# Patient Record
Sex: MALE | Race: WHITE | NOT HISPANIC OR LATINO | Employment: OTHER | URBAN - NONMETROPOLITAN AREA
[De-identification: names, ages, dates, MRNs, and addresses within clinical notes are randomized per-mention and may not be internally consistent; named-entity substitution may affect disease eponyms.]

---

## 2018-01-18 ENCOUNTER — APPOINTMENT (OUTPATIENT)
Dept: GENERAL RADIOLOGY | Facility: HOSPITAL | Age: 48
End: 2018-01-18

## 2018-01-18 ENCOUNTER — HOSPITAL ENCOUNTER (EMERGENCY)
Facility: HOSPITAL | Age: 48
Discharge: HOME | End: 2018-01-18
Attending: EMERGENCY MEDICINE | Admitting: EMERGENCY MEDICINE

## 2018-01-18 ENCOUNTER — APPOINTMENT (OUTPATIENT)
Dept: CT IMAGING | Facility: HOSPITAL | Age: 48
End: 2018-01-18

## 2018-01-18 VITALS
DIASTOLIC BLOOD PRESSURE: 106 MMHG | HEIGHT: 71 IN | BODY MASS INDEX: 26.6 KG/M2 | WEIGHT: 190 LBS | OXYGEN SATURATION: 96 % | RESPIRATION RATE: 16 BRPM | HEART RATE: 93 BPM | SYSTOLIC BLOOD PRESSURE: 140 MMHG | TEMPERATURE: 98.2 F

## 2018-01-18 DIAGNOSIS — S16.1XXA CERVICAL STRAIN, ACUTE, INITIAL ENCOUNTER: ICD-10-CM

## 2018-01-18 DIAGNOSIS — S39.012A LUMBAR STRAIN, INITIAL ENCOUNTER: ICD-10-CM
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
## 2018-01-18 DIAGNOSIS — S29.019A STRAIN OF THORACIC SPINE, INITIAL ENCOUNTER: ICD-10-CM

## 2018-01-18 PROCEDURE — 72128 CT CHEST SPINE W/O DYE: CPT

## 2018-01-18 PROCEDURE — 73030 X-RAY EXAM OF SHOULDER: CPT

## 2018-01-18 PROCEDURE — 72125 CT NECK SPINE W/O DYE: CPT

## 2018-01-18 PROCEDURE — 72131 CT LUMBAR SPINE W/O DYE: CPT

## 2018-01-18 PROCEDURE — 99283 EMERGENCY DEPT VISIT LOW MDM: CPT

## 2018-01-18 PROCEDURE — 70450 CT HEAD/BRAIN W/O DYE: CPT

## 2018-01-18 RX ORDER — HYDROCODONE BITARTRATE AND ACETAMINOPHEN 5; 325 MG/1; MG/1
1 TABLET ORAL 4 TIMES DAILY PRN
Qty: 5 TABLET | Refills: 0 | Status: SHIPPED | OUTPATIENT
Start: 2018-01-18

## 2018-01-18 RX ORDER — MORPHINE SULFATE 4 MG/ML
4 INJECTION, SOLUTION INTRAMUSCULAR; INTRAVENOUS ONCE
Status: DISCONTINUED | OUTPATIENT
Start: 2018-01-18 | End: 2018-01-18 | Stop reason: HOSPADM

## 2018-01-18 RX ORDER — LORAZEPAM 0.5 MG/1
1 TABLET ORAL ONCE
Status: COMPLETED | OUTPATIENT
Start: 2018-01-18 | End: 2018-01-18

## 2018-01-18 RX ORDER — CYCLOBENZAPRINE HCL 10 MG
10 TABLET ORAL 3 TIMES DAILY PRN
Qty: 15 TABLET | Refills: 0 | Status: SHIPPED | OUTPATIENT
Start: 2018-01-18

## 2018-01-18 RX ADMIN — LORAZEPAM 1 MG: 0.5 TABLET ORAL at 20:13
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
## 2021-10-19 ENCOUNTER — APPOINTMENT (OUTPATIENT)
Dept: GENERAL RADIOLOGY | Facility: HOSPITAL | Age: 51
End: 2021-10-19

## 2021-10-19 ENCOUNTER — APPOINTMENT (OUTPATIENT)
Dept: CT IMAGING | Facility: HOSPITAL | Age: 51
End: 2021-10-19

## 2021-10-19 ENCOUNTER — HOSPITAL ENCOUNTER (EMERGENCY)
Facility: HOSPITAL | Age: 51
Discharge: HOME | End: 2021-10-19
Attending: EMERGENCY MEDICINE | Admitting: EMERGENCY MEDICINE

## 2021-10-19 VITALS
BODY MASS INDEX: 28 KG/M2 | OXYGEN SATURATION: 97 % | WEIGHT: 200 LBS | RESPIRATION RATE: 18 BRPM | SYSTOLIC BLOOD PRESSURE: 118 MMHG | DIASTOLIC BLOOD PRESSURE: 89 MMHG | TEMPERATURE: 99 F | HEIGHT: 71 IN | HEART RATE: 87 BPM

## 2021-10-19 DIAGNOSIS — R07.9 CHEST PAIN IN ADULT: Primary | ICD-10-CM

## 2021-10-19 DIAGNOSIS — F11.20 NARCOTIC DEPENDENCE (HCC): ICD-10-CM

## 2021-10-19 DIAGNOSIS — G89.29 OTHER CHRONIC PAIN: ICD-10-CM

## 2021-10-19 DIAGNOSIS — I42.9 CARDIOMYOPATHY, UNSPECIFIED TYPE (HCC): ICD-10-CM

## 2021-10-19 LAB
ALBUMIN SERPL-MCNC: 3.2 G/DL (ref 3.5–5.2)
ALBUMIN/GLOB SERPL: 0.9 G/DL
ALP SERPL-CCNC: 40 U/L (ref 39–117)
ALT SERPL W P-5'-P-CCNC: 10 U/L (ref 1–41)
AMPHET+METHAMPHET UR QL: NEGATIVE
ANION GAP SERPL CALCULATED.3IONS-SCNC: 10.1 MMOL/L (ref 5–15)
APTT PPP: 28.7 SECONDS (ref 22.7–35.4)
AST SERPL-CCNC: 24 U/L (ref 1–40)
BARBITURATES UR QL SCN: NEGATIVE
BASOPHILS # BLD AUTO: 0.04 10*3/MM3 (ref 0–0.2)
BASOPHILS NFR BLD AUTO: 0.5 % (ref 0–1.5)
BENZODIAZ UR QL SCN: NEGATIVE
BILIRUB SERPL-MCNC: 0.9 MG/DL (ref 0–1.2)
BUN SERPL-MCNC: 8 MG/DL (ref 6–20)
BUN/CREAT SERPL: 8.2 (ref 7–25)
CALCIUM SPEC-SCNC: 8.3 MG/DL (ref 8.6–10.5)
CANNABINOIDS SERPL QL: NEGATIVE
CHLORIDE SERPL-SCNC: 92 MMOL/L (ref 98–107)
CO2 SERPL-SCNC: 31.9 MMOL/L (ref 22–29)
COCAINE UR QL: NEGATIVE
CREAT SERPL-MCNC: 0.98 MG/DL (ref 0.76–1.27)
DEPRECATED RDW RBC AUTO: 44.4 FL (ref 37–54)
EOSINOPHIL # BLD AUTO: 0.03 10*3/MM3 (ref 0–0.4)
EOSINOPHIL NFR BLD AUTO: 0.4 % (ref 0.3–6.2)
ERYTHROCYTE [DISTWIDTH] IN BLOOD BY AUTOMATED COUNT: 14.1 % (ref 12.3–15.4)
ETHANOL BLD-MCNC: <10 MG/DL (ref 0–10)
ETHANOL UR QL: <0.01 %
GFR SERPL CREATININE-BSD FRML MDRD: 80 ML/MIN/1.73
GLOBULIN UR ELPH-MCNC: 3.4 GM/DL
GLUCOSE SERPL-MCNC: 137 MG/DL (ref 65–99)
HCT VFR BLD AUTO: 34.3 % (ref 37.5–51)
HGB BLD-MCNC: 11.8 G/DL (ref 13–17.7)
IMM GRANULOCYTES # BLD AUTO: 0.02 10*3/MM3 (ref 0–0.05)
IMM GRANULOCYTES NFR BLD AUTO: 0.2 % (ref 0–0.5)
INR PPP: 1.91 (ref 0.9–1.1)
LIPASE SERPL-CCNC: 5 U/L (ref 13–60)
LYMPHOCYTES # BLD AUTO: 1.25 10*3/MM3 (ref 0.7–3.1)
LYMPHOCYTES NFR BLD AUTO: 15.3 % (ref 19.6–45.3)
MAGNESIUM SERPL-MCNC: 1.7 MG/DL (ref 1.6–2.6)
MCH RBC QN AUTO: 30.3 PG (ref 26.6–33)
MCHC RBC AUTO-ENTMCNC: 34.4 G/DL (ref 31.5–35.7)
MCV RBC AUTO: 87.9 FL (ref 79–97)
METHADONE UR QL SCN: NEGATIVE
MONOCYTES # BLD AUTO: 1.07 10*3/MM3 (ref 0.1–0.9)
MONOCYTES NFR BLD AUTO: 13.1 % (ref 5–12)
NEUTROPHILS NFR BLD AUTO: 5.77 10*3/MM3 (ref 1.7–7)
NEUTROPHILS NFR BLD AUTO: 70.5 % (ref 42.7–76)
NRBC BLD AUTO-RTO: 0 /100 WBC (ref 0–0.2)
OPIATES UR QL: POSITIVE
OXYCODONE UR QL SCN: POSITIVE
PLATELET # BLD AUTO: 221 10*3/MM3 (ref 140–450)
PMV BLD AUTO: 10.6 FL (ref 6–12)
POTASSIUM SERPL-SCNC: 3.3 MMOL/L (ref 3.5–5.2)
PROT SERPL-MCNC: 6.6 G/DL (ref 6–8.5)
PROTHROMBIN TIME: 21.6 SECONDS (ref 11.7–14.2)
RBC # BLD AUTO: 3.9 10*6/MM3 (ref 4.14–5.8)
SARS-COV-2 RNA RESP QL NAA+PROBE: NOT DETECTED
SODIUM SERPL-SCNC: 134 MMOL/L (ref 136–145)
TROPONIN T SERPL-MCNC: <0.01 NG/ML (ref 0–0.03)
TROPONIN T SERPL-MCNC: <0.01 NG/ML (ref 0–0.03)
WBC # BLD AUTO: 8.18 10*3/MM3 (ref 3.4–10.8)

## 2021-10-19 PROCEDURE — 80307 DRUG TEST PRSMV CHEM ANLYZR: CPT | Performed by: EMERGENCY MEDICINE

## 2021-10-19 PROCEDURE — 85610 PROTHROMBIN TIME: CPT | Performed by: EMERGENCY MEDICINE

## 2021-10-19 PROCEDURE — C9803 HOPD COVID-19 SPEC COLLECT: HCPCS | Performed by: EMERGENCY MEDICINE

## 2021-10-19 PROCEDURE — 82077 ASSAY SPEC XCP UR&BREATH IA: CPT | Performed by: EMERGENCY MEDICINE

## 2021-10-19 PROCEDURE — 96365 THER/PROPH/DIAG IV INF INIT: CPT

## 2021-10-19 PROCEDURE — U0003 INFECTIOUS AGENT DETECTION BY NUCLEIC ACID (DNA OR RNA); SEVERE ACUTE RESPIRATORY SYNDROME CORONAVIRUS 2 (SARS-COV-2) (CORONAVIRUS DISEASE [COVID-19]), AMPLIFIED PROBE TECHNIQUE, MAKING USE OF HIGH THROUGHPUT TECHNOLOGIES AS DESCRIBED BY CMS-2020-01-R: HCPCS | Performed by: EMERGENCY MEDICINE

## 2021-10-19 PROCEDURE — 0 IOPAMIDOL PER 1 ML: Performed by: EMERGENCY MEDICINE

## 2021-10-19 PROCEDURE — 93005 ELECTROCARDIOGRAM TRACING: CPT | Performed by: EMERGENCY MEDICINE

## 2021-10-19 PROCEDURE — 99284 EMERGENCY DEPT VISIT MOD MDM: CPT

## 2021-10-19 PROCEDURE — U0005 INFEC AGEN DETEC AMPLI PROBE: HCPCS | Performed by: EMERGENCY MEDICINE

## 2021-10-19 PROCEDURE — 93010 ELECTROCARDIOGRAM REPORT: CPT | Performed by: INTERNAL MEDICINE

## 2021-10-19 PROCEDURE — 85730 THROMBOPLASTIN TIME PARTIAL: CPT | Performed by: EMERGENCY MEDICINE

## 2021-10-19 PROCEDURE — 80053 COMPREHEN METABOLIC PANEL: CPT | Performed by: EMERGENCY MEDICINE

## 2021-10-19 PROCEDURE — 83690 ASSAY OF LIPASE: CPT | Performed by: EMERGENCY MEDICINE

## 2021-10-19 PROCEDURE — 96366 THER/PROPH/DIAG IV INF ADDON: CPT

## 2021-10-19 PROCEDURE — 71045 X-RAY EXAM CHEST 1 VIEW: CPT

## 2021-10-19 PROCEDURE — 85025 COMPLETE CBC W/AUTO DIFF WBC: CPT | Performed by: EMERGENCY MEDICINE

## 2021-10-19 PROCEDURE — 71275 CT ANGIOGRAPHY CHEST: CPT

## 2021-10-19 PROCEDURE — 84484 ASSAY OF TROPONIN QUANT: CPT | Performed by: EMERGENCY MEDICINE

## 2021-10-19 PROCEDURE — 83735 ASSAY OF MAGNESIUM: CPT | Performed by: EMERGENCY MEDICINE

## 2021-10-19 RX ORDER — NITROGLYCERIN 20 MG/100ML
5-200 INJECTION INTRAVENOUS
Status: DISCONTINUED | OUTPATIENT
Start: 2021-10-19 | End: 2021-10-19 | Stop reason: HOSPADM

## 2021-10-19 RX ORDER — SODIUM CHLORIDE 0.9 % (FLUSH) 0.9 %
10 SYRINGE (ML) INJECTION AS NEEDED
Status: DISCONTINUED | OUTPATIENT
Start: 2021-10-19 | End: 2021-10-19 | Stop reason: HOSPADM

## 2021-10-19 RX ADMIN — IOPAMIDOL 85 ML: 755 INJECTION, SOLUTION INTRAVENOUS at 17:28

## 2021-10-19 RX ADMIN — SODIUM CHLORIDE 250 ML: 9 INJECTION, SOLUTION INTRAVENOUS at 16:18

## 2021-10-19 RX ADMIN — NITROGLYCERIN 5 MCG/MIN: 20 INJECTION INTRAVENOUS at 15:00

## 2021-10-19 NOTE — ED NOTES
Arrival to [facility] c/o chest pain given 4  Baby aspirin  And 2 sl nitro by EMS     Toña Benavides RN  10/19/21 1400

## 2021-10-19 NOTE — ED PROVIDER NOTES
" EMERGENCY DEPARTMENT ENCOUNTER    Room Number:  23/23  Date of encounter:  10/19/2021  PCP: Amaris Jensen APRN  Historian: Pt    Patient was placed in face mask during triage process. Patient was wearing facemask when I entered the room and throughout our encounter. I wore full protective equipment throughout this patient encounter including a face mask, eye protection, and gloves. Hand hygiene was performed before donning protective equipment and again following doffing of PPE after leaving the room.    HPI:  Chief Complaint: Chest pain  A complete HPI/ROS/PMH/PSH/SH/FH are unobtainable due to: Rather selective historian  Context: Luis F Elliott is a 51 y.o. male who presents to the ED c/o chest discomfort.  He notes that he spent all day today but cannot and/or will not identify specific onset time.  He notes that it is in his chest and throughout his entire chest.  \"It feels like a heart attack.\"  He said there is some burning associated with it as well.  No nausea or vomiting.  He does feel little bit of short of breath.  He continues to smoke.  He repeatedly notes to the  that he would like to stop speaking by sign because it hurt so much until he receives some morphine for his pain.  He received 2 nitroglycerin in route which took his pain from an 8 to a 7.  He also received aspirin prior to arrival.  No other clear exacerbating or relieving factors identified  Patient has an established history at [facility] in [facility] and was in fact just discharged from there 2 days ago.        MEDICAL HISTORY REVIEW    [facility] Discharge Summary:  Admit Date: 9/29/2021  Discharge Date: 10/17/2021    Admitting Physician: Delvin Bradford DO  Discharging Physician: DEWEY Chapman    Reason for Hospitalization:  Active Hospital Problems  Scrotal edema  Constipation  Vancomycin-induced nephrotoxicity  Hyperkalemia  *Cellulitis  PVD " (peripheral vascular disease) (HCC)  Gangrene of toe of right foot (HCC)  Cellulitis of right lower extremity  COPD (chronic obstructive pulmonary disease) (HCC)  PAD (peripheral artery disease) (HCC)  Tobacco abuse  Coronary artery disease involving native heart with unstable angina pectoris (HCC)  Heart failure with reduced ejection fraction (HCC)  Bilateral deafness  Mild nonproliferative diabetic retinopathy of both eyes without macular edema associated with type 2 diabetes mellitus (HCC)  Chronic hepatitis C without hepatic coma (HCC)  HTN (hypertension)  Chronic systolic congestive heart failure (HCC)  GOLD (acute kidney injury) (HCC)  Type 2 diabetes mellitus with neurologic complication, with long-term current use of insulin (HCC)  Hx of substance abuse (HCC)    -------------------------------------------------------    Procedure Note - Eleazar Rob MD - 10/08/2021 2:02 PM EDT    Formatting of this note is different from the original.  [facility]  [facility]   Consultation Note  HFrEF  -Etiology: ICM  -SCD Prevention: AICD, dual chamber    -CXR: stable chest  -TTE (4/24/21): EF 15-20%, LVDD 6.2 cm, mild MR, mild to mod TR, appears no thrombus with definity  -TTE (4/8/20): EF 20-25%  -TTE (12/9/19): EF 20-25%, LVEDD 5.6 cm  -Cardiac PET (12/11/19): Apical- periapical defect. Diminished activity in a small area of the proximal lateral wall.  -on Lisinopril 10 mg daily, Metoprolol Succinate 100 mg daily, Torsemide 40 mg daily     ASHD  -LHC (2/7/20)  LAD with rt to lt collaterals, PCI of circ and OM with BILLY, patent stent RCA  -CTS not surgical candidate (12/2019)  -Inferior STEMI 2015, s/p PCI with BILLY  -on atorvastatin, Plavix, (not on ASA as he is on Plavix and Eliquis)  Coronary angiography: 2/2020     Left Anterior Descending   The vessel is small. There is mild diffuse disease throughout the vessel.   Collaterals   Dist LAD filled by  collaterals from RPDA.       Mid LAD lesion is 100% stenosed.   First Diagonal Branch   The vessel is moderate in size. The vessel exhibits minimal luminal irregularities.   Second Diagonal Branch   Collaterals   2nd Diag filled by collaterals from RPDA.       Left Circumflex   Ost Cx to Prox Cx lesion is 60% stenosed with 99% stenosed side branch in 1st Mrg. The lesion is thrombotic, eccentric, thrombotic, concentric and irregular.   Second Obtuse Marginal Branch   The vessel is moderate in size. The vessel exhibits minimal luminal irregularities.   Right Coronary Artery   The vessel is moderate in size. There is mild diffuse disease throughout the vessel. This vessel is mildly tortuous. This vessel is mildly calcified.   Ost RCA to Prox RCA lesion is 40% stenosed.   Right Posterior Descending Artery   There is mild diffuse disease throughout the vessel.   RPDA lesion is 60% stenosed.   Right Posterior Atrioventricular Artery   The vessel is small. There is mild diffuse disease throughout the vessel.   RPAV lesion is 90% stenosed.      Ostial circumflex to prox circ with side branch OM1 treated with BILLY    PAST MEDICAL HISTORY  Active Ambulatory Problems     Diagnosis Date Noted   • No Active Ambulatory Problems     Resolved Ambulatory Problems     Diagnosis Date Noted   • No Resolved Ambulatory Problems     Past Medical History:   Diagnosis Date   • Deaf    • Diabetes mellitus (HCC)    • Hyperlipidemia    • Hypertension    • Injury of back    • Myocardial infarction (HCC)    • Pneumonia          PAST SURGICAL HISTORY  Past Surgical History:   Procedure Laterality Date   • BACK SURGERY     • CARDIAC CATHETERIZATION     • CORONARY ANGIOPLASTY WITH STENT PLACEMENT           FAMILY HISTORY  History reviewed. No pertinent family history.      SOCIAL HISTORY  Social History     Socioeconomic History   • Marital status: Single   Tobacco Use   • Smoking status: Heavy Tobacco Smoker     Packs/day: 0.50     Types:  Cigarettes   • Smokeless tobacco: Current User     Types: Chew   Substance and Sexual Activity   • Alcohol use: No   • Drug use: No     Comment: NARCOTICS         ALLERGIES  Patient has no known allergies.        REVIEW OF SYSTEMS  Review of Systems     All systems reviewed and negative except for those discussed in HPI.       PHYSICAL EXAM    I have reviewed the triage vital signs and nursing notes.    ED Triage Vitals   Temp Heart Rate Resp BP SpO2   10/19/21 1406 10/19/21 1403 10/19/21 1403 10/19/21 1403 10/19/21 1406   99 °F (37.2 °C) 97 20 110/85 95 %      Temp src Heart Rate Source Patient Position BP Location FiO2 (%)   10/19/21 1406 10/19/21 1403 10/19/21 1403 10/19/21 1403 --   Tympanic Monitor Lying Right arm          Physical Exam    Physical Exam   Constitutional: No distress.  Smells strongly of cigarette smoke  HENT:  Head: Normocephalic and atraumatic.   Oropharynx: Mucous membranes are moist.   Eyes: No scleral icterus. No conjunctival pallor.  Neck: Painless range of motion noted. Neck supple.   Cardiovascular: Normal rate, regular rhythm and intact distal pulses.  Pulmonary/Chest: No respiratory distress.  Diminished throughout   abdominal: Soft. There is no tenderness. There is no rebound and no guarding.   Musculoskeletal: Moves all extremities equally.  Severe peripheral vascular disease with chronic changes.  Recent postop changes right foot consistent with history of toe amputation.    Neurological: Alert.  Baseline strength and sensation noted.   Skin: Skin is pink, warm, and dry. No pallor.   Psychiatric: Mood and affect normal.   Nursing note and vitals reviewed.    LAB RESULTS  Recent Results (from the past 24 hour(s))   ECG 12 Lead    Collection Time: 10/19/21  2:02 PM   Result Value Ref Range    QT Interval 396 ms   Comprehensive Metabolic Panel    Collection Time: 10/19/21  2:29 PM    Specimen: Blood   Result Value Ref Range    Glucose 137 (H) 65 - 99 mg/dL    BUN 8 6 - 20 mg/dL     Creatinine 0.98 0.76 - 1.27 mg/dL    Sodium 134 (L) 136 - 145 mmol/L    Potassium 3.3 (L) 3.5 - 5.2 mmol/L    Chloride 92 (L) 98 - 107 mmol/L    CO2 31.9 (H) 22.0 - 29.0 mmol/L    Calcium 8.3 (L) 8.6 - 10.5 mg/dL    Total Protein 6.6 6.0 - 8.5 g/dL    Albumin 3.20 (L) 3.50 - 5.20 g/dL    ALT (SGPT) 10 1 - 41 U/L    AST (SGOT) 24 1 - 40 U/L    Alkaline Phosphatase 40 39 - 117 U/L    Total Bilirubin 0.9 0.0 - 1.2 mg/dL    eGFR Non African Amer 80 >60 mL/min/1.73    Globulin 3.4 gm/dL    A/G Ratio 0.9 g/dL    BUN/Creatinine Ratio 8.2 7.0 - 25.0    Anion Gap 10.1 5.0 - 15.0 mmol/L   Lipase    Collection Time: 10/19/21  2:29 PM    Specimen: Blood   Result Value Ref Range    Lipase 5 (L) 13 - 60 U/L   Protime-INR    Collection Time: 10/19/21  2:29 PM    Specimen: Blood   Result Value Ref Range    Protime 21.6 (H) 11.7 - 14.2 Seconds    INR 1.91 (H) 0.90 - 1.10   aPTT    Collection Time: 10/19/21  2:29 PM    Specimen: Blood   Result Value Ref Range    PTT 28.7 22.7 - 35.4 seconds   Ethanol    Collection Time: 10/19/21  2:29 PM    Specimen: Blood   Result Value Ref Range    Ethanol <10 0 - 10 mg/dL    Ethanol % <0.010 %   Troponin    Collection Time: 10/19/21  2:29 PM    Specimen: Blood   Result Value Ref Range    Troponin T <0.010 0.000 - 0.030 ng/mL   CBC Auto Differential    Collection Time: 10/19/21  2:29 PM    Specimen: Blood   Result Value Ref Range    WBC 8.18 3.40 - 10.80 10*3/mm3    RBC 3.90 (L) 4.14 - 5.80 10*6/mm3    Hemoglobin 11.8 (L) 13.0 - 17.7 g/dL    Hematocrit 34.3 (L) 37.5 - 51.0 %    MCV 87.9 79.0 - 97.0 fL    MCH 30.3 26.6 - 33.0 pg    MCHC 34.4 31.5 - 35.7 g/dL    RDW 14.1 12.3 - 15.4 %    RDW-SD 44.4 37.0 - 54.0 fl    MPV 10.6 6.0 - 12.0 fL    Platelets 221 140 - 450 10*3/mm3    Neutrophil % 70.5 42.7 - 76.0 %    Lymphocyte % 15.3 (L) 19.6 - 45.3 %    Monocyte % 13.1 (H) 5.0 - 12.0 %    Eosinophil % 0.4 0.3 - 6.2 %    Basophil % 0.5 0.0 - 1.5 %    Immature Grans % 0.2 0.0 - 0.5 %    Neutrophils,  Absolute 5.77 1.70 - 7.00 10*3/mm3    Lymphocytes, Absolute 1.25 0.70 - 3.10 10*3/mm3    Monocytes, Absolute 1.07 (H) 0.10 - 0.90 10*3/mm3    Eosinophils, Absolute 0.03 0.00 - 0.40 10*3/mm3    Basophils, Absolute 0.04 0.00 - 0.20 10*3/mm3    Immature Grans, Absolute 0.02 0.00 - 0.05 10*3/mm3    nRBC 0.0 0.0 - 0.2 /100 WBC   Magnesium    Collection Time: 10/19/21  2:29 PM    Specimen: Blood   Result Value Ref Range    Magnesium 1.7 1.6 - 2.6 mg/dL   COVID-19,[facility] IN-HOUSE CEPHEID/[facility]UIDO NP SWAB IN TRANSPORT MEDIA 8-12 HR TAT - Swab, Nasopharynx    Collection Time: 10/19/21  2:38 PM    Specimen: Nasopharynx; Swab   Result Value Ref Range    COVID19 Not Detected Not Detected - Ref. Range   Urine Drug Screen - Urine, Clean Catch    Collection Time: 10/19/21  2:49 PM    Specimen: Urine, Clean Catch   Result Value Ref Range    Amphet/Methamphet, Screen Negative Negative    Barbiturates Screen, Urine Negative Negative    Benzodiazepine Screen, Urine Negative Negative    Cocaine Screen, Urine Negative Negative    Opiate Screen Positive (A) Negative    THC, Screen, Urine Negative Negative    Methadone Screen, Urine Negative Negative    Oxycodone Screen, Urine Positive (A) Negative   ECG 12 Lead    Collection Time: 10/19/21  3:30 PM   Result Value Ref Range    QT Interval 421 ms   Troponin    Collection Time: 10/19/21  5:33 PM    Specimen: Blood   Result Value Ref Range    Troponin T <0.010 0.000 - 0.030 ng/mL       Ordered the above labs and independently reviewed the results.        RADIOLOGY  XR Chest 1 View    Result Date: 10/19/2021  XR CHEST 1 VW-  HISTORY: Male who is 51 years-old,  chest pain  TECHNIQUE: Frontal view of the chest  COMPARISON: None available  FINDINGS: The heart size is borderline. Left-sided pacemaker and cardiac leads are seen. Pulmonary vasculature is unremarkable. No focal pulmonary consolidation, pleural effusion, or pneumothorax. No acute osseous process.      No focal pulmonary consolidation.  Borderline heart size. Follow-up as clinical indications persist.  This report was finalized on 10/19/2021 2:58 PM by Dr. Sonny Granados M.D.        I ordered the above noted radiological studies. Reviewed by me and discussed with radiologist.  See dictation for official radiology interpretation.      PROCEDURES    Procedures        MEDICATIONS GIVEN IN ER    Medications   sodium chloride 0.9 % flush 10 mL (has no administration in time range)   nitroglycerin (TRIDIL) 200 mcg/ml infusion (5 mcg/min Intravenous Currently Infusing 10/19/21 1704)   sodium chloride 0.9 % bolus 250 mL (0 mL Intravenous Stopped 10/19/21 1704)   iopamidol (ISOVUE-370) 76 % injection 100 mL (85 mL Intravenous Given 10/19/21 1728)         PROGRESS, DATA ANALYSIS, CONSULTS, AND MEDICAL DECISION MAKING    My differential diagnosis for chest pain includes but is not limited to:  Muscle strain, costochondritis, myositis, pleurisy, rib fracture, intercostal neuritis, herpes zoster, tumor, myocardial infarction, coronary syndrome, unstable angina, angina, aortic dissection, mitral valve prolapse, pericarditis, palpitations, pulmonary embolus, pneumonia, pneumothorax, lung cancer, GERD, esophagitis, esophageal spasm      All labs have been independently reviewed by me.  All radiology studies have been reviewed by me and discussed with radiologist dictating the report.   EKG's independently viewed and interpreted by me.  Discussion below represents my analysis of pertinent findings related to patient's condition, differential diagnosis, treatment plan and final disposition.      ED Course as of 10/19/21 1833   Tue Oct 19, 2021   1433 EKG           EKG time: 1402  Rhythm/Rate: Sinus with frequent ectopy; ventricular rate 100  P waves and AL: VU within normal limits  QRS, axis: Wide-complex with poor R wave progression  ST and T waves: No clear evidence of STEMI; QTC appears prolonged    Interpreted Contemporaneously by me, independently  viewed  Comparison: Recent comparison Unavailable   [RS]   1521 Troponin T: <0.010 [RS]   1521 Patient planes of 9 out of 10 ongoing chest pain.  We will repeat the EKG.  Nitro drip does not help this pain but has dropped his pressure slightly.  Small fluid bolus ordered.  Patient again asking for morphine. [RS]   1522 eGFR Non  Am: 80  Plan CTA chest [RS]   1535 EKG   #2      EKG time: 1530  Rhythm/Rate: Sinus with frequent ventricular and atrial ectopy; 90  P waves and WI: VU within normal limits  QRS, axis: Wide-complex  ST and T waves: ST/T wave repolarization abnormality with no evidence of STEMI    Interpreted Contemporaneously by me, independently viewed  Comparison: No significant changes compared to same date 1402   [RS]   1543 CONSULT        Provider: [facility]    Discussion: She is very familiar with this patient.  I reviewed his history to this point today as well as response to therapies.  We talked about significant recent work-up.  She notes that he has an appointment on 10/21/2021 in their office.  She is agreeable to following up in the office assuming negative second troponin and CTA is unrevealing for acute pathology.    Agreeable c treatment and planned disposition.         [RS]   1749 Case management was able to contact family. The patient is currently living with his daughter who reports that he uses methamphetamine in front of her children. She reports that he told her today he was going to the ER to get some narcotics. He is familiar with his behavior and agreeable with our plan for discharge and outpatient follow-up. [RS]   1803 CTA chest reviewed with radiologist, Dr. Zhao.  No PE, no acute aortic pathology appreciated.  There is small pleural effusion and some mild pulmonary edema.  No other acute life threats identified. [RS]   1806 Patient is irate, continues to demand narcotics. He is invited to return to his prior pain specialist as  needed for his uncontrolled pain. [RS]   1827 Troponin T: <0.010  Negative delta troponin. No ischemic change on EKG. No acute abnormalities on chest CT. Patient will be discharged at this time with plan for outpatient follow-up. [RS]      ED Course User Index  [RS] Collins Loredo MD       AS OF 18:33 EDT VITALS:    BP - 118/89  HR - 87  TEMP - 99 °F (37.2 °C) (Tympanic)  O2 SATS - 97%        DIAGNOSIS  Final diagnoses:   Chest pain in adult   Cardiomyopathy, unspecified type (HCC)   Narcotic dependence (HCC)   Other chronic pain         DISPOSITION  DISCHARGE    Patient discharged in stable condition.    Reviewed implications of results, diagnosis, meds, responsibility to follow up, warning signs and symptoms of possible worsening, potential complications and reasons to return to ER.    Patient/Family voiced understanding of above instructions.    Discussed plan for discharge, as there is no emergent indication for admission. Patient referred to primary care provider for regular health maintenance. Pt/family is agreeable and understands need for follow up and possible repeat testing.  Pt is aware that discharge does not mean that nothing is wrong but it indicates no emergency is present that requires admission and they must continue care with follow-up as given below or physician of their choice.     FOLLOW-UP  Amaris Jensen, APRN  [address]  [facility] [id number] [phone]    Schedule an appointment as soon as possible for a visit   As needed    Go to your cardiology appointment in 2 days as scheduled               Medication List      No changes were made to your prescriptions during this visit.            Collins Loredo MD  10/19/21 1584

## 2021-10-19 NOTE — ED NOTES
This RN wearing all appropriate PPE during patient encounter. Hand hygiene performed before and during entering room.       Carlee Olsen, RN  10/19/21 6384

## 2021-10-19 NOTE — ED NOTES
This RN wearing all appropriate PPE during patient encounter. Hand hygiene performed before and during entering room.       Carlee Olsen, TINA  10/19/21 4184

## 2021-10-19 NOTE — ED NOTES
This RN wearing all appropriate PPE during patient encounter. Hand hygiene performed before and during entering room.       Carlee Olsen, RN  10/19/21 6870

## 2021-10-20 LAB
QT INTERVAL: 396 MS
QT INTERVAL: 421 MS

## 2021-10-20 NOTE — CASE MANAGEMENT/SOCIAL WORK
Patient waiting for ride in ER waiting room. He has been irate and causing a scene in the waiting room. Spoke with patient's step-daughter, Caroline, on the phone regarding transportation home upon discharge from the ED [facility]. Per step-daughter, they have no money with which to pay for a cab to get him back to [facility], where they live. Informed her that [facility] is too far, and we cannot provide him a cab that far away. They will not have money available until tomorrow. I told her that I could send the patient to [facility], but that the patient could not stay in the hospital, as he has been discharged. Caroline voices understanding and states that they have contacted another family member who is willing to pay for a hotel in [place] ([place]), where they will pick him up tomorrow, when they have gas money. Patient provided with cab voucher to [employer]. LEONARD AndrewsN RN

## 2021-10-21 ENCOUNTER — APPOINTMENT (OUTPATIENT)
Dept: CT IMAGING | Facility: HOSPITAL | Age: 51
End: 2021-10-21

## 2021-10-21 ENCOUNTER — APPOINTMENT (OUTPATIENT)
Dept: GENERAL RADIOLOGY | Facility: HOSPITAL | Age: 51
End: 2021-10-21

## 2021-10-21 ENCOUNTER — HOSPITAL ENCOUNTER (EMERGENCY)
Facility: HOSPITAL | Age: 51
Discharge: HOME | End: 2021-10-21
Attending: EMERGENCY MEDICINE | Admitting: EMERGENCY MEDICINE

## 2021-10-21 VITALS
DIASTOLIC BLOOD PRESSURE: 93 MMHG | WEIGHT: 193.5 LBS | OXYGEN SATURATION: 99 % | RESPIRATION RATE: 18 BRPM | TEMPERATURE: 97.8 F | HEIGHT: 71 IN | BODY MASS INDEX: 27.09 KG/M2 | SYSTOLIC BLOOD PRESSURE: 111 MMHG | HEART RATE: 98 BPM

## 2021-10-21 DIAGNOSIS — R10.9 ABDOMINAL PAIN, UNSPECIFIED ABDOMINAL LOCATION: ICD-10-CM

## 2021-10-21 DIAGNOSIS — R07.9 CHEST PAIN, UNSPECIFIED TYPE: Primary | ICD-10-CM

## 2021-10-21 DIAGNOSIS — Z76.5 DRUG-SEEKING BEHAVIOR: ICD-10-CM

## 2021-10-21 DIAGNOSIS — S30.1XXA CONTUSION OF ABDOMINAL WALL, INITIAL ENCOUNTER: ICD-10-CM

## 2021-10-21 LAB
ALBUMIN SERPL-MCNC: 3.1 G/DL (ref 3.5–5.2)
ALBUMIN/GLOB SERPL: 0.8 G/DL
ALP SERPL-CCNC: 50 U/L (ref 39–117)
ALT SERPL W P-5'-P-CCNC: 10 U/L (ref 1–41)
AMPHET+METHAMPHET UR QL: NEGATIVE
AMPHETAMINES UR QL: NEGATIVE
ANION GAP SERPL CALCULATED.3IONS-SCNC: 7.8 MMOL/L (ref 5–15)
AST SERPL-CCNC: 24 U/L (ref 1–40)
BACTERIA UR QL AUTO: ABNORMAL /HPF
BARBITURATES UR QL SCN: NEGATIVE
BASOPHILS # BLD AUTO: 0.05 10*3/MM3 (ref 0–0.2)
BASOPHILS NFR BLD AUTO: 0.6 % (ref 0–1.5)
BENZODIAZ UR QL SCN: NEGATIVE
BILIRUB SERPL-MCNC: 1.1 MG/DL (ref 0–1.2)
BILIRUB UR QL STRIP: NEGATIVE
BUN SERPL-MCNC: 10 MG/DL (ref 6–20)
BUN/CREAT SERPL: 10.9 (ref 7–25)
BUPRENORPHINE SERPL-MCNC: NEGATIVE NG/ML
CALCIUM SPEC-SCNC: 8.9 MG/DL (ref 8.6–10.5)
CANNABINOIDS SERPL QL: NEGATIVE
CHLORIDE SERPL-SCNC: 90 MMOL/L (ref 98–107)
CLARITY UR: CLEAR
CO2 SERPL-SCNC: 33.2 MMOL/L (ref 22–29)
COCAINE UR QL: NEGATIVE
COLOR UR: ABNORMAL
CREAT SERPL-MCNC: 0.92 MG/DL (ref 0.76–1.27)
DEPRECATED RDW RBC AUTO: 48.5 FL (ref 37–54)
EOSINOPHIL # BLD AUTO: 0.03 10*3/MM3 (ref 0–0.4)
EOSINOPHIL NFR BLD AUTO: 0.3 % (ref 0.3–6.2)
ERYTHROCYTE [DISTWIDTH] IN BLOOD BY AUTOMATED COUNT: 14.8 % (ref 12.3–15.4)
ETHANOL BLD-MCNC: <10 MG/DL (ref 0–10)
ETHANOL UR QL: <0.01 %
GFR SERPL CREATININE-BSD FRML MDRD: 86 ML/MIN/1.73
GLOBULIN UR ELPH-MCNC: 3.9 GM/DL
GLUCOSE SERPL-MCNC: 172 MG/DL (ref 65–99)
GLUCOSE UR STRIP-MCNC: NEGATIVE MG/DL
HCT VFR BLD AUTO: 39.9 % (ref 37.5–51)
HGB BLD-MCNC: 12.9 G/DL (ref 13–17.7)
HGB UR QL STRIP.AUTO: ABNORMAL
HYALINE CASTS UR QL AUTO: ABNORMAL /LPF
IMM GRANULOCYTES # BLD AUTO: 0.04 10*3/MM3 (ref 0–0.05)
IMM GRANULOCYTES NFR BLD AUTO: 0.5 % (ref 0–0.5)
KETONES UR QL STRIP: NEGATIVE
LEUKOCYTE ESTERASE UR QL STRIP.AUTO: NEGATIVE
LIPASE SERPL-CCNC: 8 U/L (ref 13–60)
LYMPHOCYTES # BLD AUTO: 1.52 10*3/MM3 (ref 0.7–3.1)
LYMPHOCYTES NFR BLD AUTO: 17.1 % (ref 19.6–45.3)
MCH RBC QN AUTO: 29.1 PG (ref 26.6–33)
MCHC RBC AUTO-ENTMCNC: 32.3 G/DL (ref 31.5–35.7)
MCV RBC AUTO: 89.9 FL (ref 79–97)
METHADONE UR QL SCN: NEGATIVE
MONOCYTES # BLD AUTO: 1.14 10*3/MM3 (ref 0.1–0.9)
MONOCYTES NFR BLD AUTO: 12.8 % (ref 5–12)
NEUTROPHILS NFR BLD AUTO: 6.1 10*3/MM3 (ref 1.7–7)
NEUTROPHILS NFR BLD AUTO: 68.7 % (ref 42.7–76)
NITRITE UR QL STRIP: NEGATIVE
NRBC BLD AUTO-RTO: 0 /100 WBC (ref 0–0.2)
OPIATES UR QL: POSITIVE
OXYCODONE UR QL SCN: POSITIVE
PCP UR QL SCN: NEGATIVE
PH UR STRIP.AUTO: 5.5 [PH] (ref 4.5–8)
PLATELET # BLD AUTO: 230 10*3/MM3 (ref 140–450)
PMV BLD AUTO: 10.9 FL (ref 6–12)
POTASSIUM SERPL-SCNC: 4 MMOL/L (ref 3.5–5.2)
PROPOXYPH UR QL: NEGATIVE
PROT SERPL-MCNC: 7 G/DL (ref 6–8.5)
PROT UR QL STRIP: ABNORMAL
QT INTERVAL: 381 MS
RBC # BLD AUTO: 4.44 10*6/MM3 (ref 4.14–5.8)
RBC # UR: ABNORMAL /HPF
REF LAB TEST METHOD: ABNORMAL
SODIUM SERPL-SCNC: 131 MMOL/L (ref 136–145)
SP GR UR STRIP: 1.02 (ref 1–1.03)
SQUAMOUS #/AREA URNS HPF: ABNORMAL /HPF
TRICYCLICS UR QL SCN: NEGATIVE
TROPONIN T SERPL-MCNC: <0.01 NG/ML (ref 0–0.03)
TROPONIN T SERPL-MCNC: <0.01 NG/ML (ref 0–0.03)
UROBILINOGEN UR QL STRIP: ABNORMAL
WBC # BLD AUTO: 8.88 10*3/MM3 (ref 3.4–10.8)
WBC UR QL AUTO: ABNORMAL /HPF

## 2021-10-21 PROCEDURE — 71046 X-RAY EXAM CHEST 2 VIEWS: CPT

## 2021-10-21 PROCEDURE — 82077 ASSAY SPEC XCP UR&BREATH IA: CPT | Performed by: EMERGENCY MEDICINE

## 2021-10-21 PROCEDURE — 36415 COLL VENOUS BLD VENIPUNCTURE: CPT

## 2021-10-21 PROCEDURE — 74177 CT ABD & PELVIS W/CONTRAST: CPT

## 2021-10-21 PROCEDURE — 0 IOPAMIDOL PER 1 ML: Performed by: EMERGENCY MEDICINE

## 2021-10-21 PROCEDURE — 93010 ELECTROCARDIOGRAM REPORT: CPT | Performed by: INTERNAL MEDICINE

## 2021-10-21 PROCEDURE — 80306 DRUG TEST PRSMV INSTRMNT: CPT | Performed by: EMERGENCY MEDICINE

## 2021-10-21 PROCEDURE — 96360 HYDRATION IV INFUSION INIT: CPT

## 2021-10-21 PROCEDURE — 99284 EMERGENCY DEPT VISIT MOD MDM: CPT | Performed by: EMERGENCY MEDICINE

## 2021-10-21 PROCEDURE — 85025 COMPLETE CBC W/AUTO DIFF WBC: CPT | Performed by: EMERGENCY MEDICINE

## 2021-10-21 PROCEDURE — 84484 ASSAY OF TROPONIN QUANT: CPT | Performed by: EMERGENCY MEDICINE

## 2021-10-21 PROCEDURE — 99283 EMERGENCY DEPT VISIT LOW MDM: CPT

## 2021-10-21 PROCEDURE — 93005 ELECTROCARDIOGRAM TRACING: CPT | Performed by: EMERGENCY MEDICINE

## 2021-10-21 PROCEDURE — 80053 COMPREHEN METABOLIC PANEL: CPT | Performed by: EMERGENCY MEDICINE

## 2021-10-21 PROCEDURE — 96361 HYDRATE IV INFUSION ADD-ON: CPT

## 2021-10-21 PROCEDURE — 83690 ASSAY OF LIPASE: CPT | Performed by: EMERGENCY MEDICINE

## 2021-10-21 PROCEDURE — 81001 URINALYSIS AUTO W/SCOPE: CPT | Performed by: EMERGENCY MEDICINE

## 2021-10-21 RX ORDER — SODIUM CHLORIDE 9 MG/ML
250 INJECTION, SOLUTION INTRAVENOUS CONTINUOUS
Status: DISCONTINUED | OUTPATIENT
Start: 2021-10-21 | End: 2021-10-21 | Stop reason: HOSPADM

## 2021-10-21 RX ORDER — SODIUM CHLORIDE 0.9 % (FLUSH) 0.9 %
10 SYRINGE (ML) INJECTION AS NEEDED
Status: DISCONTINUED | OUTPATIENT
Start: 2021-10-21 | End: 2021-10-21 | Stop reason: HOSPADM

## 2021-10-21 RX ADMIN — IOPAMIDOL 100 ML: 755 INJECTION, SOLUTION INTRAVENOUS at 09:07

## 2021-10-21 RX ADMIN — SODIUM CHLORIDE 250 ML/HR: 9 INJECTION, SOLUTION INTRAVENOUS at 08:16

## 2021-10-21 NOTE — ED PROVIDER NOTES
Subjective     History provided by:  Patient, medical records and EMS personnel    History of Present Illness    · Chief complaint: Chest pain and abdominal pain    · Location: Anterior chest pain and left lower abdominal pain    · Quality/Severity: The patient does not articulate it stating only that it hurts.    · Timing/Onset: He does not articulate how long its been present.    · Modifying Factors: He does not reticulate any aggravating or relieving factors.    · Associated symptoms: He does not answer questions concerning associated symptoms.    · Narrative: The patient is a 51-year-old white male presents via EMS complaining of chest and abdominal pain.  He was seen at [facility] ER 2 days ago for chest pain at which time his EKG was noted to be in A. fib with interventricular conduction delay and right bundle branch block.  His cardiac enzymes were negative.  The physician there spoke with his cardiologist Mansi Velasco is single lives with heart failure clinic and the patient was discharged with a scheduled appointment to follow-up with her today.  The patient was noted there to repeatedly request pain medication.  He reportedly fell and has bruising on his left lower abdomen and complains of abdominal pain.  He has a history of cardiomyopathy and his last echocardiogram 4/8/2020 showed an ejection fraction of 20 to 25%.  A cardiac catheterization 2/7/2020 showed mid LAD occlusion with distal LAD being filled by collaterals.  He has stents in his obtuse marginal, circumflex and RCA.  The patient arrived very lethargic like he was under the influence of a depressant such as an opiate.  He then became more arousable, but would not answer direct specific questions concerning his chest and abdominal pain.  He repeatedly requests pain medication.  He denied taking any narcotics or other medications at home when I inquired for why he was so lethargic initially.  EMS reports that he was yelling at  "family members where he was staying on their arrival.  Patient has a history of medical noncompliance.    Review of Systems   Reason unable to perform ROS: Patient uncooperative.     Past Medical History:   Diagnosis Date   • Deaf    • Diabetes mellitus (HCC)    • Hyperlipidemia    • Hypertension    • Injury of back    • Myocardial infarction (HCC)    • Pneumonia      /93   Pulse 98   Temp 97.8 °F (36.6 °C) (Oral)   Resp 18   Ht 180.3 cm (71\")   Wt 87.8 kg (193 lb 8 oz)   SpO2 99%   BMI 26.99 kg/m²     Past Medical History:   Diagnosis Date   • Deaf    • Diabetes mellitus (HCC)    • Hyperlipidemia    • Hypertension    • Injury of back    • Myocardial infarction (HCC)    • Pneumonia        No Known Allergies    Past Surgical History:   Procedure Laterality Date   • BACK SURGERY     • CARDIAC CATHETERIZATION     • CORONARY ANGIOPLASTY WITH STENT PLACEMENT         History reviewed. No pertinent family history.    Social History     Socioeconomic History   • Marital status: Single   Tobacco Use   • Smoking status: Heavy Tobacco Smoker     Packs/day: 0.50     Types: Cigarettes   • Smokeless tobacco: Current User     Types: Chew   Substance and Sexual Activity   • Alcohol use: No   • Drug use: No     Comment: NARCOTICS           Objective   Physical Exam  Vitals and nursing note reviewed.   Constitutional:       General: He is not in acute distress.     Appearance: He is not ill-appearing, toxic-appearing or diaphoretic.      Comments: The patient was initially lethargic and required repeated stimulus sedation to answer questions.  He then became more aroused but was uncooperative with questioning.  Review of his vital signs: He is afebrile with a temperature 97.8, respirations normal 16 with a normal room air oxygen saturation of 99%, mildly tachycardic with a heart rate of 105, blood pressure normal 116/92.  He is poorly kept.   HENT:      Head: Normocephalic and atraumatic.      Nose: Nose normal.      " Mouth/Throat:      Mouth: Mucous membranes are moist.      Pharynx: Oropharynx is clear.   Eyes:      General: No scleral icterus.     Conjunctiva/sclera: Conjunctivae normal.      Pupils: Pupils are equal, round, and reactive to light.   Neck:      Vascular: No carotid bruit.   Cardiovascular:      Rate and Rhythm: Normal rate and regular rhythm.      Heart sounds: No murmur heard.      Pulmonary:      Effort: Pulmonary effort is normal.      Breath sounds: Normal breath sounds.   Abdominal:      General: Bowel sounds are normal.      Palpations: Abdomen is soft.      Tenderness: There is no abdominal tenderness. There is no right CVA tenderness, left CVA tenderness, guarding or rebound.      Comments: The patient has subacute purplish ecchymosis over the left lower quadrant.   Musculoskeletal:      Cervical back: Normal range of motion and neck supple. No tenderness.      Comments: Patient has a surgical dressing on the right foot.   Lymphadenopathy:      Cervical: No cervical adenopathy.   Skin:     General: Skin is warm and dry.      Capillary Refill: Capillary refill takes less than 2 seconds.   Neurological:      Comments: Patient initially lethargic consistent with being under the influence of a depression.  He is oriented to person.  He is uncooperative with further questioning.   Psychiatric:      Comments: Uncooperative behavior.         Procedures           ED Course  ED Course as of 10/21/21 1443   u Oct 21, 2021   0915 My interpretation of the patient's EKG tracing performed 8: 11 is atrial fibrillation with a ventricular response of 104, wide QRS complex due to interventricular conduction delay and right bundle branch block, Q waves in the anterior lateral leads suggesting old anterior lateral MI.  PVCs present.  No change in comparison to tracing 10/19/2021. [TP]   1963 The patient's chest x-ray was interpreted by me and the radiologist as asymmetric opacities in the upper lobes of the lung.  The  patient does not give history of symptoms consistent with pneumonia.  A new pacemaker was noted.  Note pleural effusion or pneumothorax was noted.  CT the abdomen and pelvis with IV contrast showed anasarca with diffuse soft tissue edema throughout the mesentery and bowel wall.  There is moderate cardiomegaly without a pericardial effusion.  Chronic renal scarring on the right.  Nothing acute on the abdominal CT. [TP]   1000 Review the patient's laboratory studies: His urine tox screen was positive for opiates and OxyContin in spite of him swearing he is not taking any pain medication.  Cardiac troponin was negative.  CMP has an elevated blood glucose of 172 and a slightly low sodium of 131 with a normal potassium of 4.0.  Renal and liver function test within normal limits.  Serum ethanol level was 0.  Lipase was normal.  CBC had a normal white count of 8.9 with a normal differential.  Hemoglobin slightly low at 12.9 with a normal hematocrit 39.9. [TP]   1001 The patient was administered IV normal saline at 250 cc/h while in the ER. [TP]   1037 Delta troponin is negative. [TP]   1124 The patient has complained of abdomen and chest pain I can find no acute pathology.  He displays drug-seeking behavior.  He will be instructed to follow-up with his heart failure clinic as [facility]. [TP]      ED Course User Index  [TP] Johny Contreras MD                                           MDM  Number of Diagnoses or Management Options  Abdominal pain, unspecified abdominal location  Chest pain, unspecified type  Contusion of abdominal wall, initial encounter  Drug-seeking behavior  Diagnosis management comments: Abdominal and chest pain are established problems.  Unclear if worsening or getting better.       Amount and/or Complexity of Data Reviewed  Clinical lab tests: ordered and reviewed  Tests in the radiology section of CPT®: ordered and reviewed  Tests in the medicine section of CPT®: ordered and  reviewed  Review and summarize past medical records: yes    Risk of Complications, Morbidity, and/or Mortality  Presenting problems: high  Diagnostic procedures: high  Management options: high  General comments: My differential diagnosis for abdominal pain includes but is not limited to:  Gastritis, gastroenteritis, peptic ulcer disease, GERD, esophageal perforation, acute appendicitis, mesenteric adenitis, Meckel’s diverticulum, epiploic appendagitis, diverticulitis, colon cancer, ulcerative colitis, Crohn’s disease, intussusception, small bowel obstruction, adhesions, ischemic bowel, perforated viscus, ileus, obstipation, biliary colic, cholecystitis, cholelithiasis, Wilfred-Wang [place], hepatitis, pancreatitis, common bile duct obstruction, cholangitis, bile leak, splenic trauma, splenic rupture, splenic infarction, splenic abscess, abdominal abscess, ascites, spontaneous bacterial peritonitis, hernia, UTI, cystitis, prostatitis, ureterolithiasis, urinary obstruction, AAA, myocardial infarction, pneumonia, cancer, porphyria, DKA, medications, sickle cell, viral syndrome, zoster   My differential diagnosis for chest pain includes but is not limited to:  Muscle strain, costochondritis, myositis, pleurisy, rib fracture, intercostal neuritis, herpes zoster, tumor, myocardial infarction, coronary syndrome, unstable angina, angina, aortic dissection, mitral valve prolapse, pericarditis, palpitations, pulmonary embolus, pneumonia, pneumothorax, lung cancer, GERD, esophagitis, esophageal spasm    Patient Progress  Patient progress: stable      Final diagnoses:   Chest pain, unspecified type   Abdominal pain, unspecified abdominal location   Contusion of abdominal wall, initial encounter   Drug-seeking behavior       ED Disposition  ED Disposition     ED Disposition Condition Comment    Discharge Stable           Follow-up with your heart failure clinic at [facility].    Schedule an appointment as soon as  possible for a visit   next available         Medication List      No changes were made to your prescriptions during this visit.         Labs Reviewed   COMPREHENSIVE METABOLIC PANEL - Abnormal; Notable for the following components:       Result Value    Glucose 172 (*)     Sodium 131 (*)     Chloride 90 (*)     CO2 33.2 (*)     Albumin 3.10 (*)     All other components within normal limits    Narrative:     GFR Normal >60  Chronic Kidney Disease <60  Kidney Failure <15     LIPASE - Abnormal; Notable for the following components:    Lipase 8 (*)     All other components within normal limits   URINALYSIS W/ MICROSCOPIC IF INDICATED (NO CULTURE) - Abnormal; Notable for the following components:    Color, UA Other (*)     Blood, UA Trace (*)     Protein,  mg/dL (2+) (*)     All other components within normal limits   URINE DRUG SCREEN - Abnormal; Notable for the following components:    Opiate Screen Positive (*)     Oxycodone Screen, Urine Positive (*)     All other components within normal limits    Narrative:     Urine drug screen results are to be used for medical purposes only.  They are not to be used for legal purposes such as employment testing.  Negative results do not necessarily mean the complete absence of a subtance, but rather that the result is less than the cutoff for that substance.  Positive results are unconfirmed and considered Preliminary Positive.  [facility] does not automatically confirm Postitive Unconfirmed results.  The physician may request (order) an Unconfirmed Positive result to be sent out for confirmation.      Negative Thresholds for Drugs Screened:    THC screen, urine                          50 ng/ml  Phenycyclidine (PCP), urine                25 ng/ml  Cocaine screen, urine                     150 ng/ml  Methamphetamine, urine                    500 ng/ml  Opiate screen, urine                      100 ng/ml  Amphetamine screen, urine                 500  ng/ml  Benzodiazepine screen, urine              150 ng/ml  Tricyclic Antidepressants screen, urine   300 ng/ml  Methadone screen, urine                   200 ng/ml  Barbiturates screen, urine                200 ng/ml  Oxycodone screen, urine                   100 ng/ml  Propoxyphene screen, urine                300 ng/ml  Buprenorphine screen, urine                10 ng/ml   CBC WITH AUTO DIFFERENTIAL - Abnormal; Notable for the following components:    Hemoglobin 12.9 (*)     Lymphocyte % 17.1 (*)     Monocyte % 12.8 (*)     Monocytes, Absolute 1.14 (*)     All other components within normal limits   URINALYSIS, MICROSCOPIC ONLY - Abnormal; Notable for the following components:    WBC, UA 0-2 (*)     All other components within normal limits   TROPONIN (IN-HOUSE) - Normal    Narrative:     Troponin T Reference Range:  <= 0.03 ng/mL-   Negative for AMI  >0.03 ng/mL-     Abnormal for myocardial necrosis.  Clinicians would have to utilize clinical acumen, EKG, Troponin and serial changes to determine if it is an Acute Myocardial Infarction or myocardial injury due to an underlying chronic condition.       Results may be falsely decreased if patient taking Biotin.     TROPONIN (IN-HOUSE) - Normal    Narrative:     Troponin T Reference Range:  <= 0.03 ng/mL-   Negative for AMI  >0.03 ng/mL-     Abnormal for myocardial necrosis.  Clinicians would have to utilize clinical acumen, EKG, Troponin and serial changes to determine if it is an Acute Myocardial Infarction or myocardial injury due to an underlying chronic condition.       Results may be falsely decreased if patient taking Biotin.     ETHANOL   CBC AND DIFFERENTIAL    Narrative:     The following orders were created for panel order CBC & Differential.  Procedure                               Abnormality         Status                     ---------                               -----------         ------                     CBC Auto Differential[[id number]]         Abnormal            Final result                 Please view results for these tests on the individual orders.     XR Chest 2 View   Final Result       1. Asymmetric opacities in the upper lung zone on the right. Correlate   with signs or symptoms of pneumonia. Follow-up to clearing recommended.   2. New dual lead pacemaker.   3. There is no effusion or pneumothorax.       This report was finalized on 10/21/2021 9:32 AM by Dr. Mil Souza MD.          CT Abdomen Pelvis With Contrast   Final Result   1.  Anasarca with diffuse soft tissue edema throughout the mesentery and body wall. Small volume ascites. Small right pleural effusion.   2.  Moderate cardiomegaly with right and left chamber dilatation. No pericardial effusion. Suspected minimal interstitial pulmonary edema.   3.  Chronic right lower pole renal scarring.      Signer Name: Harinder Paige MD    Signed: 10/21/2021 9:42 AM    Workstation Name: [email or web address]     [facility]             Medication List      No changes were made to your prescriptions during this visit.              Johny Contreras MD  10/21/21 9612

## 2021-10-21 NOTE — ED NOTES
"Pt discharged by [facility], organized by Stephanie Wood. Through multiple interpreters and lengthy conversations pt was made aware that he was not admission criteria and I had talked to daughter at home ( Caroline) and she assured me that he was able to walk without assistance and \" wanted to walk around [facility] \" yesterday. She states that he can cook for himself. EMS Elvia was phoned and she assured me that pt was standing when EMS picked him up and a family member stated to them she would pick him up when discharged. Caroline stated to me that she did not have gas or money to pick him up. Pt was advised that [employer] would take him straight home and not to  any items at [facility] or any other store or drugstore     Diamond Adrian, RN  10/21/21 7687    "

## 2021-10-21 NOTE — PROGRESS NOTES
At the request of ED nurse, Bridget, I attempted to secure a live  but was not successful in securing anyone.  The Ipad is being used to communicate with the patient.      Stephanie Wood

## 2022-05-17 ENCOUNTER — HOSPITAL ENCOUNTER (EMERGENCY)
Facility: HOSPITAL | Age: 52
Discharge: HOME | End: 2022-05-17
Attending: EMERGENCY MEDICINE | Admitting: EMERGENCY MEDICINE

## 2022-05-17 VITALS
SYSTOLIC BLOOD PRESSURE: 111 MMHG | TEMPERATURE: 97.7 F | RESPIRATION RATE: 18 BRPM | DIASTOLIC BLOOD PRESSURE: 79 MMHG | WEIGHT: 163.3 LBS | BODY MASS INDEX: 22.86 KG/M2 | HEIGHT: 71 IN | HEART RATE: 89 BPM | OXYGEN SATURATION: 100 %

## 2022-05-17 DIAGNOSIS — S80.812A LOWER LEG ABRASION, LEFT, INITIAL ENCOUNTER: Primary | ICD-10-CM

## 2022-05-17 PROCEDURE — 99282 EMERGENCY DEPT VISIT SF MDM: CPT | Performed by: EMERGENCY MEDICINE

## 2022-05-17 PROCEDURE — 99282 EMERGENCY DEPT VISIT SF MDM: CPT

## 2022-05-17 NOTE — ED PROVIDER NOTES
Subjective     History provided by:  Patient and medical records    History of Present Illness    · Chief complaint: Leg wound    · Location: Shin of left lower leg.    · Quality/Severity: The patient has a wound on the shin of his left lower leg that is painful and he believes infected.    · Timing/Onset: He developed a scrape on his left lower leg a week ago and states that is not healing.    · Modifying Factors: The patient has a history of peripheral vascular disease.  The patient states she washes the wound several times a day.    · Associated symptoms: Denies any fever or redness.    · Narrative: The patient is a 52-year-old white male who  · Developed a scrape on his left mid shin a week ago.  He states he has been cleaning it with soap and water but it continues to be painful.  He believes because it is painful and is infected and the infection will spread to his heart and to his right lower extremity.  He informs me he is not currently having chest pain.  The patient was recently hospitalized states [facility] in [place] 4/28 - 5/1/2022 for chest pain.  He has a history of a non-STEMI in 2015 and a STEMI 2007 and has stents in his obtuse marginal and his last cath showed that his distal LAD fills by collaterals.  He has an AI DC place 1/20/2020.  He normally has a right bundle branch block and anterior Q waves.  His most recent echo showed an ejection fraction of less than 15%.  The patient has a history of peripheral artery disease and a history of a right BKA, type 2 diabetes and COPD and continues to smoke.  He currently states he has homeless living in his truck because his family sold his house.  He asked the nurse that we could admit him so he would have some place to stay for a couple of days.    Review of Systems   Reason unable to perform ROS: Patient is deaf.   Constitutional: Negative for fever.   Respiratory: Negative for shortness of breath.    Cardiovascular: Negative for chest  "pain.   Gastrointestinal: Negative for abdominal pain.   Skin: Positive for wound ( Left lower leg). Negative for color change and rash.     Past Medical History:   Diagnosis Date   • Deaf    • Diabetes mellitus (HCC)    • History of leg amputation (HCC)    • Hyperlipidemia    • Hypertension    • Injury of back    • Myocardial infarction (HCC)    • Osteomyelitis (HCC)    • Pneumonia      /79 (BP Location: Right arm, Patient Position: Lying)   Pulse 89   Temp 97.7 °F (36.5 °C) (Axillary)   Resp 18   Ht 180.3 cm (71\")   Wt 74.1 kg (163 lb 4.8 oz)   SpO2 100%   BMI 22.78 kg/m²     Past Medical History:   Diagnosis Date   • Deaf    • Diabetes mellitus (HCC)    • History of leg amputation (HCC)    • Hyperlipidemia    • Hypertension    • Injury of back    • Myocardial infarction (HCC)    • Osteomyelitis (HCC)    • Pneumonia        No Known Allergies    Past Surgical History:   Procedure Laterality Date   • BACK SURGERY     • CARDIAC CATHETERIZATION     • CORONARY ANGIOPLASTY WITH STENT PLACEMENT     • LEG AMPUTATION Right        History reviewed. No pertinent family history.    Social History     Socioeconomic History   • Marital status: Single   Tobacco Use   • Smoking status: Heavy Tobacco Smoker     Packs/day: 0.50     Types: Cigarettes   • Smokeless tobacco: Current User     Types: Chew   Substance and Sexual Activity   • Alcohol use: No   • Drug use: No           Objective   Physical Exam  Vitals and nursing note reviewed.   Constitutional:       General: He is not in acute distress.     Appearance: He is well-developed and normal weight. He is not ill-appearing, toxic-appearing or diaphoretic.      Comments: The patient does not appear ill.  Review of his vital signs: He is afebrile and all his vital signs are within normal limits.   HENT:      Head: Normocephalic and atraumatic.   Eyes:      General: No scleral icterus.     Conjunctiva/sclera: Conjunctivae normal.   Musculoskeletal:         General: No " swelling, tenderness or deformity.      Right lower leg: No edema.      Left lower leg: No edema.   Skin:     Capillary Refill: Capillary refill takes less than 2 seconds.      Coloration: Skin is not pale.      Findings: No erythema or rash.      Comments: The patient has a subacute healing circular 2-1/2 cm wound on his anterior left shin that appears consistent with an abrasion that is granulating in.  There is no evidence of infection, no erythema, no drainage, no tenderness.  He has shiny skin consistent with PAD.   Neurological:      General: No focal deficit present.      Mental Status: He is alert and oriented to person, place, and time.      Cranial Nerves: No cranial nerve deficit.      Sensory: No sensory deficit.      Motor: No weakness.   Psychiatric:         Thought Content: Thought content normal.         Procedures           ED Course  ED Course as of 05/17/22 1930 Tue May 17, 2022   1929 Patient has a wound on his left anterior shin that appears to be an abrasion that is healing well.  I informed him that the healing will be slow due to his peripheral artery disease.  I informed him there is no evidence of infection.  He was instructed to continue to clean the wound twice a day with soap and water and apply antibiotic ointment. [TP]      ED Course User Index  [TP] Johny Contreras MD                                                 MDM  Number of Diagnoses or Management Options  Lower leg abrasion, left, initial encounter: new and does not require workup  Risk of Complications, Morbidity, and/or Mortality  Presenting problems: low  Diagnostic procedures: minimal  Management options: low    Patient Progress  Patient progress: stable      Final diagnoses:   Lower leg abrasion, left, initial encounter       ED Disposition  ED Disposition     ED Disposition   Discharge    Condition   Stable    Comment   --             Amaris Jensen, APRN  [address]  [facility]  [address]  [phone]    Schedule an appointment as soon as possible for a visit in 1 week  For wound re-check         Medication List      No changes were made to your prescriptions during this visit.         Labs Reviewed - No data to display  No orders to display          Medication List      No changes were made to your prescriptions during this visit.              Johny Contreras MD  05/17/22 1761

## 2023-07-31 ENCOUNTER — HOSPITAL ENCOUNTER (INPATIENT)
Age: 53
LOS: 3 days | Discharge: HOME | End: 2023-08-03
Attending: STUDENT IN AN ORGANIZED HEALTH CARE EDUCATION/TRAINING PROGRAM | Admitting: INTERNAL MEDICINE
Payer: MEDICARE

## 2023-07-31 ENCOUNTER — APPOINTMENT (OUTPATIENT)
Dept: GENERAL RADIOLOGY | Age: 53
End: 2023-07-31
Payer: MEDICARE

## 2023-07-31 DIAGNOSIS — L03.115 CELLULITIS OF RIGHT LOWER EXTREMITY: ICD-10-CM

## 2023-07-31 DIAGNOSIS — J81.0 ACUTE PULMONARY EDEMA (HCC): Primary | ICD-10-CM

## 2023-07-31 DIAGNOSIS — R94.31 QT PROLONGATION: ICD-10-CM

## 2023-07-31 DIAGNOSIS — S88.111A BELOW-KNEE AMPUTATION OF RIGHT LOWER EXTREMITY (HCC): ICD-10-CM

## 2023-07-31 DIAGNOSIS — R06.02 SHORTNESS OF BREATH: ICD-10-CM

## 2023-07-31 DIAGNOSIS — R18.8 OTHER ASCITES: ICD-10-CM

## 2023-07-31 LAB
ALBUMIN SERPL-MCNC: 3.6 G/DL (ref 3.4–5)
ALBUMIN/GLOB SERPL: 1 {RATIO} (ref 1.1–2.2)
ALP SERPL-CCNC: 106 U/L (ref 40–129)
ALT SERPL-CCNC: 96 U/L (ref 10–40)
ANION GAP SERPL CALCULATED.3IONS-SCNC: 13 MMOL/L (ref 3–16)
AST SERPL-CCNC: 175 U/L (ref 15–37)
BASOPHILS # BLD: 0.1 K/UL (ref 0–0.2)
BASOPHILS NFR BLD: 1.4 %
BILIRUB SERPL-MCNC: 1.2 MG/DL (ref 0–1)
BUN SERPL-MCNC: 16 MG/DL (ref 7–20)
CALCIUM SERPL-MCNC: 8.4 MG/DL (ref 8.3–10.6)
CHLORIDE SERPL-SCNC: 97 MMOL/L (ref 99–110)
CO2 SERPL-SCNC: 22 MMOL/L (ref 21–32)
CREAT SERPL-MCNC: 1.1 MG/DL (ref 0.9–1.3)
DEPRECATED RDW RBC AUTO: 20.6 % (ref 12.4–15.4)
EKG ATRIAL RATE: 91 BPM
EKG DIAGNOSIS: NORMAL
EKG P AXIS: 36 DEGREES
EKG P-R INTERVAL: 204 MS
EKG Q-T INTERVAL: 418 MS
EKG QRS DURATION: 118 MS
EKG QTC CALCULATION (BAZETT): 514 MS
EKG R AXIS: 252 DEGREES
EKG T AXIS: 73 DEGREES
EKG VENTRICULAR RATE: 91 BPM
EOSINOPHIL # BLD: 0.1 K/UL (ref 0–0.6)
EOSINOPHIL NFR BLD: 2.6 %
GFR SERPLBLD CREATININE-BSD FMLA CKD-EPI: >60 ML/MIN/{1.73_M2}
GLUCOSE BLD-MCNC: 101 MG/DL (ref 70–99)
GLUCOSE SERPL-MCNC: 127 MG/DL (ref 70–99)
HCT VFR BLD AUTO: 38.4 % (ref 40.5–52.5)
HGB BLD-MCNC: 12.5 G/DL (ref 13.5–17.5)
LACTATE BLDV-SCNC: 2 MMOL/L (ref 0.4–1.9)
LACTATE BLDV-SCNC: 2.4 MMOL/L (ref 0.4–1.9)
LYMPHOCYTES # BLD: 0.8 K/UL (ref 1–5.1)
LYMPHOCYTES NFR BLD: 14 %
MCH RBC QN AUTO: 25.7 PG (ref 26–34)
MCHC RBC AUTO-ENTMCNC: 32.6 G/DL (ref 31–36)
MCV RBC AUTO: 78.8 FL (ref 80–100)
MONOCYTES # BLD: 0.5 K/UL (ref 0–1.3)
MONOCYTES NFR BLD: 9 %
NEUTROPHILS # BLD: 4.1 K/UL (ref 1.7–7.7)
NEUTROPHILS NFR BLD: 73 %
NT-PROBNP SERPL-MCNC: 4649 PG/ML (ref 0–124)
PERFORMED ON: ABNORMAL
PLATELET # BLD AUTO: 149 K/UL (ref 135–450)
PMV BLD AUTO: 7.9 FL (ref 5–10.5)
POTASSIUM SERPL-SCNC: 4.5 MMOL/L (ref 3.5–5.1)
PROT SERPL-MCNC: 7.3 G/DL (ref 6.4–8.2)
RBC # BLD AUTO: 4.87 M/UL (ref 4.2–5.9)
SODIUM SERPL-SCNC: 132 MMOL/L (ref 136–145)
TROPONIN, HIGH SENSITIVITY: 44 NG/L (ref 0–22)
TROPONIN, HIGH SENSITIVITY: 56 NG/L (ref 0–22)
WBC # BLD AUTO: 5.6 K/UL (ref 4–11)

## 2023-07-31 PROCEDURE — 99285 EMERGENCY DEPT VISIT HI MDM: CPT

## 2023-07-31 PROCEDURE — 93005 ELECTROCARDIOGRAM TRACING: CPT | Performed by: PHYSICIAN ASSISTANT

## 2023-07-31 PROCEDURE — 83036 HEMOGLOBIN GLYCOSYLATED A1C: CPT

## 2023-07-31 PROCEDURE — 93010 ELECTROCARDIOGRAM REPORT: CPT | Performed by: INTERNAL MEDICINE

## 2023-07-31 PROCEDURE — 71045 X-RAY EXAM CHEST 1 VIEW: CPT

## 2023-07-31 PROCEDURE — 2580000003 HC RX 258: Performed by: STUDENT IN AN ORGANIZED HEALTH CARE EDUCATION/TRAINING PROGRAM

## 2023-07-31 PROCEDURE — 85025 COMPLETE CBC W/AUTO DIFF WBC: CPT

## 2023-07-31 PROCEDURE — 84484 ASSAY OF TROPONIN QUANT: CPT

## 2023-07-31 PROCEDURE — 80053 COMPREHEN METABOLIC PANEL: CPT

## 2023-07-31 PROCEDURE — 96375 TX/PRO/DX INJ NEW DRUG ADDON: CPT

## 2023-07-31 PROCEDURE — 6360000002 HC RX W HCPCS: Performed by: PHYSICIAN ASSISTANT

## 2023-07-31 PROCEDURE — 6360000002 HC RX W HCPCS: Performed by: STUDENT IN AN ORGANIZED HEALTH CARE EDUCATION/TRAINING PROGRAM

## 2023-07-31 PROCEDURE — 96365 THER/PROPH/DIAG IV INF INIT: CPT

## 2023-07-31 PROCEDURE — 83880 ASSAY OF NATRIURETIC PEPTIDE: CPT

## 2023-07-31 PROCEDURE — 36415 COLL VENOUS BLD VENIPUNCTURE: CPT

## 2023-07-31 PROCEDURE — 83605 ASSAY OF LACTIC ACID: CPT

## 2023-07-31 PROCEDURE — 1200000000 HC SEMI PRIVATE

## 2023-07-31 PROCEDURE — 87040 BLOOD CULTURE FOR BACTERIA: CPT

## 2023-07-31 PROCEDURE — 6370000000 HC RX 637 (ALT 250 FOR IP): Performed by: PHYSICIAN ASSISTANT

## 2023-07-31 RX ORDER — FERROUS SULFATE 325(65) MG
325 TABLET ORAL 2 TIMES DAILY WITH MEALS
COMMUNITY
Start: 2023-06-29

## 2023-07-31 RX ORDER — PANTOPRAZOLE SODIUM 40 MG/1
40 TABLET, DELAYED RELEASE ORAL 2 TIMES DAILY
COMMUNITY
Start: 2021-02-24

## 2023-07-31 RX ORDER — ATORVASTATIN CALCIUM 40 MG/1
40 TABLET, FILM COATED ORAL NIGHTLY
COMMUNITY
Start: 2023-07-27

## 2023-07-31 RX ORDER — FAMOTIDINE 20 MG/1
20 TABLET, FILM COATED ORAL 2 TIMES DAILY
COMMUNITY
Start: 2021-03-04

## 2023-07-31 RX ORDER — FUROSEMIDE 10 MG/ML
20 INJECTION INTRAMUSCULAR; INTRAVENOUS ONCE
Status: COMPLETED | OUTPATIENT
Start: 2023-07-31 | End: 2023-07-31

## 2023-07-31 RX ORDER — SUCRALFATE 1 G/1
1 TABLET ORAL 4 TIMES DAILY
COMMUNITY
Start: 2022-05-01

## 2023-07-31 RX ORDER — TORSEMIDE 20 MG/1
40 TABLET ORAL
Qty: 60 TABLET | Refills: 50 | Status: ON HOLD | COMMUNITY
Start: 2020-04-23 | End: 2023-08-03 | Stop reason: HOSPADM

## 2023-07-31 RX ORDER — ASPIRIN 81 MG/1
243 TABLET, CHEWABLE ORAL ONCE
Status: DISCONTINUED | OUTPATIENT
Start: 2023-07-31 | End: 2023-08-01

## 2023-07-31 RX ORDER — CLOPIDOGREL BISULFATE 75 MG/1
75 TABLET ORAL DAILY
COMMUNITY
Start: 2017-05-01

## 2023-07-31 RX ORDER — FUROSEMIDE 10 MG/ML
40 INJECTION INTRAMUSCULAR; INTRAVENOUS ONCE
Status: DISCONTINUED | OUTPATIENT
Start: 2023-07-31 | End: 2023-07-31

## 2023-07-31 RX ORDER — CIPROFLOXACIN 500 MG/1
1 TABLET, FILM COATED ORAL EVERY 24 HOURS
Status: ON HOLD | COMMUNITY
Start: 2023-07-15 | End: 2023-08-03 | Stop reason: HOSPADM

## 2023-07-31 RX ORDER — BUSPIRONE HYDROCHLORIDE 5 MG/1
5 TABLET ORAL 2 TIMES DAILY
COMMUNITY
Start: 2022-10-22

## 2023-07-31 RX ORDER — DICYCLOMINE HYDROCHLORIDE 10 MG/1
10 CAPSULE ORAL EVERY 4 HOURS PRN
COMMUNITY
Start: 2023-07-15

## 2023-07-31 RX ADMIN — FUROSEMIDE 20 MG: 10 INJECTION, SOLUTION INTRAMUSCULAR; INTRAVENOUS at 20:23

## 2023-07-31 RX ADMIN — CEFEPIME 2000 MG: 2 INJECTION, POWDER, FOR SOLUTION INTRAVENOUS at 22:07

## 2023-07-31 RX ADMIN — Medication: at 20:23

## 2023-07-31 ASSESSMENT — ENCOUNTER SYMPTOMS
CHEST TIGHTNESS: 0
ABDOMINAL DISTENTION: 1
CONSTIPATION: 0
NAUSEA: 0
DIARRHEA: 0
ABDOMINAL PAIN: 0
COLOR CHANGE: 0
BACK PAIN: 0
COUGH: 0
SHORTNESS OF BREATH: 1
VOMITING: 0

## 2023-07-31 ASSESSMENT — PAIN DESCRIPTION - ORIENTATION: ORIENTATION: MID

## 2023-07-31 ASSESSMENT — PAIN SCALES - GENERAL
PAINLEVEL_OUTOF10: 10
PAINLEVEL_OUTOF10: 10

## 2023-07-31 ASSESSMENT — PAIN DESCRIPTION - ONSET: ONSET: ON-GOING

## 2023-07-31 ASSESSMENT — LIFESTYLE VARIABLES
HOW MANY STANDARD DRINKS CONTAINING ALCOHOL DO YOU HAVE ON A TYPICAL DAY: PATIENT DOES NOT DRINK
HOW OFTEN DO YOU HAVE A DRINK CONTAINING ALCOHOL: NEVER

## 2023-07-31 ASSESSMENT — PAIN - FUNCTIONAL ASSESSMENT
PAIN_FUNCTIONAL_ASSESSMENT: 0-10
PAIN_FUNCTIONAL_ASSESSMENT: ACTIVITIES ARE NOT PREVENTED

## 2023-07-31 ASSESSMENT — PAIN DESCRIPTION - LOCATION: LOCATION: ABDOMEN;CHEST

## 2023-07-31 ASSESSMENT — PAIN DESCRIPTION - PAIN TYPE: TYPE: ACUTE PAIN

## 2023-07-31 ASSESSMENT — PAIN DESCRIPTION - DESCRIPTORS: DESCRIPTORS: BURNING

## 2023-07-31 ASSESSMENT — PAIN DESCRIPTION - FREQUENCY: FREQUENCY: CONTINUOUS

## 2023-07-31 NOTE — ED PROVIDER NOTES
distress, presenting for shortness of breath. Patient also concerned about unsafe living environment given that he does not have any utilities in his home. History obtained from patient using  given patient is deaf. Physical exam remarkable for evidence of cellulitis. Patient's pertinent external medical records: Records Reviewed : External ED Note reviewed recent notes from [facility] emergency department    Chronic Medical Conditions that may contribute to presentation today:  has a past medical history of CHF (congestive heart failure) ([address] Social Determinants affecting Dx or Tx:    Social History     Socioeconomic History    Marital status: Single     Spouse name: Not on file    Number of children: Not on file    Years of education: Not on file    Highest education level: Not on file   Occupational History    Not on file   Tobacco Use    Smoking status: Every Day     Packs/day: 1.00     Types: Cigarettes    Smokeless tobacco: Never   Vaping Use    Vaping Use: Never used   Substance and Sexual Activity    Alcohol use: Never    Drug use: Never    Sexual activity: Not on file   Other Topics Concern    Not on file   Social History Narrative    Not on file     Social Determinants of Health     Financial Resource Strain: Not on file   Food Insecurity: Not on file   Transportation Needs: Not on file   Physical Activity: Not on file   Stress: Not on file   Social Connections: Not on file   Intimate Partner Violence: Not on file   Housing Stability: Not on file       Differential diagnosis includes but is not limited to: CHF, pneumonia, pulmonary edema, pleural effusions, ACS, cellulitis    WORKUP INTERPRETATION:  ED Course as of 07/31/23 2159 Mon Jul 31, 2023 1924 The EKG, as interpreted by myself, in the emergency department in the absence of a cardiologist.  normal sinus rhythm with a rate of 91  Axis is   Left axis deviation  QTc is   514  Intervals and Durations are unremarkable. ST Segments: nonspecific changes  No previous for comparison [ER]   1930 Mild anemia 12.5. No leukocytosis or thrombocytopenia [ER]   1946 Mild transaminitis, similar to previous. [ER]   1946 Hyponatremia 132, hypochloremia 97. Similar to previous. No other electrolyte abnormalities or evidence of kidney dysfunction [ER]   1946 Lactate mildly elevated to 2 [ER]   1947 Chest x-ray on my interpretation shows no evidence of pneumonia, pneumothorax, pleural effusion, pulmonary edema [ER]   1951 Troponin is elevated to 56. Patient does not have current chest pain, EKG without evidence of acute ischemia. Patient is on Eliquis [ER]   1951 BNP elevated to 4649 [ER]   2023 CXR: IMPRESSION:  Increased interstitial opacity with bilateral pleural effusions, the constellation which suggests pulmonary interstitial edema. [ER]   2023 Given borderline blood pressure, patient given 20 mg of IV Lasix. [ER]   2051 Patient excepted for admission at [facility]  [ER]   2130 Patient does not meet SIRS criteria. Repeat lactate with mild uptrend to 2.4. Patient was receiving Lasix for fluid overload, will hold off on fluid administration at this time. [ER]   2130 Delta troponin downtrending to 44 [ER]      ED Course User Index  [ER] Saadia Jefferson MD        CONSULTS:   IP CONSULT TO HOSPITALIST    Discussed presentation and work-up with hospitalist team who accepted the patient for admission. SCREENINGS:       [place] Coma Scale  Eye Opening: Spontaneous  Best Verbal Response: Oriented  Best Motor Response: Obeys commands  [place] Coma Scale Score: 15                CIWA Assessment  BP: 107/87  Pulse: 86           Is this patient to be included in the SEP-1 Core Measure due to severe sepsis or septic shock?    No   Exclusion criteria - the patient is NOT to be included for SEP-1 Core Measure due to:  2+ SIRS criteria are not met      TREATMENT:  During the patient's ED course, the patient was given:  Medications   ceFEPIme

## 2023-07-31 NOTE — ED PROVIDER NOTES
of breath with leg swelling and abdominal distention. Upon examination he is tachypneic. His oxygen saturation about 93 to 94% on room air. He has crackles in the bibasilar lungs. He upon review of records which was seen yesterday at outside emergency department. He was given dose of diuretic and discharged home. He reports he had increasing swelling and shortness of breath. IV was established and blood work obtained. CBC showed normal white count and platelets. Hemoglobin 12.5. CMP showed bilirubin 1.2 with ALT of 96 and AST of 175. Troponin was 56. Outpatient troponin appears to be in the 30s. BNP 4600. Concern elevated BNP and elevated troponin secondary to increasing pulmonary edema/fluid overload. He is on Eliquis and do not believe CT scan of the chest indicated this time. Low sufficient for PE. Lactic 2 which could be elevated secondary to his fluid overload and increased work of breathing. He has some cellulitis to his right lower extremity but he reports this is not worsening. Low suspicion for sepsis. Chest x-ray showed increased interstitial opacity favoring bilateral pleural effusions and pulmonary edema. EKG inter by attending. Patient was given dose of IV Lasix 20 mg here in the emergency department. Believe patient would benefit from mission for diuresis and further evaluation/treatment. Case discussed with hospitalist at [facility] who graciously agreed to accept patient for admission at this time. Pending transfer. I am the Primary Clinician of Record. FINAL IMPRESSION      1. Acute pulmonary edema (HCC)    2. Shortness of breath    3. Cellulitis of right lower extremity    4. Below-knee amputation of right lower extremity ([address])          DISPOSITION/PLAN     DISPOSITION Decision To Admit 07/31/2023 08:04:39 PM      PATIENT REFERRED TO:  No follow-up provider specified.     DISCHARGE MEDICATIONS:  New Prescriptions    No medications on file       DISCONTINUED MEDICATIONS:  Discontinued Medications    No medications on file              (Please note that portions of this note were completed with a voice recognition program.  Efforts were made to edit the dictations but occasionally words are mis-transcribed.)    RICHA Jovel (electronically signed)      RICHA Miller  07/31/23 2050

## 2023-08-01 PROBLEM — I50.9 ACUTE ON CHRONIC CONGESTIVE HEART FAILURE, UNSPECIFIED HEART FAILURE TYPE (HCC): Status: ACTIVE | Noted: 2023-08-01

## 2023-08-01 LAB
ANION GAP SERPL CALCULATED.3IONS-SCNC: 12 MMOL/L (ref 3–16)
BUN SERPL-MCNC: 17 MG/DL (ref 7–20)
CALCIUM SERPL-MCNC: 8.7 MG/DL (ref 8.3–10.6)
CHLORIDE SERPL-SCNC: 99 MMOL/L (ref 99–110)
CO2 SERPL-SCNC: 22 MMOL/L (ref 21–32)
CREAT SERPL-MCNC: 1 MG/DL (ref 0.9–1.3)
GFR SERPLBLD CREATININE-BSD FMLA CKD-EPI: >60 ML/MIN/{1.73_M2}
GLUCOSE BLD-MCNC: 111 MG/DL (ref 70–99)
GLUCOSE BLD-MCNC: 131 MG/DL (ref 70–99)
GLUCOSE BLD-MCNC: 163 MG/DL (ref 70–99)
GLUCOSE BLD-MCNC: 85 MG/DL (ref 70–99)
GLUCOSE SERPL-MCNC: 94 MG/DL (ref 70–99)
MAGNESIUM SERPL-MCNC: 2 MG/DL (ref 1.8–2.4)
PERFORMED ON: ABNORMAL
PERFORMED ON: NORMAL
POTASSIUM SERPL-SCNC: 4.3 MMOL/L (ref 3.5–5.1)
SODIUM SERPL-SCNC: 133 MMOL/L (ref 136–145)
TSH SERPL DL<=0.005 MIU/L-ACNC: 2.97 UIU/ML (ref 0.27–4.2)

## 2023-08-01 PROCEDURE — 6360000002 HC RX W HCPCS: Performed by: STUDENT IN AN ORGANIZED HEALTH CARE EDUCATION/TRAINING PROGRAM

## 2023-08-01 PROCEDURE — 6370000000 HC RX 637 (ALT 250 FOR IP): Performed by: INTERNAL MEDICINE

## 2023-08-01 PROCEDURE — 80048 BASIC METABOLIC PNL TOTAL CA: CPT

## 2023-08-01 PROCEDURE — 36415 COLL VENOUS BLD VENIPUNCTURE: CPT

## 2023-08-01 PROCEDURE — 6370000000 HC RX 637 (ALT 250 FOR IP)

## 2023-08-01 PROCEDURE — 6360000002 HC RX W HCPCS: Performed by: INTERNAL MEDICINE

## 2023-08-01 PROCEDURE — 2580000003 HC RX 258: Performed by: STUDENT IN AN ORGANIZED HEALTH CARE EDUCATION/TRAINING PROGRAM

## 2023-08-01 PROCEDURE — 83735 ASSAY OF MAGNESIUM: CPT

## 2023-08-01 PROCEDURE — 2580000003 HC RX 258: Performed by: INTERNAL MEDICINE

## 2023-08-01 PROCEDURE — 1200000000 HC SEMI PRIVATE

## 2023-08-01 PROCEDURE — 99223 1ST HOSP IP/OBS HIGH 75: CPT | Performed by: INTERNAL MEDICINE

## 2023-08-01 PROCEDURE — 84443 ASSAY THYROID STIM HORMONE: CPT

## 2023-08-01 PROCEDURE — 89051 BODY FLUID CELL COUNT: CPT

## 2023-08-01 PROCEDURE — 6360000002 HC RX W HCPCS

## 2023-08-01 RX ORDER — MAGNESIUM SULFATE IN WATER 40 MG/ML
2000 INJECTION, SOLUTION INTRAVENOUS PRN
Status: DISCONTINUED | OUTPATIENT
Start: 2023-08-01 | End: 2023-08-03 | Stop reason: HOSPADM

## 2023-08-01 RX ORDER — BUSPIRONE HYDROCHLORIDE 5 MG/1
5 TABLET ORAL 2 TIMES DAILY
Status: DISCONTINUED | OUTPATIENT
Start: 2023-08-01 | End: 2023-08-03 | Stop reason: HOSPADM

## 2023-08-01 RX ORDER — ALBUTEROL SULFATE 90 UG/1
2 AEROSOL, METERED RESPIRATORY (INHALATION) EVERY 4 HOURS PRN
Status: DISCONTINUED | OUTPATIENT
Start: 2023-08-01 | End: 2023-08-03 | Stop reason: HOSPADM

## 2023-08-01 RX ORDER — ACETAMINOPHEN 325 MG/1
650 TABLET ORAL EVERY 6 HOURS PRN
Status: DISCONTINUED | OUTPATIENT
Start: 2023-08-01 | End: 2023-08-03 | Stop reason: HOSPADM

## 2023-08-01 RX ORDER — POLYETHYLENE GLYCOL 3350 17 G/17G
17 POWDER, FOR SOLUTION ORAL DAILY PRN
Status: DISCONTINUED | OUTPATIENT
Start: 2023-08-01 | End: 2023-08-03 | Stop reason: HOSPADM

## 2023-08-01 RX ORDER — METOPROLOL SUCCINATE 25 MG/1
25 TABLET, EXTENDED RELEASE ORAL DAILY
Status: DISCONTINUED | OUTPATIENT
Start: 2023-08-01 | End: 2023-08-03 | Stop reason: HOSPADM

## 2023-08-01 RX ORDER — FAMOTIDINE 20 MG/1
20 TABLET, FILM COATED ORAL 2 TIMES DAILY
Status: DISCONTINUED | OUTPATIENT
Start: 2023-08-01 | End: 2023-08-03 | Stop reason: HOSPADM

## 2023-08-01 RX ORDER — FUROSEMIDE 10 MG/ML
40 INJECTION INTRAMUSCULAR; INTRAVENOUS 2 TIMES DAILY
Status: DISCONTINUED | OUTPATIENT
Start: 2023-08-01 | End: 2023-08-02

## 2023-08-01 RX ORDER — CLOPIDOGREL BISULFATE 75 MG/1
75 TABLET ORAL DAILY
Status: DISCONTINUED | OUTPATIENT
Start: 2023-08-01 | End: 2023-08-03 | Stop reason: HOSPADM

## 2023-08-01 RX ORDER — ONDANSETRON 4 MG/1
4 TABLET, ORALLY DISINTEGRATING ORAL EVERY 8 HOURS PRN
Status: DISCONTINUED | OUTPATIENT
Start: 2023-08-01 | End: 2023-08-01

## 2023-08-01 RX ORDER — PROCHLORPERAZINE EDISYLATE 5 MG/ML
10 INJECTION INTRAMUSCULAR; INTRAVENOUS EVERY 6 HOURS PRN
Status: DISCONTINUED | OUTPATIENT
Start: 2023-08-01 | End: 2023-08-03 | Stop reason: HOSPADM

## 2023-08-01 RX ORDER — INSULIN LISPRO 100 [IU]/ML
0-4 INJECTION, SOLUTION INTRAVENOUS; SUBCUTANEOUS NIGHTLY
Status: DISCONTINUED | OUTPATIENT
Start: 2023-08-01 | End: 2023-08-03 | Stop reason: HOSPADM

## 2023-08-01 RX ORDER — SODIUM CHLORIDE 0.9 % (FLUSH) 0.9 %
5-40 SYRINGE (ML) INJECTION EVERY 12 HOURS SCHEDULED
Status: DISCONTINUED | OUTPATIENT
Start: 2023-08-01 | End: 2023-08-03 | Stop reason: HOSPADM

## 2023-08-01 RX ORDER — ALBUTEROL SULFATE 90 UG/1
2 AEROSOL, METERED RESPIRATORY (INHALATION) EVERY 6 HOURS PRN
Status: DISCONTINUED | OUTPATIENT
Start: 2023-08-01 | End: 2023-08-01

## 2023-08-01 RX ORDER — FERROUS SULFATE 325(65) MG
325 TABLET ORAL 2 TIMES DAILY WITH MEALS
Status: DISCONTINUED | OUTPATIENT
Start: 2023-08-01 | End: 2023-08-03 | Stop reason: HOSPADM

## 2023-08-01 RX ORDER — SPIRONOLACTONE 25 MG/1
25 TABLET ORAL DAILY
Status: DISCONTINUED | OUTPATIENT
Start: 2023-08-01 | End: 2023-08-03 | Stop reason: HOSPADM

## 2023-08-01 RX ORDER — ONDANSETRON 2 MG/ML
4 INJECTION INTRAMUSCULAR; INTRAVENOUS EVERY 6 HOURS PRN
Status: DISCONTINUED | OUTPATIENT
Start: 2023-08-01 | End: 2023-08-01

## 2023-08-01 RX ORDER — DEXTROSE MONOHYDRATE 100 MG/ML
INJECTION, SOLUTION INTRAVENOUS CONTINUOUS PRN
Status: DISCONTINUED | OUTPATIENT
Start: 2023-08-01 | End: 2023-08-03 | Stop reason: HOSPADM

## 2023-08-01 RX ORDER — POTASSIUM CHLORIDE 7.45 MG/ML
10 INJECTION INTRAVENOUS PRN
Status: DISCONTINUED | OUTPATIENT
Start: 2023-08-01 | End: 2023-08-03 | Stop reason: HOSPADM

## 2023-08-01 RX ORDER — POTASSIUM CHLORIDE 20 MEQ/1
40 TABLET, EXTENDED RELEASE ORAL PRN
Status: DISCONTINUED | OUTPATIENT
Start: 2023-08-01 | End: 2023-08-03 | Stop reason: HOSPADM

## 2023-08-01 RX ORDER — INSULIN LISPRO 100 [IU]/ML
0-8 INJECTION, SOLUTION INTRAVENOUS; SUBCUTANEOUS
Status: DISCONTINUED | OUTPATIENT
Start: 2023-08-01 | End: 2023-08-03 | Stop reason: HOSPADM

## 2023-08-01 RX ORDER — DICYCLOMINE HYDROCHLORIDE 10 MG/1
10 CAPSULE ORAL 3 TIMES DAILY PRN
Status: DISCONTINUED | OUTPATIENT
Start: 2023-08-01 | End: 2023-08-03 | Stop reason: HOSPADM

## 2023-08-01 RX ORDER — ACETAMINOPHEN 650 MG/1
650 SUPPOSITORY RECTAL EVERY 6 HOURS PRN
Status: DISCONTINUED | OUTPATIENT
Start: 2023-08-01 | End: 2023-08-03 | Stop reason: HOSPADM

## 2023-08-01 RX ORDER — SODIUM CHLORIDE 9 MG/ML
INJECTION, SOLUTION INTRAVENOUS PRN
Status: DISCONTINUED | OUTPATIENT
Start: 2023-08-01 | End: 2023-08-03 | Stop reason: HOSPADM

## 2023-08-01 RX ORDER — PANTOPRAZOLE SODIUM 40 MG/1
40 TABLET, DELAYED RELEASE ORAL 2 TIMES DAILY
Status: DISCONTINUED | OUTPATIENT
Start: 2023-08-01 | End: 2023-08-03 | Stop reason: HOSPADM

## 2023-08-01 RX ORDER — SODIUM CHLORIDE 0.9 % (FLUSH) 0.9 %
5-40 SYRINGE (ML) INJECTION PRN
Status: DISCONTINUED | OUTPATIENT
Start: 2023-08-01 | End: 2023-08-03 | Stop reason: HOSPADM

## 2023-08-01 RX ORDER — SUCRALFATE 1 G/1
1 TABLET ORAL 4 TIMES DAILY
Status: DISCONTINUED | OUTPATIENT
Start: 2023-08-01 | End: 2023-08-03 | Stop reason: HOSPADM

## 2023-08-01 RX ORDER — ATORVASTATIN CALCIUM 40 MG/1
40 TABLET, FILM COATED ORAL NIGHTLY
Status: DISCONTINUED | OUTPATIENT
Start: 2023-08-02 | End: 2023-08-03 | Stop reason: HOSPADM

## 2023-08-01 RX ADMIN — FAMOTIDINE 20 MG: 20 TABLET ORAL at 10:00

## 2023-08-01 RX ADMIN — FAMOTIDINE 20 MG: 20 TABLET ORAL at 02:12

## 2023-08-01 RX ADMIN — FERROUS SULFATE TAB 325 MG (65 MG ELEMENTAL FE) 325 MG: 325 (65 FE) TAB at 10:00

## 2023-08-01 RX ADMIN — METOPROLOL SUCCINATE 25 MG: 25 TABLET, EXTENDED RELEASE ORAL at 10:00

## 2023-08-01 RX ADMIN — BUSPIRONE HYDROCHLORIDE 5 MG: 5 TABLET ORAL at 10:01

## 2023-08-01 RX ADMIN — HYDROMORPHONE HYDROCHLORIDE 1 MG: 1 INJECTION, SOLUTION INTRAMUSCULAR; INTRAVENOUS; SUBCUTANEOUS at 17:47

## 2023-08-01 RX ADMIN — CLOPIDOGREL BISULFATE 75 MG: 75 TABLET ORAL at 10:01

## 2023-08-01 RX ADMIN — SACUBITRIL AND VALSARTAN 1 TABLET: 24; 26 TABLET, FILM COATED ORAL at 22:54

## 2023-08-01 RX ADMIN — BUSPIRONE HYDROCHLORIDE 5 MG: 5 TABLET ORAL at 02:12

## 2023-08-01 RX ADMIN — PANTOPRAZOLE SODIUM 40 MG: 40 TABLET, DELAYED RELEASE ORAL at 02:12

## 2023-08-01 RX ADMIN — Medication 10 ML: at 10:01

## 2023-08-01 RX ADMIN — HYDROMORPHONE HYDROCHLORIDE 1 MG: 1 INJECTION, SOLUTION INTRAMUSCULAR; INTRAVENOUS; SUBCUTANEOUS at 22:50

## 2023-08-01 RX ADMIN — SUCRALFATE 1 G: 1 TABLET ORAL at 13:11

## 2023-08-01 RX ADMIN — METOPROLOL TARTRATE 12.5 MG: 25 TABLET, FILM COATED ORAL at 02:12

## 2023-08-01 RX ADMIN — SUCRALFATE 1 G: 1 TABLET ORAL at 10:00

## 2023-08-01 RX ADMIN — BUSPIRONE HYDROCHLORIDE 5 MG: 5 TABLET ORAL at 22:54

## 2023-08-01 RX ADMIN — HYDROMORPHONE HYDROCHLORIDE 0.5 MG: 1 INJECTION, SOLUTION INTRAMUSCULAR; INTRAVENOUS; SUBCUTANEOUS at 13:20

## 2023-08-01 RX ADMIN — SUCRALFATE 1 G: 1 TABLET ORAL at 02:12

## 2023-08-01 RX ADMIN — PANTOPRAZOLE SODIUM 40 MG: 40 TABLET, DELAYED RELEASE ORAL at 22:54

## 2023-08-01 RX ADMIN — FUROSEMIDE 40 MG: 10 INJECTION, SOLUTION INTRAMUSCULAR; INTRAVENOUS at 17:48

## 2023-08-01 RX ADMIN — FUROSEMIDE 40 MG: 10 INJECTION, SOLUTION INTRAMUSCULAR; INTRAVENOUS at 10:01

## 2023-08-01 RX ADMIN — VANCOMYCIN HYDROCHLORIDE 2000 MG: 1 INJECTION, POWDER, LYOPHILIZED, FOR SOLUTION INTRAVENOUS at 02:16

## 2023-08-01 RX ADMIN — Medication 10 ML: at 22:55

## 2023-08-01 RX ADMIN — SUCRALFATE 1 G: 1 TABLET ORAL at 17:48

## 2023-08-01 RX ADMIN — SACUBITRIL AND VALSARTAN 1 TABLET: 24; 26 TABLET, FILM COATED ORAL at 10:00

## 2023-08-01 RX ADMIN — SUCRALFATE 1 G: 1 TABLET ORAL at 22:54

## 2023-08-01 RX ADMIN — FAMOTIDINE 20 MG: 20 TABLET ORAL at 22:54

## 2023-08-01 RX ADMIN — PANTOPRAZOLE SODIUM 40 MG: 40 TABLET, DELAYED RELEASE ORAL at 10:00

## 2023-08-01 RX ADMIN — HYDROMORPHONE HYDROCHLORIDE 0.5 MG: 1 INJECTION, SOLUTION INTRAMUSCULAR; INTRAVENOUS; SUBCUTANEOUS at 10:01

## 2023-08-01 RX ADMIN — DICYCLOMINE HYDROCHLORIDE 10 MG: 10 CAPSULE ORAL at 02:12

## 2023-08-01 RX ADMIN — SPIRONOLACTONE 25 MG: 25 TABLET ORAL at 10:00

## 2023-08-01 ASSESSMENT — PAIN - FUNCTIONAL ASSESSMENT: PAIN_FUNCTIONAL_ASSESSMENT: PREVENTS OR INTERFERES SOME ACTIVE ACTIVITIES AND ADLS

## 2023-08-01 ASSESSMENT — PAIN DESCRIPTION - ONSET: ONSET: ON-GOING

## 2023-08-01 ASSESSMENT — PAIN DESCRIPTION - LOCATION
LOCATION: ABDOMEN

## 2023-08-01 ASSESSMENT — PAIN SCALES - GENERAL
PAINLEVEL_OUTOF10: 10

## 2023-08-01 ASSESSMENT — PAIN DESCRIPTION - FREQUENCY: FREQUENCY: CONTINUOUS

## 2023-08-01 ASSESSMENT — PAIN DESCRIPTION - DESCRIPTORS
DESCRIPTORS: ACHING
DESCRIPTORS: STABBING

## 2023-08-01 ASSESSMENT — PAIN DESCRIPTION - PAIN TYPE: TYPE: ACUTE PAIN

## 2023-08-01 ASSESSMENT — PAIN DESCRIPTION - ORIENTATION: ORIENTATION: UPPER;MID

## 2023-08-01 ASSESSMENT — PAIN SCALES - WONG BAKER: WONGBAKER_NUMERICALRESPONSE: 0

## 2023-08-01 NOTE — ACP (ADVANCE CARE PLANNING)
Advance Care Planning     General Advance Care Planning (ACP) Conversation    Date of Conversation: 7/31/2023  Conducted with: Patient with Decision Making Capacity    Healthcare Decision Maker:  No healthcare decision makers have been documented. Click here to complete [address] including selection of the Healthcare Decision Maker Relationship (ie \"Primary\")   Today we  pt's ex girlfriend.      Content/Action Overview:  Has NO ACP documents/care preferences - requested patient complete ACP documents  Reviewed DNR/DNI and patient elects Full Code (Attempt Resuscitation)        Length of Voluntary ACP Conversation in minutes:  <16 minutes (Non-Billable)    Faina Carlson RN

## 2023-08-01 NOTE — CONSULTS
Nutrition Education    Provided heart failure diet education via [facility] service. Education included low sodium diet guidelines (2 gm Na+/day) and fluid restriction (1800 ML/day). Reviewed foods to choose and foods to avoid, along with label reading and ways to add flavor to food. Pt currently tries to follow a low sodium diet at home and does not add salt to food. Pt states understanding of education. Time spent with patient: 15 minutes. Educated on CHF diet  Learners: Patient  Readiness: Acceptance  Method: Explanation and Handout  Response: Verbalizes Understanding  Contact name and number provided.     Marybeth Stephenson RD  Contact Number: [phone]

## 2023-08-01 NOTE — H&P
V2.0  History and Physical      Name:  Marine Goff /Age/Sex: 1970  (52 y.o. male)   MRN & CSN:  [id number] & [id number] Encounter Date/Time: 2023 12:54 AM EDT   Location:  [id number] PCP: No primary care provider on file. Hospital Day: 2    Assessment and Plan:   Marine Goff is a 52 y.o. male with a pmh of CHF, mute and deafness who presents with Acute on chronic congestive heart failure, unspecified heart failure type [facility])    Hospital Problems             Last Modified POA    * (Principal) Acute on chronic congestive heart failure, unspecified heart failure type ([address]) 2023 Yes       Plan:  Admit to medical floor. Patient has evidence of volume overload. Will initiate IV Lasix. Monitor intake output and daily weights  Cardiac diet. Resume home medication  Telemetry monitoring  Social work consulted assess discharge needs    Disposition:   Current Living situation: At home with no electricity and utilities  Expected Disposition: We will likely need placement  Estimated D/C: In next 24 to 48 hours    Diet ADULT DIET; Regular; Low Sodium (2 gm)   DVT Prophylaxis [] Lovenox, []  Heparin, [] SCDs, [] Ambulation,  [x] Eliquis, [] Xarelto, [] Coumadin   Code Status Full Code   Surrogate Decision Maker/ POA      Personally reviewed Lab Studies and Imaging       History from:     patient, electronic medical record    History of Present Illness:     Chief Complaint: Shortness of breath    Marine Goff is a 52 y.o. male with past medical history of speech and hearing challenged, CHF, liver cirrhosis, peripheral vascular disease status post right BKA, , hypertension who presents ED with complaint of shortness of breath. He reports he just recently moved to the area yesterday from [place]. He reports that the place he is staying at does not have utilities or electric set up at this time.   He reports that he does not feel safe staying there secondary to lack of electricity and utilities currently a candidate. Pt does not recall allergy (Abstracted records from [facility])  Not an allergy, listed here for QI. May consider use in the future but not currently a candidate. 12/10/19 pt does not recall allergy. Not an allergy, listed here for QI. May consider use in the future but not currently a candidate. 12/10/19 pt does not recall allergy. Fam HX:  family history is not on file.   Soc HX:   Social History     Socioeconomic History    Marital status: Single     Spouse name: None    Number of children: None    Years of education: None    Highest education level: None   Tobacco Use    Smoking status: Every Day     Packs/day: 1.00     Types: Cigarettes    Smokeless tobacco: Never   Vaping Use    Vaping Use: Never used   Substance and Sexual Activity    Alcohol use: Never    Drug use: Never       Medications:   Medications:    furosemide  40 mg IntraVENous BID    pantoprazole  40 mg Oral BID    sucralfate  1 g Oral 4x Daily    metoprolol tartrate  12.5 mg Oral BID    clopidogrel  75 mg Oral Daily    ferrous sulfate  325 mg Oral BID WC    busPIRone  5 mg Oral BID    [START ON 8/2/2023] apixaban  5 mg Oral BID    [START ON 8/2/2023] atorvastatin  40 mg Oral Nightly    famotidine  20 mg Oral BID    sodium chloride flush  5-40 mL IntraVENous 2 times per day    vancomycin  25 mg/kg IntraVENous Once    aspirin  243 mg Oral Once      Infusions:    sodium chloride       PRN Meds: dicyclomine, 10 mg, TID PRN  sodium chloride flush, 5-40 mL, PRN  sodium chloride, , PRN  ondansetron, 4 mg, Q8H PRN   Or  ondansetron, 4 mg, Q6H PRN  polyethylene glycol, 17 g, Daily PRN  acetaminophen, 650 mg, Q6H PRN   Or  acetaminophen, 650 mg, Q6H PRN  potassium chloride, 40 mEq, PRN   Or  potassium alternative oral replacement, 40 mEq, PRN   Or  potassium chloride, 10 mEq, PRN  magnesium sulfate, 2,000 mg, PRN  perflutren lipid microspheres, 1.5 mL, ONCE PRN  GI cocktail, , TID PRN        Labs      CBC:

## 2023-08-01 NOTE — PLAN OF CARE
Problem: Discharge Planning  Goal: Discharge to home or other facility with appropriate resources  Outcome: Progressing  Flowsheets (Taken 8/1/2023 0005)  Discharge to home or other facility with appropriate resources: Identify barriers to discharge with patient and caregiver     Problem: Pain  Goal: Verbalizes/displays adequate comfort level or baseline comfort level  Outcome: Progressing     Problem: Safety - Adult  Goal: Free from fall injury  Outcome: Progressing

## 2023-08-01 NOTE — CONSULTS
[address] [place] [address]  [phone]    [facility] 8.1.23    Reason for Consultation/Chief Complaint: \"I have been asked to see him for shortness of breath and CHF . \"    History of Present Illness:  Clayton Kay is a 52 y.o. patient who presented to the hospital with complaints of shortness of breath   He is deaf and mute. He lives at home with no electricity and no utilities. History is obtained from EMR and talking to staff. He has cardiomyopathy and has had AICD placed with work up at [employer] I have been asked to provide consultation regarding further management and testing. Past Medical History:   has a past medical history of CHF (congestive heart failure) ([address]). Surgical History:   has a past surgical history that includes Leg amputation below knee (Right) and Pacemaker insertion. Social History:   reports that he has been smoking cigarettes. He has been smoking an average of 1 pack per day. He has never used smokeless tobacco. He reports that he does not drink alcohol and does not use drugs. Family History:  family history is not on file. Home Medications:  Were reviewed and are listed in nursing record. and/or listed below  Prior to Admission medications    Medication Sig Start Date End Date Taking?  Authorizing Provider   pantoprazole (PROTONIX) 40 MG tablet Take 1 tablet by mouth 2 times daily 2/24/21  Yes Historical Provider, MD   sucralfate (CARAFATE) 1 GM tablet Take 1 tablet by mouth 4 times daily 5/1/22  Yes Historical Provider, MD   torsemide (DEMADEX) 20 MG tablet Take 2 tablets by mouth daily (with breakfast) 4/23/20 6/7/24 Yes Historical Provider, MD   clopidogrel (PLAVIX) 75 MG tablet Take 1 tablet by mouth daily 5/1/17  Yes Historical Provider, MD   sacubitril-valsartan (ENTRESTO) 24-26 MG per tablet Take 1 tablet by mouth 2 times daily 6/8/23  Yes Historical Provider, MD   ferrous sulfate (IRON 325) 325 (65 Fe) MG tablet Take 1 tablet by mouth 2 times

## 2023-08-01 NOTE — PROGRESS NOTES
Patient admitted to room __222__ from THE [facility] ER. Patient oriented to room, call light, bed rails, phone, lights and bathroom. Patient instructed about the schedule of the day including: vital sign frequency, lab draws, possible tests, frequency of MD and staff rounds, daily weights, I &O's and prescribed diet. Telemetry box in place, patient aware of placement and reason. Bed locked, in lowest position, side rails up 2/4, call light within reach. Recliner Assessment:     Patient is able to demonstrate the ability to move from a reclining position to an upright position within the recliner. 4 Eyes Skin Assessment     The patient is being assess for   Admission    I agree that 2 RN's have performed a thorough Head to Toe Skin Assessment on the patient. ALL assessment sites listed below have been assessed. Areas assessed for pressure by both nurses:   [x]   Head, Face, and Ears   [x]   Shoulders, Back, and Chest, Abdomen  [x]   Arms, Elbows, and Hands   [x]   Coccyx, Sacrum, and Ischium  [x]   Legs, Feet, and Heels        Skin Assessed Under all Medical Devices by both nurses:  na               All Mepilex Borders were peeled back and area peeked at by both nurses:  No: na  Please list where Mepilex Borders are located:  na             **SHARE this note so that the co-signing nurse is able to place an eSignature**    Co-signer eSignature: {Esignature:[id number]}    Does the Patient have Skin Breakdown related to pressure? No     Scattered bruises and abrasions and scabs.  Cellulitis LLE         Garret Prevention initiated:  NA   Wound Care Orders initiated:  VENKATA      [facility] nurse consulted for Pressure Injury (Stage 3,4, Unstageable, DTI, NWPT, Complex wounds)and New or Established Ostomies:  NA      Primary Nurse eSignature: Electronically signed by Federica Feliz RN on 8/1/23 at 2:38 AM EDT

## 2023-08-01 NOTE — PROGRESS NOTES
Admission assessment complete. Alert and oriented x4. Patient deaf, ASL  used. Patient complains of burning pain in chest and abdomen. Provider notified. Redness and abrasions on LLE. Scattered scabs, abrasions, and bruises. Bed bug found on patient personal belongings. Home medications sent to pharmacy. Patient denies any needs at this time. Bed in lowest position. Side rails up x2. Call light in reach.

## 2023-08-01 NOTE — CARE COORDINATION
Case Management Assessment  Initial Evaluation    Date/Time of Evaluation: 8/1/2023 3:23 PM  Assessment Completed by: Jeb Fleischer, RN    If patient is discharged prior to next notation, then this note serves as note for discharge by case management. Patient Name: Marilee Garces                   YOB: 1970  Diagnosis: Shortness of breath [R06.02]  Acute pulmonary edema ([address]) [J81.0]  QT prolongation [R94.31]  Cellulitis of right lower extremity [J67.978]  Below-knee amputation of right lower extremity ([address]) [S88.111A]  Acute on chronic congestive heart failure, unspecified heart failure type ([address]) [I50.9]                   Date / Time: 7/31/2023  6:45 PM    Patient Admission Status: Inpatient   Readmission Risk (Low < 19, Mod (19-27), High > 27): Readmission Risk Score: 13.2    Current PCP: No primary care provider on file. PCP verified by CM? Yes (has new PCP but forgot the name)    Chart Reviewed: Yes      History Provided by: Patient  Patient Orientation: Alert and Oriented    Patient Cognition: Alert    Hospitalization in the last 30 days (Readmission):  No    If yes, Readmission Assessment in CM Navigator will be completed. Advance Directives:      Code Status: Full Code   Patient's Primary Decision Maker is: Legal Next of Kin      Discharge Planning:    Patient lives with: Alone Type of Home: House (moving into new home this week)  Primary Care Giver: Self  Patient Support Systems include: Family Members   Current Financial resources: Medicare  Current community resources: None  Current services prior to admission: Durable Medical Equipment            Current DME: Kalia Anthony (Comment) (lift chair)            Type of Home Care services:  None    ADLS  Prior functional level: Independent in ADLs/IADLs  Current functional level: Independent in ADLs/IADLs    PT AM-PAC:   /24  OT AM-PAC:   /24    Family can provide assistance at DC:  Yes  Would you like Case Management to discuss

## 2023-08-01 NOTE — PROGRESS NOTES
Shift report received from Jorge Junior, [address]. Patient asking for IV pain medication knowing that he has no IV. Informed that IV will hopefully be restarted shortly via clinical nurse. Sitting on side of bed, call light in reach .

## 2023-08-01 NOTE — CONSULTS
[address] [place] [address]   HEART FAILURE PROGRAM      NAME:  Nora Machado  AGE: 52 y.o. GENDER: male  : 1970  TODAY'S DATE:  2023    Subjective:     VISIT TYPE: Evaluation / Consult    ADMIT DATE: 2023    PAST MEDICAL HISTORY:      Diagnosis Date    CHF (congestive heart failure) ([address] [facility])      HOME MEDICATIONS:  Prior to Admission medications    Medication Sig Start Date End Date Taking?  Authorizing Provider   pantoprazole (PROTONIX) 40 MG tablet Take 1 tablet by mouth 2 times daily 21  Yes Historical Provider, MD   sucralfate (CARAFATE) 1 GM tablet Take 1 tablet by mouth 4 times daily 22  Yes Historical Provider, MD   torsemide (DEMADEX) 20 MG tablet Take 2 tablets by mouth daily (with breakfast) 20 Yes Historical Provider, MD   clopidogrel (PLAVIX) 75 MG tablet Take 1 tablet by mouth daily 17  Yes Historical Provider, MD   sacubitril-valsartan (ENTRESTO) 24-26 MG per tablet Take 1 tablet by mouth 2 times daily 23  Yes Historical Provider, MD   ferrous sulfate (IRON 325) 325 (65 Fe) MG tablet Take 1 tablet by mouth 2 times daily (with meals) 23  Yes Historical Provider, MD   ciprofloxacin (CIPRO) 500 MG tablet Take 1 tablet by mouth every 24 hours 7/15/23  Yes Historical Provider, MD   dicyclomine (BENTYL) 10 MG capsule Take 1 capsule by mouth every 4 hours as needed 7/15/23  Yes Historical Provider, MD   busPIRone (BUSPAR) 5 MG tablet Take 1 tablet by mouth 2 times daily 10/22/22  Yes Historical Provider, MD   apixaban (ELIQUIS) 5 MG TABS tablet Take 1 tablet by mouth 2 times daily 20  Yes Historical Provider, MD   atorvastatin (LIPITOR) 40 MG tablet Take 1 tablet by mouth nightly 23  Yes Historical Provider, MD   famotidine (PEPCID) 20 MG tablet Take 1 tablet by mouth 2 times daily 3/4/21  Yes Historical Provider, MD   metoprolol tartrate (LOPRESSOR) 25 MG tablet Take 0.5 tablets by mouth    Historical Provider, MD      Objective:     ADMISSION DIAGNOSIS:   Shortness of breath [R06.02]  Acute pulmonary edema (HCC) [J81.0]  QT prolongation [R94.31]  Cellulitis of right lower extremity [Z97.278]  Below-knee amputation of right lower extremity (HCC) [S88.111A]  Acute on chronic congestive heart failure, unspecified heart failure type ([address]) [I50.9]    WEIGHTS:    Admission weight: 174 lb (78.9 kg)   Wt Readings from Last 3 Encounters:   08/01/23 178 lb 1.6 oz (80.8 kg)     INTAKE & OUTPUT: No intake or output data in the 24 hours ending 08/01/23 1232    ECHOCARDIOGRAM: EF 25% as of 06/2023    Assessment:     Patient sitting in bed at this time on room air. Pt with complaints of shortness of breath; no complaints of chest pain. Patient is deaf and needed a ALS  at the bedside for CHF education and consult.  ipad used to translate ALS for patient. Patient recently moved here from [facility] and stated he will be coming to this hospital going forward. Patient is aware of sCHF with EF of 25%. Patient signed he has frequent paracentesis every 2-3 weeks. Patient expressed if its more than 2 weeks he usually has to come back to hospital to get admitted to be drained. Patient would like to be set up outpatient with scheduled paracentesis. Provided the Patient with Heart Failure resource folder with education on: signs/symptoms to monitor, medications, daily weights, low sodium diet, 2000 ml fluid restriction, and activity. Reviewed HF Zones (green/yellow/red) and importance of reporting yellow symptoms on Magnet provided. Reinforced the importance of daily weights and to report weight gain of 3 lbs in one day and 5 lbs in one week to Provider. Patient signed he does not weigh self daily, monitor sodium intake, and or fluid intake. Patient signed he knows when he is fluid overloaded by the symptoms and needs drained.      Patient wanted to keep follow-up appointment with PCP Dr. Jenniffer Wright in [facility] on 8/3/2023 at [phone] AM. Provided Heart Failure Nurse

## 2023-08-02 ENCOUNTER — APPOINTMENT (OUTPATIENT)
Dept: ULTRASOUND IMAGING | Age: 53
End: 2023-08-02
Payer: MEDICARE

## 2023-08-02 PROBLEM — J81.0 ACUTE PULMONARY EDEMA (HCC): Status: ACTIVE | Noted: 2023-08-02

## 2023-08-02 PROBLEM — L03.115 CELLULITIS OF RIGHT LOWER EXTREMITY: Status: ACTIVE | Noted: 2023-08-02

## 2023-08-02 PROBLEM — R06.02 SHORTNESS OF BREATH: Status: ACTIVE | Noted: 2023-08-02

## 2023-08-02 PROBLEM — R94.31 QT PROLONGATION: Status: ACTIVE | Noted: 2023-08-02

## 2023-08-02 PROBLEM — S88.111A BELOW-KNEE AMPUTATION OF RIGHT LOWER EXTREMITY (HCC): Status: ACTIVE | Noted: 2023-08-02

## 2023-08-02 LAB
ALBUMIN FLD-MCNC: 1.5 G/DL
ALBUMIN SERPL-MCNC: 2.9 G/DL (ref 3.4–5)
ALP SERPL-CCNC: 87 U/L (ref 40–129)
ALT SERPL-CCNC: 81 U/L (ref 10–40)
ANION GAP SERPL CALCULATED.3IONS-SCNC: 12 MMOL/L (ref 3–16)
APPEARANCE FLUID: NORMAL
AST SERPL-CCNC: 182 U/L (ref 15–37)
BDY FLUID QUALITY: NORMAL
BILIRUB DIRECT SERPL-MCNC: 0.3 MG/DL (ref 0–0.3)
BILIRUB INDIRECT SERPL-MCNC: 0.4 MG/DL (ref 0–1)
BILIRUB SERPL-MCNC: 0.7 MG/DL (ref 0–1)
BUN SERPL-MCNC: 16 MG/DL (ref 7–20)
CALCIUM SERPL-MCNC: 8.6 MG/DL (ref 8.3–10.6)
CELL COUNT FLUID TYPE: NORMAL
CHLORIDE SERPL-SCNC: 100 MMOL/L (ref 99–110)
CO2 SERPL-SCNC: 22 MMOL/L (ref 21–32)
COLOR FLUID: YELLOW
CREAT SERPL-MCNC: 1 MG/DL (ref 0.9–1.3)
EOSINOPHIL NFR FLD MANUAL: 1 %
EST. AVERAGE GLUCOSE BLD GHB EST-MCNC: 145.6 MG/DL
GFR SERPLBLD CREATININE-BSD FMLA CKD-EPI: >60 ML/MIN/{1.73_M2}
GLUCOSE BLD-MCNC: 133 MG/DL (ref 70–99)
GLUCOSE BLD-MCNC: 133 MG/DL (ref 70–99)
GLUCOSE SERPL-MCNC: 148 MG/DL (ref 70–99)
HBA1C MFR BLD: 6.7 %
INR PPP: 1.72 (ref 0.84–1.16)
LYMPHOCYTES NFR FLD: 62 %
MACROPHAGES # FLD: 22 %
MAGNESIUM SERPL-MCNC: 2 MG/DL (ref 1.8–2.4)
NEUTROPHIL, FLUID: 10 %
NUC CELL # FLD: 342 /CUMM
PERFORMED ON: ABNORMAL
PERFORMED ON: ABNORMAL
POTASSIUM SERPL-SCNC: 4.6 MMOL/L (ref 3.5–5.1)
PROT FLD-MCNC: 2.9 G/DL
PROT SERPL-MCNC: 6.3 G/DL (ref 6.4–8.2)
PROTHROMBIN TIME: 20.1 SEC (ref 11.5–14.8)
RBC FLUID: 6300 /CUMM
SODIUM SERPL-SCNC: 134 MMOL/L (ref 136–145)
SPECIMEN SOURCE FLD: NORMAL
TOTAL CELLS COUNTED FLD: 100
VARIANT LYMPHS NFR FLD: 5 %

## 2023-08-02 PROCEDURE — 6370000000 HC RX 637 (ALT 250 FOR IP)

## 2023-08-02 PROCEDURE — 85610 PROTHROMBIN TIME: CPT

## 2023-08-02 PROCEDURE — 87340 HEPATITIS B SURFACE AG IA: CPT

## 2023-08-02 PROCEDURE — 80048 BASIC METABOLIC PNL TOTAL CA: CPT

## 2023-08-02 PROCEDURE — 99232 SBSQ HOSP IP/OBS MODERATE 35: CPT | Performed by: INTERNAL MEDICINE

## 2023-08-02 PROCEDURE — 2580000003 HC RX 258: Performed by: INTERNAL MEDICINE

## 2023-08-02 PROCEDURE — 49083 ABD PARACENTESIS W/IMAGING: CPT

## 2023-08-02 PROCEDURE — 83540 ASSAY OF IRON: CPT

## 2023-08-02 PROCEDURE — 86706 HEP B SURFACE ANTIBODY: CPT

## 2023-08-02 PROCEDURE — 87522 HEPATITIS C REVRS TRNSCRPJ: CPT

## 2023-08-02 PROCEDURE — 6370000000 HC RX 637 (ALT 250 FOR IP): Performed by: INTERNAL MEDICINE

## 2023-08-02 PROCEDURE — 99232 SBSQ HOSP IP/OBS MODERATE 35: CPT

## 2023-08-02 PROCEDURE — 80076 HEPATIC FUNCTION PANEL: CPT

## 2023-08-02 PROCEDURE — 82042 OTHER SOURCE ALBUMIN QUAN EA: CPT

## 2023-08-02 PROCEDURE — 82103 ALPHA-1-ANTITRYPSIN TOTAL: CPT

## 2023-08-02 PROCEDURE — 83735 ASSAY OF MAGNESIUM: CPT

## 2023-08-02 PROCEDURE — 0W9G3ZZ DRAINAGE OF PERITONEAL CAVITY, PERCUTANEOUS APPROACH: ICD-10-PCS | Performed by: RADIOLOGY

## 2023-08-02 PROCEDURE — 36415 COLL VENOUS BLD VENIPUNCTURE: CPT

## 2023-08-02 PROCEDURE — 1200000000 HC SEMI PRIVATE

## 2023-08-02 PROCEDURE — 83550 IRON BINDING TEST: CPT

## 2023-08-02 PROCEDURE — 84157 ASSAY OF PROTEIN OTHER: CPT

## 2023-08-02 PROCEDURE — 86038 ANTINUCLEAR ANTIBODIES: CPT

## 2023-08-02 PROCEDURE — 86705 HEP B CORE ANTIBODY IGM: CPT

## 2023-08-02 PROCEDURE — 82105 ALPHA-FETOPROTEIN SERUM: CPT

## 2023-08-02 PROCEDURE — 6360000002 HC RX W HCPCS

## 2023-08-02 PROCEDURE — 82390 ASSAY OF CERULOPLASMIN: CPT

## 2023-08-02 RX ORDER — TORSEMIDE 20 MG/1
20 TABLET ORAL DAILY
Status: DISCONTINUED | OUTPATIENT
Start: 2023-08-02 | End: 2023-08-03 | Stop reason: HOSPADM

## 2023-08-02 RX ORDER — OXYCODONE HYDROCHLORIDE 5 MG/1
5 TABLET ORAL EVERY 4 HOURS PRN
Status: DISCONTINUED | OUTPATIENT
Start: 2023-08-02 | End: 2023-08-03 | Stop reason: HOSPADM

## 2023-08-02 RX ORDER — OXYCODONE HYDROCHLORIDE 5 MG/1
10 TABLET ORAL EVERY 4 HOURS PRN
Status: DISCONTINUED | OUTPATIENT
Start: 2023-08-02 | End: 2023-08-03 | Stop reason: HOSPADM

## 2023-08-02 RX ADMIN — SUCRALFATE 1 G: 1 TABLET ORAL at 10:49

## 2023-08-02 RX ADMIN — BUSPIRONE HYDROCHLORIDE 5 MG: 5 TABLET ORAL at 10:49

## 2023-08-02 RX ADMIN — SUCRALFATE 1 G: 1 TABLET ORAL at 12:40

## 2023-08-02 RX ADMIN — OXYCODONE HYDROCHLORIDE 10 MG: 5 TABLET ORAL at 23:12

## 2023-08-02 RX ADMIN — SUCRALFATE 1 G: 1 TABLET ORAL at 18:03

## 2023-08-02 RX ADMIN — SPIRONOLACTONE 25 MG: 25 TABLET ORAL at 10:49

## 2023-08-02 RX ADMIN — SUCRALFATE 1 G: 1 TABLET ORAL at 23:15

## 2023-08-02 RX ADMIN — SACUBITRIL AND VALSARTAN 1 TABLET: 24; 26 TABLET, FILM COATED ORAL at 23:16

## 2023-08-02 RX ADMIN — PANTOPRAZOLE SODIUM 40 MG: 40 TABLET, DELAYED RELEASE ORAL at 23:15

## 2023-08-02 RX ADMIN — BUSPIRONE HYDROCHLORIDE 5 MG: 5 TABLET ORAL at 23:15

## 2023-08-02 RX ADMIN — HYDROMORPHONE HYDROCHLORIDE 1 MG: 1 INJECTION, SOLUTION INTRAMUSCULAR; INTRAVENOUS; SUBCUTANEOUS at 06:00

## 2023-08-02 RX ADMIN — HYDROMORPHONE HYDROCHLORIDE 1 MG: 1 INJECTION, SOLUTION INTRAMUSCULAR; INTRAVENOUS; SUBCUTANEOUS at 12:40

## 2023-08-02 RX ADMIN — HYDROMORPHONE HYDROCHLORIDE 1 MG: 1 INJECTION, SOLUTION INTRAMUSCULAR; INTRAVENOUS; SUBCUTANEOUS at 09:42

## 2023-08-02 RX ADMIN — APIXABAN 5 MG: 5 TABLET, FILM COATED ORAL at 23:16

## 2023-08-02 RX ADMIN — SACUBITRIL AND VALSARTAN 1 TABLET: 24; 26 TABLET, FILM COATED ORAL at 10:49

## 2023-08-02 RX ADMIN — PANTOPRAZOLE SODIUM 40 MG: 40 TABLET, DELAYED RELEASE ORAL at 10:49

## 2023-08-02 RX ADMIN — FAMOTIDINE 20 MG: 20 TABLET ORAL at 23:16

## 2023-08-02 RX ADMIN — Medication 10 ML: at 23:16

## 2023-08-02 RX ADMIN — OXYCODONE HYDROCHLORIDE 10 MG: 5 TABLET ORAL at 18:03

## 2023-08-02 RX ADMIN — Medication 10 ML: at 09:45

## 2023-08-02 RX ADMIN — METOPROLOL SUCCINATE 25 MG: 25 TABLET, EXTENDED RELEASE ORAL at 10:49

## 2023-08-02 RX ADMIN — ATORVASTATIN CALCIUM 40 MG: 40 TABLET, FILM COATED ORAL at 23:15

## 2023-08-02 RX ADMIN — FAMOTIDINE 20 MG: 20 TABLET ORAL at 10:49

## 2023-08-02 ASSESSMENT — PAIN DESCRIPTION - PAIN TYPE: TYPE: ACUTE PAIN;CHRONIC PAIN

## 2023-08-02 ASSESSMENT — PAIN DESCRIPTION - ORIENTATION
ORIENTATION: RIGHT
ORIENTATION: RIGHT

## 2023-08-02 ASSESSMENT — PAIN - FUNCTIONAL ASSESSMENT
PAIN_FUNCTIONAL_ASSESSMENT: PREVENTS OR INTERFERES SOME ACTIVE ACTIVITIES AND ADLS
PAIN_FUNCTIONAL_ASSESSMENT: PREVENTS OR INTERFERES SOME ACTIVE ACTIVITIES AND ADLS

## 2023-08-02 ASSESSMENT — PAIN DESCRIPTION - LOCATION
LOCATION: ABDOMEN

## 2023-08-02 ASSESSMENT — PAIN SCALES - GENERAL
PAINLEVEL_OUTOF10: 8
PAINLEVEL_OUTOF10: 10

## 2023-08-02 ASSESSMENT — PAIN DESCRIPTION - DESCRIPTORS
DESCRIPTORS: ACHING
DESCRIPTORS: ACHING

## 2023-08-02 ASSESSMENT — PAIN DESCRIPTION - ONSET: ONSET: ON-GOING

## 2023-08-02 ASSESSMENT — PAIN DESCRIPTION - FREQUENCY: FREQUENCY: CONTINUOUS

## 2023-08-02 NOTE — PROGRESS NOTES
See PM shift assess. Talkative; waiting for pain medication. Clinical nurse present and has placed an IV via [address] Patient impulsive at times. He began picking at the dressing of the new IV and reinforced that if this IV comes out, he will not get another until tomorrow sometime as he is a very hard stick. Voiced understanding. Pleasant, call light in reach.

## 2023-08-02 NOTE — PROGRESS NOTES
CHEST PORTABLE   Final Result   Increased interstitial opacity with bilateral pleural effusions, the   constellation which suggests pulmonary interstitial edema. Assessment/Plan:  #Acute on chronic combined CHF  #Cardiomyopathy s/p AICD  -last echo as above, EF 20-25%  -IV lasix 40 BID --> transitioned to home Demadex today  -continue home aldactone, entresto, metoprolol   -no SGLT2 at this time  -low Na diet, strict I&Os, daily weights  -cardiology consulted and following     #Elevated troponin  #CAD, s/p stents  -troponin's elevated but down-trended  -EKG without acute ischemic change  -on statin, BB, and imdur prn     #HTN  -continue home regimen      #Hx left ventricular mural thrombus following MI   -on Eliquis      #PAD   -on plavix      #Abdominal pain  #Alcoholic cirrhosis with ascites  #Elevated LFTs  #Hepatitis C   -gets paracentesis every 2 weeks, ordered  -patient recently moved, so will need this set up for outpatient prior to discharge   -s/p paracentesis, -2.7 L, cultures pending  -GI consulted and following   -will need eventual EGD for variceal screening      #T2DM  -hold home regimen  -SSI  -monitor glucose      #Iron deficiency anemia  -H&H stable, on ferrous sulfate     #COPD  -stable, continue home inhalers     #Edward's esophagus   -on carafate and PPI     #Bilateral deafness  -communicates via ASL  -supportive care          DVT Prophylaxis: Eliquis  Diet: ADULT DIET; Regular;  Low Sodium (2 gm); 1800 ml  Code Status: Full Code    Willy Patrick PA-C  8/2/2023  1:42 PM

## 2023-08-02 NOTE — CONSULTS
Allergies: Allergies   Allergen Reactions    Azithromycin Rash and Shortness Of Breath     Other reaction(s): Gastrointestinal Intolerance, GI UPSET  (Abstracted from [facility] [address])  (Abstracted from [address] [place] [address])  Patient experienced GI upset, rash to abdomen, pain to administration site, shortness of breath, anxiety, tachycardia, and hypotension with administration. Other reaction(s): Gastrointestinal Intolerance, GI UPSET  (Abstracted from [address] [place] [address] Road)  Patient experienced GI upset, rash to abdomen, pain to administration site, shortness of breath, anxiety, tachycardia, and hypotension with administration. Patient experienced GI upset, rash to abdomen, pain to administration site, shortness of breath, anxiety, tachycardia, and hypotension with administration. Statins Other (See Comments)     Not an allergy, listed here for QI. May consider use in the future, but not currently a candidate. Pt does not recall allergy (Abstracted records from [address] [place] [address])  Not an allergy, listed here for QI. May consider use in the future, but not currently a candidate. Pt does not recall allergy (Abstracted records from [address] [place] [address])  Not an allergy, listed here for QI. May consider use in the future but not currently a candidate. 12/10/19 pt does not recall allergy. Not an allergy, listed here for QI. May consider use in the future but not currently a candidate. 12/10/19 pt does not recall allergy. ROS:   General: No fever or weight change. Hematologic: No unexpected submucosal bleeding or bruising. HEENT: No sore throat or facial pain  Respiratory: Denies cough. Endorses shortness of breath.    Cardiovascular: Denies angina or dependent edema  Gastrointestinal: See HPI  Musculoskeletal: No usual joint pain or stiffness  Skin: No skin eruptions or changing lesions  Neurologic: No focal weakness or numbness  Psychiatric: No anxiety or known and untreated hepatits C infection, cirrhosis, and ascites. He recently moved here from [place] and is wanting to establish care here in [facility]. MELD Na 16  08-02-23:  AST on 7/31 was 175 and is 185 today. ALT on 7/31 was 96 and is 81 today. Total bilirubin on 7/31 was 1.2 and is 0.7 today. Will trend. Na+ level 132 on admission, today is 134. Plan:  Paracentesis today - fluid studies ordered. Trend LFTs  Will need EGD for variceal screening, likely as an outpatient   Low Na+ diet  Follow up cardiology recommendations  Supportive care  Chronic Hep markers ordered      DOUG Mathew - CNP    [facility]    [phone].  Also available via [employer]

## 2023-08-02 NOTE — CARE COORDINATION
INTERDISCIPLINARY PLAN OF CARE CONFERENCE    Date/Time: 8/2/2023 5:21 PM  Completed by: Ermias Muller RN, Case Management      Patient Name:  Latonya Rae  YOB: 1970  Admitting Diagnosis: Shortness of breath [R06.02]  Acute pulmonary edema ([address]) [J81.0]  QT prolongation [R94.31]  Cellulitis of right lower extremity [B18.039]  Below-knee amputation of right lower extremity ([address]) [S88.111A]  Acute on chronic congestive heart failure, unspecified heart failure type ([address]) [I50.9]     Admit Date/Time:  7/31/2023  6:45 PM    Chart reviewed. Interdisciplinary team contacted or reviewed plan related to patient progress and discharge plans. Disciplines included Case Management, Nursing, and Dietitian. Current Status:Stable  PT/OT recommendation for discharge plan of care: N/A    Expected D/C Disposition:  Home  Confirmed plan with patient  Yes   Met with:. Discharge Plan Comments: attempted to meet with pt who is sleeping. Will cont plan to return to new home at [place].  Will follow    Home O2 in place on admit: No  Pt informed of need to bring portable home O2 tank on day of discharge for nursing to connect prior to leaving:  Not Indicated  Verbalized agreement/Understanding:  Not Indicated

## 2023-08-02 NOTE — ACP (ADVANCE CARE PLANNING)
08/02/23 2273   Encounter Summary   Encounter Overview/Reason  Advance Care Planning   Service Provided For: Patient   Support System Children   Last Encounter    (8/2: patient is heading down for procedshannan, RN stated that daughter is primary support. daughter would be NOK for pt, Spiritual health can follow up after procedure to establish contact info/decision maker for HCPOA as desired by patient)   Begin Time 0940   End Time  0953   Total Time Calculated 13 min     Thank you for consulting [address]    If you would like a 's presence for emotional, spiritual, grief or comfort care,   please dial \"0\" and ask for the u.sit on-call to be paged.     For help with [address] for Healthcare or Living Will forms, you may also call us directly:    [phone] [facility]  [phone] ([phone]) [facility]  [phone]) Outpatient    -[facility]

## 2023-08-03 VITALS
RESPIRATION RATE: 16 BRPM | DIASTOLIC BLOOD PRESSURE: 61 MMHG | OXYGEN SATURATION: 98 % | BODY MASS INDEX: 24.95 KG/M2 | TEMPERATURE: 97.7 F | SYSTOLIC BLOOD PRESSURE: 95 MMHG | HEIGHT: 71 IN | HEART RATE: 84 BPM | WEIGHT: 178.2 LBS

## 2023-08-03 LAB
ANA SER QL IA: NEGATIVE
ANION GAP SERPL CALCULATED.3IONS-SCNC: 10 MMOL/L (ref 3–16)
BUN SERPL-MCNC: 12 MG/DL (ref 7–20)
CALCIUM SERPL-MCNC: 8.5 MG/DL (ref 8.3–10.6)
CHLORIDE SERPL-SCNC: 99 MMOL/L (ref 99–110)
CO2 SERPL-SCNC: 19 MMOL/L (ref 21–32)
CREAT SERPL-MCNC: 0.8 MG/DL (ref 0.9–1.3)
GFR SERPLBLD CREATININE-BSD FMLA CKD-EPI: >60 ML/MIN/{1.73_M2}
GLUCOSE BLD-MCNC: 113 MG/DL (ref 70–99)
GLUCOSE BLD-MCNC: 150 MG/DL (ref 70–99)
GLUCOSE SERPL-MCNC: 119 MG/DL (ref 70–99)
HBV CORE IGM SERPL QL IA: NORMAL
HBV SURFACE AB SERPL IA-ACNC: <3.5 MIU/ML
HBV SURFACE AG SERPL QL IA: NORMAL
IRON SATN MFR SERPL: 11 % (ref 20–50)
IRON SERPL-MCNC: 45 UG/DL (ref 59–158)
MAGNESIUM SERPL-MCNC: 2.2 MG/DL (ref 1.8–2.4)
PERFORMED ON: ABNORMAL
PERFORMED ON: ABNORMAL
POTASSIUM SERPL-SCNC: 4.4 MMOL/L (ref 3.5–5.1)
SODIUM SERPL-SCNC: 128 MMOL/L (ref 136–145)
TIBC SERPL-MCNC: 410 UG/DL (ref 260–445)

## 2023-08-03 PROCEDURE — 6370000000 HC RX 637 (ALT 250 FOR IP)

## 2023-08-03 PROCEDURE — 6370000000 HC RX 637 (ALT 250 FOR IP): Performed by: INTERNAL MEDICINE

## 2023-08-03 PROCEDURE — 80048 BASIC METABOLIC PNL TOTAL CA: CPT

## 2023-08-03 PROCEDURE — 36415 COLL VENOUS BLD VENIPUNCTURE: CPT

## 2023-08-03 PROCEDURE — 99239 HOSP IP/OBS DSCHRG MGMT >30: CPT

## 2023-08-03 PROCEDURE — 83735 ASSAY OF MAGNESIUM: CPT

## 2023-08-03 PROCEDURE — 99232 SBSQ HOSP IP/OBS MODERATE 35: CPT | Performed by: INTERNAL MEDICINE

## 2023-08-03 RX ORDER — OXYCODONE HYDROCHLORIDE 5 MG/1
5 TABLET ORAL EVERY 6 HOURS PRN
Qty: 12 TABLET | Refills: 0 | Status: SHIPPED | OUTPATIENT
Start: 2023-08-03 | End: 2023-08-06

## 2023-08-03 RX ORDER — METOPROLOL SUCCINATE 25 MG/1
25 TABLET, EXTENDED RELEASE ORAL DAILY
Qty: 30 TABLET | Refills: 3 | Status: SHIPPED | OUTPATIENT
Start: 2023-08-03

## 2023-08-03 RX ORDER — SPIRONOLACTONE 25 MG/1
25 TABLET ORAL DAILY
Qty: 30 TABLET | Refills: 3 | Status: SHIPPED | OUTPATIENT
Start: 2023-08-04

## 2023-08-03 RX ORDER — TORSEMIDE 20 MG/1
20 TABLET ORAL DAILY
Qty: 30 TABLET | Refills: 3 | Status: SHIPPED | OUTPATIENT
Start: 2023-08-03

## 2023-08-03 RX ADMIN — FAMOTIDINE 20 MG: 20 TABLET ORAL at 10:05

## 2023-08-03 RX ADMIN — OXYCODONE HYDROCHLORIDE 10 MG: 5 TABLET ORAL at 04:06

## 2023-08-03 RX ADMIN — FERROUS SULFATE TAB 325 MG (65 MG ELEMENTAL FE) 325 MG: 325 (65 FE) TAB at 10:06

## 2023-08-03 RX ADMIN — SPIRONOLACTONE 25 MG: 25 TABLET ORAL at 10:06

## 2023-08-03 RX ADMIN — BUSPIRONE HYDROCHLORIDE 5 MG: 5 TABLET ORAL at 10:06

## 2023-08-03 RX ADMIN — CLOPIDOGREL BISULFATE 75 MG: 75 TABLET ORAL at 10:06

## 2023-08-03 RX ADMIN — PANTOPRAZOLE SODIUM 40 MG: 40 TABLET, DELAYED RELEASE ORAL at 10:05

## 2023-08-03 RX ADMIN — APIXABAN 5 MG: 5 TABLET, FILM COATED ORAL at 10:05

## 2023-08-03 RX ADMIN — SUCRALFATE 1 G: 1 TABLET ORAL at 10:06

## 2023-08-03 ASSESSMENT — PAIN DESCRIPTION - ORIENTATION: ORIENTATION: RIGHT

## 2023-08-03 ASSESSMENT — PAIN - FUNCTIONAL ASSESSMENT: PAIN_FUNCTIONAL_ASSESSMENT: PREVENTS OR INTERFERES SOME ACTIVE ACTIVITIES AND ADLS

## 2023-08-03 ASSESSMENT — PAIN DESCRIPTION - DESCRIPTORS: DESCRIPTORS: ACHING;BURNING

## 2023-08-03 ASSESSMENT — PAIN SCALES - GENERAL: PAINLEVEL_OUTOF10: 10

## 2023-08-03 ASSESSMENT — PAIN DESCRIPTION - LOCATION: LOCATION: ABDOMEN

## 2023-08-03 NOTE — PROGRESS NOTES
See PM shift assess and vitals. Medications given including pain medication. Upset that IV pain medication was dc/d. Discussed oral pain medications; uses and side effects. Enquired about free transportation home. Informed that CM will be contacted re this issue tomorrow. Indicated understanding. Communicating with family on the phone. Call light in reach. l

## 2023-08-03 NOTE — PROGRESS NOTES
Patient was verbally yelling at staff due to wanting to go to bank, for 1 hour.  [facility] via [facility] and meds to beds given, taxi taking him to [facility] per patient request.

## 2023-08-03 NOTE — CARE COORDINATION
DISCHARGE ORDER  Date/Time 8/3/2023 2:13 PM  Completed by: David Noriega, Case Management    Patient Name: Marine Goff      : 1970  Admitting Diagnosis: Shortness of breath [R06.02]  Acute pulmonary edema ([address]) [J81.0]  Cellulitis of right lower extremity [M15.027]  Acute on chronic congestive heart failure, unspecified heart failure type ([address]) [I50.9]      Admit order Date and Status: 23 Stable  (verify MD's last order for status of admission)      Noted discharge order. If applicable PT/OT recommendation at Discharge: NA    Confirmed discharge plan: Yes  with whom___David____________  If pt confirmed DC plan does family need to be contacted by CM No     Discharge Plan: Reviewed chart, noted DC order, met with pt at bedside, communicated via video . Pt requesting [facility] bed, not covered by insurance as pt is ambulatory. Requesting more food stamps, advised to contact [address] [place] [address]. Requesting [facility] [place], [facility]. RN, advised to contact PCP. Requesting cab voucher to [facility], advised pt can only get cab voucher to home. Pt has $20 gutiérrez, Rosemary PA to give pt $10 as [employer]'s state cab from [place] [employer] is $30. Pt agrees with DC at this time. Date of Last IMM Given: 8/3/2023    Reviewed chart. Role of discharge planner explained and patient verbalized understanding. Discharge order is noted. Has Home O2 in place on admit:  No  Informed of need to bring portable home O2 tank on day of discharge for nursing to connect prior to leaving:   Not Indicated  Verbalized agreement/Understanding:   Not Indicated  Pt is being d/c'd to home today. Pt's O2 sats are 98% on RA. Discharge timeout done with CM, PT, Felice Juarez, Sergio Coronado, [employer]. All discharge needs and concerns addressed.

## 2023-08-04 ENCOUNTER — FOLLOWUP TELEPHONE ENCOUNTER (OUTPATIENT)
Dept: ADMINISTRATIVE | Age: 53
End: 2023-08-04

## 2023-08-04 LAB
BACTERIA BLD CULT ORG #2: NORMAL
BACTERIA BLD CULT: NORMAL
HCV RNA SERPL NAA+PROBE-ACNC: ABNORMAL IU/ML
HCV RNA SERPL NAA+PROBE-LOG IU: 6.24 LOG IU/ML
HCV RNA SERPL QL NAA+PROBE: DETECTED

## 2023-08-04 NOTE — TELEPHONE ENCOUNTER
Heart Failure Follow-up Call:     1st Attempt; phone number listed has been disconnected at [phone]. No other number listed for patient. Will attempt emergency contact numbers.      Electronically signed by AZAEL Wilkins, RN  on 8/4/2023 at 11:40 AM

## 2023-08-04 NOTE — PROGRESS NOTES
Physician Progress Note      Bradford Patel  CSN #:                  [id number]  :                       1970  ADMIT DATE:       2023 6:45 PM  [facility] DATE:        8/3/2023 5:07 PM  RESPONDING  PROVIDER #:        Catherine Vivar MD          QUERY TEXT:    Pt admitted with Acute on chronic combined CHF. If possible, please document   in progress notes and discharge summary the etiology of CHF, if able to be   determined. The medical record reflects the following:  Risk Factors: Acute on chronic combined CHF, ischemic cardiomyopathy, CAD, s/p   stents, HTN    Clinical Indicators: PN - Cardiomyopathy  appears to be ischemic   cardiomyopathy based on prior echo and EKG. CAD with total occlusion of left ant descending artery  troponin's elevated but down-trended  last echo as above, EF 20-25%    Treatment: IV lasix 40 BID, continue home aldactone, entresto, metoprolol    Thank 4000 Harleen Pepe RN, [email or web address]  Options provided:  -- CHF due to Hypertensive Heart Disease  -- CHF due to Hypertensive Heart Disease and CAD  -- CHF not due to Hypertension but due to CAD  -- CHF due to Hypertensive Heart Disease and ICMP  -- CHF not due to Hypertension but due to ICMP  -- CHF due to HTN, CAD and  ICMP  -- Other - I will add my own diagnosis  -- Disagree - Not applicable / Not valid  -- Disagree - Clinically unable to determine / Unknown  -- Refer to Clinical Documentation Reviewer    PROVIDER RESPONSE TEXT:    This patient has CHF due to hypertensive heart disease.     Query created by: Valerie Plummer on 8/3/2023 10:38 AM      Electronically signed by:  Catherine Vivar MD 8/3/2023 9:21 PM

## 2023-08-04 NOTE — TELEPHONE ENCOUNTER
Heart Failure Follow-Up Call:    Call within 72 Hours of Discharge: Yes     Patient: Daya Corley   Patient : 1970   MRN: [id number]    Date of discharge: 8/3/23    Discharge department/facility: Med-Surg / [facility]    Discharge Disposition: Home    RARS: Readmission Risk Score: 16.5    Spoke with: Daughter Hillary Bains    Patients phone has been disconnected at number [phone]. Called emergency contact Hillary Bains at [phone]. Stated that [facility] is doing well since discharged. Reminded of follow up appointment on  at [phone] in [place]. Asked if [facility] had another number I could call and he only has messenger on facebook. Unable to get questions answered from patient. Hillary Bains stated she would give [facility] my number. [facility] received HF folder in hospital and has my number as well. Provided Heart Failure Nurse number at [phone] for any further questions or assistance with resources.        Follow Up  Future Appointments   Date Time Provider [address] [place] [address]   2023 10:00 AM DOUG Us - CNP [facility]       Electronically signed by Erin Cool, AZAEL, RN  on 2023 at 11:44 AM

## 2023-08-05 LAB
A1AT SERPL-MCNC: 228 MG/DL (ref 90–200)
AFP-TM SERPL-MCNC: 3.8 UG/L
CERULOPLASMIN SERPL-MCNC: 29 MG/DL (ref 15–30)

## 2023-08-16 ENCOUNTER — HOSPITAL ENCOUNTER (INPATIENT)
Age: 53
LOS: 1 days | Discharge: HOME | End: 2023-08-17
Attending: EMERGENCY MEDICINE | Admitting: STUDENT IN AN ORGANIZED HEALTH CARE EDUCATION/TRAINING PROGRAM
Payer: MEDICARE

## 2023-08-16 ENCOUNTER — APPOINTMENT (OUTPATIENT)
Dept: CT IMAGING | Age: 53
End: 2023-08-16
Payer: MEDICARE

## 2023-08-16 DIAGNOSIS — R10.84 GENERALIZED ABDOMINAL PAIN: Primary | ICD-10-CM

## 2023-08-16 DIAGNOSIS — R18.8 OTHER ASCITES: ICD-10-CM

## 2023-08-16 DIAGNOSIS — J90 PLEURAL EFFUSION: ICD-10-CM

## 2023-08-16 LAB
ALBUMIN SERPL-MCNC: 3.6 G/DL (ref 3.4–5)
ALBUMIN/GLOB SERPL: 0.9 {RATIO} (ref 1.1–2.2)
ALP SERPL-CCNC: 99 U/L (ref 40–129)
ALT SERPL-CCNC: 57 U/L (ref 10–40)
AMORPH SED URNS QL MICRO: ABNORMAL /HPF
AMPHETAMINES UR QL SCN>1000 NG/ML: ABNORMAL
ANION GAP SERPL CALCULATED.3IONS-SCNC: 13 MMOL/L (ref 3–16)
AST SERPL-CCNC: 126 U/L (ref 15–37)
BACTERIA URNS QL MICRO: ABNORMAL /HPF
BARBITURATES UR QL SCN>200 NG/ML: ABNORMAL
BASOPHILS # BLD: 0 K/UL (ref 0–0.2)
BASOPHILS NFR BLD: 0.9 %
BENZODIAZ UR QL SCN>200 NG/ML: ABNORMAL
BILIRUB SERPL-MCNC: 0.6 MG/DL (ref 0–1)
BILIRUB UR QL STRIP.AUTO: NEGATIVE
BUN SERPL-MCNC: 13 MG/DL (ref 7–20)
CALCIUM SERPL-MCNC: 8.7 MG/DL (ref 8.3–10.6)
CANNABINOIDS UR QL SCN>50 NG/ML: ABNORMAL
CHLORIDE SERPL-SCNC: 95 MMOL/L (ref 99–110)
CLARITY UR: CLEAR
CO2 SERPL-SCNC: 27 MMOL/L (ref 21–32)
COCAINE UR QL SCN: ABNORMAL
COLOR UR: YELLOW
CREAT SERPL-MCNC: 0.9 MG/DL (ref 0.9–1.3)
DEPRECATED RDW RBC AUTO: 19.8 % (ref 12.4–15.4)
DRUG SCREEN COMMENT UR-IMP: ABNORMAL
EOSINOPHIL # BLD: 0.1 K/UL (ref 0–0.6)
EOSINOPHIL NFR BLD: 1.5 %
EPI CELLS #/AREA URNS HPF: ABNORMAL /HPF (ref 0–5)
FENTANYL SCREEN, URINE: ABNORMAL
GFR SERPLBLD CREATININE-BSD FMLA CKD-EPI: >60 ML/MIN/{1.73_M2}
GLUCOSE SERPL-MCNC: 223 MG/DL (ref 70–99)
GLUCOSE UR STRIP.AUTO-MCNC: NEGATIVE MG/DL
HCT VFR BLD AUTO: 35.1 % (ref 40.5–52.5)
HGB BLD-MCNC: 11.6 G/DL (ref 13.5–17.5)
HGB UR QL STRIP.AUTO: NEGATIVE
KETONES UR STRIP.AUTO-MCNC: NEGATIVE MG/DL
LEUKOCYTE ESTERASE UR QL STRIP.AUTO: NEGATIVE
LIPASE SERPL-CCNC: 10 U/L (ref 13–60)
LYMPHOCYTES # BLD: 0.8 K/UL (ref 1–5.1)
LYMPHOCYTES NFR BLD: 15.9 %
MCH RBC QN AUTO: 25.8 PG (ref 26–34)
MCHC RBC AUTO-ENTMCNC: 33 G/DL (ref 31–36)
MCV RBC AUTO: 78.1 FL (ref 80–100)
METHADONE UR QL SCN>300 NG/ML: ABNORMAL
MONOCYTES # BLD: 0.7 K/UL (ref 0–1.3)
MONOCYTES NFR BLD: 12.7 %
NEUTROPHILS # BLD: 3.7 K/UL (ref 1.7–7.7)
NEUTROPHILS NFR BLD: 69 %
NITRITE UR QL STRIP.AUTO: NEGATIVE
OPIATES UR QL SCN>300 NG/ML: ABNORMAL
OXYCODONE UR QL SCN: POSITIVE
PCP UR QL SCN>25 NG/ML: ABNORMAL
PH UR STRIP.AUTO: 7 [PH] (ref 5–8)
PH UR STRIP: 7 [PH]
PLATELET # BLD AUTO: 117 K/UL (ref 135–450)
PMV BLD AUTO: 8.2 FL (ref 5–10.5)
POTASSIUM SERPL-SCNC: 3.5 MMOL/L (ref 3.5–5.1)
PROT SERPL-MCNC: 7.7 G/DL (ref 6.4–8.2)
PROT UR STRIP.AUTO-MCNC: 30 MG/DL
RBC # BLD AUTO: 4.5 M/UL (ref 4.2–5.9)
RBC #/AREA URNS HPF: ABNORMAL /HPF (ref 0–4)
RENAL EPI CELLS #/AREA UR COMP ASSIST: ABNORMAL /HPF (ref 0–1)
SODIUM SERPL-SCNC: 135 MMOL/L (ref 136–145)
SP GR UR STRIP.AUTO: 1.01 (ref 1–1.03)
TROPONIN, HIGH SENSITIVITY: 48 NG/L (ref 0–22)
TROPONIN, HIGH SENSITIVITY: 49 NG/L (ref 0–22)
UA COMPLETE W REFLEX CULTURE PNL UR: ABNORMAL
UA DIPSTICK W REFLEX MICRO PNL UR: YES
URN SPEC COLLECT METH UR: ABNORMAL
UROBILINOGEN UR STRIP-ACNC: 2 E.U./DL
WBC # BLD AUTO: 5.3 K/UL (ref 4–11)
WBC #/AREA URNS HPF: ABNORMAL /HPF (ref 0–5)

## 2023-08-16 PROCEDURE — 80053 COMPREHEN METABOLIC PANEL: CPT

## 2023-08-16 PROCEDURE — 74177 CT ABD & PELVIS W/CONTRAST: CPT

## 2023-08-16 PROCEDURE — 81001 URINALYSIS AUTO W/SCOPE: CPT

## 2023-08-16 PROCEDURE — 6370000000 HC RX 637 (ALT 250 FOR IP): Performed by: EMERGENCY MEDICINE

## 2023-08-16 PROCEDURE — 84484 ASSAY OF TROPONIN QUANT: CPT

## 2023-08-16 PROCEDURE — 83690 ASSAY OF LIPASE: CPT

## 2023-08-16 PROCEDURE — 36415 COLL VENOUS BLD VENIPUNCTURE: CPT

## 2023-08-16 PROCEDURE — 96375 TX/PRO/DX INJ NEW DRUG ADDON: CPT

## 2023-08-16 PROCEDURE — 93005 ELECTROCARDIOGRAM TRACING: CPT | Performed by: PHYSICIAN ASSISTANT

## 2023-08-16 PROCEDURE — 85610 PROTHROMBIN TIME: CPT

## 2023-08-16 PROCEDURE — 6370000000 HC RX 637 (ALT 250 FOR IP): Performed by: PHYSICIAN ASSISTANT

## 2023-08-16 PROCEDURE — 6360000004 HC RX CONTRAST MEDICATION: Performed by: PHYSICIAN ASSISTANT

## 2023-08-16 PROCEDURE — 80307 DRUG TEST PRSMV CHEM ANLYZR: CPT

## 2023-08-16 PROCEDURE — 99285 EMERGENCY DEPT VISIT HI MDM: CPT

## 2023-08-16 PROCEDURE — 85025 COMPLETE CBC W/AUTO DIFF WBC: CPT

## 2023-08-16 PROCEDURE — 6360000002 HC RX W HCPCS: Performed by: PHYSICIAN ASSISTANT

## 2023-08-16 RX ORDER — KETOROLAC TROMETHAMINE 30 MG/ML
15 INJECTION, SOLUTION INTRAMUSCULAR; INTRAVENOUS ONCE
Status: COMPLETED | OUTPATIENT
Start: 2023-08-16 | End: 2023-08-16

## 2023-08-16 RX ORDER — DICYCLOMINE HYDROCHLORIDE 10 MG/1
10 CAPSULE ORAL ONCE
Status: COMPLETED | OUTPATIENT
Start: 2023-08-16 | End: 2023-08-16

## 2023-08-16 RX ORDER — ONDANSETRON 2 MG/ML
4 INJECTION INTRAMUSCULAR; INTRAVENOUS ONCE
Status: COMPLETED | OUTPATIENT
Start: 2023-08-16 | End: 2023-08-16

## 2023-08-16 RX ORDER — MORPHINE SULFATE 4 MG/ML
4 INJECTION, SOLUTION INTRAMUSCULAR; INTRAVENOUS ONCE
Status: COMPLETED | OUTPATIENT
Start: 2023-08-16 | End: 2023-08-16

## 2023-08-16 RX ORDER — ACETAMINOPHEN 500 MG
1000 TABLET ORAL ONCE
Status: COMPLETED | OUTPATIENT
Start: 2023-08-16 | End: 2023-08-16

## 2023-08-16 RX ADMIN — ACETAMINOPHEN 1000 MG: 500 TABLET ORAL at 21:43

## 2023-08-16 RX ADMIN — ONDANSETRON 4 MG: 2 INJECTION INTRAMUSCULAR; INTRAVENOUS at 21:42

## 2023-08-16 RX ADMIN — KETOROLAC TROMETHAMINE 15 MG: 30 INJECTION, SOLUTION INTRAMUSCULAR; INTRAVENOUS at 21:42

## 2023-08-16 RX ADMIN — MORPHINE SULFATE 4 MG: 4 INJECTION, SOLUTION INTRAMUSCULAR; INTRAVENOUS at 21:41

## 2023-08-16 RX ADMIN — DICYCLOMINE HYDROCHLORIDE 10 MG: 10 CAPSULE ORAL at 23:01

## 2023-08-16 RX ADMIN — IOPAMIDOL 75 ML: 755 INJECTION, SOLUTION INTRAVENOUS at 22:13

## 2023-08-16 ASSESSMENT — PAIN DESCRIPTION - DESCRIPTORS: DESCRIPTORS: SHARP;TIGHTNESS

## 2023-08-16 ASSESSMENT — PAIN SCALES - GENERAL
PAINLEVEL_OUTOF10: 10
PAINLEVEL_OUTOF10: 10

## 2023-08-16 ASSESSMENT — PAIN DESCRIPTION - LOCATION
LOCATION: ABDOMEN
LOCATION: ABDOMEN

## 2023-08-17 ENCOUNTER — APPOINTMENT (OUTPATIENT)
Dept: ULTRASOUND IMAGING | Age: 53
End: 2023-08-17
Payer: MEDICARE

## 2023-08-17 VITALS
TEMPERATURE: 98.2 F | RESPIRATION RATE: 16 BRPM | BODY MASS INDEX: 24.84 KG/M2 | DIASTOLIC BLOOD PRESSURE: 68 MMHG | SYSTOLIC BLOOD PRESSURE: 94 MMHG | WEIGHT: 178.13 LBS | HEART RATE: 92 BPM | OXYGEN SATURATION: 99 %

## 2023-08-17 PROBLEM — J90 PLEURAL EFFUSION: Status: ACTIVE | Noted: 2023-08-17

## 2023-08-17 PROBLEM — K74.60 DECOMPENSATED HEPATIC CIRRHOSIS (HCC): Status: ACTIVE | Noted: 2023-08-17

## 2023-08-17 PROBLEM — R18.8 OTHER ASCITES: Status: ACTIVE | Noted: 2023-08-17

## 2023-08-17 PROBLEM — R10.84 GENERALIZED ABDOMINAL PAIN: Status: ACTIVE | Noted: 2023-08-17

## 2023-08-17 PROBLEM — K72.90 DECOMPENSATED HEPATIC CIRRHOSIS (HCC): Status: ACTIVE | Noted: 2023-08-17

## 2023-08-17 LAB
AMMONIA PLAS-SCNC: 42 UMOL/L (ref 16–60)
EKG ATRIAL RATE: 92 BPM
EKG DIAGNOSIS: NORMAL
EKG P AXIS: 70 DEGREES
EKG P-R INTERVAL: 208 MS
EKG Q-T INTERVAL: 412 MS
EKG QRS DURATION: 120 MS
EKG QTC CALCULATION (BAZETT): 509 MS
EKG R AXIS: 262 DEGREES
EKG T AXIS: 84 DEGREES
EKG VENTRICULAR RATE: 92 BPM
ETHANOLAMINE SERPL-MCNC: NORMAL MG/DL (ref 0–0.08)
GLUCOSE BLD-MCNC: 101 MG/DL (ref 70–99)
GLUCOSE BLD-MCNC: 144 MG/DL (ref 70–99)
INR PPP: 1.61 (ref 0.84–1.16)
PERFORMED ON: ABNORMAL
PERFORMED ON: ABNORMAL
PROTHROMBIN TIME: 19.1 SEC (ref 11.5–14.8)
TROPONIN, HIGH SENSITIVITY: 57 NG/L (ref 0–22)

## 2023-08-17 PROCEDURE — 36415 COLL VENOUS BLD VENIPUNCTURE: CPT

## 2023-08-17 PROCEDURE — 0W9G3ZZ DRAINAGE OF PERITONEAL CAVITY, PERCUTANEOUS APPROACH: ICD-10-PCS | Performed by: RADIOLOGY

## 2023-08-17 PROCEDURE — 99239 HOSP IP/OBS DSCHRG MGMT >30: CPT

## 2023-08-17 PROCEDURE — 6370000000 HC RX 637 (ALT 250 FOR IP): Performed by: STUDENT IN AN ORGANIZED HEALTH CARE EDUCATION/TRAINING PROGRAM

## 2023-08-17 PROCEDURE — 93010 ELECTROCARDIOGRAM REPORT: CPT | Performed by: INTERNAL MEDICINE

## 2023-08-17 PROCEDURE — 82077 ASSAY SPEC XCP UR&BREATH IA: CPT

## 2023-08-17 PROCEDURE — 6360000002 HC RX W HCPCS: Performed by: INTERNAL MEDICINE

## 2023-08-17 PROCEDURE — 96375 TX/PRO/DX INJ NEW DRUG ADDON: CPT

## 2023-08-17 PROCEDURE — 82140 ASSAY OF AMMONIA: CPT

## 2023-08-17 PROCEDURE — 49083 ABD PARACENTESIS W/IMAGING: CPT

## 2023-08-17 PROCEDURE — 96365 THER/PROPH/DIAG IV INF INIT: CPT

## 2023-08-17 PROCEDURE — 6360000002 HC RX W HCPCS: Performed by: EMERGENCY MEDICINE

## 2023-08-17 PROCEDURE — 2580000003 HC RX 258: Performed by: EMERGENCY MEDICINE

## 2023-08-17 PROCEDURE — 84484 ASSAY OF TROPONIN QUANT: CPT

## 2023-08-17 PROCEDURE — 1200000000 HC SEMI PRIVATE

## 2023-08-17 RX ORDER — POTASSIUM CHLORIDE 20 MEQ/1
40 TABLET, EXTENDED RELEASE ORAL PRN
Status: DISCONTINUED | OUTPATIENT
Start: 2023-08-17 | End: 2023-08-17 | Stop reason: HOSPADM

## 2023-08-17 RX ORDER — PROCHLORPERAZINE EDISYLATE 5 MG/ML
10 INJECTION INTRAMUSCULAR; INTRAVENOUS EVERY 6 HOURS PRN
Status: DISCONTINUED | OUTPATIENT
Start: 2023-08-17 | End: 2023-08-17 | Stop reason: HOSPADM

## 2023-08-17 RX ORDER — POTASSIUM CHLORIDE 7.45 MG/ML
10 INJECTION INTRAVENOUS PRN
Status: DISCONTINUED | OUTPATIENT
Start: 2023-08-17 | End: 2023-08-17 | Stop reason: HOSPADM

## 2023-08-17 RX ORDER — CIPROFLOXACIN 500 MG/1
500 TABLET, FILM COATED ORAL DAILY
COMMUNITY
End: 2023-08-17

## 2023-08-17 RX ORDER — TORSEMIDE 20 MG/1
20 TABLET ORAL DAILY
Status: DISCONTINUED | OUTPATIENT
Start: 2023-08-17 | End: 2023-08-17 | Stop reason: HOSPADM

## 2023-08-17 RX ORDER — ACETAMINOPHEN 650 MG/1
650 SUPPOSITORY RECTAL EVERY 6 HOURS PRN
Status: DISCONTINUED | OUTPATIENT
Start: 2023-08-17 | End: 2023-08-17

## 2023-08-17 RX ORDER — OXYCODONE HYDROCHLORIDE AND ACETAMINOPHEN 5; 325 MG/1; MG/1
1 TABLET ORAL EVERY 4 HOURS PRN
Status: ON HOLD | COMMUNITY
End: 2023-08-22 | Stop reason: HOSPADM

## 2023-08-17 RX ORDER — ENOXAPARIN SODIUM 100 MG/ML
40 INJECTION SUBCUTANEOUS DAILY
Status: DISCONTINUED | OUTPATIENT
Start: 2023-08-17 | End: 2023-08-17

## 2023-08-17 RX ORDER — SODIUM CHLORIDE 0.9 % (FLUSH) 0.9 %
5-40 SYRINGE (ML) INJECTION PRN
Status: DISCONTINUED | OUTPATIENT
Start: 2023-08-17 | End: 2023-08-17 | Stop reason: HOSPADM

## 2023-08-17 RX ORDER — SODIUM CHLORIDE 9 MG/ML
INJECTION, SOLUTION INTRAVENOUS PRN
Status: DISCONTINUED | OUTPATIENT
Start: 2023-08-17 | End: 2023-08-17 | Stop reason: HOSPADM

## 2023-08-17 RX ORDER — INSULIN LISPRO 100 [IU]/ML
0-4 INJECTION, SOLUTION INTRAVENOUS; SUBCUTANEOUS
Status: DISCONTINUED | OUTPATIENT
Start: 2023-08-17 | End: 2023-08-17 | Stop reason: HOSPADM

## 2023-08-17 RX ORDER — METOPROLOL SUCCINATE 25 MG/1
25 TABLET, EXTENDED RELEASE ORAL DAILY
Status: DISCONTINUED | OUTPATIENT
Start: 2023-08-17 | End: 2023-08-17 | Stop reason: HOSPADM

## 2023-08-17 RX ORDER — INSULIN LISPRO 100 [IU]/ML
0-4 INJECTION, SOLUTION INTRAVENOUS; SUBCUTANEOUS NIGHTLY
Status: DISCONTINUED | OUTPATIENT
Start: 2023-08-17 | End: 2023-08-17 | Stop reason: HOSPADM

## 2023-08-17 RX ORDER — MAGNESIUM SULFATE IN WATER 40 MG/ML
2000 INJECTION, SOLUTION INTRAVENOUS PRN
Status: DISCONTINUED | OUTPATIENT
Start: 2023-08-17 | End: 2023-08-17 | Stop reason: HOSPADM

## 2023-08-17 RX ORDER — POLYETHYLENE GLYCOL 3350 17 G/17G
17 POWDER, FOR SOLUTION ORAL DAILY PRN
Status: DISCONTINUED | OUTPATIENT
Start: 2023-08-17 | End: 2023-08-17 | Stop reason: HOSPADM

## 2023-08-17 RX ORDER — HYDROXYZINE HYDROCHLORIDE 10 MG/1
10 TABLET, FILM COATED ORAL 3 TIMES DAILY PRN
COMMUNITY
End: 2023-08-17

## 2023-08-17 RX ORDER — ATORVASTATIN CALCIUM 40 MG/1
40 TABLET, FILM COATED ORAL NIGHTLY
Status: DISCONTINUED | OUTPATIENT
Start: 2023-08-17 | End: 2023-08-17 | Stop reason: HOSPADM

## 2023-08-17 RX ORDER — SUCRALFATE 1 G/1
1 TABLET ORAL
Status: DISCONTINUED | OUTPATIENT
Start: 2023-08-17 | End: 2023-08-17 | Stop reason: HOSPADM

## 2023-08-17 RX ORDER — BUSPIRONE HYDROCHLORIDE 5 MG/1
5 TABLET ORAL 2 TIMES DAILY
Status: DISCONTINUED | OUTPATIENT
Start: 2023-08-17 | End: 2023-08-17 | Stop reason: HOSPADM

## 2023-08-17 RX ORDER — ONDANSETRON 4 MG/1
4 TABLET, ORALLY DISINTEGRATING ORAL EVERY 8 HOURS PRN
Status: DISCONTINUED | OUTPATIENT
Start: 2023-08-17 | End: 2023-08-17 | Stop reason: HOSPADM

## 2023-08-17 RX ORDER — SODIUM CHLORIDE 0.9 % (FLUSH) 0.9 %
5-40 SYRINGE (ML) INJECTION EVERY 12 HOURS SCHEDULED
Status: DISCONTINUED | OUTPATIENT
Start: 2023-08-17 | End: 2023-08-17 | Stop reason: HOSPADM

## 2023-08-17 RX ORDER — FENTANYL CITRATE 50 UG/ML
25 INJECTION, SOLUTION INTRAMUSCULAR; INTRAVENOUS ONCE
Status: COMPLETED | OUTPATIENT
Start: 2023-08-17 | End: 2023-08-17

## 2023-08-17 RX ORDER — OXYCODONE HYDROCHLORIDE 5 MG/1
5 TABLET ORAL EVERY 6 HOURS PRN
Status: DISCONTINUED | OUTPATIENT
Start: 2023-08-17 | End: 2023-08-17 | Stop reason: HOSPADM

## 2023-08-17 RX ORDER — PANTOPRAZOLE SODIUM 40 MG/1
40 TABLET, DELAYED RELEASE ORAL 2 TIMES DAILY
Status: DISCONTINUED | OUTPATIENT
Start: 2023-08-17 | End: 2023-08-17 | Stop reason: HOSPADM

## 2023-08-17 RX ORDER — LACTULOSE 10 G/15ML
20 SOLUTION ORAL EVERY 8 HOURS PRN
Status: DISCONTINUED | OUTPATIENT
Start: 2023-08-17 | End: 2023-08-17 | Stop reason: HOSPADM

## 2023-08-17 RX ORDER — CLOPIDOGREL BISULFATE 75 MG/1
75 TABLET ORAL DAILY
Status: DISCONTINUED | OUTPATIENT
Start: 2023-08-17 | End: 2023-08-17 | Stop reason: HOSPADM

## 2023-08-17 RX ORDER — MORPHINE SULFATE 2 MG/ML
2 INJECTION, SOLUTION INTRAMUSCULAR; INTRAVENOUS EVERY 4 HOURS PRN
Status: DISCONTINUED | OUTPATIENT
Start: 2023-08-17 | End: 2023-08-17 | Stop reason: HOSPADM

## 2023-08-17 RX ORDER — ONDANSETRON 2 MG/ML
4 INJECTION INTRAMUSCULAR; INTRAVENOUS EVERY 6 HOURS PRN
Status: DISCONTINUED | OUTPATIENT
Start: 2023-08-17 | End: 2023-08-17 | Stop reason: ALTCHOICE

## 2023-08-17 RX ORDER — LACTULOSE 10 G/15ML
20 SOLUTION ORAL EVERY 8 HOURS PRN
Qty: 473 ML | Refills: 1 | Status: SHIPPED | OUTPATIENT
Start: 2023-08-17

## 2023-08-17 RX ORDER — SPIRONOLACTONE 25 MG/1
25 TABLET ORAL DAILY
Status: DISCONTINUED | OUTPATIENT
Start: 2023-08-17 | End: 2023-08-17 | Stop reason: HOSPADM

## 2023-08-17 RX ORDER — NITROGLYCERIN 0.4 MG/1
0.4 TABLET SUBLINGUAL EVERY 5 MIN PRN
COMMUNITY
End: 2023-08-17

## 2023-08-17 RX ORDER — ACETAMINOPHEN 325 MG/1
650 TABLET ORAL EVERY 6 HOURS PRN
Status: DISCONTINUED | OUTPATIENT
Start: 2023-08-17 | End: 2023-08-17

## 2023-08-17 RX ADMIN — MORPHINE SULFATE 2 MG: 2 INJECTION, SOLUTION INTRAMUSCULAR; INTRAVENOUS at 08:58

## 2023-08-17 RX ADMIN — CEFTRIAXONE SODIUM 1000 MG: 1 INJECTION, POWDER, FOR SOLUTION INTRAMUSCULAR; INTRAVENOUS at 00:31

## 2023-08-17 RX ADMIN — FENTANYL CITRATE 25 MCG: 50 INJECTION, SOLUTION INTRAMUSCULAR; INTRAVENOUS at 00:56

## 2023-08-17 RX ADMIN — OXYCODONE HYDROCHLORIDE 5 MG: 5 TABLET ORAL at 05:44

## 2023-08-17 ASSESSMENT — PAIN DESCRIPTION - ORIENTATION
ORIENTATION: UPPER
ORIENTATION: MID;UPPER
ORIENTATION: UPPER;MID

## 2023-08-17 ASSESSMENT — PAIN DESCRIPTION - DESCRIPTORS
DESCRIPTORS: ACHING;STABBING
DESCRIPTORS: ACHING;STABBING
DESCRIPTORS: STABBING

## 2023-08-17 ASSESSMENT — PAIN DESCRIPTION - LOCATION
LOCATION: ABDOMEN

## 2023-08-17 ASSESSMENT — PAIN SCALES - GENERAL
PAINLEVEL_OUTOF10: 10

## 2023-08-17 ASSESSMENT — PAIN SCALES - WONG BAKER: WONGBAKER_NUMERICALRESPONSE: 0

## 2023-08-17 NOTE — PROGRESS NOTES
Patient was evaluated by adonisist early this AM. Current plan of care below. I have reviewed vitals, labs and orders and have briefly seen the patient. Patient admitted to [facility] 7/31-8/03. Discharged to home. Then admitted to [facility]. E's for pneumonia and discharged on 8/14 to SNF, checked himself out. Presented back to [facility] on 8/16 for abdominal pain. Has not had paracentesis since 8/8. Para ordered and GI consulted. Ongoing issue of non-compliance.   ***      #Abdominal pain   #Decompensated alcoholic cirrhosis with ascites   #Elevated LFTs   #Hepatitis C   -recent admission for same, unable to f/u with GI in interim  -last paracentesis 8/8 and over 2 L removed  -paracentesis ordered   -continue torsemide and aldactone  -prn lactulose to maintain 2-3 BM/day  -low salt/fluid restricted diet  -still needs EGD for variceal screening as outpatient      #Chronic combined CHF  #Cardiomyopathy s/p AICD  -last echo EF 20-25%  -on demedex, metoprolol, plavix, eliquis  -holding entresto and aldactone until paracentesis, then resume  -no SGLT2 at this time  -low Na diet, strict I&Os, daily weights  -follow up with cardiology as outpatient     #Elevated troponin  #CAD, s/p stents  -troponin's elevated but down-trended  -EKG without acute ischemic change  -on statin, plavix, BB  -follow up with cardiology as outpatient     #HTN  -continue home regimen      #Hx left ventricular mural thrombus following MI   -on Eliquis      #PAD   -on plavix     #T2DM  -hold home regimen  -SSI  -monitor glucose      #Iron deficiency anemia  -H&H stable, on ferrous sulfate     #COPD  -stable, continue home inhalers     #Edward's esophagus   -on carafate and PPI     #Bilateral deafness  -communicates via ASL  -supportive care      Damon Stark PA-C  8/17/2023   9:48 AM

## 2023-08-17 NOTE — PROGRESS NOTES
4 Eyes Skin Assessment     NAME:  Jaycob Huerta  YOB: 1970  MEDICAL RECORD NUMBER:  [id number]    The patient is being assessed for  Admission    I agree that at least one RN has performed a thorough Head to Toe Skin Assessment on the patient. ALL assessment sites listed below have been assessed. Areas assessed by both nurses:    Head, Face, Ears, Shoulders, Back, Chest, Arms, Elbows, Hands, Sacrum. Buttock, Coccyx, Ischium, Legs. Feet and Heels, and Under Medical Devices     Redness to left lower extremity, scabs over wounds scattered on lower extremity; scab to RBKA incision line, scattered bruising        Does the Patient have a Wound? Yes wound(s) were present on assessment.  LDA wound assessment was Initiated and completed by RN       Garret Prevention initiated by RN: No  Wound Care Orders initiated by RN: No    Pressure Injury (Stage 3,4, Unstageable, DTI, NWPT, and Complex wounds) if present, place Wound referral order by RN under : No    New Ostomies, if present place, Ostomy referral order under : No     Nurse 1 eSignature: Electronically signed by Jazzy Hall RN on 8/17/23 at 11:19 AM EDT    **SHARE this note so that the co-signing nurse can place an eSignature**    Nurse 2 eSignature: {Esignature:[id number]}

## 2023-08-17 NOTE — PLAN OF CARE
HEART FAILURE CARE PLAN:    Comorbidities Reviewed: Yes   Patient has a past medical history of CHF (congestive heart failure) ([address] ECHOCARDIOGRAM Reviewed: Yes   Patient's Ejection Fraction (EF) is less than 40%    Weights Reviewed: Yes   Admission weight:     Wt Readings from Last 3 Encounters:   08/02/23 178 lb 3.2 oz (80.8 kg)     Intake & Output Reviewed: Yes   No intake or output data in the 24 hours ending 08/17/23 0831  Medications Reviewed: Yes   SCHEDULED HOSPITAL MEDICATIONS:   sodium chloride flush  5-40 mL IntraVENous 2 times per day    insulin lispro  0-4 Units SubCUTAneous TID WC    insulin lispro  0-4 Units SubCUTAneous Nightly    apixaban  5 mg Oral BID    busPIRone  5 mg Oral BID    clopidogrel  75 mg Oral Daily    atorvastatin  40 mg Oral Nightly    metoprolol succinate  25 mg Oral Daily    pantoprazole  40 mg Oral BID    [Held by provider] sacubitril-valsartan  1 tablet Oral BID    [Held by provider] spironolactone  25 mg Oral Daily    sucralfate  1 g Oral 4x Daily AC & HS    torsemide  20 mg Oral Daily     ACE/ARB/ARNI is REQUIRED for EF </= 66% SYSTOLIC FAILURE:   ACE[de-identified] None  ARB[de-identified] None  ARNI[de-identified] Sacubitril/Valsartan-Entresto    Evidenced-Based Beta Blocker is REQUIRED for EF </= 29% SYSTOLIC FAILURE:   [de-identified] Metoprolol SUCCinate- Toprol XL    Diuretics:  [de-identified] Torsemide    Diet Reviewed: Yes   ADULT DIET; Regular; Low Sodium (2 gm); 1800 ml    Goal of Care Reviewed: Yes   Patient and/or Family's stated Goal of Care this Admission: reduce shortness of breath, increase activity tolerance, better understand heart failure and disease management, be more comfortable, and reduce lower extremity edema prior to discharge.

## 2023-08-17 NOTE — PROGRESS NOTES
Pt leaving via personal vehicle to home. Discharge instructions given. Pt voiced understanding. Copy of discharge and scripts with patient. Iv removed. CP and PE completed. No further needs at discharge. Pt leaving with all personal belonging. Education and information on what appointments to schedule follow ups for discussed with patient's friend. Patient taken to ER waiting room at this time as that is where his ride was.

## 2023-08-17 NOTE — H&P
History and Physical      Name:  Klaus Trejo /Age/Sex: 1970  (52 y.o. male)   MRN & CSN:  [id number] & [id number] Encounter Date/Time: 2023 1:00 AM EDT   Location:   PCP: No primary care provider on file. Assessment and Plan:   Klaus Trejo is a 52 y.o. male with speech and hearing disability & past medical history of chronic combined congestive heart failure, CAD with history of PCI, hypertension, PAD, LV thrombus, alcoholic cirrhosis, L1XS who presents with Decompensated hepatic cirrhosis [facility]    Hospital Problems             Last Modified POA    * (Principal) Decompensated hepatic cirrhosis ([address]) 2023 Yes     Decompensated alcoholic/hepatitis C liver cirrhosis with ascites  Abnormal LFTs AST> ALT  Most recent MELD Na 16  HPE grade 1  Continue Torsemide and Spironolactone  Lactulose as needed to maintain 2-3 BM per day  Low salt and fluid restricted diet  Has been unable to f/u with GI after last admission due to admission at another facility    T2DM with hyperglycemia  Sliding scale insulin    Elevated troponin  CAD with history of PCI  HS troponin 48>49  EKG SR 92/min, no acute ST-T wave abnormalities  Continue Plavix and statin    Chronic congestive heart failure  With ICD placement  Most recent Echocardiogram 20-25%  Clinically euvolemic  Hold entresto and spironolactone in anticipation for paracentesis  Continue Toprol XL    Hx Left ventricular mural thrombus  Continue Eliquis    Inpatient  Full code    Disposition:   Current Living situation: Home   Expected Disposition: Home   Estimated D/C: 2 days    Diet ADULT DIET; Regular;  Low Sodium (2 gm); 1800 ml   DVT Prophylaxis [] Lovenox, []  Heparin, [] SCDs, [] Ambulation,  [x] Eliquis, [] Xarelto, [] Coumadin   Code Status Full Code   Surrogate Decision Maker/ GIOVANNA Langford     Personally reviewed Lab Studies and Imaging     History from:     Patient via [address] [place] [address] St     History of Present Illness:     Chief Complaint:

## 2023-08-17 NOTE — PROGRESS NOTES
Patient rates pain 10 out of 10 pain in upper abdomen. Only PRN oral medication on board and it is not available until [phone]. MD messaged regarding this.

## 2023-08-17 NOTE — PLAN OF CARE
Problem: Musculoskeletal - Adult  Goal: Return mobility to safest level of function  Outcome: Progressing     Problem: Gastrointestinal - Adult  Goal: Minimal or absence of nausea and vomiting  Outcome: Progressing     Problem: Pain  Goal: Verbalizes/displays adequate comfort level or baseline comfort level  Outcome: Progressing

## 2023-08-17 NOTE — DISCHARGE SUMMARY
oxyCODONE-acetaminophen 5-325 MG per tablet  Commonly known as: PERCOCET     pantoprazole 40 MG tablet  Commonly known as: PROTONIX     sacubitril-valsartan 24-26 MG per tablet  Commonly known as: ENTRESTO     spironolactone 25 MG tablet  Commonly known as: ALDACTONE  Take 1 tablet by mouth daily     sucralfate 1 GM tablet  Commonly known as: CARAFATE            ASK your doctor about these medications      torsemide 20 MG tablet  Commonly known as: DEMADEX  Take 1 tablet by mouth daily               Where to Get Your Medications        These medications were sent to [address]  [address] [place] [address]      Phone: [phone]   lactulose 10 GM/15ML solution           Discharged in stable condition to home    Follow Up: Follow up with PCP in 1 week. Follow up with GI as discussed.       Roseanna Blanco PA-C  8/17/2023  4:40 PM

## 2023-08-17 NOTE — ED NOTES
Pt BG was 144 and so no Insulin given. Pt sitting up in bed and given another cup of ice and a cup of OJ. Pt denies needs at this time.       Monique Daniels RN  08/17/23 7103

## 2023-08-17 NOTE — PROGRESS NOTES
Patient back from paracentesis at this time and is stating he wants to go home. Provider messaged regarding this.

## 2023-08-17 NOTE — PROGRESS NOTES
Additive QT interval prolongation may occur with ondansetron. Arrhythmias occur most commonly when QTC >500. Patient's QTC is 509. Changed ondansetron to Compazine per policy. Bilateral Helical Rim Advancement Flap Text: The defect edges were debeveled with a #15 blade scalpel.  Given the location of the defect and the proximity to free margins (helical rim) a bilateral helical rim advancement flap was deemed most appropriate.  Using a sterile surgical marker, the appropriate advancement flaps were drawn incorporating the defect and placing the expected incisions between the helical rim and antihelix where possible.  The area thus outlined was incised through and through with a #15 scalpel blade.  With a skin hook and iris scissors, the flaps were gently and sharply undermined and freed up.

## 2023-08-17 NOTE — PLAN OF CARE
Problem: Skin/Tissue Integrity  Goal: Absence of new skin breakdown  Description: 1. Monitor for areas of redness and/or skin breakdown  2. Assess vascular access sites hourly  3. Every 4-6 hours minimum:  Change oxygen saturation probe site  4. Every 4-6 hours:  If on nasal continuous positive airway pressure, respiratory therapy assess nares and determine need for appliance change or resting period.   Outcome: Adequate for Discharge     Problem: Musculoskeletal - Adult  Goal: Return mobility to safest level of function  8/17/2023 1305 by Lisa Guillen RN  Outcome: Adequate for Discharge  8/17/2023 0829 by Lisa Guillen RN  Outcome: Progressing  Goal: Maintain proper alignment of affected body part  Outcome: Adequate for Discharge  Goal: Return ADL status to a safe level of function  Outcome: Adequate for Discharge     Problem: Gastrointestinal - Adult  Goal: Minimal or absence of nausea and vomiting  8/17/2023 1305 by Lisa Guillen RN  Outcome: Adequate for Discharge  8/17/2023 0829 by Lisa Guillen RN  Outcome: Progressing  Goal: Maintains or returns to baseline bowel function  Outcome: Adequate for Discharge  Goal: Maintains adequate nutritional intake  Outcome: Adequate for Discharge  Goal: Establish and maintain optimal ostomy function  Outcome: Adequate for Discharge     Problem: Pain  Goal: Verbalizes/displays adequate comfort level or baseline comfort level  8/17/2023 1305 by Lisa Guillen RN  Outcome: Adequate for Discharge  8/17/2023 0829 by Lisa Guillen RN  Outcome: Progressing

## 2023-08-18 ENCOUNTER — APPOINTMENT (OUTPATIENT)
Dept: GENERAL RADIOLOGY | Age: 53
DRG: 392 | End: 2023-08-18
Attending: STUDENT IN AN ORGANIZED HEALTH CARE EDUCATION/TRAINING PROGRAM
Payer: MEDICARE

## 2023-08-18 ENCOUNTER — HOSPITAL ENCOUNTER (INPATIENT)
Age: 53
LOS: 1 days | Discharge: LEFT BEFORE DISCHARGE | DRG: 392 | End: 2023-08-19
Attending: STUDENT IN AN ORGANIZED HEALTH CARE EDUCATION/TRAINING PROGRAM | Admitting: INTERNAL MEDICINE
Payer: MEDICARE

## 2023-08-18 ENCOUNTER — APPOINTMENT (OUTPATIENT)
Dept: CT IMAGING | Age: 53
DRG: 392 | End: 2023-08-18
Attending: STUDENT IN AN ORGANIZED HEALTH CARE EDUCATION/TRAINING PROGRAM
Payer: MEDICARE

## 2023-08-18 DIAGNOSIS — R10.9 INTRACTABLE ABDOMINAL PAIN: Primary | ICD-10-CM

## 2023-08-18 DIAGNOSIS — J90 PLEURAL EFFUSION: ICD-10-CM

## 2023-08-18 DIAGNOSIS — I50.9 ACUTE ON CHRONIC CONGESTIVE HEART FAILURE, UNSPECIFIED HEART FAILURE TYPE (HCC): ICD-10-CM

## 2023-08-18 DIAGNOSIS — K74.60 CIRRHOSIS OF LIVER WITH ASCITES, UNSPECIFIED HEPATIC CIRRHOSIS TYPE (HCC): ICD-10-CM

## 2023-08-18 DIAGNOSIS — R06.02 SHORTNESS OF BREATH: ICD-10-CM

## 2023-08-18 DIAGNOSIS — R18.8 CIRRHOSIS OF LIVER WITH ASCITES, UNSPECIFIED HEPATIC CIRRHOSIS TYPE (HCC): ICD-10-CM

## 2023-08-18 LAB
ALBUMIN SERPL-MCNC: 3 G/DL (ref 3.4–5)
ALBUMIN/GLOB SERPL: 0.9 {RATIO} (ref 1.1–2.2)
ALP SERPL-CCNC: 95 U/L (ref 40–129)
ALT SERPL-CCNC: 54 U/L (ref 10–40)
AMMONIA PLAS-SCNC: 49 UMOL/L (ref 16–60)
ANION GAP SERPL CALCULATED.3IONS-SCNC: 12 MMOL/L (ref 3–16)
AST SERPL-CCNC: 132 U/L (ref 15–37)
BASE EXCESS BLDV CALC-SCNC: 3.1 MMOL/L (ref -3–3)
BASOPHILS # BLD: 0.1 K/UL (ref 0–0.2)
BASOPHILS NFR BLD: 1.1 %
BILIRUB SERPL-MCNC: 0.5 MG/DL (ref 0–1)
BUN SERPL-MCNC: 14 MG/DL (ref 7–20)
CALCIUM SERPL-MCNC: 8.2 MG/DL (ref 8.3–10.6)
CHLORIDE SERPL-SCNC: 99 MMOL/L (ref 99–110)
CO2 BLDV-SCNC: 26 MMOL/L
CO2 SERPL-SCNC: 24 MMOL/L (ref 21–32)
COHGB MFR BLDV: 3.7 % (ref 0–1.5)
CREAT SERPL-MCNC: 0.9 MG/DL (ref 0.9–1.3)
DEPRECATED RDW RBC AUTO: 19.8 % (ref 12.4–15.4)
EOSINOPHIL # BLD: 0.1 K/UL (ref 0–0.6)
EOSINOPHIL NFR BLD: 2 %
GFR SERPLBLD CREATININE-BSD FMLA CKD-EPI: >60 ML/MIN/{1.73_M2}
GLUCOSE SERPL-MCNC: 174 MG/DL (ref 70–99)
HCO3 BLDV-SCNC: 25.1 MMOL/L (ref 23–29)
HCT VFR BLD AUTO: 33.4 % (ref 40.5–52.5)
HGB BLD-MCNC: 11 G/DL (ref 13.5–17.5)
LACTATE BLDV-SCNC: 3 MMOL/L (ref 0.4–2)
LIPASE SERPL-CCNC: 10 U/L (ref 13–60)
LYMPHOCYTES # BLD: 0.6 K/UL (ref 1–5.1)
LYMPHOCYTES NFR BLD: 12.6 %
MCH RBC QN AUTO: 25.1 PG (ref 26–34)
MCHC RBC AUTO-ENTMCNC: 33 G/DL (ref 31–36)
MCV RBC AUTO: 76.2 FL (ref 80–100)
METHGB MFR BLDV: 0.3 %
MONOCYTES # BLD: 0.7 K/UL (ref 0–1.3)
MONOCYTES NFR BLD: 12.9 %
NEUTROPHILS # BLD: 3.6 K/UL (ref 1.7–7.7)
NEUTROPHILS NFR BLD: 71.4 %
O2 THERAPY: ABNORMAL
PCO2 BLDV: 30 MMHG (ref 40–50)
PH BLDV: 7.54 [PH] (ref 7.35–7.45)
PLATELET # BLD AUTO: 146 K/UL (ref 135–450)
PMV BLD AUTO: 8.4 FL (ref 5–10.5)
PO2 BLDV: 54.9 MMHG (ref 25–40)
POTASSIUM SERPL-SCNC: 4.1 MMOL/L (ref 3.5–5.1)
PROT SERPL-MCNC: 6.2 G/DL (ref 6.4–8.2)
RBC # BLD AUTO: 4.38 M/UL (ref 4.2–5.9)
SAO2 % BLDV: 92 %
SODIUM SERPL-SCNC: 135 MMOL/L (ref 136–145)
TROPONIN, HIGH SENSITIVITY: 51 NG/L (ref 0–22)
WBC # BLD AUTO: 5.1 K/UL (ref 4–11)

## 2023-08-18 PROCEDURE — 71045 X-RAY EXAM CHEST 1 VIEW: CPT

## 2023-08-18 PROCEDURE — 6360000004 HC RX CONTRAST MEDICATION: Performed by: STUDENT IN AN ORGANIZED HEALTH CARE EDUCATION/TRAINING PROGRAM

## 2023-08-18 PROCEDURE — 36415 COLL VENOUS BLD VENIPUNCTURE: CPT

## 2023-08-18 PROCEDURE — 84484 ASSAY OF TROPONIN QUANT: CPT

## 2023-08-18 PROCEDURE — 83690 ASSAY OF LIPASE: CPT

## 2023-08-18 PROCEDURE — 99285 EMERGENCY DEPT VISIT HI MDM: CPT

## 2023-08-18 PROCEDURE — 83605 ASSAY OF LACTIC ACID: CPT

## 2023-08-18 PROCEDURE — 85025 COMPLETE CBC W/AUTO DIFF WBC: CPT

## 2023-08-18 PROCEDURE — 80053 COMPREHEN METABOLIC PANEL: CPT

## 2023-08-18 PROCEDURE — 82803 BLOOD GASES ANY COMBINATION: CPT

## 2023-08-18 PROCEDURE — 6360000002 HC RX W HCPCS: Performed by: STUDENT IN AN ORGANIZED HEALTH CARE EDUCATION/TRAINING PROGRAM

## 2023-08-18 PROCEDURE — 74177 CT ABD & PELVIS W/CONTRAST: CPT

## 2023-08-18 PROCEDURE — 82140 ASSAY OF AMMONIA: CPT

## 2023-08-18 PROCEDURE — 96375 TX/PRO/DX INJ NEW DRUG ADDON: CPT

## 2023-08-18 PROCEDURE — 2580000003 HC RX 258: Performed by: STUDENT IN AN ORGANIZED HEALTH CARE EDUCATION/TRAINING PROGRAM

## 2023-08-18 RX ORDER — 0.9 % SODIUM CHLORIDE 0.9 %
1000 INTRAVENOUS SOLUTION INTRAVENOUS ONCE
Status: COMPLETED | OUTPATIENT
Start: 2023-08-18 | End: 2023-08-18

## 2023-08-18 RX ORDER — ONDANSETRON 2 MG/ML
4 INJECTION INTRAMUSCULAR; INTRAVENOUS ONCE
Status: COMPLETED | OUTPATIENT
Start: 2023-08-18 | End: 2023-08-18

## 2023-08-18 RX ORDER — FENTANYL CITRATE 50 UG/ML
25 INJECTION, SOLUTION INTRAMUSCULAR; INTRAVENOUS ONCE
Status: COMPLETED | OUTPATIENT
Start: 2023-08-18 | End: 2023-08-18

## 2023-08-18 RX ADMIN — IOMEPROL INJECTION 75 ML: 714 INJECTION, SOLUTION INTRAVASCULAR at 22:44

## 2023-08-18 RX ADMIN — FENTANYL CITRATE 25 MCG: 50 INJECTION INTRAMUSCULAR; INTRAVENOUS at 21:39

## 2023-08-18 RX ADMIN — ONDANSETRON 4 MG: 2 INJECTION INTRAMUSCULAR; INTRAVENOUS at 21:38

## 2023-08-18 RX ADMIN — SODIUM CHLORIDE 1000 ML: 9 INJECTION, SOLUTION INTRAVENOUS at 22:17

## 2023-08-18 ASSESSMENT — PAIN SCALES - GENERAL
PAINLEVEL_OUTOF10: 10
PAINLEVEL_OUTOF10: 10

## 2023-08-18 ASSESSMENT — PAIN DESCRIPTION - LOCATION: LOCATION: ABDOMEN

## 2023-08-19 VITALS
DIASTOLIC BLOOD PRESSURE: 65 MMHG | BODY MASS INDEX: 26.2 KG/M2 | OXYGEN SATURATION: 96 % | SYSTOLIC BLOOD PRESSURE: 95 MMHG | HEART RATE: 83 BPM | TEMPERATURE: 98 F | HEIGHT: 71 IN | WEIGHT: 187.13 LBS | RESPIRATION RATE: 18 BRPM

## 2023-08-19 PROBLEM — R10.9 ABDOMINAL PAIN: Status: ACTIVE | Noted: 2023-08-19

## 2023-08-19 PROBLEM — I50.9 ACUTE HEART FAILURE, UNSPECIFIED HEART FAILURE TYPE (HCC): Status: ACTIVE | Noted: 2023-08-19

## 2023-08-19 LAB
AMPHETAMINES UR QL SCN>1000 NG/ML: ABNORMAL
BARBITURATES UR QL SCN>200 NG/ML: ABNORMAL
BENZODIAZ UR QL SCN>200 NG/ML: ABNORMAL
CANNABINOIDS UR QL SCN>50 NG/ML: ABNORMAL
COCAINE UR QL SCN: ABNORMAL
DRUG SCREEN COMMENT UR-IMP: ABNORMAL
FENTANYL SCREEN, URINE: POSITIVE
GLUCOSE BLD-MCNC: 122 MG/DL (ref 70–99)
GLUCOSE BLD-MCNC: 154 MG/DL (ref 70–99)
GLUCOSE BLD-MCNC: 181 MG/DL (ref 70–99)
METHADONE UR QL SCN>300 NG/ML: ABNORMAL
OPIATES UR QL SCN>300 NG/ML: ABNORMAL
OXYCODONE UR QL SCN: POSITIVE
PCP UR QL SCN>25 NG/ML: ABNORMAL
PERFORMED ON: ABNORMAL
PH UR STRIP: 5 [PH]

## 2023-08-19 PROCEDURE — 96375 TX/PRO/DX INJ NEW DRUG ADDON: CPT

## 2023-08-19 PROCEDURE — 6360000002 HC RX W HCPCS: Performed by: STUDENT IN AN ORGANIZED HEALTH CARE EDUCATION/TRAINING PROGRAM

## 2023-08-19 PROCEDURE — 2060000000 HC ICU INTERMEDIATE R&B

## 2023-08-19 PROCEDURE — 6370000000 HC RX 637 (ALT 250 FOR IP): Performed by: PEDIATRICS

## 2023-08-19 PROCEDURE — 87040 BLOOD CULTURE FOR BACTERIA: CPT

## 2023-08-19 PROCEDURE — 2580000003 HC RX 258: Performed by: STUDENT IN AN ORGANIZED HEALTH CARE EDUCATION/TRAINING PROGRAM

## 2023-08-19 PROCEDURE — 2580000003 HC RX 258: Performed by: PEDIATRICS

## 2023-08-19 PROCEDURE — 96365 THER/PROPH/DIAG IV INF INIT: CPT

## 2023-08-19 PROCEDURE — 80307 DRUG TEST PRSMV CHEM ANLYZR: CPT

## 2023-08-19 RX ORDER — INSULIN LISPRO 100 [IU]/ML
0-4 INJECTION, SOLUTION INTRAVENOUS; SUBCUTANEOUS
Status: DISCONTINUED | OUTPATIENT
Start: 2023-08-19 | End: 2023-08-19

## 2023-08-19 RX ORDER — METOPROLOL SUCCINATE 25 MG/1
25 TABLET, EXTENDED RELEASE ORAL DAILY
Status: DISCONTINUED | OUTPATIENT
Start: 2023-08-19 | End: 2023-08-19 | Stop reason: HOSPADM

## 2023-08-19 RX ORDER — INSULIN GLARGINE 100 [IU]/ML
10 INJECTION, SOLUTION SUBCUTANEOUS NIGHTLY
Status: DISCONTINUED | OUTPATIENT
Start: 2023-08-19 | End: 2023-08-19 | Stop reason: HOSPADM

## 2023-08-19 RX ORDER — OXYCODONE HYDROCHLORIDE 5 MG/1
5 TABLET ORAL EVERY 4 HOURS PRN
Status: DISCONTINUED | OUTPATIENT
Start: 2023-08-19 | End: 2023-08-19 | Stop reason: SDUPTHER

## 2023-08-19 RX ORDER — TORSEMIDE 20 MG/1
20 TABLET ORAL DAILY
Status: DISCONTINUED | OUTPATIENT
Start: 2023-08-19 | End: 2023-08-19 | Stop reason: HOSPADM

## 2023-08-19 RX ORDER — INSULIN LISPRO 100 [IU]/ML
0-4 INJECTION, SOLUTION INTRAVENOUS; SUBCUTANEOUS
Status: DISCONTINUED | OUTPATIENT
Start: 2023-08-19 | End: 2023-08-19 | Stop reason: HOSPADM

## 2023-08-19 RX ORDER — LACTULOSE 10 G/15ML
20 SOLUTION ORAL EVERY 8 HOURS PRN
Status: DISCONTINUED | OUTPATIENT
Start: 2023-08-19 | End: 2023-08-19 | Stop reason: HOSPADM

## 2023-08-19 RX ORDER — OXYCODONE HYDROCHLORIDE 5 MG/1
5 TABLET ORAL EVERY 6 HOURS PRN
Status: DISCONTINUED | OUTPATIENT
Start: 2023-08-19 | End: 2023-08-19 | Stop reason: HOSPADM

## 2023-08-19 RX ORDER — SPIRONOLACTONE 25 MG/1
25 TABLET ORAL DAILY
Status: DISCONTINUED | OUTPATIENT
Start: 2023-08-19 | End: 2023-08-19 | Stop reason: HOSPADM

## 2023-08-19 RX ORDER — INSULIN LISPRO 100 [IU]/ML
0-4 INJECTION, SOLUTION INTRAVENOUS; SUBCUTANEOUS NIGHTLY
Status: DISCONTINUED | OUTPATIENT
Start: 2023-08-19 | End: 2023-08-19

## 2023-08-19 RX ORDER — TORSEMIDE 20 MG/1
20 TABLET ORAL ONCE
Status: DISCONTINUED | OUTPATIENT
Start: 2023-08-19 | End: 2023-08-19 | Stop reason: HOSPADM

## 2023-08-19 RX ORDER — PANTOPRAZOLE SODIUM 40 MG/1
40 TABLET, DELAYED RELEASE ORAL
Status: DISCONTINUED | OUTPATIENT
Start: 2023-08-19 | End: 2023-08-19 | Stop reason: HOSPADM

## 2023-08-19 RX ORDER — SODIUM CHLORIDE 0.9 % (FLUSH) 0.9 %
5-40 SYRINGE (ML) INJECTION EVERY 12 HOURS SCHEDULED
Status: DISCONTINUED | OUTPATIENT
Start: 2023-08-19 | End: 2023-08-19 | Stop reason: HOSPADM

## 2023-08-19 RX ORDER — SUCRALFATE 1 G/1
1 TABLET ORAL
Status: DISCONTINUED | OUTPATIENT
Start: 2023-08-19 | End: 2023-08-19 | Stop reason: HOSPADM

## 2023-08-19 RX ORDER — BUSPIRONE HYDROCHLORIDE 5 MG/1
5 TABLET ORAL 2 TIMES DAILY
Status: DISCONTINUED | OUTPATIENT
Start: 2023-08-19 | End: 2023-08-19 | Stop reason: HOSPADM

## 2023-08-19 RX ORDER — HYDROXYZINE HYDROCHLORIDE 25 MG/1
25 TABLET, FILM COATED ORAL ONCE
Status: COMPLETED | OUTPATIENT
Start: 2023-08-19 | End: 2023-08-19

## 2023-08-19 RX ORDER — INSULIN LISPRO 100 [IU]/ML
0-4 INJECTION, SOLUTION INTRAVENOUS; SUBCUTANEOUS NIGHTLY
Status: DISCONTINUED | OUTPATIENT
Start: 2023-08-19 | End: 2023-08-19 | Stop reason: HOSPADM

## 2023-08-19 RX ORDER — ATORVASTATIN CALCIUM 40 MG/1
40 TABLET, FILM COATED ORAL NIGHTLY
Status: DISCONTINUED | OUTPATIENT
Start: 2023-08-19 | End: 2023-08-19

## 2023-08-19 RX ORDER — CLOPIDOGREL BISULFATE 75 MG/1
75 TABLET ORAL DAILY
Status: DISCONTINUED | OUTPATIENT
Start: 2023-08-19 | End: 2023-08-19 | Stop reason: HOSPADM

## 2023-08-19 RX ORDER — DEXTROSE MONOHYDRATE 100 MG/ML
INJECTION, SOLUTION INTRAVENOUS CONTINUOUS PRN
Status: DISCONTINUED | OUTPATIENT
Start: 2023-08-19 | End: 2023-08-19 | Stop reason: HOSPADM

## 2023-08-19 RX ADMIN — OXYCODONE HYDROCHLORIDE 5 MG: 5 TABLET ORAL at 12:31

## 2023-08-19 RX ADMIN — PANTOPRAZOLE SODIUM 40 MG: 40 TABLET, DELAYED RELEASE ORAL at 06:38

## 2023-08-19 RX ADMIN — Medication 10 ML: at 09:20

## 2023-08-19 RX ADMIN — OXYCODONE HYDROCHLORIDE 5 MG: 5 TABLET ORAL at 06:18

## 2023-08-19 RX ADMIN — SUCRALFATE 1 G: 1 TABLET ORAL at 06:39

## 2023-08-19 RX ADMIN — TORSEMIDE 20 MG: 20 TABLET ORAL at 09:15

## 2023-08-19 RX ADMIN — PANTOPRAZOLE SODIUM 40 MG: 40 TABLET, DELAYED RELEASE ORAL at 17:12

## 2023-08-19 RX ADMIN — CLOPIDOGREL BISULFATE 75 MG: 75 TABLET ORAL at 09:17

## 2023-08-19 RX ADMIN — SPIRONOLACTONE 25 MG: 25 TABLET ORAL at 09:17

## 2023-08-19 RX ADMIN — HYDROXYZINE HYDROCHLORIDE 25 MG: 25 TABLET ORAL at 09:12

## 2023-08-19 RX ADMIN — HYDROMORPHONE HYDROCHLORIDE 1 MG: 1 INJECTION, SOLUTION INTRAMUSCULAR; INTRAVENOUS; SUBCUTANEOUS at 01:04

## 2023-08-19 RX ADMIN — SUCRALFATE 1 G: 1 TABLET ORAL at 12:31

## 2023-08-19 RX ADMIN — CEFTRIAXONE SODIUM 1000 MG: 1 INJECTION, POWDER, FOR SOLUTION INTRAMUSCULAR; INTRAVENOUS at 01:04

## 2023-08-19 RX ADMIN — SACUBITRIL AND VALSARTAN 1 TABLET: 24; 26 TABLET, FILM COATED ORAL at 09:17

## 2023-08-19 RX ADMIN — SUCRALFATE 1 G: 1 TABLET ORAL at 17:12

## 2023-08-19 RX ADMIN — APIXABAN 5 MG: 5 TABLET, FILM COATED ORAL at 09:15

## 2023-08-19 ASSESSMENT — PAIN DESCRIPTION - PAIN TYPE
TYPE: ACUTE PAIN
TYPE: ACUTE PAIN

## 2023-08-19 ASSESSMENT — PAIN SCALES - GENERAL
PAINLEVEL_OUTOF10: 10
PAINLEVEL_OUTOF10: 9
PAINLEVEL_OUTOF10: 9
PAINLEVEL_OUTOF10: 10
PAINLEVEL_OUTOF10: 9
PAINLEVEL_OUTOF10: 10
PAINLEVEL_OUTOF10: 10
PAINLEVEL_OUTOF10: 0
PAINLEVEL_OUTOF10: 9
PAINLEVEL_OUTOF10: 9

## 2023-08-19 ASSESSMENT — PAIN DESCRIPTION - ORIENTATION
ORIENTATION: UPPER

## 2023-08-19 ASSESSMENT — PAIN DESCRIPTION - LOCATION
LOCATION: ABDOMEN
LOCATION: ABDOMEN
LOCATION: ABDOMEN;CHEST

## 2023-08-19 ASSESSMENT — PAIN DESCRIPTION - FREQUENCY
FREQUENCY: CONTINUOUS
FREQUENCY: CONTINUOUS

## 2023-08-19 ASSESSMENT — PAIN DESCRIPTION - DESCRIPTORS
DESCRIPTORS: GNAWING;DISCOMFORT;ACHING
DESCRIPTORS: DISCOMFORT
DESCRIPTORS: DISCOMFORT

## 2023-08-19 ASSESSMENT — PAIN DESCRIPTION - ONSET
ONSET: ON-GOING
ONSET: ON-GOING

## 2023-08-19 ASSESSMENT — PAIN - FUNCTIONAL ASSESSMENT
PAIN_FUNCTIONAL_ASSESSMENT: PREVENTS OR INTERFERES SOME ACTIVE ACTIVITIES AND ADLS
PAIN_FUNCTIONAL_ASSESSMENT: ACTIVITIES ARE NOT PREVENTED
PAIN_FUNCTIONAL_ASSESSMENT: PREVENTS OR INTERFERES WITH MANY ACTIVE NOT PASSIVE ACTIVITIES

## 2023-08-19 NOTE — FLOWSHEET NOTE
08/19/23 0252   Assessment   Charting Type Admission   Psychosocial   Psychosocial (WDL) WDL   Neurological   Neuro (WDL) WDL   Level of Consciousness 0   Orientation Level Oriented X4   Cognition Appropriate judgement; Appropriate safety awareness; Appropriate attention/concentration; Appropriate for developmental age; Follows commands   Speech Deaf ( present)  (patient can speak short words clearly and read lips)   [place] Coma Scale   Eye Opening 4   Best Verbal Response 5   Best Motor Response 6   [place] Coma Scale Score 15   HEENT (Head, Ears, Eyes, Nose, & Throat)   HEENT (WDL) X   Right Eye Glasses; Impaired vision   Left Eye Glasses; Impaired vision   Right Ear Deaf   Left Ear Deaf   Respiratory   Respiratory (WDL) WDL   Cardiac   Cardiac (WDL) X   Cardiac Regularity Regular   Implanted Cardiac Device (Pacemaker, ICD, PA Sensor) Yes   Pacemaker   Pacemaker Yes   Pacemaker Type Permanent   Gastrointestinal   Abdominal (WDL) X   RUQ Bowel Sounds Active   LUQ Bowel Sounds Active   RLQ Bowel Sounds Active   LLQ Bowel Sounds Active   Abdomen Inspection Distended;Rounded  (incisions from paracenthesis)   GI Symptoms Distention   Tenderness RUQ;RLQ;LUQ;LLQ   Genitourinary   Genitourinary (WDL) WDL   Dysuria (Pain/Burning w/Urination) No   Difficulty Urinating/Starting Stream No   Peripheral Vascular   Peripheral Vascular (WDL) X   Edema Left lower extremity   LLE Edema Trace   Skin Integumentary    Skin Integumentary (WDL) X   Skin Color Red   Skin Condition/Temp Hot   Skin Integrity Redness; Other (comment)  (cellulitis)   Location LLE   Multiple Skin Integrity Sites Yes   Skin Integrity Site 2   Skin Integrity Location 2 Abrasion   Location 2 scattered   Preventative Dressing No   Musculoskeletal   Musculoskeletal (WDL) X   RL Extremity Amputation;Prosthesis  (BKA)   Wound 08/17/23 scattered dry scabs to left lower leg, venous, partial full thickness   Date First Assessed/Time First Assessed: 08/17/23 1119   Wound Description (Comments): scattered dry scabs to left lower leg, venous, partial full thickness   Dressing/Treatment Open to air     Admission assessment and 4 eyes completed. Patient awake in bed. A/Ox4. See flowsheet for vitals. Orders not available to be released at this time. Will review.  at bedside. Patient is completely deaf. Can speak small words clearly and reads lips. Patient oriented to room and call light. Patient questions answered. Patient denies pain or any further needs at this time. Water and jello provided. Call light and bedside table within reach.

## 2023-08-19 NOTE — H&P
V2.0  History and Physical      Name:  Edwin Patel /Age/Sex: 1970  (52 y.o. male)   MRN & CSN:  [id number] & [id number] Encounter Date/Time: 2023 3:49 AM EDT   Location:  [facility]5488-97 PCP: No primary care provider on file.        Hospital Day: 2    Assessment and Plan:   Edwin Patel is a 52 y.o. male with a pmh of cirrhosis, alcohol abuse, hepatis C, heart failure, diabetes, PAD, CAD, adult ADHD (also a noted hx of methamphetamine abuse), anasarca, anxiety, depression, COPD, asthma, IGNACIA, back pain, magana's esophagus, bradycardia, bronchitis, CAD (inferior STEMI in  with STACY to RCA, heart cath 2020 with chronic total occlusion of the LAD with R-->L collaterals, mild disease in the RCA proper but significant disease of the ostium of PLB and PDA and critical stenosis of the ostium of the LCX as well as the OM branch with STACY to the LCX and OM), carpal tunnel syndrome (), Cerebral vascular accident (, in [place]), deviated septum (s/p rhinoscopy 08), diabetic neuropathy, diabetic retinopathy, macular degeneration, fanconi syndrome, hypertension, PTSD, PE, pulmonary hypertension (per echo in ), sciatica, smoking, LV thrombosis, medical noncompliance -          who presents with Abdominal pain    Hospital Problems             Last Modified POA    * (Principal) Abdominal pain 2023 Yes    Acute heart failure, unspecified heart failure type ([address]) 2023 Yes       Abdominal pain, ascites, cirrhosis, hx of alcohol abuse / hepatitis C, splenomegaly:  - GI consulted    - NPO in case of paracentesis   - he has lactulose listed as a prn - so he likely has a hx of hepatic encephalopathy -but he reports not needing lactulose recently - monitor   - spironolactone 25 mg po daily   - torsemide 20 mg po daily   - low sodium diet   - last INR of 1.6 on 23    Rule out SBP (pt c/o abdominal pain)   - ceftriaxone IV   - blood cultures pending     - oxycodone 5 mg po q4 hrs prn   - ABDOMEN PELVIS W IV CONTRAST Additional Contrast? None    Result Date: 8/17/2023  EXAMINATION: CT OF THE ABDOMEN AND PELVIS WITH CONTRAST 8/16/2023 9:59 pm TECHNIQUE: CT of the abdomen and pelvis was performed with the administration of intravenous contrast. Multiplanar reformatted images are provided for review. Automated exposure control, iterative reconstruction, and/or weight based adjustment of the mA/kV was utilized to reduce the radiation dose to as low as reasonably achievable. COMPARISON: None. HISTORY: ORDERING SYSTEM PROVIDED HISTORY: Acute on chronic abdominal pain TECHNOLOGIST PROVIDED HISTORY: Reason for exam:->Acute on chronic abdominal pain Additional Contrast?->None Decision Support Exception - unselect if not a suspected or confirmed emergency medical condition->Emergency Medical Condition (MA) Reason for Exam: Acute on chronic abdominal pain FINDINGS: Lower Chest: Moderate right pleural effusion, small left pleural effusion, and some septal thickening at the lower lungs. Mildly enlarged heart and postsurgical changes. There is pericardial effusion along the inferior and right lateral aspect. Organs: Slightly lobular and nodular contour of the liver. No obvious mass is identified. There is ascites along the margin of the liver. No mineralized stone within the gallbladder. The spleen is mildly enlarged. The heterogenous enhancement of the spleen could be due to the phase of contrast but underlying nodularity is not excluded. Fatty atrophy of the pancreas without apparent ductal dilatation. The adrenal glands are not enlarged. Kidneys are enhancing equally with scarring at the lower pole of the right kidney. Few tiny hypodensities in the renal cortex are too small to characterize but statistically likely tiny cysts. There is no hydronephrosis at this time. GI/Bowel: The stomach, small bowel, and colon are not dilated.   The general edema and moderate ascites limits evaluation for

## 2023-08-19 NOTE — ED NOTES
Patient lives with ex girlfriend, patient is hard of hearing and uses sign language and can read lips. Patient is able to speak and let you know what his wants and needs are.  exGirlfriend interprets for patient      Bess Aiken RN  08/19/23 6541

## 2023-08-19 NOTE — PROGRESS NOTES
Telemetry Assignment Communication Form    Patient has orders for continuous telemetry OR pulse oximeter only orders    To be filled out by Clinical    Patient has Admission or Transfer orders and is assigned to Room Number: 314    Continuous Telemetry:  Yes       WITH/WITHOUT: without Continuous pulse ox    Patient has pulse ox ONLY orders:  No    (Once this top section is completed:  Select \"Route\" and send note to Fax number: [phone] ))      ___________________________________________________________________________      [facility]    Patient assigned to tele box number: __________________               (to be written in by [facility] when telemetry box is assigned to patient)    ___________________________________________________________________________      Bedside RN confirming that the box listed above is in fact the telemetry box number being placed on the patient listed above.         X________________________________________ RN signature        __________________________________________RN assigned to Patient (please print)    _______________ Date    ____________ Time

## 2023-08-19 NOTE — PLAN OF CARE
Problem: Discharge Planning  Goal: Discharge to home or other facility with appropriate resources  Outcome: Progressing  Flowsheets (Taken 8/19/2023 0324)  Discharge to home or other facility with appropriate resources:   Identify barriers to discharge with patient and caregiver   Identify discharge learning needs (meds, wound care, etc)   Refer to discharge planning if patient needs post-hospital services based on physician order or complex needs related to functional status, cognitive ability or social support system   Arrange for needed discharge resources and transportation as appropriate     Problem: Safety - Adult  Goal: Free from fall injury  Outcome: Progressing     Problem: Pain  Goal: Verbalizes/displays adequate comfort level or baseline comfort level  Outcome: Progressing     Problem: Respiratory - Adult  Goal: Achieves optimal ventilation and oxygenation  Outcome: Progressing     Problem: Skin/Tissue Integrity - Adult  Goal: Skin integrity remains intact  Outcome: Progressing  Goal: Incisions, wounds, or drain sites healing without S/S of infection  Outcome: Progressing  Goal: Oral mucous membranes remain intact  Outcome: Progressing     Problem: Musculoskeletal - Adult  Goal: Return mobility to safest level of function  Outcome: Progressing  Goal: Maintain proper alignment of affected body part  Outcome: Progressing  Goal: Return ADL status to a safe level of function  Outcome: Progressing     Problem: Gastrointestinal - Adult  Goal: Minimal or absence of nausea and vomiting  Outcome: Progressing  Goal: Maintains or returns to baseline bowel function  Outcome: Progressing  Goal: Maintains adequate nutritional intake  Outcome: Progressing     Problem: Metabolic/Fluid and Electrolytes - Adult  Goal: Electrolytes maintained within normal limits  Outcome: Progressing  Goal: Hemodynamic stability and optimal renal function maintained  Outcome: Progressing

## 2023-08-19 NOTE — SIGNIFICANT EVENT
Pending admission       Edwin Patel is a 52 y.o. male with speech and hearing disability, CHF, CAD with hx of PCI, HTN, PAD, LV thrombus, alcoholic cirrhosis, DM2, presented 8/16/23 for decompensated liver cirrhosis. He was brought in by his girlfriend to [facility] ED for uncontrolled abdominal pain. He returns with distention, abdominal pain, inability to tolerate PO intake, and dyspnea. On CXR he has large effusion and moderate ascites. His discharge summary 8/17 lists his last paracentesis as  8/8/23. On return to the ED his lactic was elevated to 3. Family struggling with taking care of him at home, and ED staff with concern he may require placement at SNF.      Yoan Cruz MD

## 2023-08-19 NOTE — PROGRESS NOTES
Shift assessment complete, morning medications given, patient refused buspar and stated ,\"I only take that once a day at bedtime,\" metoprolol held for low BP now, will recheck BP in one hour, patient also thinking of leaving AMA because no IV pain medications will be ordered, patient resting with complaints of pain, Roxicodone given at 6:45 am ordered every 6 hours, will continue to monitor.  Heide Mcghee RN

## 2023-08-19 NOTE — ED TRIAGE NOTES
Ex girlfriend states he is here for his liver and not feeling well, he was released from [facility] this morning. She states he feels worse.

## 2023-08-19 NOTE — PROGRESS NOTES
Patient admitted to room 314 from [place] [place]. Patient oriented to room, call light, bed rails, phone, lights and bathroom. Patient instructed about the schedule of the day including: vital sign frequency, lab draws, possible tests, frequency of MD and staff rounds, daily weights, I &O's and prescribed diet. Yes Telemetry box in place, patient aware of placement and reason. Bed locked, in lowest position, side rails up 2/4, call light within reach. Recliner Assessment  Patient is not able to demonstrated the ability to move from a reclining position to an upright position within the recliner. however patient is alert, oriented and able to provide informed consent       4 Eyes Skin Assessment     NAME:  Sanjeev Flowers  YOB: 1970  MEDICAL RECORD NUMBER:  [id number]    The patient is being assessed for  Admission    I agree that at least one RN has performed a thorough Head to Toe Skin Assessment on the patient. ALL assessment sites listed below have been assessed. Areas assessed by both nurses:    Head, Face, Ears, Shoulders, Back, Chest, Arms, Elbows, Hands, Sacrum. Buttock, Coccyx, Ischium, and Legs. Feet and Heels  Redness to left lower extremity, scattered abrasions, paracenthesis incision        Does the Patient have a Wound?  No noted wound(s)       Garret Prevention initiated by RN: No  Wound Care Orders initiated by RN: No    Pressure Injury (Stage 3,4, Unstageable, DTI, NWPT, and Complex wounds) if present, place Wound referral order by RN under : No    New Ostomies, if present place, Ostomy referral order under : No     Nurse 1 eSignature: Electronically signed by Angus Scott RN on 8/19/23 at 4:27 AM EDT    **SHARE this note so that the co-signing nurse can place an eSignature**    Nurse 2 eSignature: {Esignature:[id number]}

## 2023-08-19 NOTE — ED PROVIDER NOTES
Emergency Department Provider Note  Location: [facility] EMERGENCY DEPARTMENT  8/18/2023     Patient Identification  Donovan Wolff is a 52 y.o. male    Chief Complaint  Illness (Ex girlfriend states he is here for his liver and not feeling well, he was released from [facility] this morning. She states he feels worse. )      Mode of Arrival  private car    HPI  (History provided by patient)  This is a 52 y.o. male with a PMH significant for Cirrhosis, CHF  presented today for generalized illness. Patient reports that he was in the emergency department earlier this morning. He was endorsing generalized abdominal pain and shortness of breath. Patient reports that he was not quite feeling better upon discharge this morning and symptoms have worsened  He is endorsing nausea but denies any vomiting. He reports chills and body aches but denies any fevers. He reports feeling short of breath but denies any chest pain. Denies any changes to bowel or bladder. Denies any bloody stool. ROS  Review of Systems   All other systems reviewed and are negative. I have reviewed the following nursing documentation:  Allergies: Allergies   Allergen Reactions    Azithromycin Rash and Shortness Of Breath     Other reaction(s): Gastrointestinal Intolerance, GI UPSET  (Abstracted from [address])  (Abstracted from [address] [place] [address] Road)  Patient experienced GI upset, rash to abdomen, pain to administration site, shortness of breath, anxiety, tachycardia, and hypotension with administration. Other reaction(s): Gastrointestinal Intolerance, GI UPSET  (Abstracted from [address])  Patient experienced GI upset, rash to abdomen, pain to administration site, shortness of breath, anxiety, tachycardia, and hypotension with administration. Patient experienced GI upset, rash to abdomen, pain to administration site, shortness of breath, anxiety, tachycardia, and hypotension with administration. specified. Total critical care time is 41 minutes, which excludes separately billable procedures and updating family. Time spent is specifically for management of the presenting complaint and symptoms initially, direct bedside care, reevaluation, review of records, and consultation. There was a high probability of clinically significant life-threatening deterioration in the patient's condition, which required my urgent intervention. This chart was generated in part by using Dragon Dictation system and may contain errors related to that system including errors in grammar, punctuation, and spelling, as well as words and phrases that may be inappropriate. If there are any questions or concerns please feel free to contact the dictating provider for clarification.      Anahy Gary MD  [address] [place] [address]        Anahy Gary MD  08/19/23 0688

## 2023-08-19 NOTE — CARE COORDINATION
CM met with Mr. Libertad Coker at the bedside to complete the initial assessment. He was sitting up in bed and appeared quite anxious; rubbing his head grimacing, fidgeting. On approach he waved frantically at the interpretor device. CM called for an ASL interpretor to assist with the assessment. Mr. Libertad Coker explained he was frustrated because he had been calling out for pain medication and no one had been in to help him. CM called out for the nurse. The pt is deaf, alert and oriented x4, anxious, but easy to engage in conversation with the use of the interpretor. He is from home with his ex-girlfriend, Silver Shetty. They live in a duplex apartment. He is independent with self care and functional mobility. He has a right leg prosthesis he manages himself. He has other needed DME (RW, [place], lift chair). He stated he recently moved here from [place]. He needs assistance with setting up primary care and a GI appointment. Although he is on disability, he is interested in finding employment. The nurse suggested the unemployment office in [place]. Pt expressed understanding. It is anticipated Mr. Libertad Coker will return home at discharge      Case Management Assessment  Initial Evaluation    Date/Time of Evaluation: 8/19/2023 9:34 AM  Assessment Completed by: Phyllis Waite RN    If patient is discharged prior to next notation, then this note serves as note for discharge by case management.     Patient Name: Pradip April                   YOB: 1970  Diagnosis: Shortness of breath [R06.02]  Pleural effusion [J90]  Intractable abdominal pain [R10.9]  Cirrhosis of liver with ascites, unspecified hepatic cirrhosis type ([address]) [K74.60, R18.8]  Acute on chronic congestive heart failure, unspecified heart failure type ([address]) [I50.9]  Abdominal pain [R10.9]  Acute heart failure, unspecified heart failure type [facility]) [I50.9]                   Date / Time: 8/18/2023  8:08 PM    Patient Admission Status: Inpatient

## 2023-08-19 NOTE — CONSULTS
Gastroenterology Consult Note    Patient:   Marilee Garces   :    1970   Facility:   [facility]   Referring/PCP: No primary care provider on file. Date:     2023  Consultant:   Marquita Eid MD, MD      Chief Complaint   Patient presents with    Illness     Ex girlfriend states he is here for his liver and not feeling well, he was released from [facility] this morning. She states he feels worse. History of Present illness     52year old male with complex medical history of DM, HTN, CAD, CHF s/p AICD, PAD s/p RLE amputation, COPD, IGNACIA, PTSD, medical non-complaince, substance abuse, chronic HCV cirrhosis decompensated by ascites presented to ER with complaints of abdominal pain. He has speech and hearing disability.  service was used to perform the interview. He developed progressively worsening weakness over the past week. He has had multiple ER visits and recent paracentesis a week ago. He has had previous GI workup in [place] but failed to followup and adhere to recommendations. He recalls being prescribed medication for HCV but did not complete the regimen. He denies any nausea, vomiting, diarrhea, constipation, rectal bleeding, melena or weight loss. Past Medical History:   Diagnosis Date    CHF (congestive heart failure) (HCC)      Past Surgical History:   Procedure Laterality Date    LEG AMPUTATION BELOW KNEE Right     PACEMAKER INSERTION         Social:   Social History     Tobacco Use    Smoking status: Every Day     Packs/day: 1.00     Types: Cigarettes    Smokeless tobacco: Never   Substance Use Topics    Alcohol use: Never     Family: History reviewed. No pertinent family history. No current facility-administered medications on file prior to encounter.      Current Outpatient Medications on File Prior to Encounter   Medication Sig Dispense Refill    oxyCODONE-acetaminophen (PERCOCET) 5-325 MG per tablet Take 1 tablet by mouth every 4

## 2023-08-19 NOTE — ED NOTES
Report given to Antonia Malik [facility] ambulance      [address] [place] [address] - [facility], RN  08/19/23 0119

## 2023-08-19 NOTE — CONSULTS
Contacted [address] as reported by patient. No medications filled at this location for this patient. No SureScript data available in the chart. Thank you Rx    Consult to Pharmacy for Umeclidinium or equivalent- Our therapeutic sub would be for Tiotropium, if this inhaler is not administrable the alt sub is for Ipratropium Nebs scheduled Q6-8hrs.   Haim Oneill, [address] [place] [address] PharmD 8/19/2023 9:00 AM

## 2023-08-19 NOTE — PROGRESS NOTES
Patient signed out AMA at this time, IV removed, patient taken to lobby by this RN.  Madison Wallace RN

## 2023-08-19 NOTE — PLAN OF CARE
HEART FAILURE CARE PLAN:    Comorbidities Reviewed: Yes   Patient has a past medical history of CHF (congestive heart failure) ([address] ECHOCARDIOGRAM Reviewed: Yes   No current Echo    Weights Reviewed: Yes   Admission weight: 174 lb (78.9 kg)   Wt Readings from Last 3 Encounters:   08/18/23 174 lb (78.9 kg)   08/17/23 178 lb 2.1 oz (80.8 kg)   08/02/23 178 lb 3.2 oz (80.8 kg)     Intake & Output Reviewed: Yes   No intake or output data in the 24 hours ending 08/19/23 0451  Medications Reviewed: Yes   SCHEDULED HOSPITAL MEDICATIONS:   cefTRIAXone (ROCEPHIN) IV  1,000 mg IntraVENous Q24H    apixaban  5 mg Oral BID    busPIRone  5 mg Oral BID    clopidogrel  75 mg Oral Daily    insulin lispro  0-4 Units SubCUTAneous TID WC    insulin lispro  0-4 Units SubCUTAneous Nightly    metoprolol succinate  25 mg Oral Daily    pantoprazole  40 mg Oral BID AC    sacubitril-valsartan  1 tablet Oral BID    sodium chloride flush  5-40 mL IntraVENous 2 times per day    spironolactone  25 mg Oral Daily    sucralfate  1 g Oral 4x Daily AC & HS    torsemide  20 mg Oral Daily     ACE/ARB/ARNI is REQUIRED for EF </= 29% SYSTOLIC FAILURE:   ACE[de-identified] None  ARB[de-identified] None  ARNI[de-identified] Sacubitril/Valsartan-Entresto    Evidenced-Based Beta Blocker is REQUIRED for EF </= 90% SYSTOLIC FAILURE:   [de-identified] Metoprolol SUCCinate- Toprol XL    Diuretics:  [de-identified] Torsemide and Spironolactone    Diet Reviewed: Yes   ADULT DIET; Regular; Low Sodium (2 gm); 1800 ml    Goal of Care Reviewed: Yes   Patient and/or Family's stated Goal of Care this Admission: reduce shortness of breath, increase activity tolerance, better understand heart failure and disease management, be more comfortable, and reduce lower extremity edema prior to discharge.

## 2023-08-19 NOTE — PLAN OF CARE
HEART FAILURE CARE PLAN:    Comorbidities Reviewed: Yes   Patient has a past medical history of CHF (congestive heart failure) ([address] ECHOCARDIOGRAM Reviewed: Yes   Patient's Ejection Fraction (EF) is less than 40%    Weights Reviewed: Yes   Admission weight: 174 lb (78.9 kg)   Wt Readings from Last 3 Encounters:   08/19/23 187 lb 2 oz (84.9 kg)   08/17/23 178 lb 2.1 oz (80.8 kg)   08/02/23 178 lb 3.2 oz (80.8 kg)     Intake & Output Reviewed: Yes     Intake/Output Summary (Last 24 hours) at 8/19/2023 1727  Last data filed at 8/19/2023 1724  Gross per 24 hour   Intake 720 ml   Output 1725 ml   Net -1005 ml     Medications Reviewed: Yes   SCHEDULED HOSPITAL MEDICATIONS:   cefTRIAXone (ROCEPHIN) IV  1,000 mg IntraVENous Q24H    apixaban  5 mg Oral BID    busPIRone  5 mg Oral BID    clopidogrel  75 mg Oral Daily    insulin lispro  0-4 Units SubCUTAneous TID WC    insulin lispro  0-4 Units SubCUTAneous Nightly    metoprolol succinate  25 mg Oral Daily    pantoprazole  40 mg Oral BID AC    sacubitril-valsartan  1 tablet Oral BID    sodium chloride flush  5-40 mL IntraVENous 2 times per day    spironolactone  25 mg Oral Daily    sucralfate  1 g Oral 4x Daily AC & HS    torsemide  20 mg Oral Daily    torsemide  20 mg Oral Once    insulin glargine  10 Units SubCUTAneous Nightly     ACE/ARB/ARNI is REQUIRED for EF </= 83% SYSTOLIC FAILURE:   ACE[de-identified] None  ARB[de-identified] None  ARNI[de-identified] Sacubitril/Valsartan-Entresto    Evidenced-Based Beta Blocker is REQUIRED for EF </= 37% SYSTOLIC FAILURE:   [de-identified] Metoprolol SUCCinate- Toprol XL    Diuretics:  [de-identified]   , Furosemide, Torsemide, Spironolactone, Metalozone, None, and N/A    Diet Reviewed: Yes   ADULT DIET; Regular;  Low Sodium (2 gm); 1800 ml    Goal of Care Reviewed: Yes   Patient and/or Family's stated Goal of Care this Admission: reduce shortness of breath, increase activity tolerance, better understand heart failure and disease management, be more comfortable, and reduce lower extremity edema prior to discharge.

## 2023-08-19 NOTE — ED NOTES
Patient asked for pain medication, o2 and ice chips.  Patient placed on 2lpm nasal lennie Sweeney RN  08/19/23 0041

## 2023-08-19 NOTE — CONSULTS
Consult to Pharmacy for Umeclidinium or equivalent- Our therapeutic sub would be for Tiotropium, if this inhaler is not administrable the alt sub is for Ipratropium Nebs scheduled Q6-8hrs.   FABRICIO Valente ADRIANNE [facility] PharmD 8/19/2023 9:00 AM

## 2023-08-20 ENCOUNTER — HOSPITAL ENCOUNTER (INPATIENT)
Age: 53
LOS: 1 days | Discharge: HOME | DRG: 433 | End: 2023-08-22
Attending: EMERGENCY MEDICINE | Admitting: STUDENT IN AN ORGANIZED HEALTH CARE EDUCATION/TRAINING PROGRAM
Payer: MEDICARE

## 2023-08-20 DIAGNOSIS — R18.8 CIRRHOSIS OF LIVER WITH ASCITES, UNSPECIFIED HEPATIC CIRRHOSIS TYPE (HCC): ICD-10-CM

## 2023-08-20 DIAGNOSIS — K74.60 CIRRHOSIS OF LIVER WITH ASCITES, UNSPECIFIED HEPATIC CIRRHOSIS TYPE (HCC): ICD-10-CM

## 2023-08-20 DIAGNOSIS — R10.84 GENERALIZED ABDOMINAL PAIN: Primary | ICD-10-CM

## 2023-08-20 LAB
BACTERIA BLD CULT ORG #2: NORMAL
BACTERIA BLD CULT: NORMAL

## 2023-08-20 PROCEDURE — 99285 EMERGENCY DEPT VISIT HI MDM: CPT

## 2023-08-20 PROCEDURE — 96374 THER/PROPH/DIAG INJ IV PUSH: CPT

## 2023-08-21 ENCOUNTER — APPOINTMENT (OUTPATIENT)
Dept: ULTRASOUND IMAGING | Age: 53
DRG: 433 | End: 2023-08-21
Payer: MEDICARE

## 2023-08-21 PROBLEM — I73.9 PAD (PERIPHERAL ARTERY DISEASE) (HCC): Status: ACTIVE | Noted: 2023-08-21

## 2023-08-21 PROBLEM — R18.8 CIRRHOSIS OF LIVER WITH ASCITES (HCC): Status: ACTIVE | Noted: 2023-08-17

## 2023-08-21 LAB
ALBUMIN FLD-MCNC: 1.4 G/DL
ALBUMIN SERPL-MCNC: 3 G/DL (ref 3.4–5)
ALBUMIN SERPL-MCNC: 3.1 G/DL (ref 3.4–5)
ALBUMIN/GLOB SERPL: 0.8 {RATIO} (ref 1.1–2.2)
ALBUMIN/GLOB SERPL: 0.8 {RATIO} (ref 1.1–2.2)
ALP SERPL-CCNC: 82 U/L (ref 40–129)
ALP SERPL-CCNC: 89 U/L (ref 40–129)
ALT SERPL-CCNC: 63 U/L (ref 10–40)
ALT SERPL-CCNC: 65 U/L (ref 10–40)
AMMONIA PLAS-SCNC: 43 UMOL/L (ref 16–60)
AMYLASE FLD-CCNC: 21 U/L
ANION GAP SERPL CALCULATED.3IONS-SCNC: 10 MMOL/L (ref 3–16)
ANION GAP SERPL CALCULATED.3IONS-SCNC: 12 MMOL/L (ref 3–16)
APPEARANCE FLUID: NORMAL
AST SERPL-CCNC: 147 U/L (ref 15–37)
AST SERPL-CCNC: 162 U/L (ref 15–37)
BASOPHILS # BLD: 0.1 K/UL (ref 0–0.2)
BASOPHILS # BLD: 0.1 K/UL (ref 0–0.2)
BASOPHILS NFR BLD: 1.2 %
BASOPHILS NFR BLD: 1.3 %
BDY FLUID QUALITY: NORMAL
BILIRUB SERPL-MCNC: 0.4 MG/DL (ref 0–1)
BILIRUB SERPL-MCNC: 0.7 MG/DL (ref 0–1)
BUN SERPL-MCNC: 15 MG/DL (ref 7–20)
BUN SERPL-MCNC: 15 MG/DL (ref 7–20)
CALCIUM SERPL-MCNC: 8.3 MG/DL (ref 8.3–10.6)
CALCIUM SERPL-MCNC: 8.5 MG/DL (ref 8.3–10.6)
CELL COUNT FLUID TYPE: NORMAL
CHLORIDE SERPL-SCNC: 101 MMOL/L (ref 99–110)
CHLORIDE SERPL-SCNC: 103 MMOL/L (ref 99–110)
CO2 SERPL-SCNC: 21 MMOL/L (ref 21–32)
CO2 SERPL-SCNC: 24 MMOL/L (ref 21–32)
COLOR FLUID: YELLOW
CREAT SERPL-MCNC: 1 MG/DL (ref 0.9–1.3)
CREAT SERPL-MCNC: 1 MG/DL (ref 0.9–1.3)
DEPRECATED RDW RBC AUTO: 20.2 % (ref 12.4–15.4)
DEPRECATED RDW RBC AUTO: 20.2 % (ref 12.4–15.4)
EKG ATRIAL RATE: 96 BPM
EKG DIAGNOSIS: NORMAL
EKG P AXIS: 0 DEGREES
EKG P-R INTERVAL: 232 MS
EKG Q-T INTERVAL: 406 MS
EKG QRS DURATION: 116 MS
EKG QTC CALCULATION (BAZETT): 512 MS
EKG R AXIS: 246 DEGREES
EKG T AXIS: 56 DEGREES
EKG VENTRICULAR RATE: 96 BPM
EOSINOPHIL # BLD: 0.1 K/UL (ref 0–0.6)
EOSINOPHIL # BLD: 0.1 K/UL (ref 0–0.6)
EOSINOPHIL NFR BLD: 1.7 %
EOSINOPHIL NFR BLD: 2.1 %
GFR SERPLBLD CREATININE-BSD FMLA CKD-EPI: >60 ML/MIN/{1.73_M2}
GFR SERPLBLD CREATININE-BSD FMLA CKD-EPI: >60 ML/MIN/{1.73_M2}
GLUCOSE BLD-MCNC: 150 MG/DL (ref 70–99)
GLUCOSE BLD-MCNC: 202 MG/DL (ref 70–99)
GLUCOSE FLD-MCNC: 176 MG/DL
GLUCOSE SERPL-MCNC: 148 MG/DL (ref 70–99)
GLUCOSE SERPL-MCNC: 181 MG/DL (ref 70–99)
GRAM STN SPEC: NORMAL
HCT VFR BLD AUTO: 34.3 % (ref 40.5–52.5)
HCT VFR BLD AUTO: 34.9 % (ref 40.5–52.5)
HGB BLD-MCNC: 11.4 G/DL (ref 13.5–17.5)
HGB BLD-MCNC: 11.5 G/DL (ref 13.5–17.5)
INR PPP: 1.62 (ref 0.84–1.16)
LDH FLD L TO P-CCNC: 100 U/L
LIPASE SERPL-CCNC: 7 U/L (ref 13–60)
LYMPHOCYTES # BLD: 0.9 K/UL (ref 1–5.1)
LYMPHOCYTES # BLD: 1 K/UL (ref 1–5.1)
LYMPHOCYTES NFR BLD: 16.8 %
LYMPHOCYTES NFR BLD: 17.4 %
LYMPHOCYTES NFR FLD: 88 %
MACROPHAGES # FLD: 5 %
MCH RBC QN AUTO: 25.3 PG (ref 26–34)
MCH RBC QN AUTO: 25.8 PG (ref 26–34)
MCHC RBC AUTO-ENTMCNC: 32.9 G/DL (ref 31–36)
MCHC RBC AUTO-ENTMCNC: 33.3 G/DL (ref 31–36)
MCV RBC AUTO: 76.7 FL (ref 80–100)
MCV RBC AUTO: 77.6 FL (ref 80–100)
MESOTHL CELL NFR FLD: 3 %
MONOCYTES # BLD: 0.6 K/UL (ref 0–1.3)
MONOCYTES # BLD: 0.8 K/UL (ref 0–1.3)
MONOCYTES NFR BLD: 10.5 %
MONOCYTES NFR BLD: 13.9 %
NEUTROPHIL, FLUID: 4 %
NEUTROPHILS # BLD: 3.6 K/UL (ref 1.7–7.7)
NEUTROPHILS # BLD: 4.1 K/UL (ref 1.7–7.7)
NEUTROPHILS NFR BLD: 65.4 %
NEUTROPHILS NFR BLD: 69.7 %
NUC CELL # FLD: 803 /CUMM
PERFORMED ON: ABNORMAL
PERFORMED ON: ABNORMAL
PLATELET # BLD AUTO: 178 K/UL (ref 135–450)
PLATELET # BLD AUTO: 183 K/UL (ref 135–450)
PMV BLD AUTO: 7.8 FL (ref 5–10.5)
PMV BLD AUTO: 8 FL (ref 5–10.5)
POTASSIUM SERPL-SCNC: 4.3 MMOL/L (ref 3.5–5.1)
POTASSIUM SERPL-SCNC: 4.4 MMOL/L (ref 3.5–5.1)
PROT FLD-MCNC: 2.9 G/DL
PROT SERPL-MCNC: 6.7 G/DL (ref 6.4–8.2)
PROT SERPL-MCNC: 6.9 G/DL (ref 6.4–8.2)
PROTHROMBIN TIME: 19.2 SEC (ref 11.5–14.8)
RBC # BLD AUTO: 4.42 M/UL (ref 4.2–5.9)
RBC # BLD AUTO: 4.55 M/UL (ref 4.2–5.9)
RBC FLUID: 7100 /CUMM
SODIUM SERPL-SCNC: 134 MMOL/L (ref 136–145)
SODIUM SERPL-SCNC: 137 MMOL/L (ref 136–145)
SPECIMEN SOURCE FLD: NORMAL
TOTAL CELLS COUNTED FLD: 100
TROPONIN, HIGH SENSITIVITY: 56 NG/L (ref 0–22)
TROPONIN, HIGH SENSITIVITY: 56 NG/L (ref 0–22)
WBC # BLD AUTO: 5.5 K/UL (ref 4–11)
WBC # BLD AUTO: 5.9 K/UL (ref 4–11)

## 2023-08-21 PROCEDURE — 82140 ASSAY OF AMMONIA: CPT

## 2023-08-21 PROCEDURE — 82042 OTHER SOURCE ALBUMIN QUAN EA: CPT

## 2023-08-21 PROCEDURE — 6360000002 HC RX W HCPCS: Performed by: EMERGENCY MEDICINE

## 2023-08-21 PROCEDURE — 99223 1ST HOSP IP/OBS HIGH 75: CPT | Performed by: INTERNAL MEDICINE

## 2023-08-21 PROCEDURE — 84157 ASSAY OF PROTEIN OTHER: CPT

## 2023-08-21 PROCEDURE — 6370000000 HC RX 637 (ALT 250 FOR IP)

## 2023-08-21 PROCEDURE — 49083 ABD PARACENTESIS W/IMAGING: CPT

## 2023-08-21 PROCEDURE — 84484 ASSAY OF TROPONIN QUANT: CPT

## 2023-08-21 PROCEDURE — 2580000003 HC RX 258: Performed by: EMERGENCY MEDICINE

## 2023-08-21 PROCEDURE — 85025 COMPLETE CBC W/AUTO DIFF WBC: CPT

## 2023-08-21 PROCEDURE — 89051 BODY FLUID CELL COUNT: CPT

## 2023-08-21 PROCEDURE — 36415 COLL VENOUS BLD VENIPUNCTURE: CPT

## 2023-08-21 PROCEDURE — 6370000000 HC RX 637 (ALT 250 FOR IP): Performed by: INTERNAL MEDICINE

## 2023-08-21 PROCEDURE — 87070 CULTURE OTHR SPECIMN AEROBIC: CPT

## 2023-08-21 PROCEDURE — 83615 LACTATE (LD) (LDH) ENZYME: CPT

## 2023-08-21 PROCEDURE — 0W9G3ZZ DRAINAGE OF PERITONEAL CAVITY, PERCUTANEOUS APPROACH: ICD-10-PCS | Performed by: INTERNAL MEDICINE

## 2023-08-21 PROCEDURE — 82945 GLUCOSE OTHER FLUID: CPT

## 2023-08-21 PROCEDURE — 2580000003 HC RX 258: Performed by: STUDENT IN AN ORGANIZED HEALTH CARE EDUCATION/TRAINING PROGRAM

## 2023-08-21 PROCEDURE — 93010 ELECTROCARDIOGRAM REPORT: CPT | Performed by: INTERNAL MEDICINE

## 2023-08-21 PROCEDURE — 6370000000 HC RX 637 (ALT 250 FOR IP): Performed by: STUDENT IN AN ORGANIZED HEALTH CARE EDUCATION/TRAINING PROGRAM

## 2023-08-21 PROCEDURE — 83690 ASSAY OF LIPASE: CPT

## 2023-08-21 PROCEDURE — 6370000000 HC RX 637 (ALT 250 FOR IP): Performed by: EMERGENCY MEDICINE

## 2023-08-21 PROCEDURE — 92610 EVALUATE SWALLOWING FUNCTION: CPT

## 2023-08-21 PROCEDURE — 85610 PROTHROMBIN TIME: CPT

## 2023-08-21 PROCEDURE — 93005 ELECTROCARDIOGRAM TRACING: CPT | Performed by: EMERGENCY MEDICINE

## 2023-08-21 PROCEDURE — 1200000000 HC SEMI PRIVATE

## 2023-08-21 PROCEDURE — 80053 COMPREHEN METABOLIC PANEL: CPT

## 2023-08-21 PROCEDURE — 87205 SMEAR GRAM STAIN: CPT

## 2023-08-21 PROCEDURE — 82150 ASSAY OF AMYLASE: CPT

## 2023-08-21 RX ORDER — MIDODRINE HYDROCHLORIDE 5 MG/1
5 TABLET ORAL 3 TIMES DAILY
COMMUNITY

## 2023-08-21 RX ORDER — ATORVASTATIN CALCIUM 40 MG/1
40 TABLET, FILM COATED ORAL NIGHTLY
Status: DISCONTINUED | OUTPATIENT
Start: 2023-08-21 | End: 2023-08-22 | Stop reason: HOSPADM

## 2023-08-21 RX ORDER — PANTOPRAZOLE SODIUM 40 MG/1
40 TABLET, DELAYED RELEASE ORAL 2 TIMES DAILY
Status: DISCONTINUED | OUTPATIENT
Start: 2023-08-21 | End: 2023-08-22 | Stop reason: HOSPADM

## 2023-08-21 RX ORDER — FERROUS SULFATE 325(65) MG
325 TABLET ORAL
Status: DISCONTINUED | OUTPATIENT
Start: 2023-08-21 | End: 2023-08-22 | Stop reason: HOSPADM

## 2023-08-21 RX ORDER — SPIRONOLACTONE 25 MG/1
25 TABLET ORAL DAILY
Status: DISCONTINUED | OUTPATIENT
Start: 2023-08-21 | End: 2023-08-22 | Stop reason: HOSPADM

## 2023-08-21 RX ORDER — METOPROLOL SUCCINATE 25 MG/1
12.5 TABLET, EXTENDED RELEASE ORAL DAILY
Status: DISCONTINUED | OUTPATIENT
Start: 2023-08-21 | End: 2023-08-22 | Stop reason: HOSPADM

## 2023-08-21 RX ORDER — SODIUM CHLORIDE 9 MG/ML
INJECTION, SOLUTION INTRAVENOUS PRN
Status: DISCONTINUED | OUTPATIENT
Start: 2023-08-21 | End: 2023-08-22 | Stop reason: HOSPADM

## 2023-08-21 RX ORDER — BUSPIRONE HYDROCHLORIDE 5 MG/1
5 TABLET ORAL 2 TIMES DAILY
Status: DISCONTINUED | OUTPATIENT
Start: 2023-08-21 | End: 2023-08-22 | Stop reason: HOSPADM

## 2023-08-21 RX ORDER — TORSEMIDE 20 MG/1
20 TABLET ORAL DAILY
Status: DISCONTINUED | OUTPATIENT
Start: 2023-08-21 | End: 2023-08-22 | Stop reason: HOSPADM

## 2023-08-21 RX ORDER — ONDANSETRON 2 MG/ML
4 INJECTION INTRAMUSCULAR; INTRAVENOUS EVERY 6 HOURS PRN
Status: DISCONTINUED | OUTPATIENT
Start: 2023-08-21 | End: 2023-08-21

## 2023-08-21 RX ORDER — INSULIN GLARGINE 100 [IU]/ML
10 INJECTION, SOLUTION SUBCUTANEOUS NIGHTLY
COMMUNITY

## 2023-08-21 RX ORDER — ONDANSETRON 4 MG/1
4 TABLET, ORALLY DISINTEGRATING ORAL EVERY 8 HOURS PRN
Status: DISCONTINUED | OUTPATIENT
Start: 2023-08-21 | End: 2023-08-21

## 2023-08-21 RX ORDER — SODIUM CHLORIDE 0.9 % (FLUSH) 0.9 %
5-40 SYRINGE (ML) INJECTION EVERY 12 HOURS SCHEDULED
Status: DISCONTINUED | OUTPATIENT
Start: 2023-08-21 | End: 2023-08-22 | Stop reason: HOSPADM

## 2023-08-21 RX ORDER — SUCRALFATE 1 G/1
1 TABLET ORAL 4 TIMES DAILY
Status: DISCONTINUED | OUTPATIENT
Start: 2023-08-21 | End: 2023-08-22 | Stop reason: HOSPADM

## 2023-08-21 RX ORDER — ACETAMINOPHEN 325 MG/1
650 TABLET ORAL EVERY 6 HOURS PRN
Status: DISCONTINUED | OUTPATIENT
Start: 2023-08-21 | End: 2023-08-22 | Stop reason: HOSPADM

## 2023-08-21 RX ORDER — DEXTROSE MONOHYDRATE 100 MG/ML
INJECTION, SOLUTION INTRAVENOUS CONTINUOUS PRN
Status: DISCONTINUED | OUTPATIENT
Start: 2023-08-21 | End: 2023-08-22 | Stop reason: HOSPADM

## 2023-08-21 RX ORDER — OXYCODONE HYDROCHLORIDE 5 MG/1
5 TABLET ORAL ONCE
Status: COMPLETED | OUTPATIENT
Start: 2023-08-21 | End: 2023-08-21

## 2023-08-21 RX ORDER — OXYCODONE HYDROCHLORIDE 5 MG/1
5 TABLET ORAL EVERY 6 HOURS PRN
Status: DISCONTINUED | OUTPATIENT
Start: 2023-08-21 | End: 2023-08-22 | Stop reason: HOSPADM

## 2023-08-21 RX ORDER — CLOPIDOGREL BISULFATE 75 MG/1
75 TABLET ORAL DAILY
Status: DISCONTINUED | OUTPATIENT
Start: 2023-08-21 | End: 2023-08-22 | Stop reason: HOSPADM

## 2023-08-21 RX ORDER — INSULIN ASPART 100 [IU]/ML
1-10 INJECTION, SOLUTION INTRAVENOUS; SUBCUTANEOUS
COMMUNITY

## 2023-08-21 RX ORDER — ACETAMINOPHEN 650 MG/1
650 SUPPOSITORY RECTAL EVERY 6 HOURS PRN
Status: DISCONTINUED | OUTPATIENT
Start: 2023-08-21 | End: 2023-08-22 | Stop reason: HOSPADM

## 2023-08-21 RX ORDER — METOPROLOL SUCCINATE 25 MG/1
25 TABLET, EXTENDED RELEASE ORAL DAILY
Status: DISCONTINUED | OUTPATIENT
Start: 2023-08-21 | End: 2023-08-21

## 2023-08-21 RX ORDER — ENOXAPARIN SODIUM 100 MG/ML
40 INJECTION SUBCUTANEOUS DAILY
Status: DISCONTINUED | OUTPATIENT
Start: 2023-08-21 | End: 2023-08-21

## 2023-08-21 RX ORDER — POLYETHYLENE GLYCOL 3350 17 G/17G
17 POWDER, FOR SOLUTION ORAL DAILY PRN
Status: DISCONTINUED | OUTPATIENT
Start: 2023-08-21 | End: 2023-08-22 | Stop reason: HOSPADM

## 2023-08-21 RX ORDER — PROCHLORPERAZINE EDISYLATE 5 MG/ML
10 INJECTION INTRAMUSCULAR; INTRAVENOUS EVERY 6 HOURS PRN
Status: DISCONTINUED | OUTPATIENT
Start: 2023-08-21 | End: 2023-08-22 | Stop reason: HOSPADM

## 2023-08-21 RX ORDER — LACTULOSE 10 G/15ML
20 SOLUTION ORAL EVERY 8 HOURS PRN
Status: DISCONTINUED | OUTPATIENT
Start: 2023-08-21 | End: 2023-08-22 | Stop reason: HOSPADM

## 2023-08-21 RX ORDER — SODIUM CHLORIDE 0.9 % (FLUSH) 0.9 %
5-40 SYRINGE (ML) INJECTION PRN
Status: DISCONTINUED | OUTPATIENT
Start: 2023-08-21 | End: 2023-08-22 | Stop reason: HOSPADM

## 2023-08-21 RX ADMIN — PANTOPRAZOLE SODIUM 40 MG: 40 TABLET, DELAYED RELEASE ORAL at 12:06

## 2023-08-21 RX ADMIN — PANTOPRAZOLE SODIUM 40 MG: 40 TABLET, DELAYED RELEASE ORAL at 21:10

## 2023-08-21 RX ADMIN — SPIRONOLACTONE 25 MG: 25 TABLET ORAL at 12:06

## 2023-08-21 RX ADMIN — ATORVASTATIN CALCIUM 40 MG: 40 TABLET, FILM COATED ORAL at 21:08

## 2023-08-21 RX ADMIN — METOPROLOL SUCCINATE 12.5 MG: 25 TABLET, EXTENDED RELEASE ORAL at 12:07

## 2023-08-21 RX ADMIN — OXYCODONE 5 MG: 5 TABLET ORAL at 01:18

## 2023-08-21 RX ADMIN — APIXABAN 5 MG: 5 TABLET, FILM COATED ORAL at 21:08

## 2023-08-21 RX ADMIN — OXYCODONE HYDROCHLORIDE 5 MG: 5 TABLET ORAL at 14:35

## 2023-08-21 RX ADMIN — BUSPIRONE HYDROCHLORIDE 5 MG: 5 TABLET ORAL at 12:07

## 2023-08-21 RX ADMIN — SUCRALFATE 1 G: 1 TABLET ORAL at 21:09

## 2023-08-21 RX ADMIN — SUCRALFATE 1 G: 1 TABLET ORAL at 12:06

## 2023-08-21 RX ADMIN — OXYCODONE HYDROCHLORIDE 5 MG: 5 TABLET ORAL at 07:31

## 2023-08-21 RX ADMIN — Medication 10 ML: at 21:09

## 2023-08-21 RX ADMIN — TORSEMIDE 20 MG: 20 TABLET ORAL at 12:07

## 2023-08-21 RX ADMIN — CEFTRIAXONE SODIUM 1000 MG: 1 INJECTION, POWDER, FOR SOLUTION INTRAMUSCULAR; INTRAVENOUS at 03:18

## 2023-08-21 RX ADMIN — OXYCODONE HYDROCHLORIDE 5 MG: 5 TABLET ORAL at 21:09

## 2023-08-21 RX ADMIN — BUSPIRONE HYDROCHLORIDE 5 MG: 5 TABLET ORAL at 21:08

## 2023-08-21 ASSESSMENT — PAIN SCALES - GENERAL
PAINLEVEL_OUTOF10: 4
PAINLEVEL_OUTOF10: 10
PAINLEVEL_OUTOF10: 0
PAINLEVEL_OUTOF10: 10

## 2023-08-21 ASSESSMENT — PAIN DESCRIPTION - LOCATION
LOCATION: ABDOMEN

## 2023-08-21 ASSESSMENT — PAIN - FUNCTIONAL ASSESSMENT
PAIN_FUNCTIONAL_ASSESSMENT: 0-10
PAIN_FUNCTIONAL_ASSESSMENT: 0-10

## 2023-08-21 ASSESSMENT — PAIN DESCRIPTION - DESCRIPTORS
DESCRIPTORS: ACHING;DISCOMFORT
DESCRIPTORS: ACHING;DISCOMFORT

## 2023-08-21 NOTE — PROGRESS NOTES
Pt sitting up in bed eating a snack; he had already eaten two lunch trays before the paracentesis. Pt asking for more crackers and peanut butter and another starry. This RN brought these in to the pt and before I left, he told me he had 10/10 abdominal pain and is saying the pill is no good and requesting IV pain medication. Pt does not appear to be in any distress or pain and is eating comfortably. MD notified. MD is not ordering any IV pain medication at this time.

## 2023-08-21 NOTE — PROGRESS NOTES
[facility] Hospitalist brief note     Consult received. Case reviewed with ER physician at [facility] [place]. [place]   Admission orders were placed   Full note to follow. Patient's PCP:  No primary care provider on file. 53M with PMH of speech and hearing disability & past medical history of chronic combined congestive heart failure, CAD with history of PCI, hypertension, PAD, LV thrombus, alcoholic cirrhosis, N1DQ who presents with intractable abd pain. Pt had diagnostic para at ED which ruled out SBP. Pt may benefit from therapeutic paracentesis; CT abd 8/18 showed moderate ascites. Will resume home meds, add oxycodone q6h for pain.          Marisol Colón MD

## 2023-08-21 NOTE — PLAN OF CARE
SLP Evaluation Completed.  All note are found in [address] [facility], [address] [facility]

## 2023-08-21 NOTE — PROGRESS NOTES
Attempted to speak with pt again to verify home meds. Pt refusing. MD notified; MD instructed this RN to wait until pt will talk to me to veify and give pt home meds.

## 2023-08-21 NOTE — PROGRESS NOTES
Spoke with pt at this time and confirmed that pt did not take any of his meds yesterday Updated pt home med rec and notified pharmacy at this time so his meds can be verified and given.

## 2023-08-21 NOTE — H&P
Labs     08/21/23  0152   PROTIME 19.2*   INR 1.62*     UA:  Recent Labs     08/19/23  0934   PHUR 5.0          Recent Labs     08/18/23 2106   LACTA 3.0*         CARDIAC ENZYMES  Recent Labs     08/18/23  2106 08/21/23  0152 08/21/23  0312   TROPHS 51* 56* 56*         U/A:    Lab Results   Component Value Date/Time    COLORU Yellow 08/16/2023 09:17 PM    WBCUA 0-2 08/16/2023 09:17 PM    RBCUA 0-2 08/16/2023 09:17 PM    BACTERIA Rare 08/16/2023 09:17 PM    CLARITYU Clear 08/16/2023 09:17 PM    SPECGRAV 1.010 08/16/2023 09:17 PM    LEUKOCYTESUR Negative 08/16/2023 09:17 PM    BLOODU Negative 08/16/2023 09:17 PM    GLUCOSEU Negative 08/16/2023 09:17 PM    AMORPHOUS Rare 08/16/2023 09:17 PM       CULTURES  Results       Procedure Component Value Units Date/Time    Culture, Body Fluid [[id number]] Collected: 08/21/23 0251    Order Status: Sent Specimen: Body Fluid from Ascitic Fluid Updated: 08/21/23 0327    Gram stain [[id number]] Collected: 08/21/23 0251    Order Status: Completed Specimen: Ascitic Fluid Updated: 08/21/23 0427     Gram Stain Result 2+ WBC's  3+ RBC's  No organisms seen  No Epithelial Cells seen      Narrative:      ORDER#: [id number]                          ORDERED BY: Ronni Shone  SOURCE: Ascites                            COLLECTED:  08/21/23 02:51  ANTIBIOTICS AT [place].:                      RECEIVED :  08/21/23 03:28    Blood Culture 2 [[id number]] Collected: 08/19/23 0100    Order Status: Completed Specimen: Blood Updated: 08/20/23 0115     Culture, Blood 2 No Growth to date. Any change in status will be called. Narrative:      ORDER#: [id number]                          ORDERED BY: Denny Darden  SOURCE: Blood No site given                COLLECTED:  08/19/23 01:00  ANTIBIOTICS AT [place].:                      RECEIVED :  08/19/23 10:33  If child <=2 yrs old please draw pediatric bottle. ~Blood Culture #2    Blood Culture 1 [[id number]] Collected: 08/19/23 0045    Order Status:

## 2023-08-21 NOTE — PROGRESS NOTES
Speech Language Pathology  Clinical Bedside Swallow Assessment  Facility/Department: [facility]    No further ST warranted at this time, please re-consult if concerns for aspiration arise. Instrumentation: Not clinically indicated at this time  Diet recommendation: IDDSI 7 Regular Solids; IDDSI 0 Thin Liquids;  Meds PO as tolerated  Risk management: upright for all intake, stay upright for at least 30 mins after intake, small bites/sips, oral care 2-3x/day to reduce adverse affects in the event of aspiration, general GERD precautions, and general aspiration precautions    NAME:Renato Nagy  : 1970 (52 y.o.)   MRN: [id number]  ROOM: [address] [place] [address]  ADMISSION DATE: 2023  PATIENT DIAGNOSIS(ES): Abdominal pain [R10.9]  Generalized abdominal pain [R10.84]  Cirrhosis of liver with ascites, unspecified hepatic cirrhosis type ([address]) [K74.60, R18.8]  Chief Complaint   Patient presents with    Abdominal Pain     Belly pain after eating hamburger 1/2 hr ago      Patient Active Problem List    Diagnosis Date Noted    Abdominal pain 2023    Acute heart failure, unspecified heart failure type ([address]) 2023    Decompensated hepatic cirrhosis ([address]) 2023    Generalized abdominal pain 2023    Pleural effusion 2023    Other ascites 2023    Acute pulmonary edema ([address]) 2023    Below-knee amputation of right lower extremity ([address]) 2023    Cellulitis of right lower extremity 2023    QT prolongation 2023    Shortness of breath 2023    Acute on chronic congestive heart failure, unspecified heart failure type ([address]) 2023     Past Medical History:   Diagnosis Date    CHF (congestive heart failure) (HCC)      Past Surgical History:   Procedure Laterality Date    LEG AMPUTATION BELOW KNEE Right     PACEMAKER INSERTION       Allergies   Allergen Reactions    Azithromycin Rash and Shortness Of Breath     Other reaction(s): Gastrointestinal

## 2023-08-21 NOTE — ED PROVIDER NOTES
Emergency Department Provider Note  Location: [facility] EMERGENCY DEPARTMENT  8/20/2023     Patient Identification  Sanjeev Flowers is a 52 y.o. male    Chief Complaint  Abdominal Pain (Belly pain after eating hamburger 1/2 hr ago )          HPI  (History provided by patient)   52year old male with complex medical history of DM, HTN, CAD, CHF s/p AICD, PAD s/p RLE amputation, COPD, IGNACIA, PTSD, medical non-complaince, substance abuse, chronic HCV cirrhosis decompensated by ascites presents to ER with complaints of abdominal pain. He has speech and hearing disability.  service was used to perform the interview. Patient reports generalized intractable abdominal pain which has been a chronic issue. He has been seen in the emergency department multiple times for this and was recently admitted to the hospital and left AMA yesterday. I can see from the note that they suspected drug-seeking behavior and only agreed to p.o. oxycodone at that time. Since then he has gone to [facility] and per chart review left AMA though patient denies this. He presents now with continued pain. Reports it is the same pain he has had. Denies any new symptoms. He is requesting pain medication specifically. I have reviewed the following nursing documentation:  Allergies: Allergies   Allergen Reactions    Azithromycin Rash and Shortness Of Breath     Other reaction(s): Gastrointestinal Intolerance, GI UPSET  (Abstracted from [address])  (Abstracted from [address])  Patient experienced GI upset, rash to abdomen, pain to administration site, shortness of breath, anxiety, tachycardia, and hypotension with administration.   Other reaction(s): Gastrointestinal Intolerance, GI UPSET  (Abstracted from [address])  Patient experienced GI upset, rash to abdomen, pain to administration site, shortness of breath, anxiety, tachycardia, and hypotension with

## 2023-08-21 NOTE — PROGRESS NOTES
Transferred care to [facility], [address]. Face to face bedside report given, no need voiced at this time.

## 2023-08-21 NOTE — DISCHARGE INSTRUCTIONS
Your information:  Name: Sourav Eubanks  : 1970    Your instructions:    Paracentesis every 2 weeks at [facility], call [phone]    What to do after you leave the hospital:    Recommended diet: regular diet, low sodium diet (2 gm)    Recommended activity: activity as tolerated        The following personal items were collected during your admission and were returned to you:    Belongings  Dental Appliances: None  Vision - Corrective Lenses: Eyeglasses  Hearing Aid: None  Clothing: Shirt, Shorts  Jewelry: None  Body Piercings Removed: N/A  Electronic Devices: Cell Phone,   Weapons (Notify Protective Services/Security): None  Other Valuables: At home  Home Medications: None  Valuables Given To: Patient  Provide Name(s) of Who Valuable(s) Were Given To: patient    Information obtained by:  By signing below, I understand that if any problems occur once I leave the hospital I am to contact PCP. I understand and acknowledge receipt of the instructions indicated above. Heart Failure Resources:  Heart Failure Interactive Workbook:  Go to https://MENA [employer]E/publication/?v=411773 for a Free Heart Failure Interactive Workbook provided by [employer]. This interactive workbook will provide information on Healthier Living with Heart Failure. Please copy and paste link into search bar. Use your mouse to scroll through the pages. HF [place] denise:   Heart Failure Free smart phone denise available for iPhone and Android download. Use your phone to track sodium intake, fluid intake, symptoms, and weight. Low Sodium Diet / Recipes:  Go to www. [employer]. org website for [employer] which is Low Sodium! [employer] is a dialysis company, but this website offers free seasonal cookbooks. Each quarter, they will release 25-30 new recipes with a breakdown of calories, sodium, and glucose. You can also go to [email or web address] website for free recipes.      Discharge

## 2023-08-21 NOTE — ED NOTES
Called and spoke with Arie Latham at [facility] at this time. Stated she can send out crew and they can be here in 45 minutes.      Kadi Shafer RN  08/21/23 9068

## 2023-08-21 NOTE — PROGRESS NOTES
Pt expressing desire for IV pain coverage. Using the , this RN explained to the pt that the MD does not want to order IV pain meds at this time, only the pill. Pt is irritated and says \"the doctors are stupid and don't know my history\". Pt explains that he was addicted to pain pills and doesn't want to become addicted again, stating \"the doctors want me to become addicted to pain pills again, that's stupid\". This RN reinforced the teaching that IV pain meds are typically stronger and more addictive than pain pills, and the doctor does not want to order IV coverage. Obtained consent for paracentesis at this time with the help of the . Explained to the pt that removal of extra fluid from his may help to relieve some of his abdominal pain. Pt expressed understanding.

## 2023-08-21 NOTE — CARE COORDINATION
Case Management Assessment  Initial Evaluation    Date/Time of Evaluation: 8/21/2023 2:01 PM  Assessment Completed by: Karina Dang RN    If patient is discharged prior to next notation, then this note serves as note for discharge by case management. Patient Name: Zhang Ledesma                   YOB: 1970  Diagnosis: Abdominal pain [R10.9]  Generalized abdominal pain [R10.84]  Cirrhosis of liver with ascites, unspecified hepatic cirrhosis type ([address]) [K74.60, R18.8]                   Date / Time: 8/20/2023 11:59 PM    Patient Admission Status: Inpatient   Readmission Risk (Low < 19, Mod (19-27), High > 27): Readmission Risk Score: 23.4    Current PCP: No primary care provider on file. PCP verified by CM? No (Given PCP list does not follow-up)    Chart Reviewed: Yes      History Provided by: Patient  Patient Orientation: Alert and Oriented (Pateint is deaf)    Patient Cognition: Alert    Hospitalization in the last 30 days (Readmission):  Yes    If yes, Readmission Assessment in CM Navigator will be completed. Advance Directives:      Code Status: Full Code   Patient's Primary Decision Maker is: Patient Declined (Legal Next of Kin Remains as Decision Maker)      Discharge Planning:    Patient lives with: Spouse/Significant Other Type of Home: Apartment  Primary Care Giver: Self  Patient Support Systems include: Spouse/Significant Other   Current Financial resources: Medicare  Current community resources: None  Current services prior to admission: Durable Medical Equipment            Current DME: Walker, Shower Chair (Lift Chair)            Type of Home Care services:  None    ADLS  Prior functional level: Independent in ADLs/IADLs  Current functional level:  Independent in ADLs/IADLs    PT AM-PAC:   /24  OT AM-PAC:   /24    Family can provide assistance at DC: Yes (SO)  Would you like Case Management to discuss the discharge plan with any other family members/significant others, and if so,

## 2023-08-21 NOTE — PROGRESS NOTES
Pharmacy Medication History Note     List of current medications patient is taking is complete. Source of information:  [address] discharge summary (8/5)    Medications notes:   1. Order to stop Lissa Kylie, nurse to clarify if he is to continue this admission  2. Midodrine 5mg tid : added  3. Toprol changed to 12.5mg daily  4. Lantus 10 units qpm added  5.  Novolog 1-10 units qid with meals added    Dany Gardner Pharm D 8/21/202312:01 PM  .

## 2023-08-21 NOTE — PROGRESS NOTES
4 Eyes Skin Assessment     NAME:  Diann Patel  YOB: 1970  MEDICAL RECORD NUMBER:  [id number]    The patient is being assessed for  Admission    I agree that at least one RN has performed a thorough Head to Toe Skin Assessment on the patient. ALL assessment sites listed below have been assessed. Areas assessed by both nurses:    Head, Face, Ears, Shoulders, Back, Chest, Arms, Elbows, Hands, Sacrum. Buttock, Coccyx, Ischium, and Legs. Feet and Heels    Patient has RBKA, Redness on left foot, scattered scabs on bilat arms. Scattered bruising. Does the Patient have a Wound?  No noted wound(s)       Garret Prevention initiated by RN: Yes  Wound Care Orders initiated by RN: No    Pressure Injury (Stage 3,4, Unstageable, DTI, NWPT, and Complex wounds) if present, place Wound referral order by RN under : No    New Ostomies, if present place, Ostomy referral order under : No     Nurse 1 eSignature: Electronically signed by Jihan Self RN on 8/21/23 at 7:44 AM EDT    **SHARE this note so that the co-signing nurse can place an eSignature**    Nurse 2 eSignature: {Esignature:[id number]}

## 2023-08-21 NOTE — PROGRESS NOTES
HS assessment completed. No complaints of pain or discomfort voiced. No signs of symptoms of distress noted. Patient tolerated night medications well. Respirations easy and even. Bed in lowest position, bed alarm in place and functioning properly, bed rails x2 up,  Call light within reach. Pt refusing to discuss home meds at this time. Bedside Mobility Assessment Tool (BMAT):     Assessment Level 1- Sit and Shake    1. From a semi-reclined position, ask patient to sit up and rotate to a seated position at the side of the bed. Can use the bedrail. 2. Ask patient to reach out and grab your hand and shake making sure patient reaches across his/her midline. Pass- Patient is able to come to a seated position, maintain core strength. Maintains seated balance while reaching across midline. Move on to Assessment Level 2. Assessment Level 2- Stretch and Point   1. With patient in seated position at the side of the bed, have patient place both feet on the floor (or stool) with knees no higher than hips. 2. Ask patient to stretch one leg and straighten the knee, then bend the ankle/flex and point the toes. If appropriate, repeat with the other leg. Pass- Patient is able to demonstrate appropriate quad strength on intended weight bearing limb(s). Move onto Assessment Level 3. Assessment Level 3- Stand   1. Ask patient to elevate off the bed or chair (seated to standing) using an assistive device (cane, bedrail). 2. Patient should be able to raise buttocks off be and hold for a count of five. May repeat once. Pass- Patient maintains standing stability for at least 5 seconds, proceed to assessment level 4. Assessment Level 4- Walk   1. Ask patient to march in place at bedside. 2. Then ask patient to advance step and return each foot. Some medical conditions may render a patient from stepping backwards, use your best clinical judgement.    Pass- Patient demonstrates balance while shifting weight and

## 2023-08-22 VITALS
OXYGEN SATURATION: 100 % | DIASTOLIC BLOOD PRESSURE: 79 MMHG | HEART RATE: 80 BPM | HEIGHT: 71 IN | WEIGHT: 187 LBS | TEMPERATURE: 97.6 F | BODY MASS INDEX: 26.18 KG/M2 | SYSTOLIC BLOOD PRESSURE: 112 MMHG | RESPIRATION RATE: 16 BRPM

## 2023-08-22 PROCEDURE — 6370000000 HC RX 637 (ALT 250 FOR IP): Performed by: INTERNAL MEDICINE

## 2023-08-22 PROCEDURE — 99238 HOSP IP/OBS DSCHRG MGMT 30/<: CPT | Performed by: INTERNAL MEDICINE

## 2023-08-22 PROCEDURE — 2580000003 HC RX 258: Performed by: STUDENT IN AN ORGANIZED HEALTH CARE EDUCATION/TRAINING PROGRAM

## 2023-08-22 PROCEDURE — 6370000000 HC RX 637 (ALT 250 FOR IP): Performed by: STUDENT IN AN ORGANIZED HEALTH CARE EDUCATION/TRAINING PROGRAM

## 2023-08-22 PROCEDURE — 6370000000 HC RX 637 (ALT 250 FOR IP)

## 2023-08-22 RX ORDER — METOPROLOL SUCCINATE 25 MG/1
12.5 TABLET, EXTENDED RELEASE ORAL DAILY
Qty: 30 TABLET | Refills: 0
Start: 2023-08-22

## 2023-08-22 RX ORDER — ALBUTEROL SULFATE 90 UG/1
2 AEROSOL, METERED RESPIRATORY (INHALATION) 4 TIMES DAILY PRN
Qty: 18 G | Refills: 0 | Status: SHIPPED | OUTPATIENT
Start: 2023-08-22

## 2023-08-22 RX ORDER — CIPROFLOXACIN 500 MG/1
500 TABLET, FILM COATED ORAL DAILY
Qty: 30 TABLET | Refills: 0 | Status: SHIPPED | OUTPATIENT
Start: 2023-08-22 | End: 2023-09-21

## 2023-08-22 RX ADMIN — TORSEMIDE 20 MG: 20 TABLET ORAL at 08:39

## 2023-08-22 RX ADMIN — Medication 10 ML: at 08:41

## 2023-08-22 RX ADMIN — METOPROLOL SUCCINATE 12.5 MG: 25 TABLET, EXTENDED RELEASE ORAL at 08:40

## 2023-08-22 RX ADMIN — PANTOPRAZOLE SODIUM 40 MG: 40 TABLET, DELAYED RELEASE ORAL at 05:29

## 2023-08-22 RX ADMIN — FERROUS SULFATE TAB 325 MG (65 MG ELEMENTAL FE) 325 MG: 325 (65 FE) TAB at 08:40

## 2023-08-22 RX ADMIN — APIXABAN 5 MG: 5 TABLET, FILM COATED ORAL at 08:40

## 2023-08-22 RX ADMIN — SPIRONOLACTONE 25 MG: 25 TABLET ORAL at 09:22

## 2023-08-22 RX ADMIN — SUCRALFATE 1 G: 1 TABLET ORAL at 08:40

## 2023-08-22 RX ADMIN — CLOPIDOGREL BISULFATE 75 MG: 75 TABLET ORAL at 08:40

## 2023-08-22 RX ADMIN — BUSPIRONE HYDROCHLORIDE 5 MG: 5 TABLET ORAL at 08:40

## 2023-08-22 NOTE — PLAN OF CARE
Problem: Discharge Planning  Goal: Discharge to home or other facility with appropriate resources  8/22/2023 0022 by Charlie Chao RN  Outcome: Progressing  8/21/2023 1044 by Melchor Daley RN  Outcome: Progressing     Problem: Safety - Adult  Goal: Free from fall injury  8/22/2023 0022 by Charlie Chao RN  Outcome: Progressing  8/21/2023 1044 by Melchor Daley RN  Outcome: Progressing     Problem: Pain  Goal: Verbalizes/displays adequate comfort level or baseline comfort level  8/22/2023 0022 by Charlie Chao RN  Outcome: Progressing  8/21/2023 1044 by Melchor Daley RN  Outcome: Progressing     Problem: Chronic Conditions and Co-morbidities  Goal: Patient's chronic conditions and co-morbidity symptoms are monitored and maintained or improved  8/22/2023 0022 by Charlie Chao RN  Outcome: Progressing  8/21/2023 1044 by Melchor Daley RN  Outcome: Progressing

## 2023-08-22 NOTE — CARE COORDINATION
DISCHARGE ORDER  Date/Time 2023 12:37 PM  Completed by: Kristina Morales RN, Case Management    Patient Name: John Gruber      : 1970  Admitting Diagnosis: Abdominal pain [R10.9]  Generalized abdominal pain [R10.84]  Cirrhosis of liver with ascites, unspecified hepatic cirrhosis type ([address]) [K74.60, R18.8]      Admit order Date and Status: IP 2023  (verify MD's last order for status of admission)      Noted discharge order. If applicable PT/OT recommendation at Discharge: NA  DME recommendation by PT/OT: NA  Confirmed discharge plan   Discharge Plan: Pt discharged with written instructions to call [phone] to schedule paracentesis on AVS. No HHC or DME needs. Date of Last IMM Given: 2023    Has Home O2 in place on admit:  No  Informed of need to bring portable home O2 tank on day of discharge for nursing to connect prior to leaving:   No  Verbalized agreement/Understanding:   No  Pt is being d/c'd to home today. Pt's O2 sats are 100% on RA. Discharge timeout done with [facility]. All discharge needs and concerns addressed.

## 2023-08-22 NOTE — DISCHARGE SUMMARY
Name:  Patricia Juárez  Room:  2406/3219-75  MRN:    [id number]    IM Discharge Summary    Discharging Physician:  Vincenzo Zayas MD    Admit: 8/20/2023    Discharge:  8/22/2023    PCP      No primary care provider on file.     Diagnoses and hospital course  this Admission        #Chronic Abdominal pain   #Decompensated alcoholic cirrhosis with ascites   #Elevated LFTs   #Hepatitis C   -multiple previous admissions for same, unable to f/u with GI in interim  - likely chronic pain issues , he was just dcd yesterday from the hospital   And several hospital admissions at [facility],  E noted recently      - no acute abdominal pathology noted except for mild to moderate ascites  - ascitic fluid sampling with no signs of infection ( gram stain neg, cx pending)  - avoid  narcotics - this is likely one of the reasons for recurrent admissions      -last paracentesis 8/17 and over 2 L removed, repeat para this admission with 1.8 L removed - not significant ascites noted   -continue torsemide and aldactone  -prn lactulose to maintain 2-3 BM/day  -low salt/fluid restricted diet  -still needs EGD for variceal screening as outpatient   -per chart review, patient previously on ciprofloxacin ppx , resume at dc         #Chronic combined CHF  #Cardiomyopathy s/p AICD  -last echo EF 20-25%  -on demedex, metoprolol, plavix, eliquis  - continue to hold entresto at ID for low BP  -no SGLT2 at this time  -low Na diet, strict I&Os, daily weights  -follow up with cardiology as outpatient     #Elevated troponin  #CAD, s/p stents  -troponin's elevated but flat  -EKG without acute ischemic change  -on statin, plavix, BB  -follow up with cardiology as outpatient     #Drug seeking behavior  -avoiding IV opiates at this time   - I would recommend avoiding IV narcotics even by ER unless absolutely indicated as this is encouraging his recurrent ER visits        #PAD, hx R BKA   -s/p multiple angiograms, most recently balloon angioplasty of L PTA on

## 2023-08-22 NOTE — PROGRESS NOTES
Prescriptions meds to bed given, and discharge instructions given. Pt verbalized understanding/ denies questions/ needs at this time. Patient will let us know when his ride is here.

## 2023-08-23 ENCOUNTER — HOSPITAL ENCOUNTER (EMERGENCY)
Age: 53
Discharge: HOME | End: 2023-08-23
Attending: STUDENT IN AN ORGANIZED HEALTH CARE EDUCATION/TRAINING PROGRAM
Payer: MEDICARE

## 2023-08-23 ENCOUNTER — TELEPHONE (OUTPATIENT)
Dept: OTHER | Facility: CLINIC | Age: 53
End: 2023-08-23

## 2023-08-23 ENCOUNTER — APPOINTMENT (OUTPATIENT)
Dept: GENERAL RADIOLOGY | Age: 53
End: 2023-08-23
Payer: MEDICARE

## 2023-08-23 ENCOUNTER — HOSPITAL ENCOUNTER (EMERGENCY)
Age: 53
Discharge: HOME | End: 2023-08-24
Attending: STUDENT IN AN ORGANIZED HEALTH CARE EDUCATION/TRAINING PROGRAM
Payer: MEDICARE

## 2023-08-23 VITALS
HEART RATE: 74 BPM | OXYGEN SATURATION: 98 % | RESPIRATION RATE: 18 BRPM | SYSTOLIC BLOOD PRESSURE: 124 MMHG | HEIGHT: 71 IN | DIASTOLIC BLOOD PRESSURE: 78 MMHG | BODY MASS INDEX: 26.18 KG/M2 | TEMPERATURE: 98.3 F | WEIGHT: 187 LBS

## 2023-08-23 DIAGNOSIS — G89.29 OTHER CHRONIC PAIN: ICD-10-CM

## 2023-08-23 DIAGNOSIS — G89.29 CHRONIC EPIGASTRIC PAIN: ICD-10-CM

## 2023-08-23 DIAGNOSIS — R10.13 ABDOMINAL PAIN, EPIGASTRIC: Primary | ICD-10-CM

## 2023-08-23 DIAGNOSIS — R10.13 CHRONIC EPIGASTRIC PAIN: ICD-10-CM

## 2023-08-23 DIAGNOSIS — R07.9 CHEST PAIN, UNSPECIFIED TYPE: Primary | ICD-10-CM

## 2023-08-23 LAB
ALBUMIN SERPL-MCNC: 3.2 G/DL (ref 3.4–5)
ALBUMIN SERPL-MCNC: 3.7 G/DL (ref 3.4–5)
ALBUMIN/GLOB SERPL: 0.8 {RATIO} (ref 1.1–2.2)
ALBUMIN/GLOB SERPL: 0.8 {RATIO} (ref 1.1–2.2)
ALP SERPL-CCNC: 113 U/L (ref 40–129)
ALP SERPL-CCNC: 161 U/L (ref 40–129)
ALT SERPL-CCNC: 83 U/L (ref 10–40)
ALT SERPL-CCNC: 93 U/L (ref 10–40)
ANION GAP SERPL CALCULATED.3IONS-SCNC: 12 MMOL/L (ref 3–16)
ANION GAP SERPL CALCULATED.3IONS-SCNC: 12 MMOL/L (ref 3–16)
AST SERPL-CCNC: 175 U/L (ref 15–37)
AST SERPL-CCNC: 227 U/L (ref 15–37)
BACTERIA BLD CULT ORG #2: NORMAL
BACTERIA BLD CULT: NORMAL
BASE EXCESS BLDV CALC-SCNC: -0.5 MMOL/L (ref -3–3)
BASOPHILS # BLD: 0.1 K/UL (ref 0–0.2)
BASOPHILS # BLD: 0.1 K/UL (ref 0–0.2)
BASOPHILS NFR BLD: 1.8 %
BASOPHILS NFR BLD: 2.5 %
BILIRUB SERPL-MCNC: 0.6 MG/DL (ref 0–1)
BILIRUB SERPL-MCNC: 0.7 MG/DL (ref 0–1)
BUN SERPL-MCNC: 17 MG/DL (ref 7–20)
BUN SERPL-MCNC: 22 MG/DL (ref 7–20)
CALCIUM SERPL-MCNC: 8.7 MG/DL (ref 8.3–10.6)
CALCIUM SERPL-MCNC: 9 MG/DL (ref 8.3–10.6)
CHLORIDE SERPL-SCNC: 100 MMOL/L (ref 99–110)
CHLORIDE SERPL-SCNC: 99 MMOL/L (ref 99–110)
CO2 BLDV-SCNC: 25 MMOL/L
CO2 SERPL-SCNC: 21 MMOL/L (ref 21–32)
CO2 SERPL-SCNC: 23 MMOL/L (ref 21–32)
COHGB MFR BLDV: 3.2 % (ref 0–1.5)
CREAT SERPL-MCNC: 0.9 MG/DL (ref 0.9–1.3)
CREAT SERPL-MCNC: 1 MG/DL (ref 0.9–1.3)
DEPRECATED RDW RBC AUTO: 20.4 % (ref 12.4–15.4)
DEPRECATED RDW RBC AUTO: 20.5 % (ref 12.4–15.4)
EKG ATRIAL RATE: 92 BPM
EKG DIAGNOSIS: NORMAL
EKG P AXIS: 76 DEGREES
EKG P-R INTERVAL: 226 MS
EKG Q-T INTERVAL: 406 MS
EKG QRS DURATION: 124 MS
EKG QTC CALCULATION (BAZETT): 502 MS
EKG R AXIS: -81 DEGREES
EKG T AXIS: 93 DEGREES
EKG VENTRICULAR RATE: 92 BPM
EOSINOPHIL # BLD: 0.1 K/UL (ref 0–0.6)
EOSINOPHIL # BLD: 0.1 K/UL (ref 0–0.6)
EOSINOPHIL NFR BLD: 1.4 %
EOSINOPHIL NFR BLD: 1.5 %
ETHANOLAMINE SERPL-MCNC: NORMAL MG/DL (ref 0–0.08)
GFR SERPLBLD CREATININE-BSD FMLA CKD-EPI: >60 ML/MIN/{1.73_M2}
GFR SERPLBLD CREATININE-BSD FMLA CKD-EPI: >60 ML/MIN/{1.73_M2}
GLUCOSE SERPL-MCNC: 178 MG/DL (ref 70–99)
GLUCOSE SERPL-MCNC: 206 MG/DL (ref 70–99)
HCO3 BLDV-SCNC: 23.8 MMOL/L (ref 23–29)
HCT VFR BLD AUTO: 34.5 % (ref 40.5–52.5)
HCT VFR BLD AUTO: 39 % (ref 40.5–52.5)
HGB BLD-MCNC: 11.5 G/DL (ref 13.5–17.5)
HGB BLD-MCNC: 12.8 G/DL (ref 13.5–17.5)
INR PPP: 1.52 (ref 0.84–1.16)
LIPASE SERPL-CCNC: 10 U/L (ref 13–60)
LIPASE SERPL-CCNC: 10 U/L (ref 13–60)
LYMPHOCYTES # BLD: 0.9 K/UL (ref 1–5.1)
LYMPHOCYTES # BLD: 1.1 K/UL (ref 1–5.1)
LYMPHOCYTES NFR BLD: 16.3 %
LYMPHOCYTES NFR BLD: 19.4 %
MCH RBC QN AUTO: 25.3 PG (ref 26–34)
MCH RBC QN AUTO: 25.5 PG (ref 26–34)
MCHC RBC AUTO-ENTMCNC: 32.8 G/DL (ref 31–36)
MCHC RBC AUTO-ENTMCNC: 33.3 G/DL (ref 31–36)
MCV RBC AUTO: 76.6 FL (ref 80–100)
MCV RBC AUTO: 77.1 FL (ref 80–100)
METHGB MFR BLDV: 0.3 %
MONOCYTES # BLD: 0.6 K/UL (ref 0–1.3)
MONOCYTES # BLD: 0.7 K/UL (ref 0–1.3)
MONOCYTES NFR BLD: 11.6 %
MONOCYTES NFR BLD: 11.8 %
NEUTROPHILS # BLD: 3.1 K/UL (ref 1.7–7.7)
NEUTROPHILS # BLD: 4.4 K/UL (ref 1.7–7.7)
NEUTROPHILS NFR BLD: 64.8 %
NEUTROPHILS NFR BLD: 68.9 %
NT-PROBNP SERPL-MCNC: 4398 PG/ML (ref 0–124)
O2 CT VFR BLDV CALC: 15 VOL %
O2 THERAPY: ABNORMAL
PCO2 BLDV: 37.8 MMHG (ref 40–50)
PH BLDV: 7.42 [PH] (ref 7.35–7.45)
PLATELET # BLD AUTO: 179 K/UL (ref 135–450)
PLATELET # BLD AUTO: 210 K/UL (ref 135–450)
PMV BLD AUTO: 7.8 FL (ref 5–10.5)
PMV BLD AUTO: 8 FL (ref 5–10.5)
PO2 BLDV: 61.4 MMHG (ref 25–40)
POTASSIUM SERPL-SCNC: 4.2 MMOL/L (ref 3.5–5.1)
POTASSIUM SERPL-SCNC: 4.5 MMOL/L (ref 3.5–5.1)
PROT SERPL-MCNC: 7 G/DL (ref 6.4–8.2)
PROT SERPL-MCNC: 8.3 G/DL (ref 6.4–8.2)
PROTHROMBIN TIME: 18.2 SEC (ref 11.5–14.8)
RBC # BLD AUTO: 4.5 M/UL (ref 4.2–5.9)
RBC # BLD AUTO: 5.06 M/UL (ref 4.2–5.9)
SAO2 % BLDV: 92 %
SODIUM SERPL-SCNC: 133 MMOL/L (ref 136–145)
SODIUM SERPL-SCNC: 134 MMOL/L (ref 136–145)
TROPONIN, HIGH SENSITIVITY: 39 NG/L (ref 0–22)
TROPONIN, HIGH SENSITIVITY: 46 NG/L (ref 0–22)
WBC # BLD AUTO: 4.9 K/UL (ref 4–11)
WBC # BLD AUTO: 6.5 K/UL (ref 4–11)

## 2023-08-23 PROCEDURE — 71045 X-RAY EXAM CHEST 1 VIEW: CPT

## 2023-08-23 PROCEDURE — 85610 PROTHROMBIN TIME: CPT

## 2023-08-23 PROCEDURE — 36415 COLL VENOUS BLD VENIPUNCTURE: CPT

## 2023-08-23 PROCEDURE — 99285 EMERGENCY DEPT VISIT HI MDM: CPT

## 2023-08-23 PROCEDURE — 96375 TX/PRO/DX INJ NEW DRUG ADDON: CPT

## 2023-08-23 PROCEDURE — 93005 ELECTROCARDIOGRAM TRACING: CPT | Performed by: STUDENT IN AN ORGANIZED HEALTH CARE EDUCATION/TRAINING PROGRAM

## 2023-08-23 PROCEDURE — 96374 THER/PROPH/DIAG INJ IV PUSH: CPT

## 2023-08-23 PROCEDURE — 85025 COMPLETE CBC W/AUTO DIFF WBC: CPT

## 2023-08-23 PROCEDURE — 83690 ASSAY OF LIPASE: CPT

## 2023-08-23 PROCEDURE — 82803 BLOOD GASES ANY COMBINATION: CPT

## 2023-08-23 PROCEDURE — 84484 ASSAY OF TROPONIN QUANT: CPT

## 2023-08-23 PROCEDURE — 82077 ASSAY SPEC XCP UR&BREATH IA: CPT

## 2023-08-23 PROCEDURE — 80053 COMPREHEN METABOLIC PANEL: CPT

## 2023-08-23 PROCEDURE — 6370000000 HC RX 637 (ALT 250 FOR IP): Performed by: STUDENT IN AN ORGANIZED HEALTH CARE EDUCATION/TRAINING PROGRAM

## 2023-08-23 PROCEDURE — 6360000002 HC RX W HCPCS: Performed by: STUDENT IN AN ORGANIZED HEALTH CARE EDUCATION/TRAINING PROGRAM

## 2023-08-23 PROCEDURE — 6360000002 HC RX W HCPCS: Performed by: EMERGENCY MEDICINE

## 2023-08-23 PROCEDURE — 93010 ELECTROCARDIOGRAM REPORT: CPT | Performed by: INTERNAL MEDICINE

## 2023-08-23 PROCEDURE — 83880 ASSAY OF NATRIURETIC PEPTIDE: CPT

## 2023-08-23 RX ORDER — HYDROXYZINE 50 MG/1
25 TABLET, FILM COATED ORAL ONCE
Status: COMPLETED | OUTPATIENT
Start: 2023-08-23 | End: 2023-08-23

## 2023-08-23 RX ORDER — FAMOTIDINE 10 MG/ML
40 INJECTION, SOLUTION INTRAVENOUS ONCE
Status: DISCONTINUED | OUTPATIENT
Start: 2023-08-23 | End: 2023-08-23 | Stop reason: HOSPADM

## 2023-08-23 RX ORDER — DIPHENHYDRAMINE HYDROCHLORIDE 50 MG/ML
25 INJECTION INTRAMUSCULAR; INTRAVENOUS ONCE
Status: COMPLETED | OUTPATIENT
Start: 2023-08-23 | End: 2023-08-23

## 2023-08-23 RX ORDER — ONDANSETRON 2 MG/ML
4 INJECTION INTRAMUSCULAR; INTRAVENOUS ONCE
Status: COMPLETED | OUTPATIENT
Start: 2023-08-23 | End: 2023-08-23

## 2023-08-23 RX ORDER — KETOROLAC TROMETHAMINE 30 MG/ML
15 INJECTION, SOLUTION INTRAMUSCULAR; INTRAVENOUS ONCE
Status: COMPLETED | OUTPATIENT
Start: 2023-08-23 | End: 2023-08-23

## 2023-08-23 RX ORDER — DROPERIDOL 2.5 MG/ML
2.5 INJECTION, SOLUTION INTRAMUSCULAR; INTRAVENOUS ONCE
Status: COMPLETED | OUTPATIENT
Start: 2023-08-23 | End: 2023-08-23

## 2023-08-23 RX ORDER — IPRATROPIUM BROMIDE AND ALBUTEROL SULFATE 2.5; .5 MG/3ML; MG/3ML
1 SOLUTION RESPIRATORY (INHALATION) ONCE
Status: COMPLETED | OUTPATIENT
Start: 2023-08-23 | End: 2023-08-23

## 2023-08-23 RX ADMIN — Medication: at 23:25

## 2023-08-23 RX ADMIN — HYDROMORPHONE HYDROCHLORIDE 1 MG: 1 INJECTION, SOLUTION INTRAMUSCULAR; INTRAVENOUS; SUBCUTANEOUS at 07:38

## 2023-08-23 RX ADMIN — IPRATROPIUM BROMIDE AND ALBUTEROL SULFATE 1 DOSE: 2.5; .5 SOLUTION RESPIRATORY (INHALATION) at 22:40

## 2023-08-23 RX ADMIN — HYDROXYZINE HYDROCHLORIDE 25 MG: 50 TABLET, FILM COATED ORAL at 22:47

## 2023-08-23 RX ADMIN — ONDANSETRON 4 MG: 2 INJECTION INTRAMUSCULAR; INTRAVENOUS at 07:29

## 2023-08-23 RX ADMIN — KETOROLAC TROMETHAMINE 15 MG: 30 INJECTION, SOLUTION INTRAMUSCULAR; INTRAVENOUS at 22:42

## 2023-08-23 RX ADMIN — DIPHENHYDRAMINE HYDROCHLORIDE 25 MG: 50 INJECTION INTRAMUSCULAR; INTRAVENOUS at 23:26

## 2023-08-23 RX ADMIN — DROPERIDOL 2.5 MG: 2.5 INJECTION, SOLUTION INTRAMUSCULAR; INTRAVENOUS at 23:28

## 2023-08-23 ASSESSMENT — PAIN - FUNCTIONAL ASSESSMENT
PAIN_FUNCTIONAL_ASSESSMENT: 0-10
PAIN_FUNCTIONAL_ASSESSMENT: 0-10
PAIN_FUNCTIONAL_ASSESSMENT: NONE - DENIES PAIN

## 2023-08-23 ASSESSMENT — PAIN SCALES - GENERAL
PAINLEVEL_OUTOF10: 10

## 2023-08-23 ASSESSMENT — LIFESTYLE VARIABLES
HOW MANY STANDARD DRINKS CONTAINING ALCOHOL DO YOU HAVE ON A TYPICAL DAY: 1 OR 2
HOW OFTEN DO YOU HAVE A DRINK CONTAINING ALCOHOL: MONTHLY OR LESS

## 2023-08-23 ASSESSMENT — PAIN DESCRIPTION - DESCRIPTORS: DESCRIPTORS: PRESSURE

## 2023-08-23 ASSESSMENT — PAIN DESCRIPTION - PAIN TYPE: TYPE: ACUTE PAIN

## 2023-08-23 ASSESSMENT — PAIN DESCRIPTION - ONSET: ONSET: GRADUAL

## 2023-08-23 ASSESSMENT — PAIN DESCRIPTION - LOCATION: LOCATION: ABDOMEN

## 2023-08-23 ASSESSMENT — PAIN DESCRIPTION - FREQUENCY: FREQUENCY: CONTINUOUS

## 2023-08-23 NOTE — ED PROVIDER NOTES
ED attending note:  9:17 AM    Patient did receive some intravenous Dilaudid  Patient was for what I was assuming was acute epigastric pain possibly gastritis from his alcohol consumption. But he refuses to take that or Pepcid he only wants narcotics I did give him 1 dose to help him with his presumptive pain. I did get relief in fact fell asleep repeat abdominal exam reveals no tenderness he is discharged home with follow-up as he was instructed is vies not to drink alcohol again.    All of his labs were reviewed and are felt to be baseline including his troponin he has no new findings and he remains hemodynamically stable with stable vital signs      Signed MD Erwin Weldon MD  08/23/23 7849

## 2023-08-23 NOTE — ED PROVIDER NOTES
[address] [facility] [place]         Pt Name: Nabil Smith   MRN: [id number]   [address] [facility] [place] 1970   Date of evaluation: 8/23/2023   Provider: Monse Jiang MD   PCP: No primary care provider on file. Note Started: 6:58 AM EDT 8/23/23       Chief Complaint     Chest Pain (Patient comes to ER with complaints of chest pain that started after patient drank 2 beers. He reports having heartburn. Patient reports he was sober for 2 years and thought it wouldn't be a big deal but states \"it is\".), Shortness of Breath, and Rash (Patient reports rash to right leg near amputation occurred after drinking beer also. )      History of Present Illness     Nabil Smith is a 52 y.o. male who presents with chest and abdominal pain after drinking alcohol. The patient is a complex medical history as below including heart failure with a AICD as well as a history of COPD and liver disease with ascites requiring frequent paracenteses. Patient was discharged yesterday after admission for chronic abdominal pain in the context of decompensated alcohol cirrhosis with ascites. He states upon returning home after several years of sobriety he decided drink 2 tall beers. He did not feel ill immediately after that however several hours later developed epigastric Santi pain and chest pain and this prompted him to seek evaluation emerged from. He denies any shortness of breath no nausea or vomiting no diaphoresis or syncope. His most recent paracentesis was yesterday. Please note the patient speaks  was utilized throughout the encounter. I have reviewed the nursing notes and agree unless otherwise noted. Review of Systems     Positives and pertinent negatives as per HPI.     Past Medical, Surgical, Family, and Social History     He has a past medical history of CHF (congestive heart failure) ([address]) and COPD (chronic obstructive pulmonary disease)

## 2023-08-23 NOTE — DISCHARGE INSTRUCTIONS
Continue your present medications and follow-up as you have been instructed to do  Do not drink alcohol

## 2023-08-23 NOTE — ED NOTES
Aftercare instructions reviewed with pt through  [facility] via tele health session [id number]. Pt displayed understanding.       Angy Gordon RN  08/23/23 7983

## 2023-08-23 NOTE — TELEPHONE ENCOUNTER
Writer contacted ED provider to inform of 30 day readmission risk. Writer's attempt to contact ED provider was unsuccessful.        Call Back: If you need to call back to inform of disposition you can contact me at [phone]

## 2023-08-23 NOTE — ED NOTES
Pt refusing repeat trop, states \"I don't need it. You guys don't know anything. \" MD made aware     Jaida Santiago RN  08/23/23 6623

## 2023-08-24 ENCOUNTER — FOLLOWUP TELEPHONE ENCOUNTER (OUTPATIENT)
Dept: ADMINISTRATIVE | Age: 53
End: 2023-08-24

## 2023-08-24 ENCOUNTER — APPOINTMENT (OUTPATIENT)
Dept: GENERAL RADIOLOGY | Age: 53
End: 2023-08-24
Payer: MEDICARE

## 2023-08-24 ENCOUNTER — APPOINTMENT (OUTPATIENT)
Dept: CT IMAGING | Age: 53
End: 2023-08-24
Payer: MEDICARE

## 2023-08-24 ENCOUNTER — HOSPITAL ENCOUNTER (OUTPATIENT)
Age: 53
Setting detail: OBSERVATION
Discharge: LEFT BEFORE DISCHARGE | End: 2023-08-26
Attending: STUDENT IN AN ORGANIZED HEALTH CARE EDUCATION/TRAINING PROGRAM | Admitting: INTERNAL MEDICINE
Payer: MEDICARE

## 2023-08-24 VITALS
DIASTOLIC BLOOD PRESSURE: 92 MMHG | SYSTOLIC BLOOD PRESSURE: 127 MMHG | OXYGEN SATURATION: 100 % | TEMPERATURE: 98.6 F | HEART RATE: 108 BPM | RESPIRATION RATE: 18 BRPM

## 2023-08-24 DIAGNOSIS — R06.00 DYSPNEA, UNSPECIFIED TYPE: Primary | ICD-10-CM

## 2023-08-24 DIAGNOSIS — Z78.9 UNABLE TO CARE FOR SELF: ICD-10-CM

## 2023-08-24 DIAGNOSIS — R73.9 HYPERGLYCEMIA: ICD-10-CM

## 2023-08-24 DIAGNOSIS — I50.9 ACUTE ON CHRONIC CONGESTIVE HEART FAILURE, UNSPECIFIED HEART FAILURE TYPE (HCC): ICD-10-CM

## 2023-08-24 PROBLEM — E11.9 TYPE 2 DIABETES MELLITUS WITHOUT COMPLICATION, WITHOUT LONG-TERM CURRENT USE OF INSULIN (HCC): Status: ACTIVE | Noted: 2023-08-24

## 2023-08-24 PROBLEM — I25.10 CORONARY ARTERY DISEASE WITHOUT ANGINA PECTORIS: Status: ACTIVE | Noted: 2023-08-24

## 2023-08-24 LAB
ALBUMIN SERPL-MCNC: 3.8 G/DL (ref 3.4–5)
ALBUMIN/GLOB SERPL: 0.9 {RATIO} (ref 1.1–2.2)
ALP SERPL-CCNC: 112 U/L (ref 40–129)
ALT SERPL-CCNC: 101 U/L (ref 10–40)
AMMONIA PLAS-SCNC: 17 UMOL/L (ref 16–60)
AMPHETAMINES UR QL SCN>1000 NG/ML: NORMAL
ANION GAP SERPL CALCULATED.3IONS-SCNC: 11 MMOL/L (ref 3–16)
AST SERPL-CCNC: 205 U/L (ref 15–37)
BACTERIA FLD AEROBE CULT: NORMAL
BARBITURATES UR QL SCN>200 NG/ML: NORMAL
BASOPHILS # BLD: 0.1 K/UL (ref 0–0.2)
BASOPHILS NFR BLD: 0.9 %
BENZODIAZ UR QL SCN>200 NG/ML: NORMAL
BILIRUB SERPL-MCNC: 0.9 MG/DL (ref 0–1)
BILIRUB UR QL STRIP.AUTO: NEGATIVE
BUN SERPL-MCNC: 19 MG/DL (ref 7–20)
CALCIUM SERPL-MCNC: 9.1 MG/DL (ref 8.3–10.6)
CANNABINOIDS UR QL SCN>50 NG/ML: NORMAL
CHLORIDE SERPL-SCNC: 98 MMOL/L (ref 99–110)
CLARITY UR: CLEAR
CO2 SERPL-SCNC: 25 MMOL/L (ref 21–32)
COCAINE UR QL SCN: NORMAL
COLOR UR: YELLOW
CREAT SERPL-MCNC: 0.9 MG/DL (ref 0.9–1.3)
DEPRECATED RDW RBC AUTO: 20.7 % (ref 12.4–15.4)
DRUG SCREEN COMMENT UR-IMP: NORMAL
EKG ATRIAL RATE: 104 BPM
EKG ATRIAL RATE: 58 BPM
EKG ATRIAL RATE: 95 BPM
EKG DIAGNOSIS: NORMAL
EKG P AXIS: -8 DEGREES
EKG P AXIS: 36 DEGREES
EKG P-R INTERVAL: 204 MS
EKG P-R INTERVAL: 212 MS
EKG Q-T INTERVAL: 396 MS
EKG Q-T INTERVAL: 460 MS
EKG Q-T INTERVAL: 472 MS
EKG QRS DURATION: 116 MS
EKG QRS DURATION: 124 MS
EKG QRS DURATION: 156 MS
EKG QTC CALCULATION (BAZETT): 497 MS
EKG QTC CALCULATION (BAZETT): 604 MS
EKG QTC CALCULATION (BAZETT): 635 MS
EKG R AXIS: 250 DEGREES
EKG R AXIS: 260 DEGREES
EKG R AXIS: 262 DEGREES
EKG T AXIS: 63 DEGREES
EKG T AXIS: 69 DEGREES
EKG T AXIS: 87 DEGREES
EKG VENTRICULAR RATE: 104 BPM
EKG VENTRICULAR RATE: 109 BPM
EKG VENTRICULAR RATE: 95 BPM
EOSINOPHIL # BLD: 0 K/UL (ref 0–0.6)
EOSINOPHIL NFR BLD: 0.5 %
FENTANYL SCREEN, URINE: NORMAL
GFR SERPLBLD CREATININE-BSD FMLA CKD-EPI: >60 ML/MIN/{1.73_M2}
GLUCOSE BLD-MCNC: 169 MG/DL (ref 70–99)
GLUCOSE SERPL-MCNC: 233 MG/DL (ref 70–99)
GLUCOSE UR STRIP.AUTO-MCNC: NEGATIVE MG/DL
GRAM STAIN RESULT: NORMAL
HCT VFR BLD AUTO: 38.8 % (ref 40.5–52.5)
HGB BLD-MCNC: 12.8 G/DL (ref 13.5–17.5)
HGB UR QL STRIP.AUTO: NEGATIVE
KETONES UR STRIP.AUTO-MCNC: NEGATIVE MG/DL
LEUKOCYTE ESTERASE UR QL STRIP.AUTO: NEGATIVE
LYMPHOCYTES # BLD: 0.6 K/UL (ref 1–5.1)
LYMPHOCYTES NFR BLD: 9 %
MCH RBC QN AUTO: 25.6 PG (ref 26–34)
MCHC RBC AUTO-ENTMCNC: 33 G/DL (ref 31–36)
MCV RBC AUTO: 77.7 FL (ref 80–100)
METHADONE UR QL SCN>300 NG/ML: NORMAL
MONOCYTES # BLD: 0.6 K/UL (ref 0–1.3)
MONOCYTES NFR BLD: 8.7 %
NEUTROPHILS # BLD: 5.1 K/UL (ref 1.7–7.7)
NEUTROPHILS NFR BLD: 80.9 %
NITRITE UR QL STRIP.AUTO: NEGATIVE
NT-PROBNP SERPL-MCNC: 7240 PG/ML (ref 0–124)
OPIATES UR QL SCN>300 NG/ML: NORMAL
OXYCODONE UR QL SCN: NORMAL
PCP UR QL SCN>25 NG/ML: NORMAL
PERFORMED ON: ABNORMAL
PH UR STRIP.AUTO: 7 [PH] (ref 5–8)
PH UR STRIP: 6 [PH]
PLATELET # BLD AUTO: 208 K/UL (ref 135–450)
PMV BLD AUTO: 7.8 FL (ref 5–10.5)
POTASSIUM SERPL-SCNC: 4 MMOL/L (ref 3.5–5.1)
PROT SERPL-MCNC: 8.2 G/DL (ref 6.4–8.2)
PROT UR STRIP.AUTO-MCNC: NEGATIVE MG/DL
RBC # BLD AUTO: 4.99 M/UL (ref 4.2–5.9)
SODIUM SERPL-SCNC: 134 MMOL/L (ref 136–145)
SP GR UR STRIP.AUTO: <=1.005 (ref 1–1.03)
TROPONIN, HIGH SENSITIVITY: 36 NG/L (ref 0–22)
TROPONIN, HIGH SENSITIVITY: 37 NG/L (ref 0–22)
TROPONIN, HIGH SENSITIVITY: 38 NG/L (ref 0–22)
UA COMPLETE W REFLEX CULTURE PNL UR: NORMAL
UA DIPSTICK W REFLEX MICRO PNL UR: NORMAL
URN SPEC COLLECT METH UR: NORMAL
UROBILINOGEN UR STRIP-ACNC: 0.2 E.U./DL
WBC # BLD AUTO: 6.3 K/UL (ref 4–11)

## 2023-08-24 PROCEDURE — 84484 ASSAY OF TROPONIN QUANT: CPT

## 2023-08-24 PROCEDURE — 97166 OT EVAL MOD COMPLEX 45 MIN: CPT

## 2023-08-24 PROCEDURE — G0378 HOSPITAL OBSERVATION PER HR: HCPCS

## 2023-08-24 PROCEDURE — 2500000003 HC RX 250 WO HCPCS: Performed by: STUDENT IN AN ORGANIZED HEALTH CARE EDUCATION/TRAINING PROGRAM

## 2023-08-24 PROCEDURE — 83036 HEMOGLOBIN GLYCOSYLATED A1C: CPT

## 2023-08-24 PROCEDURE — 71045 X-RAY EXAM CHEST 1 VIEW: CPT

## 2023-08-24 PROCEDURE — 93010 ELECTROCARDIOGRAM REPORT: CPT | Performed by: INTERNAL MEDICINE

## 2023-08-24 PROCEDURE — 85025 COMPLETE CBC W/AUTO DIFF WBC: CPT

## 2023-08-24 PROCEDURE — 36415 COLL VENOUS BLD VENIPUNCTURE: CPT

## 2023-08-24 PROCEDURE — 6360000002 HC RX W HCPCS: Performed by: STUDENT IN AN ORGANIZED HEALTH CARE EDUCATION/TRAINING PROGRAM

## 2023-08-24 PROCEDURE — 97530 THERAPEUTIC ACTIVITIES: CPT

## 2023-08-24 PROCEDURE — 83880 ASSAY OF NATRIURETIC PEPTIDE: CPT

## 2023-08-24 PROCEDURE — 81003 URINALYSIS AUTO W/O SCOPE: CPT

## 2023-08-24 PROCEDURE — 99285 EMERGENCY DEPT VISIT HI MDM: CPT

## 2023-08-24 PROCEDURE — 96374 THER/PROPH/DIAG INJ IV PUSH: CPT

## 2023-08-24 PROCEDURE — 2580000003 HC RX 258: Performed by: STUDENT IN AN ORGANIZED HEALTH CARE EDUCATION/TRAINING PROGRAM

## 2023-08-24 PROCEDURE — 70450 CT HEAD/BRAIN W/O DYE: CPT

## 2023-08-24 PROCEDURE — 6370000000 HC RX 637 (ALT 250 FOR IP)

## 2023-08-24 PROCEDURE — 82140 ASSAY OF AMMONIA: CPT

## 2023-08-24 PROCEDURE — 6370000000 HC RX 637 (ALT 250 FOR IP): Performed by: STUDENT IN AN ORGANIZED HEALTH CARE EDUCATION/TRAINING PROGRAM

## 2023-08-24 PROCEDURE — 93005 ELECTROCARDIOGRAM TRACING: CPT

## 2023-08-24 PROCEDURE — 97116 GAIT TRAINING THERAPY: CPT

## 2023-08-24 PROCEDURE — 96375 TX/PRO/DX INJ NEW DRUG ADDON: CPT

## 2023-08-24 PROCEDURE — 96372 THER/PROPH/DIAG INJ SC/IM: CPT

## 2023-08-24 PROCEDURE — 99223 1ST HOSP IP/OBS HIGH 75: CPT

## 2023-08-24 PROCEDURE — 80053 COMPREHEN METABOLIC PANEL: CPT

## 2023-08-24 PROCEDURE — 96376 TX/PRO/DX INJ SAME DRUG ADON: CPT

## 2023-08-24 PROCEDURE — 80307 DRUG TEST PRSMV CHEM ANLYZR: CPT

## 2023-08-24 PROCEDURE — 97162 PT EVAL MOD COMPLEX 30 MIN: CPT

## 2023-08-24 RX ORDER — CIPROFLOXACIN 500 MG/1
500 TABLET, FILM COATED ORAL DAILY
Status: DISCONTINUED | OUTPATIENT
Start: 2023-08-24 | End: 2023-08-26 | Stop reason: HOSPADM

## 2023-08-24 RX ORDER — LACTULOSE 10 G/15ML
20 SOLUTION ORAL EVERY 8 HOURS PRN
Status: DISCONTINUED | OUTPATIENT
Start: 2023-08-24 | End: 2023-08-26 | Stop reason: HOSPADM

## 2023-08-24 RX ORDER — TORSEMIDE 20 MG/1
20 TABLET ORAL DAILY
Status: DISCONTINUED | OUTPATIENT
Start: 2023-08-24 | End: 2023-08-26 | Stop reason: HOSPADM

## 2023-08-24 RX ORDER — ATORVASTATIN CALCIUM 40 MG/1
40 TABLET, FILM COATED ORAL NIGHTLY
Status: DISCONTINUED | OUTPATIENT
Start: 2023-08-24 | End: 2023-08-26 | Stop reason: HOSPADM

## 2023-08-24 RX ORDER — FAMOTIDINE 10 MG/ML
INJECTION, SOLUTION INTRAVENOUS
Status: COMPLETED
Start: 2023-08-24 | End: 2023-08-24

## 2023-08-24 RX ORDER — POTASSIUM CHLORIDE 7.45 MG/ML
10 INJECTION INTRAVENOUS PRN
Status: DISCONTINUED | OUTPATIENT
Start: 2023-08-24 | End: 2023-08-26 | Stop reason: HOSPADM

## 2023-08-24 RX ORDER — POLYETHYLENE GLYCOL 3350 17 G/17G
17 POWDER, FOR SOLUTION ORAL DAILY PRN
Status: DISCONTINUED | OUTPATIENT
Start: 2023-08-24 | End: 2023-08-26 | Stop reason: HOSPADM

## 2023-08-24 RX ORDER — ONDANSETRON 4 MG/1
4 TABLET, ORALLY DISINTEGRATING ORAL EVERY 8 HOURS PRN
Status: DISCONTINUED | OUTPATIENT
Start: 2023-08-24 | End: 2023-08-26 | Stop reason: HOSPADM

## 2023-08-24 RX ORDER — PANTOPRAZOLE SODIUM 40 MG/1
40 TABLET, DELAYED RELEASE ORAL 2 TIMES DAILY
Status: DISCONTINUED | OUTPATIENT
Start: 2023-08-24 | End: 2023-08-26 | Stop reason: HOSPADM

## 2023-08-24 RX ORDER — ACETAMINOPHEN 325 MG/1
650 TABLET ORAL EVERY 6 HOURS PRN
Status: DISCONTINUED | OUTPATIENT
Start: 2023-08-24 | End: 2023-08-26 | Stop reason: HOSPADM

## 2023-08-24 RX ORDER — SODIUM CHLORIDE 9 MG/ML
INJECTION, SOLUTION INTRAVENOUS PRN
Status: DISCONTINUED | OUTPATIENT
Start: 2023-08-24 | End: 2023-08-26 | Stop reason: HOSPADM

## 2023-08-24 RX ORDER — INSULIN GLARGINE 100 [IU]/ML
10 INJECTION, SOLUTION SUBCUTANEOUS NIGHTLY
Status: DISCONTINUED | OUTPATIENT
Start: 2023-08-24 | End: 2023-08-26 | Stop reason: HOSPADM

## 2023-08-24 RX ORDER — BUSPIRONE HYDROCHLORIDE 5 MG/1
5 TABLET ORAL 2 TIMES DAILY
Status: DISCONTINUED | OUTPATIENT
Start: 2023-08-24 | End: 2023-08-26 | Stop reason: HOSPADM

## 2023-08-24 RX ORDER — ONDANSETRON 2 MG/ML
4 INJECTION INTRAMUSCULAR; INTRAVENOUS EVERY 6 HOURS PRN
Status: DISCONTINUED | OUTPATIENT
Start: 2023-08-24 | End: 2023-08-26 | Stop reason: HOSPADM

## 2023-08-24 RX ORDER — DICYCLOMINE HYDROCHLORIDE 10 MG/1
10 CAPSULE ORAL EVERY 4 HOURS PRN
Status: DISCONTINUED | OUTPATIENT
Start: 2023-08-24 | End: 2023-08-26 | Stop reason: HOSPADM

## 2023-08-24 RX ORDER — OLANZAPINE 10 MG/1
5 INJECTION, POWDER, LYOPHILIZED, FOR SOLUTION INTRAMUSCULAR ONCE
Status: COMPLETED | OUTPATIENT
Start: 2023-08-24 | End: 2023-08-24

## 2023-08-24 RX ORDER — LORAZEPAM 2 MG/ML
1 INJECTION INTRAMUSCULAR ONCE
Status: COMPLETED | OUTPATIENT
Start: 2023-08-24 | End: 2023-08-24

## 2023-08-24 RX ORDER — SODIUM CHLORIDE 0.9 % (FLUSH) 0.9 %
5-40 SYRINGE (ML) INJECTION EVERY 12 HOURS SCHEDULED
Status: DISCONTINUED | OUTPATIENT
Start: 2023-08-24 | End: 2023-08-26 | Stop reason: HOSPADM

## 2023-08-24 RX ORDER — SUCRALFATE 1 G/1
1 TABLET ORAL 4 TIMES DAILY
Status: DISCONTINUED | OUTPATIENT
Start: 2023-08-24 | End: 2023-08-26 | Stop reason: HOSPADM

## 2023-08-24 RX ORDER — ACETAMINOPHEN 650 MG/1
650 SUPPOSITORY RECTAL EVERY 6 HOURS PRN
Status: DISCONTINUED | OUTPATIENT
Start: 2023-08-24 | End: 2023-08-26 | Stop reason: HOSPADM

## 2023-08-24 RX ORDER — CLOPIDOGREL BISULFATE 75 MG/1
75 TABLET ORAL DAILY
Status: DISCONTINUED | OUTPATIENT
Start: 2023-08-24 | End: 2023-08-26 | Stop reason: HOSPADM

## 2023-08-24 RX ORDER — IPRATROPIUM BROMIDE AND ALBUTEROL SULFATE 2.5; .5 MG/3ML; MG/3ML
SOLUTION RESPIRATORY (INHALATION)
Status: COMPLETED
Start: 2023-08-24 | End: 2023-08-24

## 2023-08-24 RX ORDER — FAMOTIDINE 20 MG/1
20 TABLET, FILM COATED ORAL 2 TIMES DAILY
Status: DISCONTINUED | OUTPATIENT
Start: 2023-08-24 | End: 2023-08-26 | Stop reason: HOSPADM

## 2023-08-24 RX ORDER — POTASSIUM CHLORIDE 20 MEQ/1
40 TABLET, EXTENDED RELEASE ORAL PRN
Status: DISCONTINUED | OUTPATIENT
Start: 2023-08-24 | End: 2023-08-26 | Stop reason: HOSPADM

## 2023-08-24 RX ORDER — FERROUS SULFATE 325(65) MG
325 TABLET ORAL 2 TIMES DAILY WITH MEALS
Status: DISCONTINUED | OUTPATIENT
Start: 2023-08-24 | End: 2023-08-26 | Stop reason: HOSPADM

## 2023-08-24 RX ORDER — IPRATROPIUM BROMIDE AND ALBUTEROL SULFATE 2.5; .5 MG/3ML; MG/3ML
1 SOLUTION RESPIRATORY (INHALATION) ONCE
Status: COMPLETED | OUTPATIENT
Start: 2023-08-24 | End: 2023-08-24

## 2023-08-24 RX ORDER — MAGNESIUM SULFATE 1 G/100ML
1000 INJECTION INTRAVENOUS PRN
Status: DISCONTINUED | OUTPATIENT
Start: 2023-08-24 | End: 2023-08-26 | Stop reason: HOSPADM

## 2023-08-24 RX ORDER — DEXTROSE MONOHYDRATE 100 MG/ML
INJECTION, SOLUTION INTRAVENOUS CONTINUOUS PRN
Status: DISCONTINUED | OUTPATIENT
Start: 2023-08-24 | End: 2023-08-26 | Stop reason: HOSPADM

## 2023-08-24 RX ORDER — MIDODRINE HYDROCHLORIDE 5 MG/1
5 TABLET ORAL 3 TIMES DAILY
Status: DISCONTINUED | OUTPATIENT
Start: 2023-08-24 | End: 2023-08-26 | Stop reason: HOSPADM

## 2023-08-24 RX ORDER — EMOLLIENT COMBINATION NO.97
OINTMENT (GRAM) TOPICAL PRN
Status: DISCONTINUED | OUTPATIENT
Start: 2023-08-24 | End: 2023-08-26 | Stop reason: HOSPADM

## 2023-08-24 RX ORDER — METOPROLOL SUCCINATE 25 MG/1
12.5 TABLET, EXTENDED RELEASE ORAL DAILY
Status: DISCONTINUED | OUTPATIENT
Start: 2023-08-24 | End: 2023-08-26 | Stop reason: HOSPADM

## 2023-08-24 RX ORDER — INSULIN LISPRO 100 [IU]/ML
0-4 INJECTION, SOLUTION INTRAVENOUS; SUBCUTANEOUS
Status: DISCONTINUED | OUTPATIENT
Start: 2023-08-24 | End: 2023-08-26 | Stop reason: HOSPADM

## 2023-08-24 RX ORDER — INSULIN LISPRO 100 [IU]/ML
0-4 INJECTION, SOLUTION INTRAVENOUS; SUBCUTANEOUS NIGHTLY
Status: DISCONTINUED | OUTPATIENT
Start: 2023-08-24 | End: 2023-08-26 | Stop reason: HOSPADM

## 2023-08-24 RX ORDER — SPIRONOLACTONE 25 MG/1
25 TABLET ORAL DAILY
Status: DISCONTINUED | OUTPATIENT
Start: 2023-08-24 | End: 2023-08-26 | Stop reason: HOSPADM

## 2023-08-24 RX ORDER — SODIUM CHLORIDE 0.9 % (FLUSH) 0.9 %
10 SYRINGE (ML) INJECTION PRN
Status: DISCONTINUED | OUTPATIENT
Start: 2023-08-24 | End: 2023-08-26 | Stop reason: HOSPADM

## 2023-08-24 RX ORDER — ALBUTEROL SULFATE 90 UG/1
2 AEROSOL, METERED RESPIRATORY (INHALATION) 4 TIMES DAILY PRN
Status: DISCONTINUED | OUTPATIENT
Start: 2023-08-24 | End: 2023-08-26 | Stop reason: HOSPADM

## 2023-08-24 RX ADMIN — Medication: at 08:07

## 2023-08-24 RX ADMIN — LORAZEPAM 1 MG: 2 INJECTION INTRAMUSCULAR; INTRAVENOUS at 09:19

## 2023-08-24 RX ADMIN — OLANZAPINE 5 MG: 10 INJECTION, POWDER, FOR SOLUTION INTRAMUSCULAR at 11:20

## 2023-08-24 RX ADMIN — IPRATROPIUM BROMIDE AND ALBUTEROL SULFATE 1 DOSE: 2.5; .5 SOLUTION RESPIRATORY (INHALATION) at 08:12

## 2023-08-24 RX ADMIN — FAMOTIDINE 20 MG: 10 INJECTION INTRAVENOUS at 08:11

## 2023-08-24 RX ADMIN — LORAZEPAM 1 MG: 2 INJECTION INTRAMUSCULAR; INTRAVENOUS at 08:11

## 2023-08-24 ASSESSMENT — PAIN - FUNCTIONAL ASSESSMENT: PAIN_FUNCTIONAL_ASSESSMENT: NONE - DENIES PAIN

## 2023-08-24 NOTE — ED NOTES
Patient placed on [facility] per his request for sleeping. He states he cannot breath without the o2. However patient's o2 sat on RA 96%.       Michelet Abdullahi RN  08/24/23 5469

## 2023-08-24 NOTE — CARE COORDINATION
Spoke to pt via  in regards to getting pt into a SNF. Pt would like to go to [facility]. Call placed to [facility]. At [facility]. Awaiting return call to see if they are able to accept.

## 2023-08-24 NOTE — TELEPHONE ENCOUNTER
Writer contacted Dr Regina Flores to inform of 30 day readmission risk. Writer's attempt to contact Dr Regina Flores was unsuccessful.      Call Back: If you need to call back to inform of disposition you can contact me at [phone]

## 2023-08-24 NOTE — ED PROVIDER NOTES
(XYLOCAINE) 5 mL (GI COCKTAIL)  ONCE         Last MAR action: Given - by EVI BURR on 08/24/23 at [address] [facility]    08/24/23 0745 08/24/23 0734  famotidine (PEPCID) 20 mg in sodium chloride (PF) 0.9 % 10 mL injection  ONCE         Last MAR action: Given - by Lotite BURR on 08/24/23 at [address] [place] [address],[address] [id number], [facility]    08/24/23 0745 08/24/23 0734  ipratropium 0.5 mg-albuterol 2.5 mg (DUONEB) nebulizer solution 1 Dose  ONCE        Question:  Initiate RT Bronchodilator Protocol  Answer:  No    Last MAR action: Given - by Lottie BURR on 08/24/23 at [address] [place] [address],[address] [id number], [facility]            EKG  Sinus tachycardia with a rate of 104, QRS of 124, QTc of 604, no significant change when compared to prior      Radiology  CT HEAD WO CONTRAST    Result Date: 8/24/2023  EXAMINATION: CT OF THE HEAD WITHOUT CONTRAST  8/24/2023 11:35 am TECHNIQUE: CT of the head was performed without the administration of intravenous contrast. Automated exposure control, iterative reconstruction, and/or weight based adjustment of the mA/kV was utilized to reduce the radiation dose to as low as reasonably achievable. COMPARISON: None. HISTORY: ORDERING SYSTEM PROVIDED HISTORY: AMS TECHNOLOGIST PROVIDED HISTORY: Reason for exam:->AMS Has a \"code stroke\" or \"stroke alert\" been called? ->No Decision Support Exception - unselect if not a suspected or confirmed emergency medical condition->Emergency Medical Condition (MA) Reason for Exam: ams,   uncomprensive,   aggitated Relevant Medical/Surgical History: chf,  copd FINDINGS: BRAIN/VENTRICLES: There is no acute intracranial hemorrhage, mass effect or midline shift. No abnormal extra-axial fluid collection. The gray-white differentiation is maintained without evidence of an acute infarct. There is no evidence of hydrocephalus. ORBITS: The visualized portion of the orbits demonstrate no acute abnormality. SINUSES: The visualized paranasal sinuses and mastoid air cells demonstrate no acute abnormality.

## 2023-08-24 NOTE — H&P
<=1.005   LEUKOCYTESUR Negative   UROBILINOGEN 0.2   BILIRUBINUR Negative   BLOODU Negative   GLUCOSEU Negative        CARDIAC ENZYMES  Recent Labs     08/23/23  2333 08/24/23  0756 08/24/23  0850   TROPHS 36* 38* 37*       CULTURES  Results       Procedure Component Value Units Date/Time    Culture, Body Fluid [[id number]] Collected: 08/21/23 0251    Order Status: Completed Specimen: Body Fluid from Ascitic Fluid Updated: 08/24/23 0859     Body Fluid Culture, Sterile No growth 60 to 72 hours     Gram Stain Result CANCELED     Comment: Result canceled by the ancillary. Narrative:      ORDER#: [id number]                          ORDERED BY: Dionisio Brittle  SOURCE: Ascites                            COLLECTED:  08/21/23 02:51  ANTIBIOTICS AT [facility].:                      RECEIVED :  08/21/23 03:27    Gram stain [[id number]] Collected: 08/21/23 0251    Order Status: Completed Specimen: Ascitic Fluid Updated: 08/21/23 0427     Gram Stain Result 2+ WBC's  3+ RBC's  No organisms seen  No Epithelial Cells seen      Narrative:      ORDER#: [id number]                          ORDERED BY: Dionisio Brittle  SOURCE: [facility]                            COLLECTED:  08/21/23 02:51  ANTIBIOTICS AT [place].:                      RECEIVED :  08/21/23 03:28    Blood Culture 2 [[id number]] Collected: 08/19/23 0100    Order Status: Completed Specimen: Blood Updated: 08/23/23 0115     Culture, Blood 2 No Growth after 4 days of incubation. Narrative:      ORDER#: [id number]                          ORDERED BY: Demetrius Prince  SOURCE: Blood No site given                COLLECTED:  08/19/23 01:00  ANTIBIOTICS AT [place].:                      RECEIVED :  08/19/23 10:33  If child <=2 yrs old please draw pediatric bottle. ~Blood Culture #2    Blood Culture 1 [[id number]] Collected: 08/19/23 0045    Order Status: Completed Specimen: Blood Updated: 08/23/23 0115     Blood Culture, Routine No Growth after 4 days of incubation.     Narrative:

## 2023-08-24 NOTE — ED NOTES
Report to Art Davila, [address] [place] [address], [address] [place] [address]  08/24/23 9948

## 2023-08-24 NOTE — DISCHARGE INSTRUCTIONS
you such as the \"Natalia scale. \"  The Natalia rating scale ranges from 6 to 20, where 6 means \"no exertion at all\" and 20 means \"maximal exertion. \" The goal is to use this to gauge how much effort it is taking for you to do your normal daily tasks. You should be able to recognize when too much exertion is being expended. Elements of Energy Conservation:   Prioritize/Plan: Decide what needs to be done today, and what can wait for a later date, write to do lists, plan ahead to avoid extra trips, and gather supplies and equipment needed before starting an activity. Position: Avoid tiring and awkward posture that may impair breathing. Pace: Slow and steady pace, never rushing! Pursed lip breathing. Pursed Lip Breathing: \"Smell the roses, blow out the candles\".

## 2023-08-24 NOTE — ED NOTES
Patient taken out of Pose Belt at this time. Order for constant observer placed per verbal order from Dr. Grisel Mitchell.       Dian Arteaga RN  08/24/23 8219

## 2023-08-24 NOTE — ED PROVIDER NOTES
Mild spondylosis     CHF     US GUIDED PARACENTESIS    Result Date: 8/21/2023  PROCEDURE: PARACENTESIS WITHOUT IMAGE GUIDANCE US ABDOMEN LIMITED 8/21/2023 HISTORY: ORDERING SYSTEM PROVIDED HISTORY: recurrent ascites , need paracentesis TECHNOLOGIST PROVIDED HISTORY: Reason for exam:->recurrent ascites , need paracentesis Does fluid need testing? If yes, please place LAB Orders. ->No Is the procedure for Diagnostic or Therapeutic reasons? ->Therapeutic TECHNIQUE: Informed consent was obtained after a detailed explanation of the procedure including risks, benefits, and alternatives. Universal protocol was followed. A limited ultrasound of the abdomen was performed. The right abdomen was prepped and draped in sterile fashion and local anesthesia was achieved with lidocaine. An 5 [place]amanian needle sheath was advanced into ascites and paracentesis was performed. The patient tolerated the procedure well. FINDINGS: Limited ultrasound of the abdomen demonstrates ascites. A total of 1.8 L of clear slightly serosanguineous fluid was removed. Successful ultrasound-guided paracentesis as above. US GUIDED PARACENTESIS    Result Date: 8/18/2023  PROCEDURE: PARACENTESIS WITHOUT IMAGE GUIDANCE US ABDOMEN LIMITED 8/17/2023 HISTORY: ORDERING SYSTEM PROVIDED HISTORY: [facility] TECHNOLOGIST PROVIDED HISTORY: Reason for exam:->Ascites Does fluid need testing? If yes, please place LAB Orders. ->No Is the procedure for Diagnostic or Therapeutic reasons? ->Therapeutic TECHNIQUE: Informed consent was obtained after a detailed explanation of the procedure including risks and benefits. Universal protocol was followed. Timeout was performed. Limited ultrasound of the abdomen was performed. The right abdomen was prepped and draped in sterile fashion. Local anesthesia was achieved with 2% Lidocaine. A 5 [place] Yueh centesis needle/sheath was advanced into the ascites. Needle was removed and paracentesis was performed.  The patient tolerated

## 2023-08-24 NOTE — CARE COORDINATION
CM received a call from [facility] with [facility]. At this time, the facility has not officially accepted the patient. Because the patient required restraints at some point and 1:1 supervision, [facility] requires that the patient be restraint and sitter free for 72 hours. Most if not all facilities will require the patient to be restraint and sitter free for at least 24 hours before they will accept.  CM will continue to follow

## 2023-08-24 NOTE — ED NOTES
Patient refusing vitals at this time and states \"I want to put my clothes back on. \" Patent educated that clothes are saturated in urine and patient still wanting to put his clothes on.       Roshan Ennis RN  08/24/23 5024

## 2023-08-24 NOTE — TELEPHONE ENCOUNTER
Heart Failure Follow-Up Call:    Call within 72 Hours of Discharge: Yes     Patient: Saniya Rodriguez   Patient : 1970   MRN: [id number]    Date of discharge: 23    Discharge department/facility: [facility]    Discharge Disposition: Home    RARS: Readmission Risk Score: 23.9    Attempted to call patient for Hospital Follow up call and he is currently in the ED. Patient has been to the ED 3x since discharge on , , and now .      Follow Up  Future Appointments   Date Time Provider [address] [place] [address]   2023 10:00 AM DOUG Leonard - CNP [facility]       Electronically signed by Ramon Koroma, MSN, RN  on 2023 at 11:17 AM

## 2023-08-24 NOTE — ED NOTES
Patient found sitting on stool in room with a trail of blood following. Patient then placed back in bed with dressing applied to [facility] site that patient states he \"scrated\" open. Patient then placed in Pose belt per verbal order from Dr. Georgette Ward for patient's own safety.       Abhishek Joiner RN  08/24/23 0963

## 2023-08-24 NOTE — ED NOTES
Patient found sitting on edge of bed with urinal in hand and puddle of liquid under feet. Patient then instructed to sit back in the bed for assistance changing out of wet pants to which he rolled his eyes and stated \"I know\". Patient instructed to take patients off due to them being saturated with urine and patient then closed his eyes and proceeded to attempt to go to sleep during sheet change. Patient placed into a gown and given call light and educated on the importance of calling for assistance to use urinal. Clean linen placed at this time.       Cena Sandhoff, RN  08/24/23 1000

## 2023-08-24 NOTE — CARE COORDINATION
SAW phoned [facility] and spoke to Kalie ([phone]). Per Kalie, the facility has a male skilled bed available and will review the patients chart to determine if they can accept. Await call back with determination.

## 2023-08-24 NOTE — ED NOTES
Pt placed on 2 liters nc per request for sleeping. SPO2 100% on room air.      Diane Woodson RN  08/24/23 0004

## 2023-08-25 ENCOUNTER — APPOINTMENT (OUTPATIENT)
Dept: GENERAL RADIOLOGY | Age: 53
End: 2023-08-25
Payer: MEDICARE

## 2023-08-25 PROBLEM — R10.13 ABDOMINAL PAIN, CHRONIC, EPIGASTRIC: Status: ACTIVE | Noted: 2023-08-25

## 2023-08-25 PROBLEM — G89.29 ABDOMINAL PAIN, CHRONIC, EPIGASTRIC: Status: ACTIVE | Noted: 2023-08-25

## 2023-08-25 LAB
EST. AVERAGE GLUCOSE BLD GHB EST-MCNC: 157.1 MG/DL
GLUCOSE BLD-MCNC: 104 MG/DL (ref 70–99)
GLUCOSE BLD-MCNC: 115 MG/DL (ref 70–99)
GLUCOSE BLD-MCNC: 147 MG/DL (ref 70–99)
GLUCOSE BLD-MCNC: 147 MG/DL (ref 70–99)
GLUCOSE BLD-MCNC: 151 MG/DL (ref 70–99)
HBA1C MFR BLD: 7.1 %
PERFORMED ON: ABNORMAL

## 2023-08-25 PROCEDURE — 97530 THERAPEUTIC ACTIVITIES: CPT

## 2023-08-25 PROCEDURE — 97535 SELF CARE MNGMENT TRAINING: CPT

## 2023-08-25 PROCEDURE — 6370000000 HC RX 637 (ALT 250 FOR IP)

## 2023-08-25 PROCEDURE — G0378 HOSPITAL OBSERVATION PER HR: HCPCS

## 2023-08-25 PROCEDURE — 6370000000 HC RX 637 (ALT 250 FOR IP): Performed by: INTERNAL MEDICINE

## 2023-08-25 PROCEDURE — 2580000003 HC RX 258

## 2023-08-25 PROCEDURE — 73502 X-RAY EXAM HIP UNI 2-3 VIEWS: CPT

## 2023-08-25 RX ORDER — OXYCODONE HYDROCHLORIDE AND ACETAMINOPHEN 5; 325 MG/1; MG/1
1 TABLET ORAL
Status: COMPLETED | OUTPATIENT
Start: 2023-08-25 | End: 2023-08-25

## 2023-08-25 RX ADMIN — OXYCODONE HYDROCHLORIDE AND ACETAMINOPHEN 1 TABLET: 5; 325 TABLET ORAL at 19:18

## 2023-08-25 RX ADMIN — SUCRALFATE 1 G: 1 TABLET ORAL at 09:14

## 2023-08-25 RX ADMIN — PANTOPRAZOLE SODIUM 40 MG: 40 TABLET, DELAYED RELEASE ORAL at 09:14

## 2023-08-25 RX ADMIN — Medication 10 ML: at 09:19

## 2023-08-25 RX ADMIN — SPIRONOLACTONE 25 MG: 25 TABLET ORAL at 09:13

## 2023-08-25 RX ADMIN — CIPROFLOXACIN 500 MG: 500 TABLET, FILM COATED ORAL at 09:13

## 2023-08-25 RX ADMIN — FAMOTIDINE 20 MG: 20 TABLET ORAL at 21:17

## 2023-08-25 RX ADMIN — Medication 10 ML: at 21:17

## 2023-08-25 RX ADMIN — MIDODRINE HYDROCHLORIDE 5 MG: 5 TABLET ORAL at 21:16

## 2023-08-25 RX ADMIN — FLUOCINONIDE: 0.5 CREAM TOPICAL at 21:21

## 2023-08-25 RX ADMIN — APIXABAN 5 MG: 5 TABLET, FILM COATED ORAL at 09:13

## 2023-08-25 RX ADMIN — ATORVASTATIN CALCIUM 40 MG: 40 TABLET, FILM COATED ORAL at 21:16

## 2023-08-25 RX ADMIN — SUCRALFATE 1 G: 1 TABLET ORAL at 21:16

## 2023-08-25 RX ADMIN — TORSEMIDE 20 MG: 20 TABLET ORAL at 09:13

## 2023-08-25 RX ADMIN — MIDODRINE HYDROCHLORIDE 5 MG: 5 TABLET ORAL at 09:13

## 2023-08-25 RX ADMIN — METOPROLOL SUCCINATE 12.5 MG: 25 TABLET, EXTENDED RELEASE ORAL at 09:13

## 2023-08-25 RX ADMIN — FAMOTIDINE 20 MG: 20 TABLET ORAL at 09:14

## 2023-08-25 RX ADMIN — PANTOPRAZOLE SODIUM 40 MG: 40 TABLET, DELAYED RELEASE ORAL at 21:16

## 2023-08-25 RX ADMIN — APIXABAN 5 MG: 5 TABLET, FILM COATED ORAL at 21:16

## 2023-08-25 RX ADMIN — CLOPIDOGREL BISULFATE 75 MG: 75 TABLET ORAL at 09:14

## 2023-08-25 RX ADMIN — BUSPIRONE HYDROCHLORIDE 5 MG: 5 TABLET ORAL at 09:13

## 2023-08-25 RX ADMIN — INSULIN GLARGINE 10 UNITS: 100 INJECTION, SOLUTION SUBCUTANEOUS at 21:17

## 2023-08-25 RX ADMIN — BUSPIRONE HYDROCHLORIDE 5 MG: 5 TABLET ORAL at 21:16

## 2023-08-25 ASSESSMENT — PAIN DESCRIPTION - DESCRIPTORS: DESCRIPTORS: ACHING

## 2023-08-25 ASSESSMENT — PAIN DESCRIPTION - ORIENTATION: ORIENTATION: MID;UPPER

## 2023-08-25 ASSESSMENT — PAIN DESCRIPTION - LOCATION: LOCATION: ABDOMEN

## 2023-08-25 ASSESSMENT — PAIN SCALES - GENERAL: PAINLEVEL_OUTOF10: 10

## 2023-08-25 NOTE — CARE COORDINATION
Reviewed chart and met with pt whois very belligerent. Noted staff called SOS on pt has he became undetectable. Attempted to reach [facility] to complete assessment . Noted dc plan is for [facility] but will need to be restraint free for 72 hrs. Spoke with Gina Johnson at [facility] who states has NOT accepted pt but once pt has been out of restraints and sitter free for 72 hrs will re visit ability to accept.

## 2023-08-25 NOTE — DISCHARGE SUMMARY
known as: ALDACTONE  Take 1 tablet by mouth daily     sucralfate 1 GM tablet  Commonly known as: CARAFATE     torsemide 20 MG tablet  Commonly known as: DEMADEX  Take 1 tablet by mouth daily                Discharge Condition/Location: stable/***    Follow Up:    PCP in 1 weeks      Time Spent on discharge is more than *** minutes in the examination, evaluation, counseling and review of medications and discharge plan.       Daniela Rodriguez MD, 8/25/2023 7:29 AM

## 2023-08-25 NOTE — PLAN OF CARE
Problem: Safety - Medical Restraint  Goal: Remains free of injury from restraints (Restraint for Interference with Medical Device)  Description: INTERVENTIONS:  1. Determine that other, less restrictive measures have been tried or would not be effective before applying the restraint  2. Evaluate the patient's condition at the time of restraint application  3. Inform patient/family regarding the reason for restraint  4. Q2H: Monitor safety, psychosocial status, comfort, nutrition and hydration  Outcome: Progressing     Problem: Discharge Planning  Goal: Discharge to home or other facility with appropriate resources  Outcome: Progressing     Problem: Safety - Adult  Goal: Free from fall injury  Outcome: Progressing     Problem: Skin/Tissue Integrity  Goal: Absence of new skin breakdown  Description: 1. Monitor for areas of redness and/or skin breakdown  2. Assess vascular access sites hourly  3. Every 4-6 hours minimum:  Change oxygen saturation probe site  4. Every 4-6 hours:  If on nasal continuous positive airway pressure, respiratory therapy assess nares and determine need for appliance change or resting period.   Outcome: Progressing     Problem: ABCDS Injury Assessment  Goal: Absence of physical injury  Outcome: Progressing     Problem: Chronic Conditions and Co-morbidities  Goal: Patient's chronic conditions and co-morbidity symptoms are monitored and maintained or improved  Outcome: Progressing     Problem: Cardiovascular - Adult  Goal: Maintains optimal cardiac output and hemodynamic stability  Outcome: Progressing  Goal: Absence of cardiac dysrhythmias or at baseline  Outcome: Progressing

## 2023-08-25 NOTE — PALLIATIVE CARE
Palliative Care Initial Note  Palliative Care Admit date:08/25/23    Advance Directives: pt elects to remain Full Code    Plan of care/goals:Reviewed chart. Pt with pmh of CHF and COPD, pt in obs status, unable to care for self at home. Met with the pt to discuss goals of care. Pt states his goal is to get better and move back to [place]. Pt agreeable to go to [facility] for short term rehab and symptom management for pain control. Pt not interested in comfort care measures at this time. PC following peripherally. SAW [place] updated on above POC. Social/Spiritual: Prayer Support    Plan: to [facility] for rehab and pt states  plans to return to home in [place].      Reason for consult:    ___ Advance Care Planning  _x__ Transition of Care Planning  ___ Psychosocial/Spiritual Support  _x__ Symptom Management    Letha Rodney RN Palliative Care

## 2023-08-26 VITALS
DIASTOLIC BLOOD PRESSURE: 77 MMHG | SYSTOLIC BLOOD PRESSURE: 103 MMHG | OXYGEN SATURATION: 100 % | HEIGHT: 71 IN | TEMPERATURE: 97.5 F | WEIGHT: 165.9 LBS | HEART RATE: 85 BPM | RESPIRATION RATE: 16 BRPM | BODY MASS INDEX: 23.23 KG/M2

## 2023-08-26 LAB
GLUCOSE BLD-MCNC: 113 MG/DL (ref 70–99)
GLUCOSE BLD-MCNC: 146 MG/DL (ref 70–99)
PERFORMED ON: ABNORMAL
PERFORMED ON: ABNORMAL

## 2023-08-26 PROCEDURE — 6370000000 HC RX 637 (ALT 250 FOR IP)

## 2023-08-26 PROCEDURE — G0378 HOSPITAL OBSERVATION PER HR: HCPCS

## 2023-08-26 PROCEDURE — 2580000003 HC RX 258

## 2023-08-26 RX ADMIN — FLUOCINONIDE: 0.5 CREAM TOPICAL at 10:58

## 2023-08-26 RX ADMIN — Medication 10 ML: at 10:57

## 2023-08-26 RX ADMIN — TORSEMIDE 20 MG: 20 TABLET ORAL at 10:48

## 2023-08-26 RX ADMIN — CIPROFLOXACIN 500 MG: 500 TABLET, FILM COATED ORAL at 06:23

## 2023-08-26 RX ADMIN — APIXABAN 5 MG: 5 TABLET, FILM COATED ORAL at 10:49

## 2023-08-26 RX ADMIN — MIDODRINE HYDROCHLORIDE 5 MG: 5 TABLET ORAL at 10:48

## 2023-08-26 RX ADMIN — PANTOPRAZOLE SODIUM 40 MG: 40 TABLET, DELAYED RELEASE ORAL at 10:49

## 2023-08-26 RX ADMIN — SPIRONOLACTONE 25 MG: 25 TABLET ORAL at 10:49

## 2023-08-26 RX ADMIN — SUCRALFATE 1 G: 1 TABLET ORAL at 10:48

## 2023-08-26 RX ADMIN — METOPROLOL SUCCINATE 12.5 MG: 25 TABLET, EXTENDED RELEASE ORAL at 10:48

## 2023-08-26 RX ADMIN — CLOPIDOGREL BISULFATE 75 MG: 75 TABLET ORAL at 10:49

## 2023-08-26 RX ADMIN — FAMOTIDINE 20 MG: 20 TABLET ORAL at 10:48

## 2023-08-26 NOTE — PLAN OF CARE
Problem: Discharge Planning  Goal: Discharge to home or other facility with appropriate resources  Outcome: Progressing     Problem: Safety - Adult  Goal: Free from fall injury  Outcome: Progressing     Problem: Skin/Tissue Integrity  Goal: Absence of new skin breakdown  Description: 1. Monitor for areas of redness and/or skin breakdown  2. Assess vascular access sites hourly  3. Every 4-6 hours minimum:  Change oxygen saturation probe site  4. Every 4-6 hours:  If on nasal continuous positive airway pressure, respiratory therapy assess nares and determine need for appliance change or resting period.   Outcome: Progressing     Problem: ABCDS Injury Assessment  Goal: Absence of physical injury  Outcome: Progressing     Problem: Chronic Conditions and Co-morbidities  Goal: Patient's chronic conditions and co-morbidity symptoms are monitored and maintained or improved  Outcome: Progressing     Problem: Cardiovascular - Adult  Goal: Maintains optimal cardiac output and hemodynamic stability  Outcome: Progressing  Goal: Absence of cardiac dysrhythmias or at baseline  Outcome: Progressing

## 2023-08-26 NOTE — PLAN OF CARE
HEART FAILURE CARE PLAN:    Comorbidities Reviewed: Yes   Patient has a past medical history of CHF (congestive heart failure) ([address]) and COPD (chronic obstructive pulmonary disease) ([address]). ECHOCARDIOGRAM Reviewed: No   Patient's Ejection Fraction (EF) No ECHO in chart to review     Weights Reviewed: Yes   Admission weight: 177 lb 9.6 oz (80.6 kg)   Wt Readings from Last 3 Encounters:   08/25/23 165 lb 14.4 oz (75.3 kg)   08/23/23 187 lb (84.8 kg)   08/22/23 187 lb (84.8 kg)     Intake & Output Reviewed: Yes     Intake/Output Summary (Last 24 hours) at 8/26/2023 0419  Last data filed at 8/25/2023 2108  Gross per 24 hour   Intake 960 ml   Output 600 ml   Net 360 ml     Medications Reviewed: Yes   SCHEDULED HOSPITAL MEDICATIONS:   spironolactone  25 mg Oral Daily    pantoprazole  40 mg Oral BID    insulin glargine  10 Units SubCUTAneous Nightly    ferrous sulfate  325 mg Oral BID WC    atorvastatin  40 mg Oral Nightly    apixaban  5 mg Oral BID    sucralfate  1 g Oral 4x Daily    torsemide  20 mg Oral Daily    busPIRone  5 mg Oral BID    clopidogrel  75 mg Oral Daily    ciprofloxacin  500 mg Oral Daily    midodrine  5 mg Oral TID    metoprolol succinate  12.5 mg Oral Daily    famotidine  20 mg Oral BID    sodium chloride flush  5-40 mL IntraVENous 2 times per day    insulin lispro  0-4 Units SubCUTAneous TID WC    insulin lispro  0-4 Units SubCUTAneous Nightly    fluocinonide   Topical BID     ACE/ARB/ARNI is REQUIRED for EF </= 48% SYSTOLIC FAILURE:   ACE[de-identified] None  ARB[de-identified] None  ARNI[de-identified] None    Evidenced-Based Beta Blocker is REQUIRED for EF </= 28% SYSTOLIC FAILURE:   [de-identified] Metoprolol SUCCinate- Toprol XL    Diuretics:  [de-identified] Torsemide    Diet Reviewed: Yes   ADULT DIET; Regular;  Low Sodium (2 gm); 1800 ml    Goal of Care Reviewed: Yes   Patient and/or Family's stated Goal of Care this Admission: reduce shortness of breath, increase activity tolerance, better understand heart failure and disease management, be more

## 2023-08-27 ENCOUNTER — HOSPITAL ENCOUNTER (EMERGENCY)
Age: 53
Discharge: HOME | End: 2023-08-27
Attending: EMERGENCY MEDICINE
Payer: MEDICARE

## 2023-08-27 ENCOUNTER — APPOINTMENT (OUTPATIENT)
Dept: GENERAL RADIOLOGY | Age: 53
End: 2023-08-27
Payer: MEDICARE

## 2023-08-27 ENCOUNTER — APPOINTMENT (OUTPATIENT)
Dept: CT IMAGING | Age: 53
End: 2023-08-27
Payer: MEDICARE

## 2023-08-27 VITALS
TEMPERATURE: 98.4 F | RESPIRATION RATE: 27 BRPM | HEIGHT: 71 IN | DIASTOLIC BLOOD PRESSURE: 83 MMHG | WEIGHT: 177 LBS | OXYGEN SATURATION: 99 % | HEART RATE: 83 BPM | BODY MASS INDEX: 24.78 KG/M2 | SYSTOLIC BLOOD PRESSURE: 121 MMHG

## 2023-08-27 DIAGNOSIS — R10.13 EPIGASTRIC PAIN: Primary | ICD-10-CM

## 2023-08-27 DIAGNOSIS — R07.9 CHEST PAIN, UNSPECIFIED TYPE: ICD-10-CM

## 2023-08-27 LAB
ANION GAP SERPL CALCULATED.3IONS-SCNC: 10 MMOL/L (ref 3–16)
BASOPHILS # BLD: 0.1 K/UL (ref 0–0.2)
BASOPHILS NFR BLD: 1.6 %
BUN SERPL-MCNC: 20 MG/DL (ref 7–20)
CALCIUM SERPL-MCNC: 9.1 MG/DL (ref 8.3–10.6)
CHLORIDE SERPL-SCNC: 103 MMOL/L (ref 99–110)
CO2 SERPL-SCNC: 24 MMOL/L (ref 21–32)
CREAT SERPL-MCNC: 0.9 MG/DL (ref 0.9–1.3)
DEPRECATED RDW RBC AUTO: 20.8 % (ref 12.4–15.4)
EKG ATRIAL RATE: 86 BPM
EKG DIAGNOSIS: ABNORMAL
EKG P AXIS: 62 DEGREES
EKG P-R INTERVAL: 206 MS
EKG Q-T INTERVAL: 450 MS
EKG QRS DURATION: 122 MS
EKG QTC CALCULATION (BAZETT): 538 MS
EKG R AXIS: 258 DEGREES
EKG T AXIS: 73 DEGREES
EKG VENTRICULAR RATE: 86 BPM
EOSINOPHIL # BLD: 0.1 K/UL (ref 0–0.6)
EOSINOPHIL NFR BLD: 2.3 %
GFR SERPLBLD CREATININE-BSD FMLA CKD-EPI: >60 ML/MIN/{1.73_M2}
GLUCOSE SERPL-MCNC: 180 MG/DL (ref 70–99)
HCT VFR BLD AUTO: 36.7 % (ref 40.5–52.5)
HGB BLD-MCNC: 12 G/DL (ref 13.5–17.5)
LIPASE SERPL-CCNC: 12 U/L (ref 13–60)
LYMPHOCYTES # BLD: 1 K/UL (ref 1–5.1)
LYMPHOCYTES NFR BLD: 20.2 %
MCH RBC QN AUTO: 25.7 PG (ref 26–34)
MCHC RBC AUTO-ENTMCNC: 32.8 G/DL (ref 31–36)
MCV RBC AUTO: 78.5 FL (ref 80–100)
MONOCYTES # BLD: 0.6 K/UL (ref 0–1.3)
MONOCYTES NFR BLD: 11.9 %
NEUTROPHILS # BLD: 3.2 K/UL (ref 1.7–7.7)
NEUTROPHILS NFR BLD: 64 %
PLATELET # BLD AUTO: 185 K/UL (ref 135–450)
PMV BLD AUTO: 8.3 FL (ref 5–10.5)
POTASSIUM SERPL-SCNC: 4.4 MMOL/L (ref 3.5–5.1)
RBC # BLD AUTO: 4.68 M/UL (ref 4.2–5.9)
SODIUM SERPL-SCNC: 137 MMOL/L (ref 136–145)
TROPONIN, HIGH SENSITIVITY: 16 NG/L (ref 0–22)
TROPONIN, HIGH SENSITIVITY: 34 NG/L (ref 0–22)
WBC # BLD AUTO: 5 K/UL (ref 4–11)

## 2023-08-27 PROCEDURE — 93010 ELECTROCARDIOGRAM REPORT: CPT | Performed by: INTERNAL MEDICINE

## 2023-08-27 PROCEDURE — 80048 BASIC METABOLIC PNL TOTAL CA: CPT

## 2023-08-27 PROCEDURE — 6360000004 HC RX CONTRAST MEDICATION: Performed by: EMERGENCY MEDICINE

## 2023-08-27 PROCEDURE — 74177 CT ABD & PELVIS W/CONTRAST: CPT

## 2023-08-27 PROCEDURE — 6360000002 HC RX W HCPCS: Performed by: EMERGENCY MEDICINE

## 2023-08-27 PROCEDURE — 84484 ASSAY OF TROPONIN QUANT: CPT

## 2023-08-27 PROCEDURE — 36415 COLL VENOUS BLD VENIPUNCTURE: CPT

## 2023-08-27 PROCEDURE — 93005 ELECTROCARDIOGRAM TRACING: CPT | Performed by: EMERGENCY MEDICINE

## 2023-08-27 PROCEDURE — 6370000000 HC RX 637 (ALT 250 FOR IP): Performed by: EMERGENCY MEDICINE

## 2023-08-27 PROCEDURE — 85025 COMPLETE CBC W/AUTO DIFF WBC: CPT

## 2023-08-27 PROCEDURE — 71045 X-RAY EXAM CHEST 1 VIEW: CPT

## 2023-08-27 PROCEDURE — 96374 THER/PROPH/DIAG INJ IV PUSH: CPT

## 2023-08-27 PROCEDURE — 83690 ASSAY OF LIPASE: CPT

## 2023-08-27 PROCEDURE — 99285 EMERGENCY DEPT VISIT HI MDM: CPT

## 2023-08-27 RX ORDER — KETOROLAC TROMETHAMINE 30 MG/ML
15 INJECTION, SOLUTION INTRAMUSCULAR; INTRAVENOUS ONCE
Status: COMPLETED | OUTPATIENT
Start: 2023-08-27 | End: 2023-08-27

## 2023-08-27 RX ADMIN — IOPAMIDOL 75 ML: 755 INJECTION, SOLUTION INTRAVENOUS at 04:15

## 2023-08-27 RX ADMIN — KETOROLAC TROMETHAMINE 15 MG: 30 INJECTION, SOLUTION INTRAMUSCULAR; INTRAVENOUS at 03:51

## 2023-08-27 RX ADMIN — Medication: at 04:20

## 2023-08-27 ASSESSMENT — PAIN DESCRIPTION - ORIENTATION: ORIENTATION: MID

## 2023-08-27 ASSESSMENT — LIFESTYLE VARIABLES
HOW OFTEN DO YOU HAVE A DRINK CONTAINING ALCOHOL: NEVER
HOW MANY STANDARD DRINKS CONTAINING ALCOHOL DO YOU HAVE ON A TYPICAL DAY: PATIENT DOES NOT DRINK

## 2023-08-27 ASSESSMENT — PAIN DESCRIPTION - DESCRIPTORS: DESCRIPTORS: SQUEEZING;SPASM

## 2023-08-27 ASSESSMENT — PAIN DESCRIPTION - LOCATION
LOCATION: ABDOMEN
LOCATION: ABDOMEN

## 2023-08-27 ASSESSMENT — PAIN SCALES - GENERAL: PAINLEVEL_OUTOF10: 10

## 2023-08-27 NOTE — ED NOTES
Pt refused interpretor up until DC, pt given interpretor upon request, pt told interpretor \" I wanted morphine, and the Dr is a fucking idiot\"discontinued instructions given with interpretor , MD at bedside educating pt on tests that were performed and meds that were given. Pt denies any further questions.        [address], RN  08/27/23 7775

## 2023-08-27 NOTE — ED NOTES
Approx 0830 RN called to lobby by registration to speak with patient and a caller on his phone. RN to lobby with Radames Santana from security. Pt stating that he was not treated well as per Pt's sister was on phone with video call. RN brought pt to P1 for video .  ID [id number], Luz Harris. Spoke with patient and patient's sister via . Had questions regarding whether an  was used during visit. Per Dr. Ely Mac, stated that patient refused an . Relayed this to pt and sister via . Pt stated through  Edwina Bowels is a lie! \" Patient made multiple threats to [facility] and doctor. States \"I will target them. \" \"I already have one lawsuit. \" Patient and sister provided with managers number and ER number with instructions to ask for manager as a back up number. Pt stated thanked this RN and stated he was not upset with this RN. Spoke with Clinical Admin, cab voucher to be obtained. Explained via  that RN will call cab and wheel patient to lobby to await. Patient verbalized understanding via .    Zohreh Jones, AVINASH  08/27/23 [address] RN  08/27/23 8838

## 2023-08-27 NOTE — ED PROVIDER NOTES
has no peripheral edema noted. Patient's abdomen is not peritoneal.  EKG similar compared to previous with no acute ischemic changes noted. Initial troponin was 34 which is consistent with patient's previous troponin, likely elevated secondary to demand ischemia. We obtained a repeat troponin which was downtrending at 16. I suspect that patient's pain is very unlikely to have ACS etiology given his downtrending troponin. Lipase was 12 making pancreatitis very unlikely. Patient does not have a leukocytosis. Hemoglobin was stable at 12.0. Chest x-ray demonstrated bibasilar airspace opacities similar to previous chest x-rays, likely secondary to atelectasis with stable mild enlargement of the cardiac silhouette. CT abdomen/pelvis demonstrating findings suggestive of cirrhosis with splenomegaly and portal hypertension with small to moderate volume ascites in the abdomen and pelvis decreased compared to prior CT scans with pleural effusions decreased compared to prior examinations and some findings consistent with anasarca. Given patient's chronic disease these findings are expected. Patient is also on a diuretic which will help diurese him. Patient was treated with Toradol as well as a GI cocktail. Upon examination, patient was sleeping comfortably and in no acute distress. Patient was repeatedly demanded morphine by name for pain control. I discussed patient's laboratory and imaging findings with him and that there is no acute indication for imaging or opioid pain medication. Patient likely is suffering from chronic abdominal pain. Patient was made aware that he would be discharged and can follow-up outpatient with primary care and gastroenterology. There does appear to be a pain seeking component to patient's pain as he is repeatedly demanding morphine by name.   Patient did not request an official  during his entire visit until he was brought discharge papers and then he demanded the

## 2023-08-29 RX ORDER — NITROGLYCERIN 0.4 MG/1
TABLET SUBLINGUAL EVERY 5 MIN PRN
COMMUNITY
Start: 2020-02-08 | End: 2023-08-29

## 2023-09-09 ENCOUNTER — HOSPITAL ENCOUNTER (OUTPATIENT)
Age: 53
Setting detail: OBSERVATION
Discharge: HOME | End: 2023-09-10
Attending: INTERNAL MEDICINE | Admitting: INTERNAL MEDICINE
Payer: MEDICARE

## 2023-09-09 ENCOUNTER — APPOINTMENT (OUTPATIENT)
Dept: GENERAL RADIOLOGY | Age: 53
End: 2023-09-09
Payer: MEDICARE

## 2023-09-09 ENCOUNTER — HOSPITAL ENCOUNTER (EMERGENCY)
Age: 53
Discharge: ANOTHER HOSPITAL | End: 2023-09-09
Attending: STUDENT IN AN ORGANIZED HEALTH CARE EDUCATION/TRAINING PROGRAM
Payer: MEDICARE

## 2023-09-09 VITALS
HEART RATE: 85 BPM | DIASTOLIC BLOOD PRESSURE: 79 MMHG | SYSTOLIC BLOOD PRESSURE: 100 MMHG | OXYGEN SATURATION: 100 % | RESPIRATION RATE: 18 BRPM | TEMPERATURE: 98.2 F

## 2023-09-09 DIAGNOSIS — R07.9 CHEST PAIN, UNSPECIFIED TYPE: Primary | ICD-10-CM

## 2023-09-09 DIAGNOSIS — Z86.79 HISTORY OF HEART FAILURE: ICD-10-CM

## 2023-09-09 DIAGNOSIS — Z86.79 HX OF CORONARY ARTERY DISEASE: ICD-10-CM

## 2023-09-09 DIAGNOSIS — R79.89 ELEVATED TROPONIN: ICD-10-CM

## 2023-09-09 LAB
ALBUMIN SERPL-MCNC: 3.7 G/DL (ref 3.4–5)
ALBUMIN/GLOB SERPL: 0.8 {RATIO} (ref 1.1–2.2)
ALP SERPL-CCNC: 142 U/L (ref 40–129)
ALT SERPL-CCNC: 142 U/L (ref 10–40)
ANION GAP SERPL CALCULATED.3IONS-SCNC: 14 MMOL/L (ref 3–16)
AST SERPL-CCNC: 212 U/L (ref 15–37)
BASOPHILS # BLD: 0.1 K/UL (ref 0–0.2)
BASOPHILS NFR BLD: 1.1 %
BILIRUB SERPL-MCNC: 0.7 MG/DL (ref 0–1)
BUN SERPL-MCNC: 19 MG/DL (ref 7–20)
CALCIUM SERPL-MCNC: 9.2 MG/DL (ref 8.3–10.6)
CHLORIDE SERPL-SCNC: 99 MMOL/L (ref 99–110)
CO2 SERPL-SCNC: 23 MMOL/L (ref 21–32)
CREAT SERPL-MCNC: 0.8 MG/DL (ref 0.9–1.3)
DEPRECATED RDW RBC AUTO: 20.8 % (ref 12.4–15.4)
EKG ATRIAL RATE: 84 BPM
EKG ATRIAL RATE: 89 BPM
EKG DIAGNOSIS: NORMAL
EKG DIAGNOSIS: NORMAL
EKG P AXIS: 27 DEGREES
EKG P AXIS: 56 DEGREES
EKG P-R INTERVAL: 210 MS
EKG P-R INTERVAL: 214 MS
EKG Q-T INTERVAL: 438 MS
EKG Q-T INTERVAL: 454 MS
EKG QRS DURATION: 124 MS
EKG QRS DURATION: 154 MS
EKG QTC CALCULATION (BAZETT): 517 MS
EKG QTC CALCULATION (BAZETT): 552 MS
EKG R AXIS: 252 DEGREES
EKG R AXIS: 260 DEGREES
EKG T AXIS: 58 DEGREES
EKG T AXIS: 78 DEGREES
EKG VENTRICULAR RATE: 84 BPM
EKG VENTRICULAR RATE: 89 BPM
EOSINOPHIL # BLD: 0.1 K/UL (ref 0–0.6)
EOSINOPHIL NFR BLD: 2.5 %
GFR SERPLBLD CREATININE-BSD FMLA CKD-EPI: >60 ML/MIN/{1.73_M2}
GLUCOSE BLD-MCNC: 118 MG/DL (ref 70–99)
GLUCOSE BLD-MCNC: 125 MG/DL (ref 70–99)
GLUCOSE BLD-MCNC: 308 MG/DL (ref 70–99)
GLUCOSE SERPL-MCNC: 214 MG/DL (ref 70–99)
HCT VFR BLD AUTO: 34.3 % (ref 40.5–52.5)
HGB BLD-MCNC: 11.4 G/DL (ref 13.5–17.5)
INR PPP: 1.54 (ref 0.84–1.16)
LIPASE SERPL-CCNC: 8 U/L (ref 13–60)
LYMPHOCYTES # BLD: 1.1 K/UL (ref 1–5.1)
LYMPHOCYTES NFR BLD: 22.1 %
MCH RBC QN AUTO: 25.1 PG (ref 26–34)
MCHC RBC AUTO-ENTMCNC: 33.3 G/DL (ref 31–36)
MCV RBC AUTO: 75.4 FL (ref 80–100)
MONOCYTES # BLD: 0.6 K/UL (ref 0–1.3)
MONOCYTES NFR BLD: 12.4 %
NEUTROPHILS # BLD: 3.2 K/UL (ref 1.7–7.7)
NEUTROPHILS NFR BLD: 61.9 %
NT-PROBNP SERPL-MCNC: 2979 PG/ML (ref 0–124)
PERFORMED ON: ABNORMAL
PLATELET # BLD AUTO: 107 K/UL (ref 135–450)
PMV BLD AUTO: 8 FL (ref 5–10.5)
POTASSIUM SERPL-SCNC: 4.5 MMOL/L (ref 3.5–5.1)
PROT SERPL-MCNC: 8.2 G/DL (ref 6.4–8.2)
PROTHROMBIN TIME: 18.5 SEC (ref 11.5–14.8)
RBC # BLD AUTO: 4.55 M/UL (ref 4.2–5.9)
SODIUM SERPL-SCNC: 136 MMOL/L (ref 136–145)
TROPONIN, HIGH SENSITIVITY: 41 NG/L (ref 0–22)
TROPONIN, HIGH SENSITIVITY: 41 NG/L (ref 0–22)
TROPONIN, HIGH SENSITIVITY: 49 NG/L (ref 0–22)
TROPONIN, HIGH SENSITIVITY: 54 NG/L (ref 0–22)
WBC # BLD AUTO: 5.2 K/UL (ref 4–11)

## 2023-09-09 PROCEDURE — 83690 ASSAY OF LIPASE: CPT

## 2023-09-09 PROCEDURE — 84484 ASSAY OF TROPONIN QUANT: CPT

## 2023-09-09 PROCEDURE — 6370000000 HC RX 637 (ALT 250 FOR IP): Performed by: INTERNAL MEDICINE

## 2023-09-09 PROCEDURE — 71045 X-RAY EXAM CHEST 1 VIEW: CPT

## 2023-09-09 PROCEDURE — 2500000003 HC RX 250 WO HCPCS: Performed by: STUDENT IN AN ORGANIZED HEALTH CARE EDUCATION/TRAINING PROGRAM

## 2023-09-09 PROCEDURE — 83880 ASSAY OF NATRIURETIC PEPTIDE: CPT

## 2023-09-09 PROCEDURE — 6370000000 HC RX 637 (ALT 250 FOR IP): Performed by: STUDENT IN AN ORGANIZED HEALTH CARE EDUCATION/TRAINING PROGRAM

## 2023-09-09 PROCEDURE — 93005 ELECTROCARDIOGRAM TRACING: CPT | Performed by: STUDENT IN AN ORGANIZED HEALTH CARE EDUCATION/TRAINING PROGRAM

## 2023-09-09 PROCEDURE — 85025 COMPLETE CBC W/AUTO DIFF WBC: CPT

## 2023-09-09 PROCEDURE — G0378 HOSPITAL OBSERVATION PER HR: HCPCS

## 2023-09-09 PROCEDURE — 36415 COLL VENOUS BLD VENIPUNCTURE: CPT

## 2023-09-09 PROCEDURE — 2580000003 HC RX 258: Performed by: INTERNAL MEDICINE

## 2023-09-09 PROCEDURE — 93010 ELECTROCARDIOGRAM REPORT: CPT | Performed by: INTERNAL MEDICINE

## 2023-09-09 PROCEDURE — G0379 DIRECT REFER HOSPITAL OBSERV: HCPCS

## 2023-09-09 PROCEDURE — 99285 EMERGENCY DEPT VISIT HI MDM: CPT

## 2023-09-09 PROCEDURE — 6360000002 HC RX W HCPCS: Performed by: INTERNAL MEDICINE

## 2023-09-09 PROCEDURE — 80053 COMPREHEN METABOLIC PANEL: CPT

## 2023-09-09 PROCEDURE — 96376 TX/PRO/DX INJ SAME DRUG ADON: CPT

## 2023-09-09 PROCEDURE — 96374 THER/PROPH/DIAG INJ IV PUSH: CPT

## 2023-09-09 PROCEDURE — 93005 ELECTROCARDIOGRAM TRACING: CPT | Performed by: INTERNAL MEDICINE

## 2023-09-09 PROCEDURE — 85610 PROTHROMBIN TIME: CPT

## 2023-09-09 PROCEDURE — 99214 OFFICE O/P EST MOD 30 MIN: CPT | Performed by: INTERNAL MEDICINE

## 2023-09-09 RX ORDER — DICYCLOMINE HYDROCHLORIDE 10 MG/1
10 CAPSULE ORAL EVERY 4 HOURS PRN
Status: DISCONTINUED | OUTPATIENT
Start: 2023-09-09 | End: 2023-09-10 | Stop reason: HOSPADM

## 2023-09-09 RX ORDER — FAMOTIDINE 10 MG/ML
20 INJECTION, SOLUTION INTRAVENOUS ONCE
Status: COMPLETED | OUTPATIENT
Start: 2023-09-09 | End: 2023-09-09

## 2023-09-09 RX ORDER — ASPIRIN 325 MG
325 TABLET ORAL ONCE
Status: COMPLETED | OUTPATIENT
Start: 2023-09-09 | End: 2023-09-09

## 2023-09-09 RX ORDER — BUSPIRONE HYDROCHLORIDE 5 MG/1
5 TABLET ORAL 2 TIMES DAILY
Status: DISCONTINUED | OUTPATIENT
Start: 2023-09-09 | End: 2023-09-10 | Stop reason: HOSPADM

## 2023-09-09 RX ORDER — INSULIN LISPRO 100 [IU]/ML
0-4 INJECTION, SOLUTION INTRAVENOUS; SUBCUTANEOUS NIGHTLY
Status: DISCONTINUED | OUTPATIENT
Start: 2023-09-09 | End: 2023-09-10 | Stop reason: HOSPADM

## 2023-09-09 RX ORDER — ONDANSETRON 4 MG/1
4 TABLET, ORALLY DISINTEGRATING ORAL EVERY 8 HOURS PRN
Status: DISCONTINUED | OUTPATIENT
Start: 2023-09-09 | End: 2023-09-10 | Stop reason: HOSPADM

## 2023-09-09 RX ORDER — ACETAMINOPHEN 650 MG/1
650 SUPPOSITORY RECTAL EVERY 6 HOURS PRN
Status: DISCONTINUED | OUTPATIENT
Start: 2023-09-09 | End: 2023-09-10 | Stop reason: HOSPADM

## 2023-09-09 RX ORDER — ALBUTEROL SULFATE 90 UG/1
2 AEROSOL, METERED RESPIRATORY (INHALATION) 4 TIMES DAILY PRN
Status: DISCONTINUED | OUTPATIENT
Start: 2023-09-09 | End: 2023-09-10 | Stop reason: HOSPADM

## 2023-09-09 RX ORDER — DEXTROSE MONOHYDRATE 100 MG/ML
INJECTION, SOLUTION INTRAVENOUS CONTINUOUS PRN
Status: DISCONTINUED | OUTPATIENT
Start: 2023-09-09 | End: 2023-09-10 | Stop reason: HOSPADM

## 2023-09-09 RX ORDER — SPIRONOLACTONE 25 MG/1
25 TABLET ORAL DAILY
Status: DISCONTINUED | OUTPATIENT
Start: 2023-09-09 | End: 2023-09-10 | Stop reason: HOSPADM

## 2023-09-09 RX ORDER — METOPROLOL SUCCINATE 25 MG/1
12.5 TABLET, EXTENDED RELEASE ORAL DAILY
Status: DISCONTINUED | OUTPATIENT
Start: 2023-09-09 | End: 2023-09-10 | Stop reason: HOSPADM

## 2023-09-09 RX ORDER — ACETAMINOPHEN 325 MG/1
650 TABLET ORAL EVERY 6 HOURS PRN
Status: DISCONTINUED | OUTPATIENT
Start: 2023-09-09 | End: 2023-09-10 | Stop reason: HOSPADM

## 2023-09-09 RX ORDER — LACTULOSE 10 G/15ML
20 SOLUTION ORAL EVERY 8 HOURS PRN
Status: DISCONTINUED | OUTPATIENT
Start: 2023-09-09 | End: 2023-09-10 | Stop reason: HOSPADM

## 2023-09-09 RX ORDER — PANTOPRAZOLE SODIUM 40 MG/1
40 TABLET, DELAYED RELEASE ORAL 2 TIMES DAILY
Status: DISCONTINUED | OUTPATIENT
Start: 2023-09-09 | End: 2023-09-10 | Stop reason: HOSPADM

## 2023-09-09 RX ORDER — FAMOTIDINE 20 MG/1
20 TABLET, FILM COATED ORAL 2 TIMES DAILY
Status: DISCONTINUED | OUTPATIENT
Start: 2023-09-09 | End: 2023-09-10 | Stop reason: HOSPADM

## 2023-09-09 RX ORDER — CIPROFLOXACIN 500 MG/1
500 TABLET, FILM COATED ORAL DAILY
Status: DISCONTINUED | OUTPATIENT
Start: 2023-09-09 | End: 2023-09-09

## 2023-09-09 RX ORDER — TORSEMIDE 20 MG/1
20 TABLET ORAL DAILY
Status: DISCONTINUED | OUTPATIENT
Start: 2023-09-09 | End: 2023-09-10 | Stop reason: HOSPADM

## 2023-09-09 RX ORDER — ASPIRIN 81 MG/1
81 TABLET, CHEWABLE ORAL DAILY
Status: DISCONTINUED | OUTPATIENT
Start: 2023-09-10 | End: 2023-09-10 | Stop reason: HOSPADM

## 2023-09-09 RX ORDER — MIDODRINE HYDROCHLORIDE 5 MG/1
5 TABLET ORAL ONCE
Status: COMPLETED | OUTPATIENT
Start: 2023-09-09 | End: 2023-09-09

## 2023-09-09 RX ORDER — INSULIN LISPRO 100 [IU]/ML
0-8 INJECTION, SOLUTION INTRAVENOUS; SUBCUTANEOUS
Status: DISCONTINUED | OUTPATIENT
Start: 2023-09-09 | End: 2023-09-10 | Stop reason: HOSPADM

## 2023-09-09 RX ORDER — SODIUM CHLORIDE 0.9 % (FLUSH) 0.9 %
5-40 SYRINGE (ML) INJECTION EVERY 12 HOURS SCHEDULED
Status: DISCONTINUED | OUTPATIENT
Start: 2023-09-09 | End: 2023-09-10 | Stop reason: HOSPADM

## 2023-09-09 RX ORDER — FERROUS SULFATE 325(65) MG
325 TABLET ORAL 2 TIMES DAILY WITH MEALS
Status: DISCONTINUED | OUTPATIENT
Start: 2023-09-09 | End: 2023-09-10 | Stop reason: HOSPADM

## 2023-09-09 RX ORDER — MORPHINE SULFATE 2 MG/ML
2 INJECTION, SOLUTION INTRAMUSCULAR; INTRAVENOUS
Status: DISCONTINUED | OUTPATIENT
Start: 2023-09-09 | End: 2023-09-10

## 2023-09-09 RX ORDER — SODIUM CHLORIDE 9 MG/ML
INJECTION, SOLUTION INTRAVENOUS PRN
Status: DISCONTINUED | OUTPATIENT
Start: 2023-09-09 | End: 2023-09-10 | Stop reason: HOSPADM

## 2023-09-09 RX ORDER — MORPHINE SULFATE 4 MG/ML
4 INJECTION, SOLUTION INTRAMUSCULAR; INTRAVENOUS
Status: DISCONTINUED | OUTPATIENT
Start: 2023-09-09 | End: 2023-09-10

## 2023-09-09 RX ORDER — SODIUM CHLORIDE 0.9 % (FLUSH) 0.9 %
5-40 SYRINGE (ML) INJECTION PRN
Status: DISCONTINUED | OUTPATIENT
Start: 2023-09-09 | End: 2023-09-10 | Stop reason: HOSPADM

## 2023-09-09 RX ORDER — INSULIN GLARGINE 100 [IU]/ML
10 INJECTION, SOLUTION SUBCUTANEOUS NIGHTLY
Status: DISCONTINUED | OUTPATIENT
Start: 2023-09-09 | End: 2023-09-10 | Stop reason: HOSPADM

## 2023-09-09 RX ORDER — POLYETHYLENE GLYCOL 3350 17 G/17G
17 POWDER, FOR SOLUTION ORAL DAILY PRN
Status: DISCONTINUED | OUTPATIENT
Start: 2023-09-09 | End: 2023-09-10 | Stop reason: HOSPADM

## 2023-09-09 RX ORDER — ATORVASTATIN CALCIUM 40 MG/1
40 TABLET, FILM COATED ORAL NIGHTLY
Status: DISCONTINUED | OUTPATIENT
Start: 2023-09-09 | End: 2023-09-10 | Stop reason: HOSPADM

## 2023-09-09 RX ORDER — ONDANSETRON 2 MG/ML
4 INJECTION INTRAMUSCULAR; INTRAVENOUS EVERY 6 HOURS PRN
Status: DISCONTINUED | OUTPATIENT
Start: 2023-09-09 | End: 2023-09-10 | Stop reason: HOSPADM

## 2023-09-09 RX ORDER — TRAMADOL HYDROCHLORIDE 50 MG/1
50 TABLET ORAL EVERY 6 HOURS PRN
Status: DISCONTINUED | OUTPATIENT
Start: 2023-09-09 | End: 2023-09-10 | Stop reason: HOSPADM

## 2023-09-09 RX ORDER — KETOROLAC TROMETHAMINE 30 MG/ML
15 INJECTION, SOLUTION INTRAMUSCULAR; INTRAVENOUS EVERY 6 HOURS PRN
Status: DISCONTINUED | OUTPATIENT
Start: 2023-09-09 | End: 2023-09-10 | Stop reason: HOSPADM

## 2023-09-09 RX ORDER — MIDODRINE HYDROCHLORIDE 5 MG/1
5 TABLET ORAL 3 TIMES DAILY
Status: DISCONTINUED | OUTPATIENT
Start: 2023-09-09 | End: 2023-09-10 | Stop reason: HOSPADM

## 2023-09-09 RX ADMIN — MIDODRINE HYDROCHLORIDE 5 MG: 5 TABLET ORAL at 21:15

## 2023-09-09 RX ADMIN — MORPHINE SULFATE 4 MG: 4 INJECTION, SOLUTION INTRAMUSCULAR; INTRAVENOUS at 15:04

## 2023-09-09 RX ADMIN — MORPHINE SULFATE 4 MG: 4 INJECTION, SOLUTION INTRAMUSCULAR; INTRAVENOUS at 21:17

## 2023-09-09 RX ADMIN — INSULIN LISPRO 6 UNITS: 100 INJECTION, SOLUTION INTRAVENOUS; SUBCUTANEOUS at 16:58

## 2023-09-09 RX ADMIN — FAMOTIDINE 20 MG: 10 INJECTION, SOLUTION INTRAVENOUS at 04:15

## 2023-09-09 RX ADMIN — SPIRONOLACTONE 25 MG: 25 TABLET ORAL at 11:30

## 2023-09-09 RX ADMIN — BUSPIRONE HYDROCHLORIDE 5 MG: 5 TABLET ORAL at 21:15

## 2023-09-09 RX ADMIN — TORSEMIDE 20 MG: 20 TABLET ORAL at 11:39

## 2023-09-09 RX ADMIN — ATORVASTATIN CALCIUM 40 MG: 40 TABLET, FILM COATED ORAL at 21:15

## 2023-09-09 RX ADMIN — METOPROLOL SUCCINATE 12.5 MG: 25 TABLET, EXTENDED RELEASE ORAL at 11:39

## 2023-09-09 RX ADMIN — MIDODRINE HYDROCHLORIDE 5 MG: 5 TABLET ORAL at 06:04

## 2023-09-09 RX ADMIN — APIXABAN 5 MG: 5 TABLET, FILM COATED ORAL at 21:15

## 2023-09-09 RX ADMIN — PANTOPRAZOLE SODIUM 40 MG: 40 TABLET, DELAYED RELEASE ORAL at 21:15

## 2023-09-09 RX ADMIN — MORPHINE SULFATE 4 MG: 4 INJECTION, SOLUTION INTRAMUSCULAR; INTRAVENOUS at 18:06

## 2023-09-09 RX ADMIN — ASPIRIN 325 MG: 325 TABLET ORAL at 02:30

## 2023-09-09 RX ADMIN — FAMOTIDINE 20 MG: 20 TABLET, FILM COATED ORAL at 11:39

## 2023-09-09 RX ADMIN — Medication: at 04:15

## 2023-09-09 RX ADMIN — SODIUM CHLORIDE, PRESERVATIVE FREE 10 ML: 5 INJECTION INTRAVENOUS at 21:17

## 2023-09-09 RX ADMIN — FAMOTIDINE 20 MG: 20 TABLET, FILM COATED ORAL at 21:15

## 2023-09-09 RX ADMIN — INSULIN GLARGINE 10 UNITS: 100 INJECTION, SOLUTION SUBCUTANEOUS at 21:13

## 2023-09-09 RX ADMIN — MIDODRINE HYDROCHLORIDE 5 MG: 5 TABLET ORAL at 15:04

## 2023-09-09 RX ADMIN — PANTOPRAZOLE SODIUM 40 MG: 40 TABLET, DELAYED RELEASE ORAL at 11:39

## 2023-09-09 ASSESSMENT — PAIN SCALES - GENERAL
PAINLEVEL_OUTOF10: 5
PAINLEVEL_OUTOF10: 10
PAINLEVEL_OUTOF10: 9

## 2023-09-09 ASSESSMENT — PAIN DESCRIPTION - DESCRIPTORS
DESCRIPTORS: ACHING
DESCRIPTORS: ACHING

## 2023-09-09 ASSESSMENT — PAIN DESCRIPTION - PAIN TYPE: TYPE: ACUTE PAIN

## 2023-09-09 ASSESSMENT — PAIN DESCRIPTION - FREQUENCY: FREQUENCY: CONTINUOUS

## 2023-09-09 ASSESSMENT — PAIN DESCRIPTION - LOCATION
LOCATION: GENERALIZED
LOCATION: CHEST

## 2023-09-09 ASSESSMENT — PAIN DESCRIPTION - ORIENTATION: ORIENTATION: MID

## 2023-09-09 NOTE — ED NOTES
Report given to [facility], RN     Aniket Grimes RN  09/09/23 3275

## 2023-09-09 NOTE — CONSULTS
these old records)      Time Based Itemization  A total of 60 minutes was spent on today's patient encounter. If applicable, non-patient-facing activities:  (X )Preparing to see the patient and reviewing records  (X ) Individual interpretation of results  ( ) Discussion or coordination of care with other health care professionals  ( X) Ordering of unique tests, medications, or procedures  (X ) Documentation within the EHR       All questions and concerns were addressed to the patient/family. Alternatives to my treatment were discussed. The note was completed using EMR. Every effort was made to ensure accuracy; however, inadvertent computerized transcription errors may be present.     Lance Weeks MD, [address] [place], [place] [id number] [facility]  [phone] [facility]  9/9/2023  11:24 AM

## 2023-09-09 NOTE — ED NOTES
Report given to [employer]. VSS, no s/s of distress/discomfort. SL in place. Patient being transferred to [facility] at this time. Report called to RN by previous shift.       Olman Warren RN  09/09/23 0308

## 2023-09-10 VITALS
OXYGEN SATURATION: 100 % | BODY MASS INDEX: 21.38 KG/M2 | DIASTOLIC BLOOD PRESSURE: 79 MMHG | SYSTOLIC BLOOD PRESSURE: 105 MMHG | HEIGHT: 71 IN | WEIGHT: 152.7 LBS | HEART RATE: 69 BPM | RESPIRATION RATE: 22 BRPM | TEMPERATURE: 97.5 F

## 2023-09-10 LAB
ANION GAP SERPL CALCULATED.3IONS-SCNC: 10 MMOL/L (ref 3–16)
BUN SERPL-MCNC: 21 MG/DL (ref 7–20)
CALCIUM SERPL-MCNC: 8.6 MG/DL (ref 8.3–10.6)
CHLORIDE SERPL-SCNC: 103 MMOL/L (ref 99–110)
CHOLEST SERPL-MCNC: 73 MG/DL (ref 0–199)
CO2 SERPL-SCNC: 22 MMOL/L (ref 21–32)
CREAT SERPL-MCNC: 0.8 MG/DL (ref 0.9–1.3)
DEPRECATED RDW RBC AUTO: 21 % (ref 12.4–15.4)
GFR SERPLBLD CREATININE-BSD FMLA CKD-EPI: >60 ML/MIN/{1.73_M2}
GLUCOSE BLD-MCNC: 103 MG/DL (ref 70–99)
GLUCOSE BLD-MCNC: 156 MG/DL (ref 70–99)
GLUCOSE SERPL-MCNC: 144 MG/DL (ref 70–99)
HCT VFR BLD AUTO: 36.4 % (ref 40.5–52.5)
HDLC SERPL-MCNC: 36 MG/DL (ref 40–60)
HGB BLD-MCNC: 11.9 G/DL (ref 13.5–17.5)
LDLC SERPL CALC-MCNC: 30 MG/DL
MCH RBC QN AUTO: 25.9 PG (ref 26–34)
MCHC RBC AUTO-ENTMCNC: 32.7 G/DL (ref 31–36)
MCV RBC AUTO: 79.3 FL (ref 80–100)
PERFORMED ON: ABNORMAL
PERFORMED ON: ABNORMAL
PLATELET # BLD AUTO: 101 K/UL (ref 135–450)
PMV BLD AUTO: 9 FL (ref 5–10.5)
POTASSIUM SERPL-SCNC: 4.5 MMOL/L (ref 3.5–5.1)
RBC # BLD AUTO: 4.59 M/UL (ref 4.2–5.9)
SODIUM SERPL-SCNC: 135 MMOL/L (ref 136–145)
TRIGL SERPL-MCNC: 35 MG/DL (ref 0–150)
TROPONIN, HIGH SENSITIVITY: 41 NG/L (ref 0–22)
VLDLC SERPL CALC-MCNC: 7 MG/DL
WBC # BLD AUTO: 4.2 K/UL (ref 4–11)

## 2023-09-10 PROCEDURE — 2580000003 HC RX 258: Performed by: INTERNAL MEDICINE

## 2023-09-10 PROCEDURE — 80048 BASIC METABOLIC PNL TOTAL CA: CPT

## 2023-09-10 PROCEDURE — 80061 LIPID PANEL: CPT

## 2023-09-10 PROCEDURE — 85027 COMPLETE CBC AUTOMATED: CPT

## 2023-09-10 PROCEDURE — 6360000002 HC RX W HCPCS: Performed by: INTERNAL MEDICINE

## 2023-09-10 PROCEDURE — 6370000000 HC RX 637 (ALT 250 FOR IP): Performed by: INTERNAL MEDICINE

## 2023-09-10 PROCEDURE — G0378 HOSPITAL OBSERVATION PER HR: HCPCS

## 2023-09-10 PROCEDURE — 84484 ASSAY OF TROPONIN QUANT: CPT

## 2023-09-10 PROCEDURE — 96376 TX/PRO/DX INJ SAME DRUG ADON: CPT

## 2023-09-10 RX ADMIN — MORPHINE SULFATE 4 MG: 4 INJECTION, SOLUTION INTRAMUSCULAR; INTRAVENOUS at 06:29

## 2023-09-10 RX ADMIN — SODIUM CHLORIDE, PRESERVATIVE FREE 10 ML: 5 INJECTION INTRAVENOUS at 09:48

## 2023-09-10 RX ADMIN — SPIRONOLACTONE 25 MG: 25 TABLET ORAL at 09:42

## 2023-09-10 RX ADMIN — ASPIRIN 81 MG 81 MG: 81 TABLET ORAL at 09:43

## 2023-09-10 RX ADMIN — MORPHINE SULFATE 4 MG: 4 INJECTION, SOLUTION INTRAMUSCULAR; INTRAVENOUS at 09:43

## 2023-09-10 RX ADMIN — FAMOTIDINE 20 MG: 20 TABLET, FILM COATED ORAL at 09:42

## 2023-09-10 RX ADMIN — MIDODRINE HYDROCHLORIDE 5 MG: 5 TABLET ORAL at 09:42

## 2023-09-10 RX ADMIN — METOPROLOL SUCCINATE 12.5 MG: 25 TABLET, EXTENDED RELEASE ORAL at 09:42

## 2023-09-10 RX ADMIN — PANTOPRAZOLE SODIUM 40 MG: 40 TABLET, DELAYED RELEASE ORAL at 09:42

## 2023-09-10 RX ADMIN — APIXABAN 5 MG: 5 TABLET, FILM COATED ORAL at 09:42

## 2023-09-10 RX ADMIN — TORSEMIDE 20 MG: 20 TABLET ORAL at 09:43

## 2023-09-10 ASSESSMENT — PAIN DESCRIPTION - DESCRIPTORS: DESCRIPTORS: ACHING

## 2023-09-10 ASSESSMENT — PAIN SCALES - GENERAL
PAINLEVEL_OUTOF10: 9
PAINLEVEL_OUTOF10: 10

## 2023-09-10 ASSESSMENT — PAIN DESCRIPTION - LOCATION: LOCATION: ABDOMEN

## 2023-09-10 NOTE — DISCHARGE SUMMARY
52 y.o. male who presented to [facility] with chest pain. He is deaf and relies on reading lips and writing; he declines to use video . PMHx significant for CAD, Ischemic Cardiomyopathy, End Stage CHF, PAF, PAD s/p R BKA, Cirrhosis, DM2, HTN, Bipolar Disorder, non-compliance and homelessness. Review of EMR suggests he has frequent hospital visits in multiple systems, often for chest pain, with some documentation of pain-seeking behavior. After being hospitalized on and off through August at [facility] for Abdominal Pain/Cirrhosis issues. He was offered placement in SNF but ultimately left AMA. It appears he presented to [address] [place] [address] shortly after that from 8/28/23-9/6/23. During that admission, he was evaluated by Cardiology for chest pain and CHF management. He was determined to have stable CAD, without evidence for ACS. He had previous PCI to RCA (10/2022), LCx, OM, known  LAD with R>L collaterals. Has been turned down by CTS in the past.  Last ECHO 6/2023 showed LVEF 20-25% with possible apical thrombus vs trabeculation. S/p Medtronic AICD. Assessment/Plan:      Current Principal Problem:  Chest pain    Chest pain with hx of CAD: He had previous PCI to RCA (10/2022), LCx, OM, known  LAD with R>L collaterals. Has been turned down by CTS in the past. Cardiology consulted here given his history, elevated troponins. Cardiology does not feel that current presentation is c/w ACS. Troponins appear chronically elevated in this range and relatively flat. Will monitor overnight with additional troponins to ensure no continued upward trend. Continue IV Morphine PRN for now. Continue Plavix, Eliquis, Statin, BB. Chronic Systolic CHF/End Stage CHF/Ischemic Cardiomyopathy: s/p Medtronic dual chamber ICD. Last ECHO 6/2023 showed LVEF 20-25% with possible apical thrombus vs trabeculation. Continues on Eliquis. Diuresed last admission. Currently appears euvolemic and stable at baseline.

## 2023-09-11 ENCOUNTER — HOSPITAL ENCOUNTER (EMERGENCY)
Age: 53
Discharge: HOME | End: 2023-09-11
Attending: EMERGENCY MEDICINE
Payer: MEDICARE

## 2023-09-11 VITALS
TEMPERATURE: 97.7 F | SYSTOLIC BLOOD PRESSURE: 101 MMHG | RESPIRATION RATE: 18 BRPM | HEART RATE: 84 BPM | OXYGEN SATURATION: 99 % | DIASTOLIC BLOOD PRESSURE: 70 MMHG

## 2023-09-11 DIAGNOSIS — G89.29 CHRONIC LOW BACK PAIN, UNSPECIFIED BACK PAIN LATERALITY, UNSPECIFIED WHETHER SCIATICA PRESENT: Primary | ICD-10-CM

## 2023-09-11 DIAGNOSIS — M54.50 CHRONIC LOW BACK PAIN, UNSPECIFIED BACK PAIN LATERALITY, UNSPECIFIED WHETHER SCIATICA PRESENT: Primary | ICD-10-CM

## 2023-09-11 PROCEDURE — 99283 EMERGENCY DEPT VISIT LOW MDM: CPT

## 2023-09-11 PROCEDURE — 6370000000 HC RX 637 (ALT 250 FOR IP): Performed by: EMERGENCY MEDICINE

## 2023-09-11 RX ORDER — HYDROCODONE BITARTRATE AND ACETAMINOPHEN 5; 325 MG/1; MG/1
1 TABLET ORAL ONCE
Status: COMPLETED | OUTPATIENT
Start: 2023-09-12 | End: 2023-09-11

## 2023-09-11 RX ADMIN — HYDROCODONE BITARTRATE AND ACETAMINOPHEN 1 TABLET: 5; 325 TABLET ORAL at 23:48

## 2023-09-11 ASSESSMENT — PAIN DESCRIPTION - DESCRIPTORS: DESCRIPTORS: ACHING

## 2023-09-11 ASSESSMENT — PAIN SCALES - GENERAL: PAINLEVEL_OUTOF10: 10

## 2023-09-11 ASSESSMENT — PAIN DESCRIPTION - LOCATION: LOCATION: BACK

## 2023-09-12 NOTE — ED PROVIDER NOTES
Med      busPIRone (BUSPAR) 5 MG tablet Take 1 tablet by mouth 2 times dailyHistorical Med      apixaban (ELIQUIS) 5 MG TABS tablet Take 1 tablet by mouth 2 times dailyHistorical Med      atorvastatin (LIPITOR) 40 MG tablet Take 1 tablet by mouth nightlyHistorical Med      famotidine (PEPCID) 20 MG tablet Take 1 tablet by mouth 2 times dailyHistorical Med             ALLERGIES     Azithromycin and Statins    FAMILY HISTORY     History reviewed. No pertinent family history. SOCIAL HISTORY       Social History     Socioeconomic History    Marital status: Single     Spouse name: None    Number of children: None    Years of education: None    Highest education level: None   Tobacco Use    Smoking status: Every Day     Packs/day: 1     Types: Cigarettes    Smokeless tobacco: Never   Vaping Use    Vaping Use: Never used   Substance and Sexual Activity    Alcohol use: Never    Drug use: Never       SCREENINGS         Ashleigh Coma Scale  Eye Opening: Spontaneous  Best Verbal Response: Oriented  Best Motor Response: Obeys commands  Ashleigh Coma Scale Score: 15                     CIWA Assessment  BP: 101/70  Pulse: 84                 PHYSICAL EXAM    (up to 7 for level 4, 8 or more for level 5)     ED Triage Vitals   BP Temp Temp src Pulse Resp SpO2 Height Weight   -- -- -- -- -- -- -- --       Physical Exam  Vitals and nursing note reviewed. Constitutional:       General: He is not in acute distress. Appearance: He is well-developed. He is not diaphoretic. HENT:      Head: Normocephalic and atraumatic. Nose: Nose normal.   Eyes:      Conjunctiva/sclera: Conjunctivae normal.      Pupils: Pupils are equal, round, and reactive to light. Cardiovascular:      Rate and Rhythm: Normal rate and regular rhythm. Heart sounds: Normal heart sounds. No murmur heard. No friction rub. No gallop. Pulmonary:      Effort: Pulmonary effort is normal. No respiratory distress.       Breath sounds: Normal breath

## 2023-09-12 NOTE — DISCHARGE INSTRUCTIONS
Please call [facility] at [phone] for an appointment. Please continue to take your medications as prescribed.

## 2023-09-12 NOTE — ED NOTES
Interpretor [id number] used for triage/assessment/medication administration and discharge.       [id number] HORACIO Mejia RN  09/11/23 1712

## 2023-09-13 ENCOUNTER — CLINICAL DOCUMENTATION (OUTPATIENT)
Dept: CASE MANAGEMENT | Age: 53
End: 2023-09-13

## 2024-01-11 ENCOUNTER — APPOINTMENT (OUTPATIENT)
Dept: GENERAL RADIOLOGY | Age: 54
DRG: 638 | End: 2024-01-11
Payer: MEDICARE

## 2024-01-11 ENCOUNTER — HOSPITAL ENCOUNTER (INPATIENT)
Age: 54
LOS: 6 days | Discharge: NURSING, REHAB OR LONG-TERM CARE | DRG: 638 | End: 2024-01-17
Attending: EMERGENCY MEDICINE | Admitting: INTERNAL MEDICINE
Payer: MEDICARE

## 2024-01-11 DIAGNOSIS — E11.621 DIABETIC FOOT ULCER WITH OSTEOMYELITIS (HCC): ICD-10-CM

## 2024-01-11 DIAGNOSIS — M86.9 DIABETIC FOOT ULCER WITH OSTEOMYELITIS (HCC): ICD-10-CM

## 2024-01-11 DIAGNOSIS — M86.9 OSTEOMYELITIS OF LEFT FOOT, UNSPECIFIED TYPE (HCC): Primary | ICD-10-CM

## 2024-01-11 DIAGNOSIS — Z76.5 DRUG-SEEKING BEHAVIOR: ICD-10-CM

## 2024-01-11 DIAGNOSIS — R79.89 ELEVATED LIVER FUNCTION TESTS: ICD-10-CM

## 2024-01-11 DIAGNOSIS — E11.42 DIABETIC POLYNEUROPATHY ASSOCIATED WITH TYPE 2 DIABETES MELLITUS (HCC): ICD-10-CM

## 2024-01-11 DIAGNOSIS — L97.509 DIABETIC FOOT ULCER WITH OSTEOMYELITIS (HCC): ICD-10-CM

## 2024-01-11 DIAGNOSIS — E10.10 TYPE 1 DIABETES MELLITUS WITH KETOACIDOSIS, UNCONTROLLED (HCC): ICD-10-CM

## 2024-01-11 DIAGNOSIS — E11.69 DIABETIC FOOT ULCER WITH OSTEOMYELITIS (HCC): ICD-10-CM

## 2024-01-11 LAB
ALBUMIN SERPL-MCNC: 4 G/DL (ref 3.4–5)
ALBUMIN/GLOB SERPL: 0.9 {RATIO} (ref 1.1–2.2)
ALP SERPL-CCNC: 163 U/L (ref 40–129)
ALT SERPL-CCNC: 164 U/L (ref 10–40)
ANION GAP SERPL CALCULATED.3IONS-SCNC: 11 MMOL/L (ref 3–16)
AST SERPL-CCNC: 150 U/L (ref 15–37)
BASOPHILS # BLD: 0.1 K/UL (ref 0–0.2)
BASOPHILS NFR BLD: 1.4 %
BILIRUB SERPL-MCNC: 0.6 MG/DL (ref 0–1)
BILIRUB UR QL STRIP.AUTO: NEGATIVE
BUN SERPL-MCNC: 13 MG/DL (ref 7–20)
CALCIUM SERPL-MCNC: 8.8 MG/DL (ref 8.3–10.6)
CHLORIDE SERPL-SCNC: 92 MMOL/L (ref 99–110)
CLARITY UR: CLEAR
CO2 SERPL-SCNC: 23 MMOL/L (ref 21–32)
COLOR UR: YELLOW
CREAT SERPL-MCNC: 0.8 MG/DL (ref 0.9–1.3)
CRP SERPL-MCNC: 16.2 MG/L (ref 0–5.1)
DEPRECATED RDW RBC AUTO: 14.2 % (ref 12.4–15.4)
EOSINOPHIL # BLD: 0.1 K/UL (ref 0–0.6)
EOSINOPHIL NFR BLD: 1.6 %
ERYTHROCYTE [SEDIMENTATION RATE] IN BLOOD BY WESTERGREN METHOD: 29 MM/HR (ref 0–20)
FLUAV RNA RESP QL NAA+PROBE: NOT DETECTED
FLUBV RNA RESP QL NAA+PROBE: NOT DETECTED
GFR SERPLBLD CREATININE-BSD FMLA CKD-EPI: >60 ML/MIN/{1.73_M2}
GLUCOSE SERPL-MCNC: 476 MG/DL (ref 70–99)
GLUCOSE UR STRIP.AUTO-MCNC: >=1000 MG/DL
HCT VFR BLD AUTO: 44.1 % (ref 40.5–52.5)
HGB BLD-MCNC: 15.1 G/DL (ref 13.5–17.5)
HGB UR QL STRIP.AUTO: NEGATIVE
KETONES UR STRIP.AUTO-MCNC: NEGATIVE MG/DL
LACTATE BLDV-SCNC: 1.5 MMOL/L (ref 0.4–1.9)
LACTATE BLDV-SCNC: 2 MMOL/L (ref 0.4–1.9)
LEUKOCYTE ESTERASE UR QL STRIP.AUTO: NEGATIVE
LYMPHOCYTES # BLD: 1.1 K/UL (ref 1–5.1)
LYMPHOCYTES NFR BLD: 16.3 %
MCH RBC QN AUTO: 29.6 PG (ref 26–34)
MCHC RBC AUTO-ENTMCNC: 34.3 G/DL (ref 31–36)
MCV RBC AUTO: 86.4 FL (ref 80–100)
MONOCYTES # BLD: 0.7 K/UL (ref 0–1.3)
MONOCYTES NFR BLD: 10.7 %
NEUTROPHILS # BLD: 4.8 K/UL (ref 1.7–7.7)
NEUTROPHILS NFR BLD: 70 %
NITRITE UR QL STRIP.AUTO: NEGATIVE
NT-PROBNP SERPL-MCNC: 1493 PG/ML (ref 0–124)
PH UR STRIP.AUTO: 7.5 [PH] (ref 5–8)
PLATELET # BLD AUTO: 125 K/UL (ref 135–450)
PMV BLD AUTO: 9.3 FL (ref 5–10.5)
POTASSIUM SERPL-SCNC: 4.4 MMOL/L (ref 3.5–5.1)
PROCALCITONIN SERPL IA-MCNC: 0.12 NG/ML (ref 0–0.15)
PROT SERPL-MCNC: 8.5 G/DL (ref 6.4–8.2)
PROT UR STRIP.AUTO-MCNC: NEGATIVE MG/DL
RBC # BLD AUTO: 5.1 M/UL (ref 4.2–5.9)
SARS-COV-2 RNA RESP QL NAA+PROBE: NOT DETECTED
SODIUM SERPL-SCNC: 126 MMOL/L (ref 136–145)
SP GR UR STRIP.AUTO: 1.01 (ref 1–1.03)
TROPONIN, HIGH SENSITIVITY: 31 NG/L (ref 0–22)
UA COMPLETE W REFLEX CULTURE PNL UR: ABNORMAL
UA DIPSTICK W REFLEX MICRO PNL UR: ABNORMAL
URN SPEC COLLECT METH UR: ABNORMAL
UROBILINOGEN UR STRIP-ACNC: 4 E.U./DL
WBC # BLD AUTO: 6.8 K/UL (ref 4–11)

## 2024-01-11 PROCEDURE — 83880 ASSAY OF NATRIURETIC PEPTIDE: CPT

## 2024-01-11 PROCEDURE — 99285 EMERGENCY DEPT VISIT HI MDM: CPT

## 2024-01-11 PROCEDURE — 81003 URINALYSIS AUTO W/O SCOPE: CPT

## 2024-01-11 PROCEDURE — 84484 ASSAY OF TROPONIN QUANT: CPT

## 2024-01-11 PROCEDURE — 1200000000 HC SEMI PRIVATE

## 2024-01-11 PROCEDURE — 80053 COMPREHEN METABOLIC PANEL: CPT

## 2024-01-11 PROCEDURE — 85025 COMPLETE CBC W/AUTO DIFF WBC: CPT

## 2024-01-11 PROCEDURE — 36415 COLL VENOUS BLD VENIPUNCTURE: CPT

## 2024-01-11 PROCEDURE — 86140 C-REACTIVE PROTEIN: CPT

## 2024-01-11 PROCEDURE — 85652 RBC SED RATE AUTOMATED: CPT

## 2024-01-11 PROCEDURE — 71045 X-RAY EXAM CHEST 1 VIEW: CPT

## 2024-01-11 PROCEDURE — 84145 PROCALCITONIN (PCT): CPT

## 2024-01-11 PROCEDURE — 87040 BLOOD CULTURE FOR BACTERIA: CPT

## 2024-01-11 PROCEDURE — 87636 SARSCOV2 & INF A&B AMP PRB: CPT

## 2024-01-11 PROCEDURE — 6370000000 HC RX 637 (ALT 250 FOR IP): Performed by: INTERNAL MEDICINE

## 2024-01-11 PROCEDURE — 83605 ASSAY OF LACTIC ACID: CPT

## 2024-01-11 PROCEDURE — 73630 X-RAY EXAM OF FOOT: CPT

## 2024-01-11 PROCEDURE — 6360000002 HC RX W HCPCS: Performed by: INTERNAL MEDICINE

## 2024-01-11 RX ORDER — METRONIDAZOLE 500 MG/100ML
500 INJECTION, SOLUTION INTRAVENOUS EVERY 8 HOURS
Status: DISCONTINUED | OUTPATIENT
Start: 2024-01-11 | End: 2024-01-16

## 2024-01-11 RX ORDER — ALBUTEROL SULFATE 90 UG/1
2 AEROSOL, METERED RESPIRATORY (INHALATION) 4 TIMES DAILY PRN
Status: DISCONTINUED | OUTPATIENT
Start: 2024-01-11 | End: 2024-01-12

## 2024-01-11 RX ORDER — SODIUM CHLORIDE 0.9 % (FLUSH) 0.9 %
5-40 SYRINGE (ML) INJECTION EVERY 12 HOURS SCHEDULED
Status: DISCONTINUED | OUTPATIENT
Start: 2024-01-11 | End: 2024-01-17 | Stop reason: HOSPADM

## 2024-01-11 RX ORDER — ACETAMINOPHEN 325 MG/1
650 TABLET ORAL EVERY 6 HOURS PRN
Status: DISCONTINUED | OUTPATIENT
Start: 2024-01-11 | End: 2024-01-17 | Stop reason: HOSPADM

## 2024-01-11 RX ORDER — ACETAMINOPHEN 650 MG/1
650 SUPPOSITORY RECTAL EVERY 6 HOURS PRN
Status: DISCONTINUED | OUTPATIENT
Start: 2024-01-11 | End: 2024-01-17 | Stop reason: HOSPADM

## 2024-01-11 RX ORDER — METOPROLOL SUCCINATE 25 MG/1
12.5 TABLET, EXTENDED RELEASE ORAL DAILY
Status: DISCONTINUED | OUTPATIENT
Start: 2024-01-12 | End: 2024-01-13

## 2024-01-11 RX ORDER — PANTOPRAZOLE SODIUM 40 MG/1
40 TABLET, DELAYED RELEASE ORAL 2 TIMES DAILY
Status: DISCONTINUED | OUTPATIENT
Start: 2024-01-11 | End: 2024-01-14 | Stop reason: DRUGHIGH

## 2024-01-11 RX ORDER — DEXTROSE MONOHYDRATE 100 MG/ML
INJECTION, SOLUTION INTRAVENOUS CONTINUOUS PRN
Status: DISCONTINUED | OUTPATIENT
Start: 2024-01-11 | End: 2024-01-17 | Stop reason: HOSPADM

## 2024-01-11 RX ORDER — POTASSIUM CHLORIDE 7.45 MG/ML
10 INJECTION INTRAVENOUS PRN
Status: DISCONTINUED | OUTPATIENT
Start: 2024-01-11 | End: 2024-01-17 | Stop reason: HOSPADM

## 2024-01-11 RX ORDER — POLYETHYLENE GLYCOL 3350 17 G/17G
17 POWDER, FOR SOLUTION ORAL DAILY PRN
Status: DISCONTINUED | OUTPATIENT
Start: 2024-01-11 | End: 2024-01-17 | Stop reason: HOSPADM

## 2024-01-11 RX ORDER — GLUCAGON 1 MG/ML
1 KIT INJECTION PRN
Status: DISCONTINUED | OUTPATIENT
Start: 2024-01-11 | End: 2024-01-17 | Stop reason: HOSPADM

## 2024-01-11 RX ORDER — MIDODRINE HYDROCHLORIDE 5 MG/1
5 TABLET ORAL 3 TIMES DAILY
Status: DISCONTINUED | OUTPATIENT
Start: 2024-01-11 | End: 2024-01-13

## 2024-01-11 RX ORDER — INSULIN LISPRO 100 [IU]/ML
0-8 INJECTION, SOLUTION INTRAVENOUS; SUBCUTANEOUS
Status: DISCONTINUED | OUTPATIENT
Start: 2024-01-12 | End: 2024-01-12

## 2024-01-11 RX ORDER — ONDANSETRON 4 MG/1
4 TABLET, ORALLY DISINTEGRATING ORAL EVERY 8 HOURS PRN
Status: DISCONTINUED | OUTPATIENT
Start: 2024-01-11 | End: 2024-01-17 | Stop reason: HOSPADM

## 2024-01-11 RX ORDER — MAGNESIUM SULFATE IN WATER 40 MG/ML
2000 INJECTION, SOLUTION INTRAVENOUS PRN
Status: DISCONTINUED | OUTPATIENT
Start: 2024-01-11 | End: 2024-01-17 | Stop reason: HOSPADM

## 2024-01-11 RX ORDER — ONDANSETRON 2 MG/ML
4 INJECTION INTRAMUSCULAR; INTRAVENOUS EVERY 6 HOURS PRN
Status: DISCONTINUED | OUTPATIENT
Start: 2024-01-11 | End: 2024-01-17 | Stop reason: HOSPADM

## 2024-01-11 RX ORDER — INSULIN LISPRO 100 [IU]/ML
0-4 INJECTION, SOLUTION INTRAVENOUS; SUBCUTANEOUS NIGHTLY
Status: DISCONTINUED | OUTPATIENT
Start: 2024-01-11 | End: 2024-01-12

## 2024-01-11 RX ORDER — LACTULOSE 10 G/15ML
20 SOLUTION ORAL EVERY 8 HOURS PRN
Status: DISCONTINUED | OUTPATIENT
Start: 2024-01-11 | End: 2024-01-14

## 2024-01-11 RX ORDER — SODIUM CHLORIDE 9 MG/ML
INJECTION, SOLUTION INTRAVENOUS PRN
Status: DISCONTINUED | OUTPATIENT
Start: 2024-01-11 | End: 2024-01-17 | Stop reason: HOSPADM

## 2024-01-11 RX ORDER — POTASSIUM CHLORIDE 20 MEQ/1
40 TABLET, EXTENDED RELEASE ORAL PRN
Status: DISCONTINUED | OUTPATIENT
Start: 2024-01-11 | End: 2024-01-17 | Stop reason: HOSPADM

## 2024-01-11 RX ORDER — SODIUM CHLORIDE 0.9 % (FLUSH) 0.9 %
5-40 SYRINGE (ML) INJECTION PRN
Status: DISCONTINUED | OUTPATIENT
Start: 2024-01-11 | End: 2024-01-17 | Stop reason: HOSPADM

## 2024-01-11 RX ORDER — BUSPIRONE HYDROCHLORIDE 5 MG/1
5 TABLET ORAL 2 TIMES DAILY
Status: DISCONTINUED | OUTPATIENT
Start: 2024-01-11 | End: 2024-01-17 | Stop reason: HOSPADM

## 2024-01-11 RX ORDER — INSULIN GLARGINE 100 [IU]/ML
10 INJECTION, SOLUTION SUBCUTANEOUS NIGHTLY
Status: DISCONTINUED | OUTPATIENT
Start: 2024-01-11 | End: 2024-01-14

## 2024-01-11 RX ADMIN — HYDROMORPHONE HYDROCHLORIDE 1 MG: 1 INJECTION, SOLUTION INTRAMUSCULAR; INTRAVENOUS; SUBCUTANEOUS at 21:05

## 2024-01-11 ASSESSMENT — PAIN SCALES - GENERAL: PAINLEVEL_OUTOF10: 10

## 2024-01-11 ASSESSMENT — PAIN DESCRIPTION - LOCATION: LOCATION: FOOT

## 2024-01-11 ASSESSMENT — PAIN - FUNCTIONAL ASSESSMENT: PAIN_FUNCTIONAL_ASSESSMENT: NONE - DENIES PAIN

## 2024-01-11 NOTE — ED PROVIDER NOTES
[facility] ED  EMERGENCY DEPARTMENT ENCOUNTER      I am the Primary Clinician of Record    Note started: 5:12 PM EST 1/11/24     I have seen and evaluated this patient with my supervising physician Dr Parker        Pt Name: Renato Nagy  MRN: [id number]  Birthdate 1970  Dateof evaluation: 1/11/2024  Provider: Jessie Colindres, APRN - CNP  PCP: No primary care provider on file.  ED Attending: No att. providers found      CHIEF COMPLAINT       Chief Complaint   Patient presents with    Wound Check     Pt states he was sent in to have left foot amputated        HISTORY OF PRESENTILLNESS   (Location/Symptom, Timing/Onset, Context/Setting, Quality, Duration, Modifying Factors, Severity)  Note limiting factors.     Renato Nagy is a 53 y.o. male for left foot infection. Onset was a few weeks.  Context includes patient reports that he is a diabetic and has been seen at [facility] and was sent to [facility] to be admitted for IV antibiotics pain control and most likely amputation of his left foot.  Patient has a sore noted to his left great toe.  Patient is unsure if he has been on antibiotics.  He is hard of hearing and uses American sign language so communication was through a . Alleviating factors include nothing.  Aggravating factors include nothing. Pain is 0/10.  Nothing has been used for pain today.     Nursing Notes were all reviewed and agreed with or any disagreements were addressed  in the HPI.      REVIEW OF SYSTEMS       Review of Systems   Constitutional:  Negative for activity change, appetite change and fever.   HENT:  Negative for congestion, facial swelling, rhinorrhea and sore throat.    Eyes:  Negative for visual disturbance.   Respiratory:  Negative for shortness of breath.    Cardiovascular:  Negative for chest pain.   Gastrointestinal:  Negative for abdominal pain.   Genitourinary:  Negative for difficulty urinating.   Musculoskeletal:  Negative  insistent that he is going to need IV morphine for a few days prior to surgery and then will most likely need IV morphine after surgery.  Patient appears very focused on IV morphine at this time however states that his foot is also numb.    Left foot x-ray was concerning for a acute osteomyelitis.  Patient was already started on vancomycin and ceftriaxone.  Lab values are all reassuring.  Consult was placed to the hospitalist for admission.  Hospitalist has accepted.  Patient's care is transferred to the inpatient care unit.        Patient was given the following medications:  Medications   vancomycin 1000 mg IVPB in 250 mL NS addavial (has no administration in time range)   cefTRIAXone (ROCEPHIN) 1,000 mg in sodium chloride 0.9 % 50 mL IVPB (mini-bag) (has no administration in time range)            CONSULTS:  IP CONSULT TO PODIATRY  PHARMACY TO DOSE VANCOMYCIN   Via perfect serve      Discussion with other professionals - hospitalist    Social determinants - none    Records Reviewed - none    History from - patient    Limitations to history- none    Chronic conditions: has a past medical history of CHF (congestive heart failure) ([facility]) and COPD (chronic obstructive pulmonary disease) ([facility]).        Is this patient to be included in the SEP-1 Core Measure due to severe sepsis or septic shock?   No   Exclusion criteria - the patient is NOT to be included for SEP-1 Core Measure due to:  2+ SIRS criteria are not met         The patient tolerated their visit well. I have evaluated this patient. My supervising physician was available for consultation. The patient and / or the family were informed of the results of any tests, a time was given to answer questions, a plan was proposed and they agreed with plan.        FINAL IMPRESSION      1. Osteomyelitis of left foot, unspecified type ([facility])    2. Type 1 diabetes mellitus with ketoacidosis, uncontrolled ([facility])    3. Elevated liver function tests    4. Drug-seeking behavior

## 2024-01-11 NOTE — PLAN OF CARE
Patient sent from [facility] \"for amputation.\"    Osteomyelitis  Hyperglycemia  Podiatry consultation    2W

## 2024-01-12 ENCOUNTER — ANESTHESIA (OUTPATIENT)
Dept: OPERATING ROOM | Age: 54
End: 2024-01-12
Payer: MEDICARE

## 2024-01-12 ENCOUNTER — APPOINTMENT (OUTPATIENT)
Dept: GENERAL RADIOLOGY | Age: 54
DRG: 638 | End: 2024-01-12
Payer: MEDICARE

## 2024-01-12 ENCOUNTER — ANESTHESIA EVENT (OUTPATIENT)
Dept: OPERATING ROOM | Age: 54
End: 2024-01-12
Payer: MEDICARE

## 2024-01-12 PROBLEM — R79.89 ELEVATED LIVER FUNCTION TESTS: Status: ACTIVE | Noted: 2024-01-12

## 2024-01-12 PROBLEM — E11.69 DIABETIC FOOT ULCER WITH OSTEOMYELITIS (HCC): Status: ACTIVE | Noted: 2024-01-12

## 2024-01-12 PROBLEM — M86.9 DIABETIC FOOT ULCER WITH OSTEOMYELITIS (HCC): Status: ACTIVE | Noted: 2024-01-12

## 2024-01-12 PROBLEM — L97.509 DIABETIC FOOT ULCER WITH OSTEOMYELITIS (HCC): Status: ACTIVE | Noted: 2024-01-12

## 2024-01-12 PROBLEM — E11.621 DIABETIC FOOT ULCER WITH OSTEOMYELITIS (HCC): Status: ACTIVE | Noted: 2024-01-12

## 2024-01-12 LAB
ALBUMIN SERPL-MCNC: 3.6 G/DL (ref 3.4–5)
ALP SERPL-CCNC: 130 U/L (ref 40–129)
ALT SERPL-CCNC: 152 U/L (ref 10–40)
ANION GAP SERPL CALCULATED.3IONS-SCNC: 9 MMOL/L (ref 3–16)
AST SERPL-CCNC: 146 U/L (ref 15–37)
BASOPHILS # BLD: 0 K/UL (ref 0–0.2)
BASOPHILS NFR BLD: 0.5 %
BILIRUB DIRECT SERPL-MCNC: 0.3 MG/DL (ref 0–0.3)
BILIRUB INDIRECT SERPL-MCNC: 0.4 MG/DL (ref 0–1)
BILIRUB SERPL-MCNC: 0.7 MG/DL (ref 0–1)
BUN SERPL-MCNC: 12 MG/DL (ref 7–20)
CALCIUM SERPL-MCNC: 8.6 MG/DL (ref 8.3–10.6)
CHLORIDE SERPL-SCNC: 97 MMOL/L (ref 99–110)
CO2 SERPL-SCNC: 24 MMOL/L (ref 21–32)
CREAT SERPL-MCNC: 0.6 MG/DL (ref 0.9–1.3)
DEPRECATED RDW RBC AUTO: 14.2 % (ref 12.4–15.4)
EKG ATRIAL RATE: 73 BPM
EKG DIAGNOSIS: NORMAL
EKG P AXIS: 71 DEGREES
EKG P-R INTERVAL: 228 MS
EKG Q-T INTERVAL: 444 MS
EKG QRS DURATION: 136 MS
EKG QTC CALCULATION (BAZETT): 489 MS
EKG R AXIS: 258 DEGREES
EKG T AXIS: 65 DEGREES
EKG VENTRICULAR RATE: 73 BPM
EOSINOPHIL # BLD: 0.1 K/UL (ref 0–0.6)
EOSINOPHIL NFR BLD: 2.3 %
GFR SERPLBLD CREATININE-BSD FMLA CKD-EPI: >60 ML/MIN/{1.73_M2}
GLUCOSE BLD-MCNC: 224 MG/DL (ref 70–99)
GLUCOSE BLD-MCNC: 229 MG/DL (ref 70–99)
GLUCOSE BLD-MCNC: 238 MG/DL (ref 70–99)
GLUCOSE BLD-MCNC: 289 MG/DL (ref 70–99)
GLUCOSE BLD-MCNC: 336 MG/DL (ref 70–99)
GLUCOSE BLD-MCNC: 377 MG/DL (ref 70–99)
GLUCOSE SERPL-MCNC: 264 MG/DL (ref 70–99)
HCT VFR BLD AUTO: 41.7 % (ref 40.5–52.5)
HGB BLD-MCNC: 14.2 G/DL (ref 13.5–17.5)
INR PPP: 1.45 (ref 0.84–1.16)
LYMPHOCYTES # BLD: 1.9 K/UL (ref 1–5.1)
LYMPHOCYTES NFR BLD: 29.7 %
MAGNESIUM SERPL-MCNC: 1.9 MG/DL (ref 1.8–2.4)
MCH RBC QN AUTO: 29.6 PG (ref 26–34)
MCHC RBC AUTO-ENTMCNC: 34 G/DL (ref 31–36)
MCV RBC AUTO: 87 FL (ref 80–100)
MONOCYTES # BLD: 0.7 K/UL (ref 0–1.3)
MONOCYTES NFR BLD: 11.8 %
NEUTROPHILS # BLD: 3.5 K/UL (ref 1.7–7.7)
NEUTROPHILS NFR BLD: 55.7 %
PERFORMED ON: ABNORMAL
PLATELET # BLD AUTO: 118 K/UL (ref 135–450)
PMV BLD AUTO: 9.1 FL (ref 5–10.5)
POTASSIUM SERPL-SCNC: 3.9 MMOL/L (ref 3.5–5.1)
PROT SERPL-MCNC: 7.6 G/DL (ref 6.4–8.2)
PROTHROMBIN TIME: 17.6 SEC (ref 11.5–14.8)
RBC # BLD AUTO: 4.8 M/UL (ref 4.2–5.9)
SODIUM SERPL-SCNC: 130 MMOL/L (ref 136–145)
TROPONIN, HIGH SENSITIVITY: 30 NG/L (ref 0–22)
VANCOMYCIN TROUGH SERPL-MCNC: 9.4 UG/ML (ref 10–20)
WBC # BLD AUTO: 6.4 K/UL (ref 4–11)

## 2024-01-12 PROCEDURE — 94640 AIRWAY INHALATION TREATMENT: CPT

## 2024-01-12 PROCEDURE — 99233 SBSQ HOSP IP/OBS HIGH 50: CPT | Performed by: INTERNAL MEDICINE

## 2024-01-12 PROCEDURE — 6360000002 HC RX W HCPCS: Performed by: INTERNAL MEDICINE

## 2024-01-12 PROCEDURE — 85025 COMPLETE CBC W/AUTO DIFF WBC: CPT

## 2024-01-12 PROCEDURE — 80076 HEPATIC FUNCTION PANEL: CPT

## 2024-01-12 PROCEDURE — 3700000001 HC ADD 15 MINUTES (ANESTHESIA): Performed by: PODIATRIST

## 2024-01-12 PROCEDURE — 36415 COLL VENOUS BLD VENIPUNCTURE: CPT

## 2024-01-12 PROCEDURE — 2500000003 HC RX 250 WO HCPCS: Performed by: NURSE ANESTHETIST, CERTIFIED REGISTERED

## 2024-01-12 PROCEDURE — 84484 ASSAY OF TROPONIN QUANT: CPT

## 2024-01-12 PROCEDURE — 83735 ASSAY OF MAGNESIUM: CPT

## 2024-01-12 PROCEDURE — 94761 N-INVAS EAR/PLS OXIMETRY MLT: CPT

## 2024-01-12 PROCEDURE — 7100000010 HC PHASE II RECOVERY - FIRST 15 MIN: Performed by: PODIATRIST

## 2024-01-12 PROCEDURE — 93306 TTE W/DOPPLER COMPLETE: CPT

## 2024-01-12 PROCEDURE — 80202 ASSAY OF VANCOMYCIN: CPT

## 2024-01-12 PROCEDURE — 6370000000 HC RX 637 (ALT 250 FOR IP): Performed by: INTERNAL MEDICINE

## 2024-01-12 PROCEDURE — 2700000000 HC OXYGEN THERAPY PER DAY

## 2024-01-12 PROCEDURE — 2580000003 HC RX 258: Performed by: PODIATRIST

## 2024-01-12 PROCEDURE — 6360000002 HC RX W HCPCS: Performed by: PODIATRIST

## 2024-01-12 PROCEDURE — 3600000012 HC SURGERY LEVEL 2 ADDTL 15MIN: Performed by: PODIATRIST

## 2024-01-12 PROCEDURE — 6370000000 HC RX 637 (ALT 250 FOR IP): Performed by: ANESTHESIOLOGY

## 2024-01-12 PROCEDURE — 7100000011 HC PHASE II RECOVERY - ADDTL 15 MIN: Performed by: PODIATRIST

## 2024-01-12 PROCEDURE — 73620 X-RAY EXAM OF FOOT: CPT

## 2024-01-12 PROCEDURE — 88305 TISSUE EXAM BY PATHOLOGIST: CPT

## 2024-01-12 PROCEDURE — 93005 ELECTROCARDIOGRAM TRACING: CPT | Performed by: INTERNAL MEDICINE

## 2024-01-12 PROCEDURE — 3600000002 HC SURGERY LEVEL 2 BASE: Performed by: PODIATRIST

## 2024-01-12 PROCEDURE — 2709999900 HC NON-CHARGEABLE SUPPLY: Performed by: PODIATRIST

## 2024-01-12 PROCEDURE — 6370000000 HC RX 637 (ALT 250 FOR IP): Performed by: PODIATRIST

## 2024-01-12 PROCEDURE — 80048 BASIC METABOLIC PNL TOTAL CA: CPT

## 2024-01-12 PROCEDURE — 2500000003 HC RX 250 WO HCPCS: Performed by: PODIATRIST

## 2024-01-12 PROCEDURE — 85610 PROTHROMBIN TIME: CPT

## 2024-01-12 PROCEDURE — 1200000000 HC SEMI PRIVATE

## 2024-01-12 PROCEDURE — 88311 DECALCIFY TISSUE: CPT

## 2024-01-12 PROCEDURE — 93010 ELECTROCARDIOGRAM REPORT: CPT | Performed by: INTERNAL MEDICINE

## 2024-01-12 PROCEDURE — 3700000000 HC ANESTHESIA ATTENDED CARE: Performed by: PODIATRIST

## 2024-01-12 PROCEDURE — 2580000003 HC RX 258: Performed by: NURSE ANESTHETIST, CERTIFIED REGISTERED

## 2024-01-12 PROCEDURE — 6360000002 HC RX W HCPCS: Performed by: NURSE ANESTHETIST, CERTIFIED REGISTERED

## 2024-01-12 PROCEDURE — 2580000003 HC RX 258: Performed by: INTERNAL MEDICINE

## 2024-01-12 RX ORDER — INSULIN LISPRO 100 [IU]/ML
0-4 INJECTION, SOLUTION INTRAVENOUS; SUBCUTANEOUS NIGHTLY
Status: DISCONTINUED | OUTPATIENT
Start: 2024-01-12 | End: 2024-01-17 | Stop reason: HOSPADM

## 2024-01-12 RX ORDER — SODIUM CHLORIDE 0.9 % (FLUSH) 0.9 %
5-40 SYRINGE (ML) INJECTION PRN
Status: DISCONTINUED | OUTPATIENT
Start: 2024-01-12 | End: 2024-01-12 | Stop reason: HOSPADM

## 2024-01-12 RX ORDER — MEPERIDINE HYDROCHLORIDE 25 MG/ML
12.5 INJECTION INTRAMUSCULAR; INTRAVENOUS; SUBCUTANEOUS EVERY 5 MIN PRN
Status: DISCONTINUED | OUTPATIENT
Start: 2024-01-12 | End: 2024-01-12 | Stop reason: HOSPADM

## 2024-01-12 RX ORDER — LIDOCAINE HYDROCHLORIDE 10 MG/ML
INJECTION, SOLUTION EPIDURAL; INFILTRATION; INTRACAUDAL; PERINEURAL PRN
Status: DISCONTINUED | OUTPATIENT
Start: 2024-01-12 | End: 2024-01-12

## 2024-01-12 RX ORDER — SODIUM CHLORIDE, SODIUM LACTATE, POTASSIUM CHLORIDE, CALCIUM CHLORIDE 600; 310; 30; 20 MG/100ML; MG/100ML; MG/100ML; MG/100ML
INJECTION, SOLUTION INTRAVENOUS CONTINUOUS PRN
Status: DISCONTINUED | OUTPATIENT
Start: 2024-01-12 | End: 2024-01-12 | Stop reason: SDUPTHER

## 2024-01-12 RX ORDER — BUPIVACAINE HYDROCHLORIDE 5 MG/ML
INJECTION, SOLUTION EPIDURAL; INTRACAUDAL PRN
Status: DISCONTINUED | OUTPATIENT
Start: 2024-01-12 | End: 2024-01-12

## 2024-01-12 RX ORDER — OXYCODONE HYDROCHLORIDE 5 MG/1
10 TABLET ORAL PRN
Status: COMPLETED | OUTPATIENT
Start: 2024-01-12 | End: 2024-01-12

## 2024-01-12 RX ORDER — ONDANSETRON 2 MG/ML
4 INJECTION INTRAMUSCULAR; INTRAVENOUS
Status: DISCONTINUED | OUTPATIENT
Start: 2024-01-12 | End: 2024-01-12 | Stop reason: HOSPADM

## 2024-01-12 RX ORDER — MIDAZOLAM HYDROCHLORIDE 1 MG/ML
INJECTION INTRAMUSCULAR; INTRAVENOUS PRN
Status: DISCONTINUED | OUTPATIENT
Start: 2024-01-12 | End: 2024-01-12 | Stop reason: SDUPTHER

## 2024-01-12 RX ORDER — ALBUTEROL SULFATE 90 UG/1
2 AEROSOL, METERED RESPIRATORY (INHALATION)
Status: DISCONTINUED | OUTPATIENT
Start: 2024-01-12 | End: 2024-01-17 | Stop reason: HOSPADM

## 2024-01-12 RX ORDER — PROPOFOL 10 MG/ML
INJECTION, EMULSION INTRAVENOUS PRN
Status: DISCONTINUED | OUTPATIENT
Start: 2024-01-12 | End: 2024-01-12 | Stop reason: SDUPTHER

## 2024-01-12 RX ORDER — OXYCODONE HYDROCHLORIDE 5 MG/1
5 TABLET ORAL PRN
Status: COMPLETED | OUTPATIENT
Start: 2024-01-12 | End: 2024-01-12

## 2024-01-12 RX ORDER — INSULIN LISPRO 100 [IU]/ML
0-16 INJECTION, SOLUTION INTRAVENOUS; SUBCUTANEOUS
Status: DISCONTINUED | OUTPATIENT
Start: 2024-01-12 | End: 2024-01-17 | Stop reason: HOSPADM

## 2024-01-12 RX ORDER — LIDOCAINE HYDROCHLORIDE 20 MG/ML
INJECTION, SOLUTION INFILTRATION; PERINEURAL PRN
Status: DISCONTINUED | OUTPATIENT
Start: 2024-01-12 | End: 2024-01-12 | Stop reason: SDUPTHER

## 2024-01-12 RX ORDER — SODIUM CHLORIDE 0.9 % (FLUSH) 0.9 %
5-40 SYRINGE (ML) INJECTION EVERY 12 HOURS SCHEDULED
Status: DISCONTINUED | OUTPATIENT
Start: 2024-01-12 | End: 2024-01-12 | Stop reason: HOSPADM

## 2024-01-12 RX ORDER — ENOXAPARIN SODIUM 100 MG/ML
40 INJECTION SUBCUTANEOUS DAILY
Status: DISCONTINUED | OUTPATIENT
Start: 2024-01-13 | End: 2024-01-13

## 2024-01-12 RX ORDER — SODIUM CHLORIDE 9 MG/ML
INJECTION, SOLUTION INTRAVENOUS PRN
Status: DISCONTINUED | OUTPATIENT
Start: 2024-01-12 | End: 2024-01-12 | Stop reason: HOSPADM

## 2024-01-12 RX ORDER — ALBUTEROL SULFATE 90 UG/1
2 AEROSOL, METERED RESPIRATORY (INHALATION) EVERY 4 HOURS PRN
Status: DISCONTINUED | OUTPATIENT
Start: 2024-01-12 | End: 2024-01-17 | Stop reason: HOSPADM

## 2024-01-12 RX ADMIN — VANCOMYCIN HYDROCHLORIDE 750 MG: 750 INJECTION, POWDER, LYOPHILIZED, FOR SOLUTION INTRAVENOUS at 06:22

## 2024-01-12 RX ADMIN — METRONIDAZOLE 500 MG: 500 INJECTION, SOLUTION INTRAVENOUS at 15:06

## 2024-01-12 RX ADMIN — HYDROMORPHONE HYDROCHLORIDE 1 MG: 1 INJECTION, SOLUTION INTRAMUSCULAR; INTRAVENOUS; SUBCUTANEOUS at 19:54

## 2024-01-12 RX ADMIN — CEFEPIME 2000 MG: 2 INJECTION, POWDER, FOR SOLUTION INTRAVENOUS at 22:21

## 2024-01-12 RX ADMIN — BUSPIRONE HYDROCHLORIDE 5 MG: 5 TABLET ORAL at 22:14

## 2024-01-12 RX ADMIN — Medication 2 PUFF: at 02:40

## 2024-01-12 RX ADMIN — HYDROMORPHONE HYDROCHLORIDE 1 MG: 1 INJECTION, SOLUTION INTRAMUSCULAR; INTRAVENOUS; SUBCUTANEOUS at 05:05

## 2024-01-12 RX ADMIN — MIDAZOLAM 2 MG: 1 INJECTION INTRAMUSCULAR; INTRAVENOUS at 10:51

## 2024-01-12 RX ADMIN — Medication 2 PUFF: at 18:35

## 2024-01-12 RX ADMIN — BUSPIRONE HYDROCHLORIDE 5 MG: 5 TABLET ORAL at 00:48

## 2024-01-12 RX ADMIN — HYDROMORPHONE HYDROCHLORIDE 1 MG: 1 INJECTION, SOLUTION INTRAMUSCULAR; INTRAVENOUS; SUBCUTANEOUS at 22:58

## 2024-01-12 RX ADMIN — LIDOCAINE HYDROCHLORIDE 60 MG: 20 INJECTION, SOLUTION INFILTRATION; PERINEURAL at 10:56

## 2024-01-12 RX ADMIN — SODIUM CHLORIDE, POTASSIUM CHLORIDE, SODIUM LACTATE AND CALCIUM CHLORIDE: 600; 310; 30; 20 INJECTION, SOLUTION INTRAVENOUS at 10:51

## 2024-01-12 RX ADMIN — HYDROMORPHONE HYDROCHLORIDE 1 MG: 1 INJECTION, SOLUTION INTRAMUSCULAR; INTRAVENOUS; SUBCUTANEOUS at 16:49

## 2024-01-12 RX ADMIN — VANCOMYCIN HYDROCHLORIDE 750 MG: 750 INJECTION, POWDER, LYOPHILIZED, FOR SOLUTION INTRAVENOUS at 15:06

## 2024-01-12 RX ADMIN — OXYCODONE HYDROCHLORIDE 10 MG: 5 TABLET ORAL at 12:04

## 2024-01-12 RX ADMIN — INSULIN LISPRO 2 UNITS: 100 INJECTION, SOLUTION INTRAVENOUS; SUBCUTANEOUS at 09:27

## 2024-01-12 RX ADMIN — HYDROMORPHONE HYDROCHLORIDE 1 MG: 1 INJECTION, SOLUTION INTRAMUSCULAR; INTRAVENOUS; SUBCUTANEOUS at 13:13

## 2024-01-12 RX ADMIN — PROPOFOL 300 MG: 10 INJECTION, EMULSION INTRAVENOUS at 10:56

## 2024-01-12 RX ADMIN — INSULIN GLARGINE 10 UNITS: 100 INJECTION, SOLUTION SUBCUTANEOUS at 00:47

## 2024-01-12 RX ADMIN — CEFEPIME 2000 MG: 2 INJECTION, POWDER, FOR SOLUTION INTRAVENOUS at 00:36

## 2024-01-12 RX ADMIN — CEFEPIME 2000 MG: 2 INJECTION, POWDER, FOR SOLUTION INTRAVENOUS at 09:32

## 2024-01-12 RX ADMIN — Medication 10 ML: at 22:14

## 2024-01-12 RX ADMIN — HYDROMORPHONE HYDROCHLORIDE 1 MG: 1 INJECTION, SOLUTION INTRAMUSCULAR; INTRAVENOUS; SUBCUTANEOUS at 01:10

## 2024-01-12 RX ADMIN — INSULIN GLARGINE 10 UNITS: 100 INJECTION, SOLUTION SUBCUTANEOUS at 22:14

## 2024-01-12 RX ADMIN — INSULIN LISPRO 8 UNITS: 100 INJECTION, SOLUTION INTRAVENOUS; SUBCUTANEOUS at 16:52

## 2024-01-12 RX ADMIN — METRONIDAZOLE 500 MG: 500 INJECTION, SOLUTION INTRAVENOUS at 22:22

## 2024-01-12 RX ADMIN — INSULIN LISPRO 6 UNITS: 100 INJECTION, SOLUTION INTRAVENOUS; SUBCUTANEOUS at 13:13

## 2024-01-12 RX ADMIN — METRONIDAZOLE 500 MG: 500 INJECTION, SOLUTION INTRAVENOUS at 05:10

## 2024-01-12 RX ADMIN — Medication 2 PUFF: at 08:06

## 2024-01-12 ASSESSMENT — PAIN DESCRIPTION - LOCATION
LOCATION: FOOT
LOCATION: LEG
LOCATION: LEG;FOOT
LOCATION: FOOT

## 2024-01-12 ASSESSMENT — PAIN SCALES - GENERAL
PAINLEVEL_OUTOF10: 10
PAINLEVEL_OUTOF10: 10
PAINLEVEL_OUTOF10: 9
PAINLEVEL_OUTOF10: 10
PAINLEVEL_OUTOF10: 10
PAINLEVEL_OUTOF10: 3
PAINLEVEL_OUTOF10: 10

## 2024-01-12 ASSESSMENT — PAIN DESCRIPTION - ORIENTATION
ORIENTATION: LEFT
ORIENTATION: RIGHT
ORIENTATION: LEFT

## 2024-01-12 ASSESSMENT — ENCOUNTER SYMPTOMS: SHORTNESS OF BREATH: 1

## 2024-01-12 ASSESSMENT — PAIN DESCRIPTION - DESCRIPTORS
DESCRIPTORS: THROBBING
DESCRIPTORS: POUNDING

## 2024-01-12 ASSESSMENT — PAIN - FUNCTIONAL ASSESSMENT: PAIN_FUNCTIONAL_ASSESSMENT: ACTIVITIES ARE NOT PREVENTED

## 2024-01-12 ASSESSMENT — LIFESTYLE VARIABLES: SMOKING_STATUS: 1

## 2024-01-12 NOTE — PROGRESS NOTES
Phase II:  1.  Patient is identified using name and the date of birth.  2.  The patient is free from signs and symptoms of chemical, electrical, laser, radiation, positioning, or transfer/transport injury.  3.  The patient receives appropriate medication(s), safely administered during the Perioperative period.  4.  The patient has wound/tissue perfusion consistent with or improved from baseline levels established preoperatively.  5.  The patient is at or returning to normothermia at the conclusion of the immediate postoperative period.  6.  The patient's fluid, electrolyte, and acid base balances are consistent with or improved from baseline levels established preoperatively.  7.  The patient's pulmonary function is consistent with or improved from baseline levels established preoperatively.  8.  The patient's cardiovascular status is consistent with or improved from baseline levels established preoperatively.  9.  The patient/caregiver demonstrates knowledge of nutritional management related to the operative or other invasive procedure.  10.  The patient/caregiver demonstrates knowledge of medication, pain, and wound management.  11.  The patient participates in the rehabilitation process as applicable.  12.  The patient/caregiver participates in decisions affection his or her Perioperative plan of care.  13.  The patient's care is consistent with the individualized Perioperative plan of care.  14.  The patient's right to privacy is maintained.  15.  The patient is the recipient of competent and ethical care within legal standards of practice.  16.  The patient's value system, lifestyle, ethnicity, and culture are considered, respected, and incorporated in the Perioperative plan of care and understands special services available.  17.  The patient demonstrates and/or reports adequate pain control throughout the the Perioperative period.  18.  The patient's neurological status is consistent with or improved from  baseline levels established preoperatively.  19.  The patient/caregiver demonstrates knowledge of the expected responses to the operative or invasive procedure.  20.  Patient/Caregiver has reduced anxiety.  Interventions- Familiarize with environment and equipment.  21. Other:  22. Other:

## 2024-01-12 NOTE — PROGRESS NOTES
Dr Granger notified via perfect serve regarding high blood sugars and suggestion to increase SSI. Awaiting further orders.

## 2024-01-12 NOTE — ANESTHESIA POSTPROCEDURE EVALUATION
Department of Anesthesiology  Postprocedure Note    Patient: Renato Nagy  MRN: [id number]  YOB: 1970  Date of evaluation: 1/12/2024    Procedure Summary       Date: 01/12/24 Room / Location: [address]    Anesthesia Start: 1051 Anesthesia Stop: 1147    Procedure: PARTIAL LEFT FOOT AMPUTATION (Left: Foot) Diagnosis: Diabetic foot ulcer with osteomyelitis (HCC)    Surgeons: Juan M Quarles DPM Responsible Provider: Ritchie Espino MD    Anesthesia Type: general ASA Status: 3            Anesthesia Type: No value filed.    Radha Phase I:      Radha Phase II: Radha Score: 10    Anesthesia Post Evaluation    Patient location during evaluation: bedside  Patient participation: complete - patient participated  Level of consciousness: awake and alert  Airway patency: patent  Nausea & Vomiting: no nausea  Cardiovascular status: hemodynamically stable  Respiratory status: acceptable  Hydration status: euvolemic  Pain management: adequate      Vitals:    01/12/24 1302 01/12/24 1313 01/12/24 1405 01/12/24 1507   BP: 120/75  (!) 146/82 131/77   Pulse: 79  90 88   Resp: 18 18 18 18   Temp: 97 °F (36.1 °C)  97.7 °F (36.5 °C) 97.1 °F (36.2 °C)   TempSrc: Oral  Oral Oral   SpO2: 97%  98% 98%   Weight:       Height:            No notable events documented.

## 2024-01-12 NOTE — CARE COORDINATION
Case Management Assessment  Initial Evaluation    Date/Time of Evaluation: 1/12/2024 3:19 PM  Assessment Completed by: PAYTON Blair    If patient is discharged prior to next notation, then this note serves as note for discharge by case management.    Patient Name: Renato Nagy                   YOB: 1970  Diagnosis: Osteomyelitis (HCC) [M86.9]  Type 1 diabetes mellitus with ketoacidosis, uncontrolled (HCC) [E10.10]  Elevated liver function tests [R79.89]  Drug-seeking behavior [Z76.5]  Osteomyelitis of left foot, unspecified type (HCC) [M86.9]                   Date / Time: 1/11/2024  3:53 PM    Patient Admission Status: Inpatient   Readmission Risk (Low < 19, Mod (19-27), High > 27): Readmission Risk Score: 19.3    Current PCP: Carrie Mead APRN - NP  PCP verified by CM? (P) Yes    Chart Reviewed: Yes      History Provided by: (P) Patient  Patient Orientation: (P) Alert and Oriented, Person, Place, Situation, Self    Patient Cognition: (P) Alert    Hospitalization in the last 30 days (Readmission):  No    If yes, Readmission Assessment in CM Navigator will be completed.    Advance Directives:      Code Status: Full Code   Patient's Primary Decision Maker is: (P) Legal Next of Kin      Discharge Planning:    Patient lives with: (P) Other (Comment) (ex girlfriend and her mother) Type of Home: (P) House  Primary Care Giver: (P) Self  Patient Support Systems include: (P) Spouse/Significant Other, Family Members   Current Financial resources: (P) None  Current community resources: (P) None  Current services prior to admission: (P) None            Current DME:              Type of Home Care services:       ADLS  Prior functional level: (P) Assistance with the following:, Mobility  Current functional level: (P) Assistance with the following:, Mobility, Other (see comment) (he is unsure)    PT AM-PAC:   /24  OT AM-PAC:   /24    Family can provide assistance at DC: (P) Other (comment) (can help  choice list with basic dialogue that supports the patient's individualized plan of care/goals and shares the quality data associated with the providers was provided to:     Patient Representative Name:       The Patient and/or Patient Representative Agree with the Discharge Plan?      PAYTON Blair  Case Management Department  Ph: [phone] Fax:

## 2024-01-12 NOTE — PROGRESS NOTES
Transport here to [facility] ept back to floor. Pt is a/o in stable condition.

## 2024-01-12 NOTE — BRIEF OP NOTE
Brief Postoperative Note      Patient: Renato Nagy  YOB: 1970  MRN: [id number]    Date of Procedure: 1/12/2024    Pre-Op Diagnosis Codes:     * Diabetic foot ulcer with osteomyelitis (HCC) [E11.621, E11.69, L97.509, M86.9]    Post-Op Diagnosis: Same       Procedure(s):  PARTIAL LEFT FOOT AMPUTATION    Surgeon(s):  Juan M Quarles DPM    Assistant:  Surgical Assistant: Jennifer Monreal    Anesthesia: General    Estimated Blood Loss (mL): less than 100     Complications: None    Specimens:   ID Type Source Tests Collected by Time Destination   A : left partial foot Tissue Tissue SURGICAL PATHOLOGY Juan M Quarles DPM 1/12/2024 1121        Implants:  * No implants in log *      Drains: * No LDAs found *    Findings: ulcers left foot, cellulitis      Electronically signed by Juan M Quarles DPM on 1/12/2024 at 11:43 AM

## 2024-01-12 NOTE — CONSULTS
CONSULT    Admit Date:  1/11/2024    Subjective:  53 y.o. male who is seen for evaluation of cellulitis and ulcer and osteomyelitis left foot. States has history of multiple wounds on his toes. Was sent to the hospital to have amputation of his foot. States last time he had a lot of wounds on the right foot and ended up with BKA.  Wants to avoid that. States the foot is painful.  But also states he has whole body pain.  Wants morphine every 2 hours. States had that for his BKA and that is what cured him.     Has seen Dr. Otero for this foot wound.     Patient is deaf.  He agreed to communicate with voice and written notes.     Past Medical History:        Diagnosis Date    CHF (congestive heart failure) ([facility])     COPD (chronic obstructive pulmonary disease) ([facility])     Diabetes ([facility])        Past Surgical History:        Procedure Laterality Date    LEG AMPUTATION BELOW KNEE Right     PACEMAKER INSERTION         Current Medications:     albuterol sulfate HFA  2 puff Inhalation BID RT    insulin glargine  10 Units SubCUTAneous Nightly    metoprolol succinate  12.5 mg Oral Daily    midodrine  5 mg Oral TID    pantoprazole  40 mg Oral BID    busPIRone  5 mg Oral BID    sodium chloride flush  5-40 mL IntraVENous 2 times per day    cefepime  2,000 mg IntraVENous Q12H    insulin lispro  0-8 Units SubCUTAneous TID WC    insulin lispro  0-4 Units SubCUTAneous Nightly    metroNIDAZOLE  500 mg IntraVENous Q8H    HYDROmorphone  0.5 mg IntraVENous Once    vancomycin  1,000 mg IntraVENous Once    vancomycin  750 mg IntraVENous Q8H       Allergies:  Azithromycin and Statins    Social History:    Social History     Tobacco Use    Smoking status: Every Day     Current packs/day: 1.00     Types: Cigarettes    Smokeless tobacco: Never   Vaping Use    Vaping Use: Never used   Substance Use Topics    Alcohol use: Never    Drug use: Never       Family History:   History reviewed. No pertinent family history.      Objective:   /80    dorsal aspect digits 2 and 3 left with serous drainage noted. Mild erythema and edema noted.   Right BKA        Assessment:  Patient Active Problem List   Diagnosis Code    Acute on chronic congestive heart failure, unspecified heart failure type ([facility]) I50.9    Acute pulmonary edema ([facility]) J81.0    Below-knee amputation of right lower extremity ([facility]) S88.111A    Cellulitis of right lower extremity L03.115    QT prolongation R94.31    Dyspnea R06.00    Cirrhosis of liver with ascites ([facility]) K74.60, R18.8    Generalized abdominal pain R10.84    Pleural effusion J90    Other ascites R18.8    Abdominal pain R10.9    Congestive heart failure ([facility]) I50.9    PAD (peripheral artery disease) ([facility]) I73.9    Unable to care for self Z78.9    Coronary artery disease without angina pectoris I25.10    Type 2 diabetes mellitus without complication, without long-term current use of insulin ([facility]) E11.9    Abdominal pain, chronic, epigastric R10.13, G89.29    Chest pain R07.9    Osteomyelitis ([facility]) M86.9     Cellulitis left LE secondary to diabetes mellitus   diabetic foot ulcer secondary to peripheral neuropathy   diabetes mellitus with peripheral neuropathy   Osteomyelitis left foot  Right BKA    Plan  Patient examined.  Reviewed labs, notes and imaging.   Continue IV antibiotics. Will obtain bone for pathology and culture during surgery.   Discussed hallux amputation alone but patient wants to proceed with TMA in order to eliminate the other toes since they keep getting sores as well.   Discussed risks, complications, alternatives and benefits with patient. Understands chance of nonhealing wound, infection, need for further surgery, loss of limb or life. Questions answered.   Has been NPO. Upset that he hasn't eaten. Explained reasoning for not eating before surgery.  Discussed pain medication with patient. Explained that the hospitalist is controlling his pain medication. He can discuss in more detail with them later today.

## 2024-01-12 NOTE — CONSULTS
Pharmacy Note  Vancomycin Consult    Renato Nagy is a 53 y.o. male started on Vancomycin for SSTI/Osteomyelitis ; consult received from Dr. Granger to manage therapy. Also receiving the following antibiotics: Cefepime.    Allergies:  Azithromycin and Statins     Tmax:     Micro:     Recent Labs     01/11/24  1702   CREATININE 0.8*       Recent Labs     01/11/24  1702   WBC 6.8       Estimated Creatinine Clearance: 100 mL/min (A) (based on SCr of 0.8 mg/dL (L)).    No intake or output data in the 24 hours ending 01/11/24 2126    Wt Readings from Last 1 Encounters:   01/11/24 68 kg (150 lb)         Body mass index is 20.92 kg/m².    Loading dose (critically ill or in ICU, require dialysis or renal replacement therapy): Vancomycin 25 mg/kg IVPB x 1 (maximum 2500 mg).  Maintenance dose: 10-20 mg/kg (maximum: 2000 mg/dose and 4500 mg/day) starting at the next dosing interval determined by renal function  Pulse dose: fluctuating renal function, MIKE, ESRD  CRRT: 7.5-10 mg/kg q12h   Goal Vancomycin trough: 15-20 mcg/mL   Goal Vancomycin AUC: 400-600     Assessment/Plan:  Will initiate Vancomycin with a one time loading dose of 1000 mg x1, followed by 750 mg IV every 8 hours. Calculated Vancomycin AUC = 490 mg/L*h with an estimated steady-state vancomycin trough = 8.2 mcg/mL. Vancomycin level ordered for 1/12 at 2100. Timing of trough level will be determined based on culture results, renal function, and clinical response.     Thank you for the consult.  Juan M Goldsmith, PharmD  1/11/2024 9:27 PM

## 2024-01-12 NOTE — DISCHARGE INSTRUCTIONS
Heart Failure Resources:  Heart Failure Interactive Workbook:  Go to https://[employer]/publication/?r=548339 for a Free Heart Failure Interactive Workbook provided by The American [employer] Association. This interactive workbook will provide information on Healthier Living with Heart Failure. Please copy and paste link into search bar. Use your mouse to scroll through the pages.    HF [place] denise:   Heart Failure Free smart phone denise available for iPhone and Android download. Use your phone to track sodium intake, fluid intake, symptoms, and weight.     Low Sodium Diet / Recipes:  Go to www.[email or web address] website for “renal” diet which is Low Sodium! [employer] is a dialysis company, but this website offers free seasonal cookbooks. Each quarter, they will release 25-30 new recipes with a breakdown of calories, sodium, and glucose. You can also go to [employer]/recipes website for free recipes.     Discharge Instruction Video:  Scan the QR code below with your camera and click the canva.com link to open the video and watch educational information on Heart Failure and Medications from one of our nurses.   [email or web address]/edit    Home Exercise Program:   Identification of Green/Yellow/Red zones:  You should be able to identify when you feel good (green zone), if you have 1-2 symptoms of HF (yellow zone), or if you are in need of medical attention (red zone).  In your CHF education folder you were provided a “stop light tool” to outline this information.     We want to you to rate your exertion levels:    Our therapy team has discussed means of identification with you such as the \"Natalia scale.\"  The Natalia rating scale ranges from 6 to 20, where 6 means \"no exertion at all\" and 20 means \"maximal exertion.\" The goal is to use this to gauge how much effort it is taking for you to do your normal daily tasks.   You should be able to recognize when too much exertion is

## 2024-01-12 NOTE — PROGRESS NOTES
HEART FAILURE CARE PLAN:    Comorbidities Reviewed: Yes   Patient has a past medical history of CHF (congestive heart failure) ([facility]), COPD (chronic obstructive pulmonary disease) ([facility]), and Diabetes ([facility]).     ECHOCARDIOGRAM Reviewed: Yes   Patient's Ejection Fraction (EF) is less than 40%    Weights Reviewed: Yes   Admission weight: 68 kg (150 lb)   Wt Readings from Last 3 Encounters:   01/12/24 70.4 kg (155 lb 3.2 oz)   09/10/23 69.3 kg (152 lb 11.2 oz)   08/27/23 80.3 kg (177 lb)     Intake & Output Reviewed: Yes     Intake/Output Summary (Last 24 hours) at 1/12/2024 1737  Last data filed at 1/12/2024 1727  Gross per 24 hour   Intake 480 ml   Output 550 ml   Net -70 ml     Medications Reviewed: Yes   SCHEDULED HOSPITAL MEDICATIONS:   albuterol sulfate HFA  2 puff Inhalation BID RT    insulin glargine  10 Units SubCUTAneous Nightly    metoprolol succinate  12.5 mg Oral Daily    midodrine  5 mg Oral TID    pantoprazole  40 mg Oral BID    busPIRone  5 mg Oral BID    sodium chloride flush  5-40 mL IntraVENous 2 times per day    cefepime  2,000 mg IntraVENous Q12H    insulin lispro  0-8 Units SubCUTAneous TID WC    insulin lispro  0-4 Units SubCUTAneous Nightly    metroNIDAZOLE  500 mg IntraVENous Q8H    HYDROmorphone  0.5 mg IntraVENous Once    vancomycin  1,000 mg IntraVENous Once    vancomycin  750 mg IntraVENous Q8H     ACE/ARB/ARNI is REQUIRED for EF </= 40% SYSTOLIC FAILURE:   ACE:: None  ARB:: None  ARNI:: None    Evidenced-Based Beta Blocker is REQUIRED for EF </= 40% SYSTOLIC FAILURE:   :: Metoprolol SUCCinate- Toprol XL    Diuretics:  :: None    Diet Reviewed: Yes   ADULT DIET; Regular; 4 carb choices (60 gm/meal)    Goal of Care Reviewed: Yes   Patient and/or Family's stated Goal of Care this Admission: increase activity tolerance and better understand heart failure and disease management prior to discharge.

## 2024-01-12 NOTE — PROGRESS NOTES
RT Inhaler-Nebulizer Bronchodilator Protocol Note    There is a bronchodilator order in the chart from a provider indicating to follow the RT Bronchodilator Protocol and there is an “Initiate RT Inhaler-Nebulizer Bronchodilator Protocol” order as well (see protocol at bottom of note).    CXR Findings:  XR CHEST PORTABLE    Result Date: 1/11/2024  Lungs are clear       The findings from the last RT Protocol Assessment were as follows:   History Pulmonary Disease: Chronic pulmonary disease  Respiratory Pattern: Regular pattern and RR 12-20 bpm  Breath Sounds: Slightly diminished and/or crackles  Cough: Strong, spontaneous, non-productive  Indication for Bronchodilator Therapy: Decreased or absent breath sounds  Bronchodilator Assessment Score: 4    Aerosolized bronchodilator medication orders have been revised according to the RT Inhaler-Nebulizer Bronchodilator Protocol below.    Respiratory Therapist to perform RT Therapy Protocol Assessment initially then follow the protocol.  Repeat RT Therapy Protocol Assessment PRN for score 0-3 or on second treatment, BID, and PRN for scores above 3.    No Indications - adjust the frequency to every 6 hours PRN wheezing or bronchospasm, if no treatments needed after 48 hours then discontinue using Per Protocol order mode.     If indication present, adjust the RT bronchodilator orders based on the Bronchodilator Assessment Score as indicated below.  Use Inhaler orders unless patient has one or more of the following: on home nebulizer, not able to hold breath for 10 seconds, is not alert and oriented, cannot activate and use MDI correctly, or respiratory rate 25 breaths per minute or more, then use the equivalent nebulizer order(s) with same Frequency and PRN reasons based on the score.  If a patient is on this medication at home then do not decrease Frequency below that used at home.    0-3 - enter or revise RT bronchodilator order(s) to equivalent RT Bronchodilator order with

## 2024-01-12 NOTE — PROGRESS NOTES
[place] [facility]    Daily Progress Note for 2024 1:37 PM 0212/0212-01  Renato Nagy : 1970 Age: 53 y.o. Sex: male  Length of Stay:  1    Interval History:      CC: F/U Wound Check (Pt states he was sent in to have left foot amputated )    Subjective:       Patient reports he is doing fine. He was seen briefly as he is about to get echocardiogram.     Objective:     Vitals:    24 1204 24 1215 24 1302 24 1313   BP:  108/71 120/75    Pulse:  72 79    Resp: 10 15 18 18   Temp:  97.6 °F (36.4 °C) 97 °F (36.1 °C)    TempSrc:  Temporal Oral    SpO2:  96% 97%    Weight:       Height:              Intake/Output Summary (Last 24 hours) at 2024 1337  Last data filed at 2024 1212  Gross per 24 hour   Intake 240 ml   Output 0 ml   Net 240 ml     Body mass index is 21.65 kg/m².    Physical Exam:  General: Cooperative, pleasant. Pt is deaf, uses sign language  HEENT:  Head: normocephalic,atraumatic, anicteric sclera, clear conjunctiva  Neck: Normal size, Jugular venous pulsations: normal  Respiratory:unlabored breathing, clear to auscultation with no crackles, wheezes rhonchi  Heart: Regular rate and rhythm, S1, S2-normal, No murmurs  Abdomen: soft, nondistended, nontender, normoactive bowel sounds,  Neurological/Psych: Alert, no gross deficits apart from hearing impairment. Mood and affect appropriate.  Skin: No obvious rashes    Extremities:  no edema, Pedal pulses 2+ bilaterally    Scheduled Medications:  albuterol sulfate HFA, 2 puff, BID RT  insulin glargine, 10 Units, Nightly  metoprolol succinate, 12.5 mg, Daily  midodrine, 5 mg, TID  pantoprazole, 40 mg, BID  busPIRone, 5 mg, BID  sodium chloride flush, 5-40 mL, 2 times per day  cefepime, 2,000 mg, Q12H  insulin lispro, 0-8 Units, TID WC  insulin lispro, 0-4 Units, Nightly  metroNIDAZOLE, 500 mg, Q8H  HYDROmorphone, 0.5 mg, Once  vancomycin, 1,000 mg, Once  vancomycin, 750 mg, Q8H        PRN  subcutaneous air is identified.             Microbiology: Cultures reviewed.   @[facility]@  [unfilled]    Lab Results   Component Value Date    LABA1C 7.1 08/24/2023      Body mass index is 21.65 kg/m².       Impression/Plan:      Acute first distal phalanx left foot osteomyelitis:  IV Vanco cefepime and Flagyl  Podiatry consulted, appreciated  OR today amputation     Uncontrolled type 2 Diabetes: Reviewed patient's medications. Plan is to hold oral medications and continued with reduced home dose of long acting and Insulin sliding scale     Transaminitis:  Monitor     GERD: Continue home medication  History of heart failure: Not in exacerbation  Chest x-ray, echo ordered     DVT Prophylaxis: Lovenox    Management discussed with RN,     Electronically signed by: Ivon Granger DO, 1/12/2024 1:37 PM

## 2024-01-12 NOTE — PROGRESS NOTES
01/12/24 1431   Encounter Summary   Encounter Overview/Reason  Initial Encounter   Service Provided For: Patient   Referral/Consult From: Rounding   Last Encounter  01/12/24  ( visited and prayed with patient)   Assessment/Intervention/Outcome   Intervention Prayer (assurance of)/[place]

## 2024-01-12 NOTE — ANESTHESIA PRE PROCEDURE
Department of Anesthesiology  Preprocedure Note       Name:  Renato Nagy   Age:  53 y.o.  :  1970                                          MRN:  [id number]         Date:  2024      Surgeon: Surgeon(s):  Juan M Quarles DPM    Procedure: Procedure(s):  PARTIAL LEFT FOOT AMPUTATION    Medications prior to admission:   Prior to Admission medications    Medication Sig Start Date End Date Taking? Authorizing Provider   metoprolol succinate (TOPROL XL) 25 MG extended release tablet Take 0.5 tablets by mouth daily 23   Mack Sanchez MD   albuterol sulfate HFA (VENTOLIN HFA) 108 (90 Base) MCG/ACT inhaler Inhale 2 puffs into the lungs 4 times daily as needed for Wheezing 23   Mack Sanchez MD   midodrine (PROAMATINE) 5 MG tablet Take 1 tablet by mouth 3 times daily    Kalpesh Hutton MD   insulin glargine (LANTUS) 100 UNIT/ML injection vial Inject 10 Units into the skin nightly    Kalpesh Hutton MD   insulin aspart (NOVOLOG) 100 UNIT/ML injection vial Inject 1-10 Units into the skin 4 times daily (before meals and nightly)    Kalpesh Hutton MD   mineral oil-hydrophilic petrolatum (AQUAPHOR) ointment Apply topically as needed for Dry Skin Apply topically as needed to left lower extremity    Kalpesh Hutton MD   lactulose (CHRONULAC) 10 GM/15ML solution Take 30 mLs by mouth every 8 hours as needed (maintain 2-3 BM per day) 23   Homa Manuel PA-C   torsemide (DEMADEX) 20 MG tablet Take 1 tablet by mouth daily 8/3/23   Homa Manuel PA-C   spironolactone (ALDACTONE) 25 MG tablet Take 1 tablet by mouth daily 23   Homa Manuel PA-C   pantoprazole (PROTONIX) 40 MG tablet Take 1 tablet by mouth 2 times daily 21   Kalpesh Hutton MD   sucralfate (CARAFATE) 1 GM tablet Take 1 tablet by mouth 4 times daily 22   Kalpesh Hutton MD   ferrous sulfate (IRON 325) 325 (65 Fe) MG tablet Take 1 tablet by mouth 2 times daily (with meals) 23

## 2024-01-12 NOTE — H&P
Hospital Medicine History & Physical      PCP: No primary care provider on file.    Date of Admission: 1/11/2024    Date of Service: Pt seen/examined on 1/11/2024    Pt seen/examined face to face on and admitted as inpatient with expected LOS to be two days but can change depending on diagnostic work up and treatment response.     Chief Complaint:    Chief Complaint   Patient presents with    Wound Check     Pt states he was sent in to have left foot amputated         History Of Present Illness:      53 y.o. male who presented to [facility] with past medical history of HF PEF, COPD presented to the ED with chief complaint of left foot infection    Patient is hearing impaired noncompensable speech and communication was obtained via writing. Patient reported that he has been taking his medications compliant with however has noticed that his left big toe has been worsening swelling that continued to progress and now spreading around the other digits pain.  No known alleviating factor reports that the pain is very worse no associated chest pain abdominal pain or dysuria.        Past Medical History:          Diagnosis Date    CHF (congestive heart failure) ([facility])     COPD (chronic obstructive pulmonary disease) ([facility])        Past Surgical History:          Procedure Laterality Date    LEG AMPUTATION BELOW KNEE Right     PACEMAKER INSERTION         Medications Prior to Admission:      Prior to Admission medications    Medication Sig Start Date End Date Taking? Authorizing Provider   metoprolol succinate (TOPROL XL) 25 MG extended release tablet Take 0.5 tablets by mouth daily 8/22/23   Mack Sanchez MD   albuterol sulfate HFA (VENTOLIN HFA) 108 (90 Base) MCG/ACT inhaler Inhale 2 puffs into the lungs 4 times daily as needed for Wheezing 8/22/23   Mack Sanchez MD   midodrine (PROAMATINE) 5 MG tablet Take 1 tablet by mouth 3 times daily    Provider, MD Kalpesh   insulin glargine (LANTUS) 100    CALCIUM 8.8     Recent Labs     01/11/24  1702   *   *   BILITOT 0.6   ALKPHOS 163*     No results for input(s): \"INR\" in the last 72 hours.  No results for input(s): \"CKTOTAL\", \"TROPONINI\" in the last 72 hours.    Urinalysis:      Lab Results   Component Value Date/Time    NITRU Negative 01/11/2024 07:30 PM    WBCUA 0-2 08/16/2023 09:17 PM    BACTERIA Rare 08/16/2023 09:17 PM    RBCUA 0-2 08/16/2023 09:17 PM    BLOODU Negative 01/11/2024 07:30 PM    SPECGRAV 1.010 01/11/2024 07:30 PM    GLUCOSEU >=1000 01/11/2024 07:30 PM       Radiology:       EKG:  I have reviewed the EKG with the following interpretation:   Pending    .  XR FOOT LEFT (MIN 3 VIEWS)   Final Result   Lucency and loss of cortical definition at the tuft of the 1st distal phalanx   consistent with acute osteomyelitis.  There is overlying soft tissue   swelling.  No subcutaneous air is identified.             ASSESSMENT AND PLAN:    Active Hospital Problems    Diagnosis Date Noted    Osteomyelitis (HCC) [M86.9] 01/11/2024     Acute first distal phalanx left foot osteomyelitis:  IV Vanco cefepime and Flagyl  Podiatry consulted, appreciated    Uncontrolled type 2 Diabetes: Reviewed patient's medications. Plan is to hold oral medications and continued with reduced home dose of long acting and Insulin sliding scale    Transaminitis:  Recheck in the a.m.    GERD: Continue home medication  History of heart failure: Not in exacerbation  Chest x-ray, echo ordered        .Due to the above diagnosis makes the patient higher risk for morbidity and mortality requiring testing and treatment      Discussion with the primary ER physician in regards to symptoms, history, physical exam, diagnosis and treatment, collaborative decision was to admit the patient.    Diet: NPO except meds ordered past midnight    DVT Prophylaxis: Held    Dispo:   Expected LOS of two days         Elsy Roca DO

## 2024-01-12 NOTE — PROGRESS NOTES
RT Inhaler-Nebulizer Bronchodilator Protocol Note    There is a bronchodilator order in the chart from a provider indicating to follow the RT Bronchodilator Protocol and there is an “Initiate RT Inhaler-Nebulizer Bronchodilator Protocol” order as well (see protocol at bottom of note).    CXR Findings:  XR CHEST PORTABLE    Result Date: 1/11/2024  Lungs are clear       The findings from the last RT Protocol Assessment were as follows:   History Pulmonary Disease: Chronic pulmonary disease  Respiratory Pattern: Regular pattern and RR 12-20 bpm  Breath Sounds: Slightly diminished and/or crackles  Cough: Strong, spontaneous, non-productive  Indication for Bronchodilator Therapy: Decreased or absent breath sounds  Bronchodilator Assessment Score: 4    Aerosolized bronchodilator medication orders have been revised according to the RT Inhaler-Nebulizer Bronchodilator Protocol below.    Respiratory Therapist to perform RT Therapy Protocol Assessment initially then follow the protocol.  Repeat RT Therapy Protocol Assessment PRN for score 0-3 or on second treatment, BID, and PRN for scores above 3.    No Indications - adjust the frequency to every 6 hours PRN wheezing or bronchospasm, if no treatments needed after 48 hours then discontinue using Per Protocol order mode.     If indication present, adjust the RT bronchodilator orders based on the Bronchodilator Assessment Score as indicated below.  Use Inhaler orders unless patient has one or more of the following: on home nebulizer, not able to hold breath for 10 seconds, is not alert and oriented, cannot activate and use MDI correctly, or respiratory rate 25 breaths per minute or more, then use the equivalent nebulizer order(s) with same Frequency and PRN reasons based on the score.  If a patient is on this medication at home then do not decrease Frequency below that used at home.    0-3 - enter or revise RT bronchodilator order(s) to equivalent RT Bronchodilator order with  Frequency of every 4 hours PRN for wheezing or increased work of breathing using Per Protocol order mode.        4-6 - enter or revise RT Bronchodilator order(s) to two equivalent RT bronchodilator orders with one order with BID Frequency and one order with Frequency of every 4 hours PRN wheezing or increased work of breathing using Per Protocol order mode.        7-10 - enter or revise RT Bronchodilator order(s) to two equivalent RT bronchodilator orders with one order with TID Frequency and one order with Frequency of every 4 hours PRN wheezing or increased work of breathing using Per Protocol order mode.       11-13 - enter or revise RT Bronchodilator order(s) to one equivalent RT bronchodilator order with QID Frequency and an Albuterol order with Frequency of every 4 hours PRN wheezing or increased work of breathing using Per Protocol order mode.      Greater than 13 - enter or revise RT Bronchodilator order(s) to one equivalent RT bronchodilator order with every 4 hours Frequency and an Albuterol order with Frequency of every 2 hours PRN wheezing or increased work of breathing using Per Protocol order mode.         Electronically signed by Tiana Chao RCP on 1/12/2024 at 2:44 AM

## 2024-01-13 PROBLEM — Z76.5 DRUG-SEEKING BEHAVIOR: Status: ACTIVE | Noted: 2024-01-13

## 2024-01-13 LAB
ALBUMIN SERPL-MCNC: 3.1 G/DL (ref 3.4–5)
ALP SERPL-CCNC: 107 U/L (ref 40–129)
ALT SERPL-CCNC: 132 U/L (ref 10–40)
ANION GAP SERPL CALCULATED.3IONS-SCNC: 8 MMOL/L (ref 3–16)
AST SERPL-CCNC: 144 U/L (ref 15–37)
BASOPHILS # BLD: 0.1 K/UL (ref 0–0.2)
BASOPHILS NFR BLD: 1.4 %
BILIRUB DIRECT SERPL-MCNC: <0.2 MG/DL (ref 0–0.3)
BILIRUB INDIRECT SERPL-MCNC: ABNORMAL MG/DL (ref 0–1)
BILIRUB SERPL-MCNC: 0.5 MG/DL (ref 0–1)
BUN SERPL-MCNC: 12 MG/DL (ref 7–20)
CALCIUM SERPL-MCNC: 8 MG/DL (ref 8.3–10.6)
CHLORIDE SERPL-SCNC: 106 MMOL/L (ref 99–110)
CO2 SERPL-SCNC: 23 MMOL/L (ref 21–32)
CREAT SERPL-MCNC: 0.7 MG/DL (ref 0.9–1.3)
DEPRECATED RDW RBC AUTO: 14.3 % (ref 12.4–15.4)
EOSINOPHIL # BLD: 0.1 K/UL (ref 0–0.6)
EOSINOPHIL NFR BLD: 1.9 %
GFR SERPLBLD CREATININE-BSD FMLA CKD-EPI: >60 ML/MIN/{1.73_M2}
GLUCOSE BLD-MCNC: 206 MG/DL (ref 70–99)
GLUCOSE BLD-MCNC: 227 MG/DL (ref 70–99)
GLUCOSE BLD-MCNC: 240 MG/DL (ref 70–99)
GLUCOSE BLD-MCNC: 246 MG/DL (ref 70–99)
GLUCOSE BLD-MCNC: 259 MG/DL (ref 70–99)
GLUCOSE SERPL-MCNC: 239 MG/DL (ref 70–99)
HCT VFR BLD AUTO: 40.9 % (ref 40.5–52.5)
HGB BLD-MCNC: 13.6 G/DL (ref 13.5–17.5)
LYMPHOCYTES # BLD: 1.2 K/UL (ref 1–5.1)
LYMPHOCYTES NFR BLD: 20.3 %
MCH RBC QN AUTO: 29.6 PG (ref 26–34)
MCHC RBC AUTO-ENTMCNC: 33.3 G/DL (ref 31–36)
MCV RBC AUTO: 88.7 FL (ref 80–100)
MONOCYTES # BLD: 0.7 K/UL (ref 0–1.3)
MONOCYTES NFR BLD: 11.9 %
NEUTROPHILS # BLD: 3.7 K/UL (ref 1.7–7.7)
NEUTROPHILS NFR BLD: 64.5 %
PERFORMED ON: ABNORMAL
PLATELET # BLD AUTO: 105 K/UL (ref 135–450)
PMV BLD AUTO: 8.9 FL (ref 5–10.5)
POTASSIUM SERPL-SCNC: 4.4 MMOL/L (ref 3.5–5.1)
PROT SERPL-MCNC: 6.3 G/DL (ref 6.4–8.2)
RBC # BLD AUTO: 4.61 M/UL (ref 4.2–5.9)
SODIUM SERPL-SCNC: 137 MMOL/L (ref 136–145)
WBC # BLD AUTO: 5.8 K/UL (ref 4–11)

## 2024-01-13 PROCEDURE — 99232 SBSQ HOSP IP/OBS MODERATE 35: CPT

## 2024-01-13 PROCEDURE — 6370000000 HC RX 637 (ALT 250 FOR IP)

## 2024-01-13 PROCEDURE — 6360000002 HC RX W HCPCS: Performed by: REGISTERED NURSE

## 2024-01-13 PROCEDURE — 6370000000 HC RX 637 (ALT 250 FOR IP): Performed by: PODIATRIST

## 2024-01-13 PROCEDURE — 94640 AIRWAY INHALATION TREATMENT: CPT

## 2024-01-13 PROCEDURE — 80048 BASIC METABOLIC PNL TOTAL CA: CPT

## 2024-01-13 PROCEDURE — 6360000002 HC RX W HCPCS: Performed by: PODIATRIST

## 2024-01-13 PROCEDURE — 6370000000 HC RX 637 (ALT 250 FOR IP): Performed by: INTERNAL MEDICINE

## 2024-01-13 PROCEDURE — 1200000000 HC SEMI PRIVATE

## 2024-01-13 PROCEDURE — 36415 COLL VENOUS BLD VENIPUNCTURE: CPT

## 2024-01-13 PROCEDURE — 6360000002 HC RX W HCPCS: Performed by: INTERNAL MEDICINE

## 2024-01-13 PROCEDURE — 2580000003 HC RX 258: Performed by: PODIATRIST

## 2024-01-13 PROCEDURE — 2700000000 HC OXYGEN THERAPY PER DAY

## 2024-01-13 PROCEDURE — 80076 HEPATIC FUNCTION PANEL: CPT

## 2024-01-13 PROCEDURE — 94761 N-INVAS EAR/PLS OXIMETRY MLT: CPT

## 2024-01-13 PROCEDURE — 85025 COMPLETE CBC W/AUTO DIFF WBC: CPT

## 2024-01-13 RX ORDER — FERROUS SULFATE 325(65) MG
325 TABLET ORAL 2 TIMES DAILY WITH MEALS
Status: DISCONTINUED | OUTPATIENT
Start: 2024-01-13 | End: 2024-01-17 | Stop reason: HOSPADM

## 2024-01-13 RX ORDER — MORPHINE SULFATE 4 MG/ML
3 INJECTION, SOLUTION INTRAMUSCULAR; INTRAVENOUS
Status: DISCONTINUED | OUTPATIENT
Start: 2024-01-13 | End: 2024-01-14

## 2024-01-13 RX ORDER — CLOPIDOGREL BISULFATE 75 MG/1
75 TABLET ORAL DAILY
Status: DISCONTINUED | OUTPATIENT
Start: 2024-01-13 | End: 2024-01-17 | Stop reason: HOSPADM

## 2024-01-13 RX ORDER — ASPIRIN 81 MG/1
81 TABLET, CHEWABLE ORAL DAILY
Status: DISCONTINUED | OUTPATIENT
Start: 2024-01-13 | End: 2024-01-13

## 2024-01-13 RX ORDER — ATORVASTATIN CALCIUM 10 MG/1
10 TABLET, FILM COATED ORAL NIGHTLY
Status: DISCONTINUED | OUTPATIENT
Start: 2024-01-13 | End: 2024-01-17 | Stop reason: HOSPADM

## 2024-01-13 RX ORDER — MORPHINE SULFATE 4 MG/ML
6 INJECTION, SOLUTION INTRAMUSCULAR; INTRAVENOUS
Status: DISCONTINUED | OUTPATIENT
Start: 2024-01-13 | End: 2024-01-14

## 2024-01-13 RX ORDER — SUCRALFATE 1 G/1
1 TABLET ORAL 4 TIMES DAILY
Status: DISCONTINUED | OUTPATIENT
Start: 2024-01-13 | End: 2024-01-17 | Stop reason: HOSPADM

## 2024-01-13 RX ADMIN — INSULIN LISPRO 4 UNITS: 100 INJECTION, SOLUTION INTRAVENOUS; SUBCUTANEOUS at 09:19

## 2024-01-13 RX ADMIN — HYDROMORPHONE HYDROCHLORIDE 1 MG: 1 INJECTION, SOLUTION INTRAMUSCULAR; INTRAVENOUS; SUBCUTANEOUS at 18:40

## 2024-01-13 RX ADMIN — METRONIDAZOLE 500 MG: 500 INJECTION, SOLUTION INTRAVENOUS at 05:10

## 2024-01-13 RX ADMIN — INSULIN GLARGINE 10 UNITS: 100 INJECTION, SOLUTION SUBCUTANEOUS at 22:23

## 2024-01-13 RX ADMIN — CEFEPIME 2000 MG: 2 INJECTION, POWDER, FOR SOLUTION INTRAVENOUS at 09:25

## 2024-01-13 RX ADMIN — HYDROMORPHONE HYDROCHLORIDE 1 MG: 1 INJECTION, SOLUTION INTRAMUSCULAR; INTRAVENOUS; SUBCUTANEOUS at 05:05

## 2024-01-13 RX ADMIN — ATORVASTATIN CALCIUM 10 MG: 10 TABLET, FILM COATED ORAL at 22:24

## 2024-01-13 RX ADMIN — METRONIDAZOLE 500 MG: 500 INJECTION, SOLUTION INTRAVENOUS at 22:28

## 2024-01-13 RX ADMIN — HYDROMORPHONE HYDROCHLORIDE 1 MG: 1 INJECTION, SOLUTION INTRAMUSCULAR; INTRAVENOUS; SUBCUTANEOUS at 09:20

## 2024-01-13 RX ADMIN — Medication 2 PUFF: at 08:27

## 2024-01-13 RX ADMIN — SUCRALFATE 1 G: 1 TABLET ORAL at 22:24

## 2024-01-13 RX ADMIN — HYDROMORPHONE HYDROCHLORIDE 1 MG: 1 INJECTION, SOLUTION INTRAMUSCULAR; INTRAVENOUS; SUBCUTANEOUS at 12:27

## 2024-01-13 RX ADMIN — APIXABAN 5 MG: 5 TABLET, FILM COATED ORAL at 22:29

## 2024-01-13 RX ADMIN — BUSPIRONE HYDROCHLORIDE 5 MG: 5 TABLET ORAL at 09:19

## 2024-01-13 RX ADMIN — INSULIN LISPRO 4 UNITS: 100 INJECTION, SOLUTION INTRAVENOUS; SUBCUTANEOUS at 12:03

## 2024-01-13 RX ADMIN — VANCOMYCIN HYDROCHLORIDE 750 MG: 750 INJECTION, POWDER, LYOPHILIZED, FOR SOLUTION INTRAVENOUS at 00:05

## 2024-01-13 RX ADMIN — ENOXAPARIN SODIUM 40 MG: 40 INJECTION SUBCUTANEOUS at 09:19

## 2024-01-13 RX ADMIN — Medication 10 ML: at 09:25

## 2024-01-13 RX ADMIN — BUSPIRONE HYDROCHLORIDE 5 MG: 5 TABLET ORAL at 22:24

## 2024-01-13 RX ADMIN — VANCOMYCIN HYDROCHLORIDE 750 MG: 750 INJECTION, POWDER, LYOPHILIZED, FOR SOLUTION INTRAVENOUS at 14:05

## 2024-01-13 RX ADMIN — VANCOMYCIN HYDROCHLORIDE 750 MG: 750 INJECTION, POWDER, LYOPHILIZED, FOR SOLUTION INTRAVENOUS at 05:08

## 2024-01-13 RX ADMIN — HYDROMORPHONE HYDROCHLORIDE 1 MG: 1 INJECTION, SOLUTION INTRAMUSCULAR; INTRAVENOUS; SUBCUTANEOUS at 01:48

## 2024-01-13 RX ADMIN — METRONIDAZOLE 500 MG: 500 INJECTION, SOLUTION INTRAVENOUS at 12:04

## 2024-01-13 RX ADMIN — Medication 2 PUFF: at 19:54

## 2024-01-13 RX ADMIN — MORPHINE SULFATE 3 MG: 4 INJECTION, SOLUTION INTRAMUSCULAR; INTRAVENOUS at 22:24

## 2024-01-13 RX ADMIN — HYDROMORPHONE HYDROCHLORIDE 1 MG: 1 INJECTION, SOLUTION INTRAMUSCULAR; INTRAVENOUS; SUBCUTANEOUS at 15:46

## 2024-01-13 RX ADMIN — INSULIN LISPRO 8 UNITS: 100 INJECTION, SOLUTION INTRAVENOUS; SUBCUTANEOUS at 18:40

## 2024-01-13 ASSESSMENT — PAIN - FUNCTIONAL ASSESSMENT: PAIN_FUNCTIONAL_ASSESSMENT: ACTIVITIES ARE NOT PREVENTED

## 2024-01-13 ASSESSMENT — PAIN DESCRIPTION - DESCRIPTORS
DESCRIPTORS: POUNDING
DESCRIPTORS: ACHING

## 2024-01-13 ASSESSMENT — PAIN DESCRIPTION - ORIENTATION
ORIENTATION: LEFT
ORIENTATION: LEFT

## 2024-01-13 ASSESSMENT — PAIN DESCRIPTION - LOCATION
LOCATION: FOOT
LOCATION: FOOT

## 2024-01-13 ASSESSMENT — PAIN SCALES - GENERAL
PAINLEVEL_OUTOF10: 9
PAINLEVEL_OUTOF10: 0
PAINLEVEL_OUTOF10: 7
PAINLEVEL_OUTOF10: 10

## 2024-01-13 NOTE — PROGRESS NOTES
RT Inhaler-Nebulizer Bronchodilator Protocol Note    There is a bronchodilator order in the chart from a provider indicating to follow the RT Bronchodilator Protocol and there is an “Initiate RT Inhaler-Nebulizer Bronchodilator Protocol” order as well (see protocol at bottom of note).    CXR Findings:  XR CHEST PORTABLE    Result Date: 1/11/2024  Lungs are clear       The findings from the last RT Protocol Assessment were as follows:   History Pulmonary Disease: (P) Chronic pulmonary disease  Respiratory Pattern: (P) Regular pattern and RR 12-20 bpm  Breath Sounds: (P) Slightly diminished and/or crackles  Cough: (P) Strong, spontaneous, non-productive  Indication for Bronchodilator Therapy: (P) Decreased or absent breath sounds  Bronchodilator Assessment Score: (P) 4    Aerosolized bronchodilator medication orders have been revised according to the RT Inhaler-Nebulizer Bronchodilator Protocol below.    Respiratory Therapist to perform RT Therapy Protocol Assessment initially then follow the protocol.  Repeat RT Therapy Protocol Assessment PRN for score 0-3 or on second treatment, BID, and PRN for scores above 3.    No Indications - adjust the frequency to every 6 hours PRN wheezing or bronchospasm, if no treatments needed after 48 hours then discontinue using Per Protocol order mode.     If indication present, adjust the RT bronchodilator orders based on the Bronchodilator Assessment Score as indicated below.  Use Inhaler orders unless patient has one or more of the following: on home nebulizer, not able to hold breath for 10 seconds, is not alert and oriented, cannot activate and use MDI correctly, or respiratory rate 25 breaths per minute or more, then use the equivalent nebulizer order(s) with same Frequency and PRN reasons based on the score.  If a patient is on this medication at home then do not decrease Frequency below that used at home.    0-3 - enter or revise RT bronchodilator order(s) to equivalent RT  Bronchodilator order with Frequency of every 4 hours PRN for wheezing or increased work of breathing using Per Protocol order mode.        4-6 - enter or revise RT Bronchodilator order(s) to two equivalent RT bronchodilator orders with one order with BID Frequency and one order with Frequency of every 4 hours PRN wheezing or increased work of breathing using Per Protocol order mode.        7-10 - enter or revise RT Bronchodilator order(s) to two equivalent RT bronchodilator orders with one order with TID Frequency and one order with Frequency of every 4 hours PRN wheezing or increased work of breathing using Per Protocol order mode.       11-13 - enter or revise RT Bronchodilator order(s) to one equivalent RT bronchodilator order with QID Frequency and an Albuterol order with Frequency of every 4 hours PRN wheezing or increased work of breathing using Per Protocol order mode.      Greater than 13 - enter or revise RT Bronchodilator order(s) to one equivalent RT bronchodilator order with every 4 hours Frequency and an Albuterol order with Frequency of every 2 hours PRN wheezing or increased work of breathing using Per Protocol order mode.     RT to enter RT Home Evaluation for COPD & MDI Assessment order using Per Protocol order mode.    Electronically signed by Kush Cabrera RCP on 1/13/2024 at 8:30 AM

## 2024-01-13 NOTE — PROGRESS NOTES
PROGRESS NOTE    Admit Date:  1/11/2024    Subjective:  53 y.o. male who is seen for evaluation of cellulitis and ulcer and osteomyelitis left foot. S/P partial left foot amputation 1/12/24. States having pain over his whole body. States needs more pain medication. Upset that he is not getting more pain medication. Believes this is what will heal his foot.   Had seen pain management in the past but missed an appointment.     Has seen Dr. Otero for this foot wound.     Patient is deaf.     video utilized for communication.         Past Medical History:        Diagnosis Date    CHF (congestive heart failure) ([facility])     COPD (chronic obstructive pulmonary disease) ([facility])     Diabetes ([facility])        Past Surgical History:        Procedure Laterality Date    LEG AMPUTATION BELOW KNEE Right     OTHER SURGICAL HISTORY  01/12/2024    PARTIAL LEFT FOOT AMPUTATION - Left    PACEMAKER INSERTION         Current Medications:     albuterol sulfate HFA  2 puff Inhalation BID RT    enoxaparin  40 mg SubCUTAneous Daily    insulin lispro  0-16 Units SubCUTAneous TID WC    insulin lispro  0-4 Units SubCUTAneous Nightly    insulin glargine  10 Units SubCUTAneous Nightly    metoprolol succinate  12.5 mg Oral Daily    midodrine  5 mg Oral TID    pantoprazole  40 mg Oral BID    busPIRone  5 mg Oral BID    sodium chloride flush  5-40 mL IntraVENous 2 times per day    cefepime  2,000 mg IntraVENous Q12H    metroNIDAZOLE  500 mg IntraVENous Q8H    HYDROmorphone  0.5 mg IntraVENous Once    vancomycin  750 mg IntraVENous Q8H       Allergies:  Azithromycin and Statins    Social History:    Social History     Tobacco Use    Smoking status: Every Day     Current packs/day: 1.00     Types: Cigarettes    Smokeless tobacco: Never   Vaping Use    Vaping Use: Never used   Substance Use Topics    Alcohol use: Never    Drug use: Never       Family History:   History reviewed. No pertinent family history.      Objective:   /78   Pulse 79    Temp 96.9 °F (36.1 °C) (Oral)   Resp 18   Ht 1.803 m (5' 11\")   Wt 70.4 kg (155 lb 3.2 oz)   SpO2 97%   BMI 21.65 kg/m²     Data:  CBC:   Recent Labs     01/11/24 1702 01/12/24 0614 01/13/24  0606   WBC 6.8 6.4 5.8   HGB 15.1 14.2 13.6   HCT 44.1 41.7 40.9   MCV 86.4 87.0 88.7   * 118* 105*     BMP:   Recent Labs     01/11/24 1702 01/12/24  0105 01/13/24  0606   * 130* 137   K 4.4 3.9 4.4   CL 92* 97* 106   CO2 23 24 23   BUN 13 12 12   CREATININE 0.8* 0.6* 0.7*     LIVER PROFILE:   Recent Labs     01/11/24 1702 01/12/24 0105 01/13/24 0606   * 146* 144*   * 152* 132*   BILIDIR  --  0.3 <0.2   BILITOT 0.6 0.7 0.5   ALKPHOS 163* 130* 107     PT/INR:   Recent Labs     01/11/24 1702 01/12/24 0105 01/12/24 0614 01/13/24  0606   PROT 8.5* 7.6  --  6.3*   INR  --   --  1.45*  --      HgBA1c:  Lab Results   Component Value Date    LABA1C 7.1 08/24/2023       CRP 16.2 High mg/L     Sed Rate 29 High mm/Hr     SARS-CoV-2 RNA, RT PCR NOT DETECTED       Cultures:     Imaging: xray left foot -   There is soft tissue swelling of the forefoot which is most prominent at the   1st toe.  There is lucency and loss of cortical definition at the tuft of the   1st distal phalanx the.  The joint spaces are unremarkable.  There is no   subcutaneous air.  Elsewhere, no fracture, dislocation or focal bone lesion   is identified.   Impression:  Lucency and loss of cortical definition at the tuft of the 1st distal phalanx   consistent with acute osteomyelitis.  There is overlying soft tissue   swelling.  No subcutaneous air is identified.       CXR -   Heart size is normal  Aorta is normal.  Lungs are normally expanded and   clear. No pleural effusions. Mild spondylosis   Impression:    Lungs are clear     Physical Exam:    Dressing - clean, dry and intact. No proximal erythema or edema. No malodor noted.   Right BKA        Assessment:  Patient Active Problem List   Diagnosis Code    Acute on chronic

## 2024-01-13 NOTE — CONSULTS
Med Rec assistance per Rx:  No current medications listed by our data aggregator [employer]. We could provide the medication last reported filled in Aug if that would be of benefit:  Very limited an sporadic refill history available.    Note - List may not be accurate based on older refill data:  Albuterol inhaler - as directed  Eliquis 5mg BID  ASA EC 81mg daily  Lipitor 10mg daily  Plavix 75mg (filled once [facility] in May  Hydroxyzine 10mg TID PRN? Filled in May 7 day supply 20 tabs  Insulin Glargine - as directed filled in Sept  Lactulose oral soln- as directed  Ativan 0.5mg 4 day supply 24 tabs filled once in sept  Toprolol 12.5mg daily filled in June  Midodrine 5mg TID? Filled once in sept  Nitro 0.4mg SL as directed  Various pain meds morphine, oxycodone, percocet  Protonix 40mg BID filled in sept  Entresto 24/26mg BID filled in Oct  Aldactone 25mg daily filled once in May  Torsemide 20mg daily? filled once in May -   Anoro ellipta inhaler filled in Sept       Patient poc is not taking calls.  Nursing staff will pass a message on if anyone calls to check on this patient to ask if there is anyone we can speak to to ask about current medications.  Nayan Jackson, JOSH PharmD 1/13/2024 2:08 PM

## 2024-01-13 NOTE — CONSULTS
Med Rec assistance per Rx:  No current medications listed by our data aggregator [employer]. We could provide the medication last reported filled in Aug if that would be of benefit:  Very limited an sporadic refill history available.    Note - List may not be accurate based on older refill data:  Albuterol inhaler - as directed  Eliquis 5mg BID  ASA EC 81mg daily  Lipitor 10mg daily  Plavix 75mg (filled once [facility] in May  Hydroxyzine 10mg TID PRN? Filled in May 7 day supply 20 tabs  Insulin Glargine - as directed filled in Sept  Lactulose oral soln- as directed  Ativan 0.5mg 4 day supply 24 tabs filled once in sept  Toprolol 12.5mg daily filled in June  Midodrine 5mg TID? Filled once in sept  Nitro 0.4mg SL as directed  Various pain meds morphine, oxycodone, percocet  Protonix 40mg BID filled in sept  Entresto 24/26mg BID filled in Oct  Aldactone 25mg daily filled once in May  Torsemide 20mg daily? filled once in May -   Anoro ellipta inhaler filled in Sept       Patient poc is not taking calls.  Nursing staff will pass a message on if anyone calls to check on this patient to ask if there is anyone we can speak to to ask about current medications.  Nayan Jackson, JOSH PharmD 1/13/2024 2:08 PM

## 2024-01-13 NOTE — PLAN OF CARE
Problem: Discharge Planning  Goal: Discharge to home or other facility with appropriate resources  1/13/2024 0545 by Rakel Garcia RN  Outcome: Progressing  1/12/2024 1736 by Bharti Okeefe RN  Outcome: Progressing     Problem: Safety - Adult  Goal: Free from fall injury  1/13/2024 0545 by Rakel Garcia RN  Outcome: Progressing  1/12/2024 1736 by Bhatri Okeefe RN  Outcome: Progressing     Problem: Skin/Tissue Integrity  Goal: Absence of new skin breakdown  Description: 1.  Monitor for areas of redness and/or skin breakdown  2.  Assess vascular access sites hourly  3.  Every 4-6 hours minimum:  Change oxygen saturation probe site  4.  Every 4-6 hours:  If on nasal continuous positive airway pressure, respiratory therapy assess nares and determine need for appliance change or resting period.  1/13/2024 0545 by Rakel Garcia RN  Outcome: Progressing  1/12/2024 1736 by Bharti Okeefe RN  Outcome: Progressing     Problem: Chronic Conditions and Co-morbidities  Goal: Patient's chronic conditions and co-morbidity symptoms are monitored and maintained or improved  1/13/2024 0545 by Rakel Garcia RN  Outcome: Progressing  1/12/2024 1736 by Bharti Okeefe RN  Outcome: Progressing     Problem: Pain  Goal: Verbalizes/displays adequate comfort level or baseline comfort level  1/13/2024 0545 by Rakel Garcia RN  Outcome: Progressing  1/12/2024 1736 by Bharti Okeefe RN  Outcome: Progressing

## 2024-01-13 NOTE — PROGRESS NOTES
Attempted to call pt's contact re med reconciliation d/t pt unable to provide a list or any information about his medications. Contact listed has only 1 number available, called and the message stated \"this person cannot accept any calls or messages at this time.\" Made pharmacy department aware.

## 2024-01-13 NOTE — PROGRESS NOTES
1/12  Vanc trough = 9.4 mcg/mL at 2138.  Initial vanc bolus was never given.  Calculated AUC of 474.  Continue current dose and frequency of vancomycin.  Will monitor and adjust dose accordingly.  Mega BourneD  1/12/2024 10:48 PM

## 2024-01-13 NOTE — PROGRESS NOTES
Shift assessment, completed, see flow sheet. Pt is alert and oriented x4. Pt utilizing clipboard and pen to communicate this AM.    VSS. Respirations are easy, even, and unlabored. Bilateral lung sounds are clear with diminished bases.     PIV x1 remains in place and patent with site and dressing C/D/I. Able to utilize urinal as needed.    Pt unhappy that he is not recievng any morphine or percocet here. There is only a current order for dilaudid. Pt states it is not helping the pain \"at all.\" He states that he takes morphine and percocet at home and needs to receive it here as well. Will mention it to hospitalist when they round today.    Call light within reach. Bed in lowest position. Bed alarm on. Will continue to monitor.

## 2024-01-13 NOTE — FLOWSHEET NOTE
01/12/24 1936   Vital Signs   Temp 97.2 °F (36.2 °C)   Temp Source Oral   Pulse 84   Heart Rate Source Monitor   Respirations 16   /81   MAP (Calculated) 96   BP Location Left upper arm   BP Method Automatic   Patient Position Semi livanwlers   Pain Assessment   Pain Assessment 0-10   Pain Level 10   Oxygen Therapy   SpO2 97 %   O2 Device Nasal cannula   O2 Flow Rate (L/min) 2.5 L/min     HS assessment completed. No signs or symptoms of distress noted. Patient tolerated night medications well. Respirations easy and even. Bed in lowest position, bed alarm in place and functioning properly, bed rails x2 up,  Call light within reach.     Bedside Mobility Assessment Tool (BMAT):     Assessment Level 1- Sit and Shake    1. From a semi-reclined position, ask patient to sit up and rotate to a seated position at the side of the bed. Can use the bedrail.    2. Ask patient to reach out and grab your hand and shake making sure patient reaches across his/her midline.   Pass- Patient is able to come to a seated position, maintain core strength. Maintains seated balance while reaching across midline. Move on to Assessment Level 2.     Assessment Level 2- Stretch and Point   1. With patient in seated position at the side of the bed, have patient place both feet on the floor (or stool) with knees no higher than hips.    2. Ask patient to stretch one leg and straighten the knee, then bend the ankle/flex and point the toes. If appropriate, repeat with the other leg.   Fail- Patient is unable to complete task. Patient is MOBILITY LEVEL 2.     Assessment Level 3- Stand   1. Ask patient to elevate off the bed or chair (seated to standing) using an assistive device (cane, bedrail).    2. Patient should be able to raise buttocks off be and hold for a count of five. May repeat once.   Fail- Patient unable to demonstrate standing stability. Patient is MOBILITY LEVEL 3.     Assessment Level 4- Walk   1. Ask patient to march in place at

## 2024-01-13 NOTE — PROGRESS NOTES
Bed side report given to AVINASH Ellington.  used. Pt c/o pain 10/10 in LLE, PRN Dilaudid given per orders. See MAR. VSS. Pt using urinal independently in bed. Pt requesting BSC if he needs to have BM. Instructed patient to call and not get out of bed on his own, also educated pt on being non weight bearing to LLE. BSC placed next to his bed. Call light is within reach. Pt denies further needs at this time. Bed alarm is on.

## 2024-01-13 NOTE — PROGRESS NOTES
Sent perfectserve to Dr. Granger per her request while on the unit re what was needed for the pt. Pt is stating that the PRN dilaudid is not helping his pain much and is requesting IV Morphine or PO Percocet. Message sent to Dr. Granger and will await response.

## 2024-01-13 NOTE — PROGRESS NOTES
PORTABLE   Final Result      Lungs are clear         XR FOOT LEFT (MIN 3 VIEWS)   Final Result   Lucency and loss of cortical definition at the tuft of the 1st distal phalanx   consistent with acute osteomyelitis.  There is overlying soft tissue   swelling.  No subcutaneous air is identified.               Assessment/Plan:  Acute first distal phalanx left foot osteomyelitis  - left foot xray as above  - prn pain control   - blood cx ngtd  - Cefepime, Flagyl, and Vanc D#2  - podiatry consulted  - s/p partial left foot amputation 1/12    DM type 2 - uncontrolled  - SSI and lantus   - carb control diet  - monitor BG     Alcoholic cirrhosis   Hepatitis C   Transaminitis  - LFTs appear chronically elevated   - monitor LFTs    HTN?  - unsure of what pt is on at home  - monitor BP    Chronic Combined CHF  Ischemic cardiomyopathy s/p AICD   - last echo on 1/12/24 with EF of 25%  - monitor daily weights and I&Os    PAF  Secondary hypercoagulable state  - AC on Eliquis     CAD  - s/p PCR to RCA  - on Plavix and statin    COPD  - no AE  - continue home inhalers    Edward's esophagus  - on PPI and carafate    Hypothyroidism  - on synthroid     Anxiety   Bipolar disorder  - continue home regimen     PAD  - s/p R BKA  - on eliquis, statin, plavix     Bilateral deafness  - communicates via writing  - supportive care    Hx of chronic pain and drug seeking behavior     Note osteomyelitis makes the patient higher risk for morbidity and mortality requiring testing and treatment.    Home med rec attempted to be reconciled by pharmacy. Medications reordered to best of ability. Sporadic refill history per pharmacy.     DVT Prophylaxis: Lovenox  Diet: ADULT DIET; Regular; 4 carb choices (60 gm/meal)  Code Status: Full Code    Bharti Connor PA-C  01/13/24

## 2024-01-14 LAB
ALBUMIN SERPL-MCNC: 3.3 G/DL (ref 3.4–5)
ALP SERPL-CCNC: 97 U/L (ref 40–129)
ALT SERPL-CCNC: 122 U/L (ref 10–40)
ANION GAP SERPL CALCULATED.3IONS-SCNC: 8 MMOL/L (ref 3–16)
AST SERPL-CCNC: 142 U/L (ref 15–37)
BASOPHILS # BLD: 0.1 K/UL (ref 0–0.2)
BASOPHILS NFR BLD: 0.9 %
BILIRUB DIRECT SERPL-MCNC: 0.3 MG/DL (ref 0–0.3)
BILIRUB INDIRECT SERPL-MCNC: 0.5 MG/DL (ref 0–1)
BILIRUB SERPL-MCNC: 0.8 MG/DL (ref 0–1)
BUN SERPL-MCNC: 8 MG/DL (ref 7–20)
CALCIUM SERPL-MCNC: 8.3 MG/DL (ref 8.3–10.6)
CHLORIDE SERPL-SCNC: 101 MMOL/L (ref 99–110)
CO2 SERPL-SCNC: 24 MMOL/L (ref 21–32)
CREAT SERPL-MCNC: 0.6 MG/DL (ref 0.9–1.3)
DEPRECATED RDW RBC AUTO: 14 % (ref 12.4–15.4)
EOSINOPHIL # BLD: 0.1 K/UL (ref 0–0.6)
EOSINOPHIL NFR BLD: 2.1 %
GFR SERPLBLD CREATININE-BSD FMLA CKD-EPI: >60 ML/MIN/{1.73_M2}
GLUCOSE BLD-MCNC: 144 MG/DL (ref 70–99)
GLUCOSE BLD-MCNC: 154 MG/DL (ref 70–99)
GLUCOSE BLD-MCNC: 211 MG/DL (ref 70–99)
GLUCOSE BLD-MCNC: 230 MG/DL (ref 70–99)
GLUCOSE BLD-MCNC: 290 MG/DL (ref 70–99)
GLUCOSE BLD-MCNC: 401 MG/DL (ref 70–99)
GLUCOSE BLD-MCNC: 90 MG/DL (ref 70–99)
GLUCOSE SERPL-MCNC: 167 MG/DL (ref 70–99)
HCT VFR BLD AUTO: 41.5 % (ref 40.5–52.5)
HGB BLD-MCNC: 14 G/DL (ref 13.5–17.5)
LYMPHOCYTES # BLD: 1.4 K/UL (ref 1–5.1)
LYMPHOCYTES NFR BLD: 24 %
MCH RBC QN AUTO: 29.4 PG (ref 26–34)
MCHC RBC AUTO-ENTMCNC: 33.8 G/DL (ref 31–36)
MCV RBC AUTO: 87 FL (ref 80–100)
MONOCYTES # BLD: 0.7 K/UL (ref 0–1.3)
MONOCYTES NFR BLD: 11.6 %
NEUTROPHILS # BLD: 3.5 K/UL (ref 1.7–7.7)
NEUTROPHILS NFR BLD: 61.4 %
PERFORMED ON: ABNORMAL
PERFORMED ON: NORMAL
PLATELET # BLD AUTO: 100 K/UL (ref 135–450)
PMV BLD AUTO: 8.9 FL (ref 5–10.5)
POTASSIUM SERPL-SCNC: 3.9 MMOL/L (ref 3.5–5.1)
PROT SERPL-MCNC: 6.5 G/DL (ref 6.4–8.2)
RBC # BLD AUTO: 4.77 M/UL (ref 4.2–5.9)
SODIUM SERPL-SCNC: 133 MMOL/L (ref 136–145)
WBC # BLD AUTO: 5.7 K/UL (ref 4–11)

## 2024-01-14 PROCEDURE — 6370000000 HC RX 637 (ALT 250 FOR IP): Performed by: PODIATRIST

## 2024-01-14 PROCEDURE — 94761 N-INVAS EAR/PLS OXIMETRY MLT: CPT

## 2024-01-14 PROCEDURE — 99233 SBSQ HOSP IP/OBS HIGH 50: CPT

## 2024-01-14 PROCEDURE — 6370000000 HC RX 637 (ALT 250 FOR IP): Performed by: INTERNAL MEDICINE

## 2024-01-14 PROCEDURE — 80076 HEPATIC FUNCTION PANEL: CPT

## 2024-01-14 PROCEDURE — 6360000002 HC RX W HCPCS: Performed by: INTERNAL MEDICINE

## 2024-01-14 PROCEDURE — 80048 BASIC METABOLIC PNL TOTAL CA: CPT

## 2024-01-14 PROCEDURE — 2700000000 HC OXYGEN THERAPY PER DAY

## 2024-01-14 PROCEDURE — 36415 COLL VENOUS BLD VENIPUNCTURE: CPT

## 2024-01-14 PROCEDURE — 94640 AIRWAY INHALATION TREATMENT: CPT

## 2024-01-14 PROCEDURE — 2580000003 HC RX 258: Performed by: PODIATRIST

## 2024-01-14 PROCEDURE — 1200000000 HC SEMI PRIVATE

## 2024-01-14 PROCEDURE — 85025 COMPLETE CBC W/AUTO DIFF WBC: CPT

## 2024-01-14 PROCEDURE — 6370000000 HC RX 637 (ALT 250 FOR IP)

## 2024-01-14 PROCEDURE — 6360000002 HC RX W HCPCS: Performed by: PODIATRIST

## 2024-01-14 PROCEDURE — 6360000002 HC RX W HCPCS: Performed by: REGISTERED NURSE

## 2024-01-14 PROCEDURE — 6370000000 HC RX 637 (ALT 250 FOR IP): Performed by: NURSE PRACTITIONER

## 2024-01-14 RX ORDER — INSULIN GLARGINE 100 [IU]/ML
15 INJECTION, SOLUTION SUBCUTANEOUS NIGHTLY
Status: DISCONTINUED | OUTPATIENT
Start: 2024-01-14 | End: 2024-01-16

## 2024-01-14 RX ORDER — PANTOPRAZOLE SODIUM 40 MG/1
40 TABLET, DELAYED RELEASE ORAL
Status: DISCONTINUED | OUTPATIENT
Start: 2024-01-15 | End: 2024-01-17 | Stop reason: HOSPADM

## 2024-01-14 RX ORDER — GABAPENTIN 300 MG/1
300 CAPSULE ORAL 3 TIMES DAILY
Status: DISCONTINUED | OUTPATIENT
Start: 2024-01-14 | End: 2024-01-17 | Stop reason: HOSPADM

## 2024-01-14 RX ORDER — OXYCODONE AND ACETAMINOPHEN 10; 325 MG/1; MG/1
1 TABLET ORAL EVERY 4 HOURS PRN
Status: DISCONTINUED | OUTPATIENT
Start: 2024-01-14 | End: 2024-01-15

## 2024-01-14 RX ORDER — INSULIN LISPRO 100 [IU]/ML
4 INJECTION, SOLUTION INTRAVENOUS; SUBCUTANEOUS ONCE
Status: COMPLETED | OUTPATIENT
Start: 2024-01-14 | End: 2024-01-14

## 2024-01-14 RX ADMIN — MORPHINE SULFATE 3 MG: 4 INJECTION, SOLUTION INTRAMUSCULAR; INTRAVENOUS at 09:47

## 2024-01-14 RX ADMIN — ATORVASTATIN CALCIUM 10 MG: 10 TABLET, FILM COATED ORAL at 20:24

## 2024-01-14 RX ADMIN — METRONIDAZOLE 500 MG: 500 INJECTION, SOLUTION INTRAVENOUS at 13:03

## 2024-01-14 RX ADMIN — SUCRALFATE 1 G: 1 TABLET ORAL at 20:24

## 2024-01-14 RX ADMIN — GABAPENTIN 300 MG: 300 CAPSULE ORAL at 20:24

## 2024-01-14 RX ADMIN — APIXABAN 5 MG: 5 TABLET, FILM COATED ORAL at 09:47

## 2024-01-14 RX ADMIN — SUCRALFATE 1 G: 1 TABLET ORAL at 16:29

## 2024-01-14 RX ADMIN — INSULIN LISPRO 4 UNITS: 100 INJECTION, SOLUTION INTRAVENOUS; SUBCUTANEOUS at 12:18

## 2024-01-14 RX ADMIN — Medication 2 PUFF: at 08:58

## 2024-01-14 RX ADMIN — INSULIN GLARGINE 15 UNITS: 100 INJECTION, SOLUTION SUBCUTANEOUS at 20:23

## 2024-01-14 RX ADMIN — INSULIN LISPRO 4 UNITS: 100 INJECTION, SOLUTION INTRAVENOUS; SUBCUTANEOUS at 20:24

## 2024-01-14 RX ADMIN — CEFEPIME 2000 MG: 2 INJECTION, POWDER, FOR SOLUTION INTRAVENOUS at 00:41

## 2024-01-14 RX ADMIN — CEFEPIME 2000 MG: 2 INJECTION, POWDER, FOR SOLUTION INTRAVENOUS at 09:46

## 2024-01-14 RX ADMIN — VANCOMYCIN HYDROCHLORIDE 750 MG: 750 INJECTION, POWDER, LYOPHILIZED, FOR SOLUTION INTRAVENOUS at 22:49

## 2024-01-14 RX ADMIN — MORPHINE SULFATE 3 MG: 4 INJECTION, SOLUTION INTRAMUSCULAR; INTRAVENOUS at 06:03

## 2024-01-14 RX ADMIN — BUSPIRONE HYDROCHLORIDE 5 MG: 5 TABLET ORAL at 09:47

## 2024-01-14 RX ADMIN — VANCOMYCIN HYDROCHLORIDE 750 MG: 750 INJECTION, POWDER, LYOPHILIZED, FOR SOLUTION INTRAVENOUS at 06:06

## 2024-01-14 RX ADMIN — SUCRALFATE 1 G: 1 TABLET ORAL at 09:47

## 2024-01-14 RX ADMIN — INSULIN LISPRO 4 UNITS: 100 INJECTION, SOLUTION INTRAVENOUS; SUBCUTANEOUS at 20:23

## 2024-01-14 RX ADMIN — FERROUS SULFATE TAB 325 MG (65 MG ELEMENTAL FE) 325 MG: 325 (65 FE) TAB at 09:47

## 2024-01-14 RX ADMIN — VANCOMYCIN HYDROCHLORIDE 750 MG: 750 INJECTION, POWDER, LYOPHILIZED, FOR SOLUTION INTRAVENOUS at 14:14

## 2024-01-14 RX ADMIN — MORPHINE SULFATE 3 MG: 4 INJECTION, SOLUTION INTRAMUSCULAR; INTRAVENOUS at 02:14

## 2024-01-14 RX ADMIN — METRONIDAZOLE 500 MG: 500 INJECTION, SOLUTION INTRAVENOUS at 06:05

## 2024-01-14 RX ADMIN — Medication 2 PUFF: at 20:08

## 2024-01-14 RX ADMIN — CEFEPIME 2000 MG: 2 INJECTION, POWDER, FOR SOLUTION INTRAVENOUS at 23:51

## 2024-01-14 RX ADMIN — APIXABAN 5 MG: 5 TABLET, FILM COATED ORAL at 20:24

## 2024-01-14 RX ADMIN — HYDROMORPHONE HYDROCHLORIDE 1 MG: 1 INJECTION, SOLUTION INTRAMUSCULAR; INTRAVENOUS; SUBCUTANEOUS at 20:32

## 2024-01-14 RX ADMIN — MORPHINE SULFATE 3 MG: 4 INJECTION, SOLUTION INTRAMUSCULAR; INTRAVENOUS at 13:03

## 2024-01-14 RX ADMIN — MORPHINE SULFATE 3 MG: 4 INJECTION, SOLUTION INTRAMUSCULAR; INTRAVENOUS at 16:30

## 2024-01-14 RX ADMIN — SUCRALFATE 1 G: 1 TABLET ORAL at 13:01

## 2024-01-14 RX ADMIN — FERROUS SULFATE TAB 325 MG (65 MG ELEMENTAL FE) 325 MG: 325 (65 FE) TAB at 16:29

## 2024-01-14 RX ADMIN — BUSPIRONE HYDROCHLORIDE 5 MG: 5 TABLET ORAL at 20:24

## 2024-01-14 RX ADMIN — VANCOMYCIN HYDROCHLORIDE 750 MG: 750 INJECTION, POWDER, LYOPHILIZED, FOR SOLUTION INTRAVENOUS at 00:40

## 2024-01-14 RX ADMIN — METRONIDAZOLE 500 MG: 500 INJECTION, SOLUTION INTRAVENOUS at 20:36

## 2024-01-14 RX ADMIN — INSULIN LISPRO 4 UNITS: 100 INJECTION, SOLUTION INTRAVENOUS; SUBCUTANEOUS at 16:31

## 2024-01-14 RX ADMIN — CLOPIDOGREL BISULFATE 75 MG: 75 TABLET ORAL at 09:47

## 2024-01-14 ASSESSMENT — PAIN DESCRIPTION - LOCATION
LOCATION: LEG
LOCATION: FOOT

## 2024-01-14 ASSESSMENT — PAIN SCALES - GENERAL
PAINLEVEL_OUTOF10: 10
PAINLEVEL_OUTOF10: 8
PAINLEVEL_OUTOF10: 7
PAINLEVEL_OUTOF10: 0
PAINLEVEL_OUTOF10: 8
PAINLEVEL_OUTOF10: 3

## 2024-01-14 ASSESSMENT — PAIN DESCRIPTION - DESCRIPTORS
DESCRIPTORS: JABBING
DESCRIPTORS: SHARP
DESCRIPTORS: SHOOTING
DESCRIPTORS: JABBING

## 2024-01-14 ASSESSMENT — PAIN DESCRIPTION - ORIENTATION
ORIENTATION: LEFT

## 2024-01-14 ASSESSMENT — PAIN - FUNCTIONAL ASSESSMENT: PAIN_FUNCTIONAL_ASSESSMENT: PREVENTS OR INTERFERES SOME ACTIVE ACTIVITIES AND ADLS

## 2024-01-14 NOTE — PROGRESS NOTES
PRN given for pain voiced 7/10. Declines non pharm methods of pain relief. Foot continues to be elevated with pillow

## 2024-01-14 NOTE — PROGRESS NOTES

## 2024-01-14 NOTE — PROGRESS NOTES
Brief progress note:    Mr. Nagy seen this afternoon. Needed to answer a page however and was unable to stay longer in the room. I wrote this on a paper and showed the patient and he gave me a thumbs up. I immediately notified the charge nurse what had occurred and arranged for my physician assistant to see the patient and and answer any of his questions and concerns.     Gen: No distress. Alert.   Eyes: PERRL. No sclera icterus. No conjunctival injection.   ENT: No discharge. Pharynx clear.   Neck: No JVD.  Trachea midline.  Resp: No accessory muscle use. No crackles. No wheezes. No rhonchi.   CV: Regular rate. Regular rhythm. No murmur.  No rub. No edema.   GI: Non-tender. Non-distended.  Normal bowel sounds.  Skin: Warm and dry. No nodule on exposed extremities. No rash on exposed extremities. +dressing in place over left foot, did not removed for exam.   M/S: No cyanosis. No joint deformity. No clubbing. +R bka  Neuro: Awake. Grossly nonfocal      Full progress note to follow.    Ivon Granger, DO

## 2024-01-14 NOTE — PROGRESS NOTES
Pt states dressing is too tight and its causing pain and foot to throb. Loosened dressing, elevated foot on pillow. Pt states relief. Dressing remains clean dry and intact.

## 2024-01-14 NOTE — PROGRESS NOTES
Progress Note    Admit Date:  1/11/2024    Patient was admitted for acute first distal phalanx left foot osteomyelitis.  S/p partial foot amputation 1/12    Subjective:  Mr. Nagy was seen resting in bed. Communication was through an . He states his pain is uncontrolled on the current morphine dose. He states he would like more pain medication because he feels like he is always in significant pain.      Objective:   Patient Vitals for the past 4 hrs:   Resp   01/14/24 1630 16          Intake/Output Summary (Last 24 hours) at 1/14/2024 1843  Last data filed at 1/14/2024 1617  Gross per 24 hour   Intake --   Output 1220 ml   Net -1220 ml       Physical Exam:  Gen: No distress. Alert.   Eyes: PERRL. No sclera icterus. No conjunctival injection.   ENT: No discharge. Pharynx clear.   Neck: No JVD.  Trachea midline.  Resp: No accessory muscle use. No crackles. No wheezes. No rhonchi.   CV: Regular rate. Regular rhythm. No murmur.  No rub. No edema.   GI: Non-tender. Non-distended.  Normal bowel sounds.  Skin: Warm and dry. No nodule on exposed extremities. No rash on exposed extremities. +dressing in place over left foot, did not remove for exam    M/S: No cyanosis. No joint deformity. No clubbing. +R BKA  Neuro: Awake. Grossly nonfocal    Psych: Oriented x 3. No anxiety or agitation.       Medications:  [START ON 1/15/2024] pantoprazole, 40 mg, QAM AC  apixaban, 5 mg, BID  atorvastatin, 10 mg, Nightly  clopidogrel, 75 mg, Daily  sucralfate, 1 g, 4x Daily  ferrous sulfate, 325 mg, BID WC  albuterol sulfate HFA, 2 puff, BID RT  insulin lispro, 0-16 Units, TID WC  insulin lispro, 0-4 Units, Nightly  insulin glargine, 10 Units, Nightly  busPIRone, 5 mg, BID  sodium chloride flush, 5-40 mL, 2 times per day  cefepime, 2,000 mg, Q12H  metroNIDAZOLE, 500 mg, Q8H  HYDROmorphone, 0.5 mg, Once  vancomycin, 750 mg, Q8H      PRN Medications:  morphine, 3 mg, Q3H PRN  albuterol sulfate HFA, 2 puff, Q4H PRN  sodium  PM         RADIOLOGY  XR FOOT LEFT (2 VIEWS)   Preliminary Result   Status post transmetatarsal amputation of the left foot as described above.         XR CHEST PORTABLE   Final Result      Lungs are clear         XR FOOT LEFT (MIN 3 VIEWS)   Final Result   Lucency and loss of cortical definition at the tuft of the 1st distal phalanx   consistent with acute osteomyelitis.  There is overlying soft tissue   swelling.  No subcutaneous air is identified.               Assessment/Plan:  Acute first distal phalanx left foot osteomyelitis  - left foot xray as above  - prn pain control with morphine  - blood cx ngtd  - Cefepime, Flagyl, and Vanc D#4  - podiatry consulted  - s/p partial left foot amputation 1/12  - added prn oxycodone and sole gabapentin after discussion with attending physician     DM type 2 - uncontrolled  - SSI and lantus   - carb control diet  - monitor BG      Alcoholic cirrhosis   Hepatitis C   Transaminitis  - LFTs appear chronically elevated   - monitor LFTs     HTN?  - unsure of what pt is on at home  - monitor BP     Chronic Combined CHF  Ischemic cardiomyopathy s/p AICD   - last echo on 1/12/24 with EF of 25%  - monitor daily weights and I&Os     PAF  Secondary hypercoagulable state  - AC on Eliquis      CAD  - s/p PCR to RCA  - on Plavix and statin     COPD  - no AE  - continue home inhalers     Edward's esophagus  - on PPI and carafate     Hypothyroidism  - on synthroid      Anxiety   Bipolar disorder  - continue home regimen      PAD  - s/p R BKA  - on eliquis, statin, plavix      Bilateral deafness  - communicates via writing  - supportive care     Hx of chronic pain and drug seeking behavior      Note osteomyelitis makes the patient higher risk for morbidity and mortality requiring testing and treatment.     Home med rec attempted to be reconciled by pharmacy. Medications reordered to best of ability. Sporadic refill history per pharmacy.     DVT Prophylaxis: Lovenox  Diet: ADULT DIET;

## 2024-01-14 NOTE — PLAN OF CARE
Problem: Safety - Adult  Goal: Free from fall injury  1/14/2024 1027 by Katty Newsome RN  Outcome: Progressing  1/14/2024 0158 by Rakel Garcia RN  Outcome: Progressing     Problem: Skin/Tissue Integrity  Goal: Absence of new skin breakdown  Description: 1.  Monitor for areas of redness and/or skin breakdown  2.  Assess vascular access sites hourly  3.  Every 4-6 hours minimum:  Change oxygen saturation probe site  4.  Every 4-6 hours:  If on nasal continuous positive airway pressure, respiratory therapy assess nares and determine need for appliance change or resting period.  1/14/2024 1027 by Katty Newsome RN  Outcome: Progressing  1/14/2024 0158 by Rakel Garcia RN  Outcome: Progressing     Problem: Chronic Conditions and Co-morbidities  Goal: Patient's chronic conditions and co-morbidity symptoms are monitored and maintained or improved  1/14/2024 0158 by Rakel Garcia RN  Outcome: Progressing     Problem: Pain  Goal: Verbalizes/displays adequate comfort level or baseline comfort level  1/14/2024 1027 by Katty Newsome RN  Outcome: Progressing  1/14/2024 0158 by Rakel Garcia RN  Outcome: Progressing

## 2024-01-14 NOTE — FLOWSHEET NOTE
01/13/24 2215   Vital Signs   Temp 98.2 °F (36.8 °C)   Temp Source Oral   Pulse 83   Heart Rate Source Monitor   Respirations 17   /78   MAP (Calculated) 95   BP Location Left upper arm   BP Method Automatic   Patient Position High fowlers   Pain Assessment   Pain Assessment 0-10   Pain Level 7   Patient's Stated Pain Goal 0 - No pain   Opioid-Induced Sedation   POSS Score 1   Oxygen Therapy   SpO2 98 %   O2 Device Nasal cannula   O2 Flow Rate (L/min) 2.5 L/min     HS assessment completed. No signs or symptoms of distress noted. Patient tolerated night medications well. Respirations easy and even. Bed in lowest position, bed alarm in place and functioning properly, bed rails x2 up,  Call light within reach.     Bedside Mobility Assessment Tool (BMAT):     Assessment Level 1- Sit and Shake    1. From a semi-reclined position, ask patient to sit up and rotate to a seated position at the side of the bed. Can use the bedrail.    2. Ask patient to reach out and grab your hand and shake making sure patient reaches across his/her midline.   Pass- Patient is able to come to a seated position, maintain core strength. Maintains seated balance while reaching across midline. Move on to Assessment Level 2.     Assessment Level 2- Stretch and Point   1. With patient in seated position at the side of the bed, have patient place both feet on the floor (or stool) with knees no higher than hips.    2. Ask patient to stretch one leg and straighten the knee, then bend the ankle/flex and point the toes. If appropriate, repeat with the other leg.   Fail- Patient is unable to complete task. Patient is MOBILITY LEVEL 2.     Assessment Level 3- Stand   1. Ask patient to elevate off the bed or chair (seated to standing) using an assistive device (cane, bedrail).    2. Patient should be able to raise buttocks off be and hold for a count of five. May repeat once.   Fail- Patient unable to demonstrate standing stability. Patient is  MOBILITY LEVEL 3.     Assessment Level 4- Walk   1. Ask patient to march in place at bedside.    2. Then ask patient to advance step and return each foot. Some medical conditions may render a patient from stepping backwards, use your best clinical judgement.   Fail- Patient not able to complete tasks OR requires use of assistive device. Patient is MOBILITY LEVEL 3.       Mobility Level- 1

## 2024-01-14 NOTE — PLAN OF CARE
Problem: Safety - Adult  Goal: Free from fall injury  1/14/2024 1027 by Katty Newsome RN  Outcome: Progressing  1/14/2024 0158 by Rakel Garcia RN  Outcome: Progressing     Problem: Skin/Tissue Integrity  Goal: Absence of new skin breakdown  Description: 1.  Monitor for areas of redness and/or skin breakdown  2.  Assess vascular access sites hourly  3.  Every 4-6 hours minimum:  Change oxygen saturation probe site  4.  Every 4-6 hours:  If on nasal continuous positive airway pressure, respiratory therapy assess nares and determine need for appliance change or resting period.  1/14/2024 1027 by Katty Newsome RN  Outcome: Progressing  1/14/2024 0158 by Rakel Garcia RN  Outcome: Progressing     Problem: Pain  Goal: Verbalizes/displays adequate comfort level or baseline comfort level  1/14/2024 1027 by Katty Newsome RN  Outcome: Progressing  1/14/2024 0158 by Rakel Garcia RN  Outcome: Progressing     Problem: Chronic Conditions and Co-morbidities  Goal: Patient's chronic conditions and co-morbidity symptoms are monitored and maintained or improved  1/14/2024 0158 by Rakel Garcia RN  Outcome: Progressing

## 2024-01-14 NOTE — PROGRESS NOTES
Shift assessment complete. See flow sheet. Scheduled meds given. See MAR.  Patients head-toe complete, VS are logged, and active bowel sound noted in all four quadrants.     P deaf, dry erase board used for communication per pt, did not want  on. Pt sitting up in bed, complaints of pain 8/10, PRN given. Alert and oriented.      No further needs noted at this time. Call light and bedside table are within reach. The bed is locked and is in the lowest position.

## 2024-01-15 PROBLEM — E11.65 TYPE 2 DIABETES MELLITUS WITH HYPERGLYCEMIA, WITH LONG-TERM CURRENT USE OF INSULIN (HCC): Status: ACTIVE | Noted: 2023-08-24

## 2024-01-15 PROBLEM — G89.29 OTHER CHRONIC PAIN: Status: ACTIVE | Noted: 2024-01-15

## 2024-01-15 PROBLEM — Z79.4 TYPE 2 DIABETES MELLITUS WITH HYPERGLYCEMIA, WITH LONG-TERM CURRENT USE OF INSULIN (HCC): Status: ACTIVE | Noted: 2023-08-24

## 2024-01-15 PROBLEM — I50.22 CHRONIC SYSTOLIC CHF (CONGESTIVE HEART FAILURE) (HCC): Status: ACTIVE | Noted: 2024-01-15

## 2024-01-15 LAB
BACTERIA BLD CULT: NORMAL
GLUCOSE BLD-MCNC: 188 MG/DL (ref 70–99)
GLUCOSE BLD-MCNC: 230 MG/DL (ref 70–99)
GLUCOSE BLD-MCNC: 251 MG/DL (ref 70–99)
GLUCOSE BLD-MCNC: 266 MG/DL (ref 70–99)
PERFORMED ON: ABNORMAL

## 2024-01-15 PROCEDURE — 2700000000 HC OXYGEN THERAPY PER DAY

## 2024-01-15 PROCEDURE — 94761 N-INVAS EAR/PLS OXIMETRY MLT: CPT

## 2024-01-15 PROCEDURE — 2580000003 HC RX 258: Performed by: PODIATRIST

## 2024-01-15 PROCEDURE — 1200000000 HC SEMI PRIVATE

## 2024-01-15 PROCEDURE — 97162 PT EVAL MOD COMPLEX 30 MIN: CPT

## 2024-01-15 PROCEDURE — 94640 AIRWAY INHALATION TREATMENT: CPT

## 2024-01-15 PROCEDURE — 6370000000 HC RX 637 (ALT 250 FOR IP): Performed by: INTERNAL MEDICINE

## 2024-01-15 PROCEDURE — 6360000002 HC RX W HCPCS: Performed by: PODIATRIST

## 2024-01-15 PROCEDURE — 97530 THERAPEUTIC ACTIVITIES: CPT

## 2024-01-15 PROCEDURE — 6370000000 HC RX 637 (ALT 250 FOR IP)

## 2024-01-15 PROCEDURE — 6370000000 HC RX 637 (ALT 250 FOR IP): Performed by: PODIATRIST

## 2024-01-15 PROCEDURE — 6370000000 HC RX 637 (ALT 250 FOR IP): Performed by: NURSE PRACTITIONER

## 2024-01-15 PROCEDURE — 6360000002 HC RX W HCPCS: Performed by: INTERNAL MEDICINE

## 2024-01-15 PROCEDURE — 99233 SBSQ HOSP IP/OBS HIGH 50: CPT | Performed by: INTERNAL MEDICINE

## 2024-01-15 PROCEDURE — 97535 SELF CARE MNGMENT TRAINING: CPT

## 2024-01-15 PROCEDURE — 97165 OT EVAL LOW COMPLEX 30 MIN: CPT

## 2024-01-15 PROCEDURE — 97116 GAIT TRAINING THERAPY: CPT

## 2024-01-15 RX ORDER — TORSEMIDE 20 MG/1
20 TABLET ORAL DAILY
Status: DISCONTINUED | OUTPATIENT
Start: 2024-01-16 | End: 2024-01-17 | Stop reason: HOSPADM

## 2024-01-15 RX ORDER — LISINOPRIL 5 MG/1
5 TABLET ORAL DAILY
Status: DISCONTINUED | OUTPATIENT
Start: 2024-01-16 | End: 2024-01-17 | Stop reason: HOSPADM

## 2024-01-15 RX ORDER — OXYCODONE AND ACETAMINOPHEN 10; 325 MG/1; MG/1
1 TABLET ORAL 4 TIMES DAILY
Status: DISCONTINUED | OUTPATIENT
Start: 2024-01-15 | End: 2024-01-17 | Stop reason: HOSPADM

## 2024-01-15 RX ORDER — SPIRONOLACTONE 25 MG/1
25 TABLET ORAL DAILY
Status: DISCONTINUED | OUTPATIENT
Start: 2024-01-16 | End: 2024-01-17 | Stop reason: HOSPADM

## 2024-01-15 RX ADMIN — HYDROMORPHONE HYDROCHLORIDE 1 MG: 1 INJECTION, SOLUTION INTRAMUSCULAR; INTRAVENOUS; SUBCUTANEOUS at 00:01

## 2024-01-15 RX ADMIN — SUCRALFATE 1 G: 1 TABLET ORAL at 13:49

## 2024-01-15 RX ADMIN — INSULIN LISPRO 8 UNITS: 100 INJECTION, SOLUTION INTRAVENOUS; SUBCUTANEOUS at 17:34

## 2024-01-15 RX ADMIN — VANCOMYCIN HYDROCHLORIDE 750 MG: 750 INJECTION, POWDER, LYOPHILIZED, FOR SOLUTION INTRAVENOUS at 22:44

## 2024-01-15 RX ADMIN — VANCOMYCIN HYDROCHLORIDE 750 MG: 750 INJECTION, POWDER, LYOPHILIZED, FOR SOLUTION INTRAVENOUS at 13:57

## 2024-01-15 RX ADMIN — CEFEPIME 2000 MG: 2 INJECTION, POWDER, FOR SOLUTION INTRAVENOUS at 22:43

## 2024-01-15 RX ADMIN — HYDROMORPHONE HYDROCHLORIDE 1 MG: 1 INJECTION, SOLUTION INTRAMUSCULAR; INTRAVENOUS; SUBCUTANEOUS at 07:22

## 2024-01-15 RX ADMIN — OXYCODONE HYDROCHLORIDE AND ACETAMINOPHEN 1 TABLET: 10; 325 TABLET ORAL at 21:37

## 2024-01-15 RX ADMIN — INSULIN LISPRO 4 UNITS: 100 INJECTION, SOLUTION INTRAVENOUS; SUBCUTANEOUS at 09:41

## 2024-01-15 RX ADMIN — APIXABAN 5 MG: 5 TABLET, FILM COATED ORAL at 09:41

## 2024-01-15 RX ADMIN — SUCRALFATE 1 G: 1 TABLET ORAL at 17:34

## 2024-01-15 RX ADMIN — GABAPENTIN 300 MG: 300 CAPSULE ORAL at 13:56

## 2024-01-15 RX ADMIN — VANCOMYCIN HYDROCHLORIDE 750 MG: 750 INJECTION, POWDER, LYOPHILIZED, FOR SOLUTION INTRAVENOUS at 06:47

## 2024-01-15 RX ADMIN — BUSPIRONE HYDROCHLORIDE 5 MG: 5 TABLET ORAL at 09:41

## 2024-01-15 RX ADMIN — CLOPIDOGREL BISULFATE 75 MG: 75 TABLET ORAL at 09:41

## 2024-01-15 RX ADMIN — OXYCODONE HYDROCHLORIDE AND ACETAMINOPHEN 1 TABLET: 10; 325 TABLET ORAL at 17:34

## 2024-01-15 RX ADMIN — HYDROMORPHONE HYDROCHLORIDE 1 MG: 1 INJECTION, SOLUTION INTRAMUSCULAR; INTRAVENOUS; SUBCUTANEOUS at 10:32

## 2024-01-15 RX ADMIN — GABAPENTIN 300 MG: 300 CAPSULE ORAL at 09:41

## 2024-01-15 RX ADMIN — CEFEPIME 2000 MG: 2 INJECTION, POWDER, FOR SOLUTION INTRAVENOUS at 09:44

## 2024-01-15 RX ADMIN — INSULIN GLARGINE 15 UNITS: 100 INJECTION, SOLUTION SUBCUTANEOUS at 21:37

## 2024-01-15 RX ADMIN — BUSPIRONE HYDROCHLORIDE 5 MG: 5 TABLET ORAL at 21:36

## 2024-01-15 RX ADMIN — INSULIN LISPRO 8 UNITS: 100 INJECTION, SOLUTION INTRAVENOUS; SUBCUTANEOUS at 13:57

## 2024-01-15 RX ADMIN — ATORVASTATIN CALCIUM 10 MG: 10 TABLET, FILM COATED ORAL at 21:36

## 2024-01-15 RX ADMIN — SUCRALFATE 1 G: 1 TABLET ORAL at 21:37

## 2024-01-15 RX ADMIN — PANTOPRAZOLE SODIUM 40 MG: 40 TABLET, DELAYED RELEASE ORAL at 06:45

## 2024-01-15 RX ADMIN — Medication 2 PUFF: at 19:42

## 2024-01-15 RX ADMIN — SUCRALFATE 1 G: 1 TABLET ORAL at 09:41

## 2024-01-15 RX ADMIN — METRONIDAZOLE 500 MG: 500 INJECTION, SOLUTION INTRAVENOUS at 13:51

## 2024-01-15 RX ADMIN — METRONIDAZOLE 500 MG: 500 INJECTION, SOLUTION INTRAVENOUS at 21:35

## 2024-01-15 RX ADMIN — OXYCODONE HYDROCHLORIDE AND ACETAMINOPHEN 1 TABLET: 10; 325 TABLET ORAL at 13:48

## 2024-01-15 RX ADMIN — APIXABAN 5 MG: 5 TABLET, FILM COATED ORAL at 21:36

## 2024-01-15 RX ADMIN — Medication 2 PUFF: at 08:12

## 2024-01-15 RX ADMIN — GABAPENTIN 300 MG: 300 CAPSULE ORAL at 21:36

## 2024-01-15 RX ADMIN — METRONIDAZOLE 500 MG: 500 INJECTION, SOLUTION INTRAVENOUS at 05:26

## 2024-01-15 ASSESSMENT — PAIN DESCRIPTION - LOCATION
LOCATION: FOOT
LOCATION: FOOT
LOCATION: LEG;FOOT
LOCATION: LEG
LOCATION: FOOT

## 2024-01-15 ASSESSMENT — PAIN DESCRIPTION - DESCRIPTORS
DESCRIPTORS: SHARP
DESCRIPTORS: THROBBING
DESCRIPTORS: ACHING;DISCOMFORT;GNAWING

## 2024-01-15 ASSESSMENT — PAIN SCALES - GENERAL
PAINLEVEL_OUTOF10: 10
PAINLEVEL_OUTOF10: 7
PAINLEVEL_OUTOF10: 10
PAINLEVEL_OUTOF10: 10
PAINLEVEL_OUTOF10: 7

## 2024-01-15 ASSESSMENT — PAIN - FUNCTIONAL ASSESSMENT
PAIN_FUNCTIONAL_ASSESSMENT: PREVENTS OR INTERFERES SOME ACTIVE ACTIVITIES AND ADLS
PAIN_FUNCTIONAL_ASSESSMENT: PREVENTS OR INTERFERES WITH MANY ACTIVE NOT PASSIVE ACTIVITIES
PAIN_FUNCTIONAL_ASSESSMENT: PREVENTS OR INTERFERES SOME ACTIVE ACTIVITIES AND ADLS
PAIN_FUNCTIONAL_ASSESSMENT: PREVENTS OR INTERFERES SOME ACTIVE ACTIVITIES AND ADLS

## 2024-01-15 ASSESSMENT — PAIN DESCRIPTION - ORIENTATION
ORIENTATION: LEFT
ORIENTATION: RIGHT;LEFT
ORIENTATION: LEFT
ORIENTATION: RIGHT
ORIENTATION: RIGHT;LEFT

## 2024-01-15 NOTE — PROGRESS NOTES

## 2024-01-15 NOTE — PROGRESS NOTES
Vancomycin Day: 5  Current Regimen: 750 mg IV every 8 hours    Patient's labs, cultures, vitals, and vancomycin regimen reviewed.   SCr stable  U/O not measured/well documented  [facility] updated    Plan:   No change today, will order bmp for isrrael Moser Pharm D 1/15/074296:39 PM  .

## 2024-01-15 NOTE — FLOWSHEET NOTE
01/15/24 1345   Vital Signs   Temp 97.4 °F (36.3 °C)   Temp Source Oral   Pulse 78   Heart Rate Source Monitor   Respirations 16   /74   MAP (Calculated) 89   BP Location Left upper arm   BP Method Automatic   Patient Position Semi fowlers   Pain Assessment   Pain Assessment 0-10   Oxygen Therapy   SpO2 98 %   O2 Device Nasal cannula   O2 Flow Rate (L/min) 2.5 L/min     Shift assessment complete. See flow sheet. Scheduled meds given. See MAR.  Patients head-toe complete, VS are logged, and active bowel sound noted in all four quadrants.    Patient rating pain 10/10, upset that he did not get any pain medication for several hours. MD at bedside along with video . Plan of care and pain management discussed in detail.    No further needs  noted at this time. Call light and bedside table are within reach. The bed is locked and is in the lowest position.      Татьяна Hairston RN

## 2024-01-15 NOTE — PROGRESS NOTES
HEART FAILURE CARE PLAN:    Comorbidities Reviewed: Yes   Patient has a past medical history of CHF (congestive heart failure) (HCC), COPD (chronic obstructive pulmonary disease) (HCC), and Diabetes ([facility]).     ECHOCARDIOGRAM Reviewed: Yes   Patient's Ejection Fraction (EF) is less than 40%    Weights Reviewed: Yes   Admission weight: 68 kg (150 lb)   Wt Readings from Last 3 Encounters:   01/12/24 70.4 kg (155 lb 3.2 oz)   09/10/23 69.3 kg (152 lb 11.2 oz)   08/27/23 80.3 kg (177 lb)     Intake & Output Reviewed: Yes     Intake/Output Summary (Last 24 hours) at 1/15/2024 1427  Last data filed at 1/15/2024 1131  Gross per 24 hour   Intake 240 ml   Output 1425 ml   Net -1185 ml     Medications Reviewed: Yes   SCHEDULED HOSPITAL MEDICATIONS:   oxyCODONE-acetaminophen  1 tablet Oral 4x daily    pantoprazole  40 mg Oral QAM AC    gabapentin  300 mg Oral TID    insulin glargine  15 Units SubCUTAneous Nightly    apixaban  5 mg Oral BID    atorvastatin  10 mg Oral Nightly    clopidogrel  75 mg Oral Daily    sucralfate  1 g Oral 4x Daily    ferrous sulfate  325 mg Oral BID WC    albuterol sulfate HFA  2 puff Inhalation BID RT    insulin lispro  0-16 Units SubCUTAneous TID WC    insulin lispro  0-4 Units SubCUTAneous Nightly    busPIRone  5 mg Oral BID    sodium chloride flush  5-40 mL IntraVENous 2 times per day    cefepime  2,000 mg IntraVENous Q12H    metroNIDAZOLE  500 mg IntraVENous Q8H    vancomycin  750 mg IntraVENous Q8H     ACE/ARB/ARNI is REQUIRED for EF </= 40% SYSTOLIC FAILURE:   ACE:: None  ARB:: Losartan  ARNI:: None    Evidenced-Based Beta Blocker is REQUIRED for EF </= 40% SYSTOLIC FAILURE:   :: Metoprolol SUCCinate- Toprol XL    Diuretics:  :: Torsemide, Spironolactone, Metalozone, None, and N/A    Diet Reviewed: Yes   ADULT DIET; Regular; 4 carb choices (60 gm/meal)    Goal of Care Reviewed: Yes   Patient and/or Family's stated Goal of Care this Admission:   , reduce shortness of breath, increase activity

## 2024-01-15 NOTE — PROGRESS NOTES
Hospitalist    Blood glucose 401 this evening.  He has been over 200 for at least past 3 days.  Will increase Lantus to 15 units and 8 units Humalog tonight.   On diabetic diet.  May need further adjustments of long acting insulin.        Kimberli Wynne NP

## 2024-01-15 NOTE — PROGRESS NOTES
RT Inhaler-Nebulizer Bronchodilator Protocol Note    There is a bronchodilator order in the chart from a provider indicating to follow the RT Bronchodilator Protocol and there is an “Initiate RT Inhaler-Nebulizer Bronchodilator Protocol” order as well (see protocol at bottom of note).    CXR Findings:  No results found.    The findings from the last RT Protocol Assessment were as follows:   History Pulmonary Disease: Chronic pulmonary disease  Respiratory Pattern: Regular pattern and RR 12-20 bpm  Breath Sounds: Slightly diminished and/or crackles  Cough: Strong, spontaneous, non-productive  Indication for Bronchodilator Therapy: Decreased or absent breath sounds  Bronchodilator Assessment Score: 4    Aerosolized bronchodilator medication orders have been revised according to the RT Inhaler-Nebulizer Bronchodilator Protocol below.    Respiratory Therapist to perform RT Therapy Protocol Assessment initially then follow the protocol.  Repeat RT Therapy Protocol Assessment PRN for score 0-3 or on second treatment, BID, and PRN for scores above 3.    No Indications - adjust the frequency to every 6 hours PRN wheezing or bronchospasm, if no treatments needed after 48 hours then discontinue using Per Protocol order mode.     If indication present, adjust the RT bronchodilator orders based on the Bronchodilator Assessment Score as indicated below.  Use Inhaler orders unless patient has one or more of the following: on home nebulizer, not able to hold breath for 10 seconds, is not alert and oriented, cannot activate and use MDI correctly, or respiratory rate 25 breaths per minute or more, then use the equivalent nebulizer order(s) with same Frequency and PRN reasons based on the score.  If a patient is on this medication at home then do not decrease Frequency below that used at home.    0-3 - enter or revise RT bronchodilator order(s) to equivalent RT Bronchodilator order with Frequency of every 4 hours PRN for wheezing or  increased work of breathing using Per Protocol order mode.        4-6 - enter or revise RT Bronchodilator order(s) to two equivalent RT bronchodilator orders with one order with BID Frequency and one order with Frequency of every 4 hours PRN wheezing or increased work of breathing using Per Protocol order mode.        7-10 - enter or revise RT Bronchodilator order(s) to two equivalent RT bronchodilator orders with one order with TID Frequency and one order with Frequency of every 4 hours PRN wheezing or increased work of breathing using Per Protocol order mode.       11-13 - enter or revise RT Bronchodilator order(s) to one equivalent RT bronchodilator order with QID Frequency and an Albuterol order with Frequency of every 4 hours PRN wheezing or increased work of breathing using Per Protocol order mode.      Greater than 13 - enter or revise RT Bronchodilator order(s) to one equivalent RT bronchodilator order with every 4 hours Frequency and an Albuterol order with Frequency of every 2 hours PRN wheezing or increased work of breathing using Per Protocol order mode.     RT to enter RT Home Evaluation for COPD & MDI Assessment order using Per Protocol order mode.    Electronically signed by Chante Varela RCP on 1/15/2024 at 8:15 AM

## 2024-01-15 NOTE — CARE COORDINATION
INTERDISCIPLINARY PLAN OF CARE CONFERENCE    Date/Time: 1/15/2024 5:02 PM  Completed by: Joy Bush RN, Case Management      Patient Name:  Renato Nagy  YOB: 1970  Admitting Diagnosis: Osteomyelitis (HCC) [M86.9]  Type 1 diabetes mellitus with ketoacidosis, uncontrolled (HCC) [E10.10]  Elevated liver function tests [R79.89]  Drug-seeking behavior [Z76.5]  Osteomyelitis of left foot, unspecified type (HCC) [M86.9]     Admit Date/Time:  1/11/2024  3:53 PM    Chart reviewed. Interdisciplinary team contacted or reviewed plan related to patient progress and discharge plans.   Disciplines included Case Management, Nursing, and Dietitian.    Current Status:Stable  PT/OT recommendation for discharge plan of care: SNF    Expected D/C Disposition:  home vs rehab  Confirmed plan with patient Yes   Met with:patient  Discharge Plan Comments: Reviewed chart and met with pt. Discussed dc plan using video  Leatha [id number]. Pt is agreeable to go to rehab in [facility]. Will review and call referrals in AM. Will cont to follow.     Home O2 in place on admit: No  Pt informed of need to bring portable home O2 tank on day of discharge for nursing to connect prior to leaving:  Not Indicated  Verbalized agreement/Understanding:  Not Indicated

## 2024-01-15 NOTE — PROGRESS NOTES
Progress Note    Admit Date:  1/11/2024    Patient was admitted for acute first distal phalanx left foot osteomyelitis.  S/p partial foot amputation 1/12    Subjective:  Mr. Nagy seen and examined with RN in room.    He is hearing impaired,  used to  communicate with patient     Patient is postop day 3 , s/p left foot partial amputation for osteomyelitis   currently on broad-spectrum IV antibiotics.   Previous right BKA.    He is from home   -patient's main complaint is his poor pain control   I discussed with him in detail about his management of his pain medications   His home medications reviewed  All questions answered in detail.     Also discussed about discharge planning  - patient refuses SNF placement and wants home care only ; case management will be informed      Objective:   Patient Vitals for the past 4 hrs:   BP Temp Temp src Pulse Resp SpO2   01/15/24 0816 116/71 97.1 °F (36.2 °C) Oral 79 16 99 %   01/15/24 0814 -- -- -- -- -- 91 %   01/15/24 0752 -- -- -- -- 16 --            Intake/Output Summary (Last 24 hours) at 1/15/2024 1037  Last data filed at 1/15/2024 0816  Gross per 24 hour   Intake 240 ml   Output 1375 ml   Net -1135 ml         Physical Exam:  Gen: No distress. Alert.  Awake and well-oriented.   Hearing impaired   Eyes: PERRL. No sclera icterus. No conjunctival injection.   ENT: No discharge. Pharynx clear.   Neck: No JVD.  Trachea midline.  Resp: No accessory muscle use. No crackles. No wheezes. No rhonchi.   CV: Regular rate. Regular rhythm. No murmur.  No rub. No edema.   GI: Non-tender. Non-distended.  Normal bowel sounds.  Skin: Warm and dry. No nodule on exposed extremities. No rash on exposed extremities. +dressing in place over left foot, did not remove for exam    M/S: No cyanosis. No joint deformity. No clubbing. +R BKA  Left foot s/p transmetatarsal amputation currently wrapped in dressing  Neuro: Awake. Grossly nonfocal    Psych: Oriented x 3. No  17:01  ANTIBIOTICS AT [facility].:                      RECEIVED :  01/11/24 17:12  If child <=2 yrs old please draw pediatric bottle.~Blood Culture 1            EKG  Encounter Date: 01/11/24   EKG 12 Lead   Result Value    Ventricular Rate 73    Atrial Rate 73    P-R Interval 228    QRS Duration 136    Q-T Interval 444    QTc Calculation (Bazett) 489    P Axis 71    R Axis 258    T Axis 65    Diagnosis      Sinus rhythm with 1st degree A-V blockInferior infarct (cited on or before 31-JUL-2023)Anterolateral infarct (cited on or before 31-JUL-2023)Abnormal ECGConfirmed by WILMA SWAIN ([id number]) on 1/12/2024 5:53:44 PM         RADIOLOGY  XR FOOT LEFT (2 VIEWS)   Preliminary Result   Status post transmetatarsal amputation of the left foot as described above.         XR CHEST PORTABLE   Final Result      Lungs are clear         XR FOOT LEFT (MIN 3 VIEWS)   Final Result   Lucency and loss of cortical definition at the tuft of the 1st distal phalanx   consistent with acute osteomyelitis.  There is overlying soft tissue   swelling.  No subcutaneous air is identified.           ECHO  Summary   Pt supine secondary to recent surgery.   LV systolic function is severely reduced with an ejection fraction of 25%.   Akinetic apex and apical-septal segment. More prominent HK anterolateral and   inferoseptal segments.   There is mild concentric left ventricular hypertrophy.   Indeterminate left ventricular filling pressure.   The left atrium is mildly dilated.   Mild mitral annular calcification.   Mild mitral regurgitation.   Pacemaker / ICD lead is visualized in the right-sided chambers.   Inadequate tricuspid regurgitation jet to estimate systolic artery pressure   (SPAP).        Assessment/Plan:    Acute first distal phalanx left foot osteomyelitis  - left foot xray as above  - prn pain control with morphine  - blood cx ngtd  - Cefepime, Flagyl, and Vanc D#5  - podiatry consulted  - s/p partial left foot amputation 1/12. POD #3  - pain

## 2024-01-15 NOTE — PROGRESS NOTES
Inpatient Physical Therapy Evaluation & Treatment    Unit: [facility]  Date:  1/15/2024  Patient Name:    Renato Nagy  Admitting diagnosis:  Osteomyelitis (HCC) [M86.9]  Type 1 diabetes mellitus with ketoacidosis, uncontrolled (HCC) [E10.10]  Elevated liver function tests [R79.89]  Drug-seeking behavior [Z76.5]  Osteomyelitis of left foot, unspecified type (HCC) [M86.9]  Admit Date:  1/11/2024  Precautions/Restrictions/WB Status/ Lines/ Wounds/ Oxygen: Fall risk, Bed/chair alarm, Lines (IV and Supplemental O2 (2L)), WB Restrictions (NWB L LE), Isolation Precautions: Contact, and R LE prosthesis     used for sign language    Pt seen for cotreatment this date due to patient safety, patient endurance, acute illness/injury, behavioral concerns, and need for the assistance of 2 skilled therapists    Treatment Time:  1140- 1230  Treatment Number:  1   Timed Code Treatment Minutes: 40 minutes  Total Treatment Minutes:  50  minutes    Patient Stated Goals for Therapy: \" to go home \"          Discharge Recommendations: SNF  DME needs for discharge: Defer to facility       Therapy recommendation for EMS Transport: can transport by wheelchair    Therapy recommendations for staff:   Assist of 2 for ambulation with use of rolling walker (RW) and gait belt to/from BSC  to/from chair    History of Present Illness:   Per Dr Roca H&P:  Pt is a 53 y.o. male who presented to [facility] with past medical history of HF PEF, COPD presented to the ED with chief complaint of left foot infection     Patient is hearing impaired noncompensable speech and communication was obtained via writing. Patient reported that he has been taking his medications compliant with however has noticed that his left big toe has been worsening swelling that continued to progress and now spreading around the other digits pain.  No known alleviating factor reports that the pain is very worse no associated chest pain abdominal pain or

## 2024-01-15 NOTE — PROGRESS NOTES
Patient stated that home medications were in patients drawer, when writer went through there were 2 controlled medications.  Morphine 15 mg (8 tablets)  Ativan .05 mg (103 tablets)    These medications were counted by margaret RN and Tim RN, sealed in security bag and sent to security. Photos were taken as well.          Electronically signed by Татьяна Hairston RN on 1/15/2024 at 11:12 AM    Electronically signed by Tim Jeter RN on 1/15/2024 at 11:14 AM

## 2024-01-15 NOTE — PROGRESS NOTES
PROGRESS NOTE    Admit Date:  1/11/2024    Subjective:  53 y.o. male who is seen for evaluation of cellulitis and ulcer and osteomyelitis left foot. S/P partial left foot amputation 1/12/24. States feels OK today. Foot is OK.      Has seen Dr. Otero for this foot wound.     Patient is deaf.    Writing on paper utilized for communication.         Past Medical History:        Diagnosis Date    CHF (congestive heart failure) (HCC)     COPD (chronic obstructive pulmonary disease) (HCC)     Diabetes (HCC)        Past Surgical History:        Procedure Laterality Date    LEG AMPUTATION BELOW KNEE Right     OTHER SURGICAL HISTORY  01/12/2024    PARTIAL LEFT FOOT AMPUTATION - Left    PACEMAKER INSERTION         Current Medications:     oxyCODONE-acetaminophen  1 tablet Oral 4x daily    pantoprazole  40 mg Oral QAM AC    gabapentin  300 mg Oral TID    insulin glargine  15 Units SubCUTAneous Nightly    apixaban  5 mg Oral BID    atorvastatin  10 mg Oral Nightly    clopidogrel  75 mg Oral Daily    sucralfate  1 g Oral 4x Daily    ferrous sulfate  325 mg Oral BID WC    albuterol sulfate HFA  2 puff Inhalation BID RT    insulin lispro  0-16 Units SubCUTAneous TID WC    insulin lispro  0-4 Units SubCUTAneous Nightly    busPIRone  5 mg Oral BID    sodium chloride flush  5-40 mL IntraVENous 2 times per day    cefepime  2,000 mg IntraVENous Q12H    metroNIDAZOLE  500 mg IntraVENous Q8H    vancomycin  750 mg IntraVENous Q8H       Allergies:  Azithromycin and Statins    Social History:    Social History     Tobacco Use    Smoking status: Every Day     Current packs/day: 1.00     Types: Cigarettes    Smokeless tobacco: Never   Vaping Use    Vaping Use: Never used   Substance Use Topics    Alcohol use: Never    Drug use: Never       Family History:   History reviewed. No pertinent family history.      Objective:   /74   Pulse 78   Temp 97.4 °F (36.3 °C) (Oral)   Resp 16   Ht 1.803 m (5' 11\")   Wt 70.4 kg (155 lb 3.2 oz)    SpO2 98%   BMI 21.65 kg/m²     Data:  CBC:   Recent Labs     01/13/24  0606 01/14/24  0608   WBC 5.8 5.7   HGB 13.6 14.0   HCT 40.9 41.5   MCV 88.7 87.0   * 100*     BMP:   Recent Labs     01/13/24 0606 01/14/24  0608    133*   K 4.4 3.9    101   CO2 23 24   BUN 12 8   CREATININE 0.7* 0.6*     LIVER PROFILE:   Recent Labs     01/13/24 0606 01/14/24  0608   * 142*   * 122*   BILIDIR <0.2 0.3   BILITOT 0.5 0.8   ALKPHOS 107 97     PT/INR:   Recent Labs     01/13/24 0606 01/14/24  0608   PROT 6.3* 6.5     HgBA1c:  Lab Results   Component Value Date    LABA1C 7.1 08/24/2023       CRP 16.2 High mg/L     Sed Rate 29 High mm/Hr     SARS-CoV-2 RNA, RT PCR NOT DETECTED     Pathology -  Left partial foot, amputation:   - Benign inflamed skin with ulceration and focal acute osteomyelitis.    Cultures:     Imaging: xray left foot -   Perioperative examination of the left foot has been obtained on this patient   status post transmetatarsal amputation.  Air is identified about the surgical   bed associated with the procedure.  Punctate radiopaque density is identified   along the plantar surface of the foot.  Posterior and plantar calcaneal   spurring is identified.   Impression:  Status post transmetatarsal amputation of the left foot as described above.      CXR -   Heart size is normal  Aorta is normal.  Lungs are normally expanded and   clear. No pleural effusions. Mild spondylosis   Impression:    Lungs are clear     Physical Exam:    Dressing - clean, dry and intact. No proximal erythema or edema. No malodor noted.   Sutures intact left TMA.  Mild serosanguinous drainage noted. No purulence noted. No malodor noted. No abscess noted. Ecchymosis along incision line and dorsal midfoot. Skin temperature consistent across the foot.    Right BKA        Assessment:  Patient Active Problem List   Diagnosis Code    Acute on chronic congestive heart failure, unspecified heart failure type (HCC)

## 2024-01-15 NOTE — PROGRESS NOTES
Inpatient Occupational Therapy Evaluation and Treatment    Unit: [facility]  Date:  1/15/2024  Patient Name:    Renato Nagy  Admitting diagnosis:  Osteomyelitis (HCC) [M86.9]  Type 1 diabetes mellitus with ketoacidosis, uncontrolled (HCC) [E10.10]  Elevated liver function tests [R79.89]  Drug-seeking behavior [Z76.5]  Osteomyelitis of left foot, unspecified type (HCC) [M86.9]  Admit Date:  1/11/2024  Precautions/Restrictions/WB Status/ Lines/ Wounds/ Oxygen: Fall risk, Bed/chair alarm, Lines (IV), and deaf , R BKA with prosthetics in room, L LE NWB s/p partial foot amputation     Pt seen for cotreatment this date due to patient safety, patient endurance, complexity of condition, acute illness/injury, and need for the assistance of 2 skilled therapists    Treatment Time:  11:40-12:30  Treatment Number:  1  Timed Code Treatment Minutes: 40 minutes  Total Treatment Minutes:  50  minutes    Patient Goals for Therapy: \"to go home \"          Discharge Recommendations: SNF  DME needs for discharge: Manual W/C and BSC if going home    Renato Nagy was evaluated today and a DME order was entered for a standard wheelchair because he requires this to successfully complete daily living tasks of toileting and ambulating.  A standard manual wheelchair is necessary due to patient's impaired ambulation and mobility restrictions and would be unable to resolve these daily living tasks using a cane or walker.  The patient is capable of using a standard wheelchair safely in their home and can maneuver within their home with adequate access.  There is a caregiver available to provide necessary assistance.  The need for this equipment was discussed with the patient and he understands, is in agreement, and has not expressed an unwillingness to use the wheelchair.            Therapy recommendations for staff:   Assist of 2 for transfers with use of rolling walker (RW) and gait belt to/from chair    History of Present Illness: 53 y.o.

## 2024-01-15 NOTE — PROGRESS NOTES
Bedside Mobility Assessment Tool (BMAT):     Assessment Level 1- Sit and Shake    1. From a semi-reclined position, ask patient to sit up and rotate to a seated position at the side of the bed. Can use the bedrail.    2. Ask patient to reach out and grab your hand and shake making sure patient reaches across his/her midline.   Fail- Patient is unable to perform tasks, patient is MOBILITY LEVEL 1.    Assessment Level 2- Stretch and Point   1. With patient in seated position at the side of the bed, have patient place both feet on the floor (or stool) with knees no higher than hips.    2. Ask patient to stretch one leg and straighten the knee, then bend the ankle/flex and point the toes. If appropriate, repeat with the other leg.   Fail- Patient is unable to complete task. Patient is MOBILITY LEVEL 2.     Assessment Level 3- Stand   1. Ask patient to elevate off the bed or chair (seated to standing) using an assistive device (cane, bedrail).    2. Patient should be able to raise buttocks off be and hold for a count of five. May repeat once.   Fail- Patient unable to demonstrate standing stability. Patient is MOBILITY LEVEL 3.     Assessment Level 4- Walk   1. Ask patient to march in place at bedside.    2. Then ask patient to advance step and return each foot. Some medical conditions may render a patient from stepping backwards, use your best clinical judgement.   Fail- Patient not able to complete tasks OR requires use of assistive device. Patient is MOBILITY LEVEL 3.       Mobility Level- 2

## 2024-01-15 NOTE — PROGRESS NOTES
Patient scored high on the Cifuentes Fall Risk Scale.   Interventions taken; verified bed/chair alarm is activated, yellow socks placed on patient, stay with me sign posted outside patients room, as well as yellow fall bracelet placed on patient.

## 2024-01-16 LAB
ANION GAP SERPL CALCULATED.3IONS-SCNC: 5 MMOL/L (ref 3–16)
BACTERIA BLD CULT ORG #2: NORMAL
BUN SERPL-MCNC: 9 MG/DL (ref 7–20)
CALCIUM SERPL-MCNC: 8.1 MG/DL (ref 8.3–10.6)
CHLORIDE SERPL-SCNC: 102 MMOL/L (ref 99–110)
CO2 SERPL-SCNC: 26 MMOL/L (ref 21–32)
CREAT SERPL-MCNC: 0.6 MG/DL (ref 0.9–1.3)
DEPRECATED RDW RBC AUTO: 14.3 % (ref 12.4–15.4)
GFR SERPLBLD CREATININE-BSD FMLA CKD-EPI: >60 ML/MIN/{1.73_M2}
GLUCOSE BLD-MCNC: 202 MG/DL (ref 70–99)
GLUCOSE BLD-MCNC: 205 MG/DL (ref 70–99)
GLUCOSE BLD-MCNC: 241 MG/DL (ref 70–99)
GLUCOSE BLD-MCNC: 380 MG/DL (ref 70–99)
GLUCOSE BLD-MCNC: 420 MG/DL (ref 70–99)
GLUCOSE SERPL-MCNC: 228 MG/DL (ref 70–99)
HCT VFR BLD AUTO: 37.8 % (ref 40.5–52.5)
HGB BLD-MCNC: 12.7 G/DL (ref 13.5–17.5)
MCH RBC QN AUTO: 29.4 PG (ref 26–34)
MCHC RBC AUTO-ENTMCNC: 33.5 G/DL (ref 31–36)
MCV RBC AUTO: 87.8 FL (ref 80–100)
PERFORMED ON: ABNORMAL
PLATELET # BLD AUTO: 96 K/UL (ref 135–450)
PLATELET BLD QL SMEAR: ABNORMAL
PMV BLD AUTO: 9.1 FL (ref 5–10.5)
POTASSIUM SERPL-SCNC: 3.7 MMOL/L (ref 3.5–5.1)
RBC # BLD AUTO: 4.3 M/UL (ref 4.2–5.9)
SLIDE REVIEW: ABNORMAL
SODIUM SERPL-SCNC: 133 MMOL/L (ref 136–145)
WBC # BLD AUTO: 5.2 K/UL (ref 4–11)

## 2024-01-16 PROCEDURE — 85027 COMPLETE CBC AUTOMATED: CPT

## 2024-01-16 PROCEDURE — 99233 SBSQ HOSP IP/OBS HIGH 50: CPT | Performed by: INTERNAL MEDICINE

## 2024-01-16 PROCEDURE — 6370000000 HC RX 637 (ALT 250 FOR IP): Performed by: NURSE PRACTITIONER

## 2024-01-16 PROCEDURE — 6370000000 HC RX 637 (ALT 250 FOR IP): Performed by: PODIATRIST

## 2024-01-16 PROCEDURE — 2580000003 HC RX 258: Performed by: PODIATRIST

## 2024-01-16 PROCEDURE — 6370000000 HC RX 637 (ALT 250 FOR IP)

## 2024-01-16 PROCEDURE — 94640 AIRWAY INHALATION TREATMENT: CPT

## 2024-01-16 PROCEDURE — 6360000002 HC RX W HCPCS: Performed by: PODIATRIST

## 2024-01-16 PROCEDURE — 6370000000 HC RX 637 (ALT 250 FOR IP): Performed by: INTERNAL MEDICINE

## 2024-01-16 PROCEDURE — 6360000002 HC RX W HCPCS: Performed by: INTERNAL MEDICINE

## 2024-01-16 PROCEDURE — 80048 BASIC METABOLIC PNL TOTAL CA: CPT

## 2024-01-16 PROCEDURE — 1200000000 HC SEMI PRIVATE

## 2024-01-16 PROCEDURE — 94761 N-INVAS EAR/PLS OXIMETRY MLT: CPT

## 2024-01-16 PROCEDURE — 36415 COLL VENOUS BLD VENIPUNCTURE: CPT

## 2024-01-16 RX ORDER — INSULIN GLARGINE 100 [IU]/ML
10 INJECTION, SOLUTION SUBCUTANEOUS DAILY
Status: DISCONTINUED | OUTPATIENT
Start: 2024-01-17 | End: 2024-01-17 | Stop reason: HOSPADM

## 2024-01-16 RX ORDER — CEFUROXIME AXETIL 500 MG/1
500 TABLET ORAL EVERY 12 HOURS SCHEDULED
Status: DISCONTINUED | OUTPATIENT
Start: 2024-01-17 | End: 2024-01-17 | Stop reason: HOSPADM

## 2024-01-16 RX ORDER — INSULIN GLARGINE 100 [IU]/ML
25 INJECTION, SOLUTION SUBCUTANEOUS NIGHTLY
Status: DISCONTINUED | OUTPATIENT
Start: 2024-01-17 | End: 2024-01-17 | Stop reason: HOSPADM

## 2024-01-16 RX ORDER — DOXYCYCLINE HYCLATE 100 MG
100 TABLET ORAL EVERY 12 HOURS SCHEDULED
Status: DISCONTINUED | OUTPATIENT
Start: 2024-01-17 | End: 2024-01-17 | Stop reason: HOSPADM

## 2024-01-16 RX ADMIN — GABAPENTIN 300 MG: 300 CAPSULE ORAL at 14:33

## 2024-01-16 RX ADMIN — SUCRALFATE 1 G: 1 TABLET ORAL at 14:34

## 2024-01-16 RX ADMIN — OXYCODONE HYDROCHLORIDE AND ACETAMINOPHEN 1 TABLET: 10; 325 TABLET ORAL at 14:33

## 2024-01-16 RX ADMIN — BUSPIRONE HYDROCHLORIDE 5 MG: 5 TABLET ORAL at 08:58

## 2024-01-16 RX ADMIN — INSULIN LISPRO 4 UNITS: 100 INJECTION, SOLUTION INTRAVENOUS; SUBCUTANEOUS at 08:58

## 2024-01-16 RX ADMIN — GABAPENTIN 300 MG: 300 CAPSULE ORAL at 21:13

## 2024-01-16 RX ADMIN — GABAPENTIN 300 MG: 300 CAPSULE ORAL at 08:56

## 2024-01-16 RX ADMIN — SUCRALFATE 1 G: 1 TABLET ORAL at 21:13

## 2024-01-16 RX ADMIN — BUSPIRONE HYDROCHLORIDE 5 MG: 5 TABLET ORAL at 21:13

## 2024-01-16 RX ADMIN — SUCRALFATE 1 G: 1 TABLET ORAL at 17:57

## 2024-01-16 RX ADMIN — ATORVASTATIN CALCIUM 10 MG: 10 TABLET, FILM COATED ORAL at 21:14

## 2024-01-16 RX ADMIN — INSULIN LISPRO 16 UNITS: 100 INJECTION, SOLUTION INTRAVENOUS; SUBCUTANEOUS at 17:58

## 2024-01-16 RX ADMIN — PANTOPRAZOLE SODIUM 40 MG: 40 TABLET, DELAYED RELEASE ORAL at 05:34

## 2024-01-16 RX ADMIN — HYDROMORPHONE HYDROCHLORIDE 1 MG: 1 INJECTION, SOLUTION INTRAMUSCULAR; INTRAVENOUS; SUBCUTANEOUS at 04:01

## 2024-01-16 RX ADMIN — OXYCODONE HYDROCHLORIDE AND ACETAMINOPHEN 1 TABLET: 10; 325 TABLET ORAL at 17:57

## 2024-01-16 RX ADMIN — EMPAGLIFLOZIN 10 MG: 10 TABLET, FILM COATED ORAL at 08:57

## 2024-01-16 RX ADMIN — SPIRONOLACTONE 25 MG: 25 TABLET ORAL at 08:57

## 2024-01-16 RX ADMIN — VANCOMYCIN HYDROCHLORIDE 750 MG: 750 INJECTION, POWDER, LYOPHILIZED, FOR SOLUTION INTRAVENOUS at 14:40

## 2024-01-16 RX ADMIN — OXYCODONE HYDROCHLORIDE AND ACETAMINOPHEN 1 TABLET: 10; 325 TABLET ORAL at 21:13

## 2024-01-16 RX ADMIN — OXYCODONE HYDROCHLORIDE AND ACETAMINOPHEN 1 TABLET: 10; 325 TABLET ORAL at 08:56

## 2024-01-16 RX ADMIN — Medication 2 PUFF: at 19:49

## 2024-01-16 RX ADMIN — VANCOMYCIN HYDROCHLORIDE 750 MG: 750 INJECTION, POWDER, LYOPHILIZED, FOR SOLUTION INTRAVENOUS at 05:35

## 2024-01-16 RX ADMIN — TORSEMIDE 20 MG: 20 TABLET ORAL at 08:56

## 2024-01-16 RX ADMIN — VANCOMYCIN HYDROCHLORIDE 750 MG: 750 INJECTION, POWDER, LYOPHILIZED, FOR SOLUTION INTRAVENOUS at 21:25

## 2024-01-16 RX ADMIN — CLOPIDOGREL BISULFATE 75 MG: 75 TABLET ORAL at 08:57

## 2024-01-16 RX ADMIN — APIXABAN 5 MG: 5 TABLET, FILM COATED ORAL at 08:58

## 2024-01-16 RX ADMIN — LISINOPRIL 5 MG: 5 TABLET ORAL at 08:57

## 2024-01-16 RX ADMIN — SUCRALFATE 1 G: 1 TABLET ORAL at 08:57

## 2024-01-16 RX ADMIN — METRONIDAZOLE 500 MG: 500 INJECTION, SOLUTION INTRAVENOUS at 04:04

## 2024-01-16 RX ADMIN — APIXABAN 5 MG: 5 TABLET, FILM COATED ORAL at 21:14

## 2024-01-16 RX ADMIN — METRONIDAZOLE 500 MG: 500 INJECTION, SOLUTION INTRAVENOUS at 14:36

## 2024-01-16 RX ADMIN — INSULIN LISPRO 4 UNITS: 100 INJECTION, SOLUTION INTRAVENOUS; SUBCUTANEOUS at 21:12

## 2024-01-16 RX ADMIN — CEFEPIME 2000 MG: 2 INJECTION, POWDER, FOR SOLUTION INTRAVENOUS at 10:38

## 2024-01-16 RX ADMIN — CEFEPIME 2000 MG: 2 INJECTION, POWDER, FOR SOLUTION INTRAVENOUS at 21:24

## 2024-01-16 RX ADMIN — INSULIN GLARGINE 15 UNITS: 100 INJECTION, SOLUTION SUBCUTANEOUS at 21:13

## 2024-01-16 ASSESSMENT — PAIN DESCRIPTION - LOCATION
LOCATION: LEG;ARM
LOCATION: FOOT
LOCATION: LEG;FOOT
LOCATION: FOOT
LOCATION: FOOT

## 2024-01-16 ASSESSMENT — PAIN SCALES - GENERAL
PAINLEVEL_OUTOF10: 10

## 2024-01-16 ASSESSMENT — PAIN DESCRIPTION - ORIENTATION
ORIENTATION: LEFT
ORIENTATION: RIGHT;LEFT
ORIENTATION: RIGHT;LEFT

## 2024-01-16 ASSESSMENT — PAIN DESCRIPTION - DESCRIPTORS
DESCRIPTORS: THROBBING
DESCRIPTORS: THROBBING
DESCRIPTORS: DISCOMFORT;NAGGING;POUNDING

## 2024-01-16 ASSESSMENT — PAIN - FUNCTIONAL ASSESSMENT
PAIN_FUNCTIONAL_ASSESSMENT: PREVENTS OR INTERFERES SOME ACTIVE ACTIVITIES AND ADLS
PAIN_FUNCTIONAL_ASSESSMENT: ACTIVITIES ARE NOT PREVENTED

## 2024-01-16 NOTE — PROGRESS NOTES
nterpreter accessed via video. MD at bedside with writer and CM. Patient agreed to go to SNF. Joy aware and looking into possibilities. Patient updated on plan. No further needs at this time.

## 2024-01-16 NOTE — PROGRESS NOTES
Bedside Mobility Assessment Tool (BMAT):     Assessment Level 1- Sit and Shake    1. From a semi-reclined position, ask patient to sit up and rotate to a seated position at the side of the bed. Can use the bedrail.    2. Ask patient to reach out and grab your hand and shake making sure patient reaches across his/her midline.   Pass- Patient is able to come to a seated position, maintain core strength. Maintains seated balance while reaching across midline. Move on to Assessment Level 2.     Assessment Level 2- Stretch and Point   1. With patient in seated position at the side of the bed, have patient place both feet on the floor (or stool) with knees no higher than hips.    2. Ask patient to stretch one leg and straighten the knee, then bend the ankle/flex and point the toes. If appropriate, repeat with the other leg.   Fail- Patient is unable to complete task. Patient is MOBILITY LEVEL 2.     Assessment Level 3- Stand   1. Ask patient to elevate off the bed or chair (seated to standing) using an assistive device (cane, bedrail).    2. Patient should be able to raise buttocks off be and hold for a count of five. May repeat once.   Fail- Patient unable to demonstrate standing stability. Patient is MOBILITY LEVEL 3.     Assessment Level 4- Walk   1. Ask patient to march in place at bedside.    2. Then ask patient to advance step and return each foot. Some medical conditions may render a patient from stepping backwards, use your best clinical judgement.   Fail- Patient not able to complete tasks OR requires use of assistive device. Patient is MOBILITY LEVEL 3.       Mobility Level- 2

## 2024-01-16 NOTE — PROGRESS NOTES
Patient c/o left foot pain 10/10 PRN Dilaudid 1 mg given. No other needs noted at this time. Urinal at the bedside. Call light and bedside table within reach.

## 2024-01-16 NOTE — PLAN OF CARE
HEART FAILURE CARE PLAN:    Comorbidities Reviewed: Yes   Patient has a past medical history of CHF (congestive heart failure) (HCC), COPD (chronic obstructive pulmonary disease) (HCC), and Diabetes (HCC).     ECHOCARDIOGRAM Reviewed: Yes   Patient's Ejection Fraction (EF) is less than 40%    Weights Reviewed: Yes   Admission weight: 68 kg (150 lb)   Wt Readings from Last 3 Encounters:   01/12/24 70.4 kg (155 lb 3.2 oz)   09/10/23 69.3 kg (152 lb 11.2 oz)   08/27/23 80.3 kg (177 lb)     Intake & Output Reviewed: Yes     Intake/Output Summary (Last 24 hours) at 1/16/2024 1009  Last data filed at 1/16/2024 0405  Gross per 24 hour   Intake 260 ml   Output 1250 ml   Net -990 ml     Medications Reviewed: Yes   SCHEDULED HOSPITAL MEDICATIONS:   oxyCODONE-acetaminophen  1 tablet Oral 4x daily    spironolactone  25 mg Oral Daily    torsemide  20 mg Oral Daily    empagliflozin  10 mg Oral Daily    lisinopril  5 mg Oral Daily    pantoprazole  40 mg Oral QAM AC    gabapentin  300 mg Oral TID    insulin glargine  15 Units SubCUTAneous Nightly    apixaban  5 mg Oral BID    atorvastatin  10 mg Oral Nightly    clopidogrel  75 mg Oral Daily    sucralfate  1 g Oral 4x Daily    ferrous sulfate  325 mg Oral BID WC    albuterol sulfate HFA  2 puff Inhalation BID RT    insulin lispro  0-16 Units SubCUTAneous TID WC    insulin lispro  0-4 Units SubCUTAneous Nightly    busPIRone  5 mg Oral BID    sodium chloride flush  5-40 mL IntraVENous 2 times per day    cefepime  2,000 mg IntraVENous Q12H    metroNIDAZOLE  500 mg IntraVENous Q8H    vancomycin  750 mg IntraVENous Q8H     ACE/ARB/ARNI is REQUIRED for EF </= 40% SYSTOLIC FAILURE:   ACE:: Lisinopril  ARB:: None  ARNI:: None    Evidenced-Based Beta Blocker is REQUIRED for EF </= 40% SYSTOLIC FAILURE:   :: None    Diuretics:  :: Torsemide, Spironolactone, and N/A    Diet Reviewed: Yes   ADULT DIET; Regular; 4 carb choices (60 gm/meal)    Goal of Care Reviewed: Yes   Patient and/or Family's  stated Goal of Care this Admission: reduce shortness of breath, increase activity tolerance, better understand heart failure and disease management, be more comfortable, and reduce lower extremity edema prior to discharge.

## 2024-01-16 NOTE — ED PROVIDER NOTES
Emergency Department Attending Provider Note  Location: [facility]  ED      Renato Nagy was evaluated in the Emergency Department. Although initial history and physical exam information was obtained by Eric Colindres (who also dictated a record of this visit), I personally saw the patient and made/approved the management plan and take responsibility for the patient management.     Patient seen and evaluated.  Relevant records reviewed. Chronic medical conditions reviewed.    HPI: 53-year-old male who is deaf with diabetes presents with concern for infection and diabetic wound of the left foot.  He was seen at [facility] and was sent here for IV antibiotics and probable admission for amputation.  He noted a sore to his left great toe he is unsure if he is on antibiotics for this.  He denies pain.     Physical Exam: Nontoxic-appearing.  Left great toe is necrotic appearing with surrounding erythema extending dorsally of the foot.  Decreased sensation noted.     MDM: Here the patient appears to have a necrotic great toe of the left foot with surrounding cellulitis.  We have started IV antibiotics and blood work and cultures have been ordered.  He is insistent on getting IV morphine but he does have a management plan in place but his current diagnosis I do think a dose of pain medicine is appropriate.  Left foot x-ray is concerning for osteomyelitis.  No gas within the soft tissue.  We will admit to the hospitalist.         I independently interpreted the following diagnostic test and studies:    Results for orders placed or performed during the hospital encounter of 01/11/24   Culture, Blood 1    Specimen: Blood   Result Value Ref Range    Blood Culture, Routine No Growth after 4 days of incubation.    Culture, Blood 2    Specimen: Blood   Result Value Ref Range    Culture, Blood 2       No Growth to date.  Any change in status will be called.   COVID-19 & Influenza Combo    Specimen:  152 (H) 10 - 40 U/L     (H) 15 - 37 U/L    Total Bilirubin 0.7 0.0 - 1.0 mg/dL    Bilirubin, Direct 0.3 0.0 - 0.3 mg/dL    Bilirubin, Indirect 0.4 0.0 - 1.0 mg/dL   Troponin   Result Value Ref Range    Troponin, High Sensitivity 31 (H) 0 - 22 ng/L   Troponin   Result Value Ref Range    Troponin, High Sensitivity 30 (H) 0 - 22 ng/L   Brain Natriuretic Peptide   Result Value Ref Range    Pro-BNP 1,493 (H) 0 - 124 pg/mL   Magnesium   Result Value Ref Range    Magnesium 1.90 1.80 - 2.40 mg/dL   Vancomycin Level, Trough   Result Value Ref Range    Vancomycin Tr 9.4 (L) 10.0 - 20.0 ug/mL   Basic Metabolic Panel w/ Reflex to MG   Result Value Ref Range    Sodium 137 136 - 145 mmol/L    Potassium reflex Magnesium 4.4 3.5 - 5.1 mmol/L    Chloride 106 99 - 110 mmol/L    CO2 23 21 - 32 mmol/L    Anion Gap 8 3 - 16    Glucose 239 (H) 70 - 99 mg/dL    BUN 12 7 - 20 mg/dL    Creatinine 0.7 (L) 0.9 - 1.3 mg/dL    Est, Glom Filt Rate >60 >60    Calcium 8.0 (L) 8.3 - 10.6 mg/dL   CBC with Auto Differential   Result Value Ref Range    WBC 5.8 4.0 - 11.0 K/uL    RBC 4.61 4.20 - 5.90 M/uL    Hemoglobin 13.6 13.5 - 17.5 g/dL    Hematocrit 40.9 40.5 - 52.5 %    MCV 88.7 80.0 - 100.0 fL    MCH 29.6 26.0 - 34.0 pg    MCHC 33.3 31.0 - 36.0 g/dL    RDW 14.3 12.4 - 15.4 %    Platelets 105 (L) 135 - 450 K/uL    MPV 8.9 5.0 - 10.5 fL    Neutrophils % 64.5 %    Lymphocytes % 20.3 %    Monocytes % 11.9 %    Eosinophils % 1.9 %    Basophils % 1.4 %    Neutrophils Absolute 3.7 1.7 - 7.7 K/uL    Lymphocytes Absolute 1.2 1.0 - 5.1 K/uL    Monocytes Absolute 0.7 0.0 - 1.3 K/uL    Eosinophils Absolute 0.1 0.0 - 0.6 K/uL    Basophils Absolute 0.1 0.0 - 0.2 K/uL   Hepatic Function Panel   Result Value Ref Range    Total Protein 6.3 (L) 6.4 - 8.2 g/dL    Albumin 3.1 (L) 3.4 - 5.0 g/dL    Alkaline Phosphatase 107 40 - 129 U/L     (H) 10 - 40 U/L     (H) 15 - 37 U/L    Total Bilirubin 0.5 0.0 - 1.0 mg/dL    Bilirubin, Direct <0.2 0.0 -

## 2024-01-16 NOTE — FLOWSHEET NOTE
Shift assessment complete. See doc flow. Nightly medications given see MAR. IV Abx infusing. A/O x4. Deaf. Surgical dressing in place to left foot. Hx of RBKA. Scheduled Percocet given. Nightly snack given. Urinal at the bedside. Bed alarm in place. Call light and bedside table within easy reach.    01/15/24 2135   Vital Signs   Temp 98.4 °F (36.9 °C)   Temp Source Oral   Pulse 95   Heart Rate Source Monitor   Respirations 16   /78   MAP (Calculated) 92   BP Location Left upper arm   BP Method Automatic   Patient Position Semi fowlers   Oxygen Therapy   SpO2 96 %   O2 Device None (Room air)

## 2024-01-16 NOTE — PROGRESS NOTES
RT Inhaler-Nebulizer Bronchodilator Protocol Note    There is a bronchodilator order in the chart from a provider indicating to follow the RT Bronchodilator Protocol and there is an “Initiate RT Inhaler-Nebulizer Bronchodilator Protocol” order as well (see protocol at bottom of note).    CXR Findings:  No results found.    The findings from the last RT Protocol Assessment were as follows:   History Pulmonary Disease: Chronic pulmonary disease  Respiratory Pattern: Regular pattern and RR 12-20 bpm  Breath Sounds: Slightly diminished and/or crackles  Cough: Strong, spontaneous, non-productive  Indication for Bronchodilator Therapy: Decreased or absent breath sounds  Bronchodilator Assessment Score: 4    Aerosolized bronchodilator medication orders have been revised according to the RT Inhaler-Nebulizer Bronchodilator Protocol below.    Respiratory Therapist to perform RT Therapy Protocol Assessment initially then follow the protocol.  Repeat RT Therapy Protocol Assessment PRN for score 0-3 or on second treatment, BID, and PRN for scores above 3.    No Indications - adjust the frequency to every 6 hours PRN wheezing or bronchospasm, if no treatments needed after 48 hours then discontinue using Per Protocol order mode.     If indication present, adjust the RT bronchodilator orders based on the Bronchodilator Assessment Score as indicated below.  Use Inhaler orders unless patient has one or more of the following: on home nebulizer, not able to hold breath for 10 seconds, is not alert and oriented, cannot activate and use MDI correctly, or respiratory rate 25 breaths per minute or more, then use the equivalent nebulizer order(s) with same Frequency and PRN reasons based on the score.  If a patient is on this medication at home then do not decrease Frequency below that used at home.    0-3 - enter or revise RT bronchodilator order(s) to equivalent RT Bronchodilator order with Frequency of every 4 hours PRN for wheezing or

## 2024-01-16 NOTE — FLOWSHEET NOTE
01/16/24 1430   Vital Signs   Temp 97.8 °F (36.6 °C)   Temp Source Axillary   Pulse 90   Heart Rate Source Monitor   Respirations 18   /77   MAP (Calculated) 88   BP Location Left upper arm   BP Method Automatic   Patient Position Semi fowlers   Oxygen Therapy   SpO2 96 %   O2 Device None (Room air)     Patient resting in bed.

## 2024-01-16 NOTE — CARE COORDINATION
Reviewed chart and met with pt who remains agreeable to rehab. Referral called to [facility] with pt permission. Await return call on acceptance/denial. Will follow.    Received call from Shelby at [facility] stating can accept [place] at [place].

## 2024-01-16 NOTE — PLAN OF CARE
Problem: Discharge Planning  Goal: Discharge to home or other facility with appropriate resources  1/15/2024 2151 by Jack Rowley RN  Outcome: Progressing  1/15/2024 1427 by Татьяна Hairston RN  Outcome: Progressing     Problem: Safety - Adult  Goal: Free from fall injury  1/15/2024 2151 by Jack Rowley RN  Outcome: Progressing  1/15/2024 1427 by Татьяна Hairston RN  Outcome: Progressing     Problem: Skin/Tissue Integrity  Goal: Absence of new skin breakdown  Description: 1.  Monitor for areas of redness and/or skin breakdown  2.  Assess vascular access sites hourly  3.  Every 4-6 hours minimum:  Change oxygen saturation probe site  4.  Every 4-6 hours:  If on nasal continuous positive airway pressure, respiratory therapy assess nares and determine need for appliance change or resting period.  1/15/2024 2151 by Jack Rowley RN  Outcome: Progressing  1/15/2024 1427 by Татьяна Hairston RN  Outcome: Progressing     Problem: Chronic Conditions and Co-morbidities  Goal: Patient's chronic conditions and co-morbidity symptoms are monitored and maintained or improved  Outcome: Progressing     Problem: Pain  Goal: Verbalizes/displays adequate comfort level or baseline comfort level  Outcome: Progressing

## 2024-01-16 NOTE — FLOWSHEET NOTE
01/16/24 0845   Vital Signs   Temp 98.1 °F (36.7 °C)   Temp Source Oral   Pulse 81   Heart Rate Source Monitor   Respirations 18   /65   MAP (Calculated) 77   BP Location Left upper arm   BP Method Automatic   Patient Position Semi fowlers   Oxygen Therapy   SpO2 95 %   O2 Device Nasal cannula   O2 Flow Rate (L/min) 2 L/min     Shift assessment complete. See flow sheet. Scheduled meds given. See MAR.  Patients head-toe complete, VS are logged, and active bowel sound noted in all four quadrants.    Patient rating pain 10/10 scheduled pain medication given. Bandage to left foot intact. No drainage noted.    No further needs  noted at this time. Call light and bedside table are within reach. The bed is locked and is in the lowest position.      Татьяна Hairston RN

## 2024-01-16 NOTE — DISCHARGE INSTR - COC
Continuity of Care Form    Patient Name: Renato Nagy   :  1970  MRN:  [id number]    Admit date:  2024  Discharge date:  24    Code Status Order: Full Code   Advance Directives:   Advance Care Flowsheet Documentation       Date/Time Healthcare Directive Type of Healthcare Directive Copy in Chart Healthcare Agent Appointed Healthcare Agent's Name Healthcare Agent's Phone Number    24 1008 No, patient does not have an advance directive for healthcare treatment -- -- -- -- --            Admitting Physician:  Ivon Granger DO  PCP: Carrie Mead APRN - NP    Discharging Nurse: Rosmery DA SILVA  Discharging Hospital Unit/Room#: 0212/0212-01  Discharging Unit Phone Number: [phone]    Emergency Contact:   Extended Emergency Contact Information  Primary Emergency Contact: Anastasiya Araya  Address: [address] [place] [address]  Home Phone: [phone]  Mobile Phone: [phone]  Relation: Other    Past Surgical History:  Past Surgical History:   Procedure Laterality Date    LEG AMPUTATION BELOW KNEE Right     OTHER SURGICAL HISTORY  2024    PARTIAL LEFT FOOT AMPUTATION - Left    PACEMAKER INSERTION         Immunization History:     There is no immunization history on file for this patient.    Active Problems:  Patient Active Problem List   Diagnosis Code    Acute on chronic congestive heart failure, unspecified heart failure type (HCC) I50.9    Acute pulmonary edema (HCC) J81.0    Below-knee amputation of right lower extremity (HCC) S88.111A    Cellulitis of right lower extremity L03.115    QT prolongation R94.31    Dyspnea R06.00    Cirrhosis of liver with ascites (HCC) K74.60, R18.8    Generalized abdominal pain R10.84    Pleural effusion J90    Other ascites R18.8    Abdominal pain R10.9    Congestive heart failure (HCC) I50.9    PAD (peripheral artery disease) (HCC) I73.9    Unable to care for self Z78.9    Coronary artery disease without angina  pectoris I25.10    Type 2 diabetes mellitus without complication, without long-term current use of insulin (HCC) E11.9    Abdominal pain, chronic, epigastric R10.13, G89.29    Chest pain R07.9    Osteomyelitis (HCC) M86.9    Diabetic foot ulcer with osteomyelitis (HCC) E11.621, E11.69, L97.509, M86.9    Elevated liver function tests R79.89    Drug-seeking behavior Z76.5       Isolation/Infection:   Isolation            Contact          Patient Infection Status       Infection Onset Added Last Indicated Last Indicated By Review Planned Expiration Resolved Resolved By    MRSA 23 Wayne Niño        Added from external infection.    Resolved    Lice (Pediculosis) 23 Sary Licea RN   23 Infection     Not Lice, possible bed bug rule out                        Nurse Assessment:  Last Vital Signs: /74   Pulse 78   Temp 97.4 °F (36.3 °C) (Oral)   Resp 16   Ht 1.803 m (5' 11\")   Wt 70.4 kg (155 lb 3.2 oz)   SpO2 98%   BMI 21.65 kg/m²     Last documented pain score (0-10 scale): Pain Level: 10  Last Weight:   Wt Readings from Last 1 Encounters:   24 70.4 kg (155 lb 3.2 oz)     Mental Status:  oriented and alert    Pt is deaf    IV Access:  - None    Nursing Mobility/ADLs:  Walking   Assisted  Transfer  Assisted  Bathing  Assisted  Dressing  Assisted  Toileting  Assisted  Feeding  Independent  Med Admin  Independent  Med Delivery   whole    Wound Care Documentation and Therapy:  Wound 23 scattered dry scabs to left lower leg, venous, partial full thickness (Active)   Number of days: 151       Incision 24 Foot Left (Active)   Dressing Status Clean;Dry;Intact 01/15/24 1350   Drainage Amount None (dry) 01/15/24 1350   Odor None 01/15/24 1350   Number of days: 3        Elimination:  Continence:   Bowel: Yes  Bladder: Yes  Urinary Catheter: None   Colostomy/Ileostomy/Ileal Conduit: No       Date of Last BM:     Intake/Output

## 2024-01-16 NOTE — PROGRESS NOTES
Progress Note    Admit Date:  1/11/2024    Patient was admitted for acute left foot osteomyelitis. Of first distal phalanx   S/p partial foot amputation 1/12.     Wound healing .   Pain controlled .     Needs SNF  - pt now agreeable       Subjective:  Mr. Nagy seen and examined with RN in room.    He is hearing impaired,  used to  communicate with patient     Patient is postop day 4 , s/p left foot partial amputation for osteomyelitis   currently on broad-spectrum IV antibiotics.   Previous right BKA.    He is from home - initially refused SNF, agreeable for SNF now   -patient's main complaint was his poor pain control- meds adjusted . On scheduled percocet now. Pain is better controlled.       All questions answered in detail.   Also discussed about discharge planning with pt, CM.   Await SNF bed, precert .     - patient refuses SNF placement and wants home care only ; case management will be informed      Objective:   Patient Vitals for the past 4 hrs:   BP Temp Temp src Pulse Resp SpO2   01/16/24 1430 111/77 97.8 °F (36.6 °C) Axillary 90 18 96 %            Intake/Output Summary (Last 24 hours) at 1/16/2024 1437  Last data filed at 1/16/2024 1305  Gross per 24 hour   Intake 380 ml   Output 3600 ml   Net -3220 ml         Physical Exam:  Gen: No distress. Alert.  Awake and well-oriented.   Hearing impaired   Eyes: PERRL. No sclera icterus. No conjunctival injection.   ENT: No discharge. Pharynx clear.   Neck: No JVD.  Trachea midline.  Resp: No accessory muscle use. No crackles. No wheezes. No rhonchi.   CV: Regular rate. Regular rhythm. No murmur.  No rub. No edema.   GI: Non-tender. Non-distended.  Normal bowel sounds.  Skin: Warm and dry. No nodule on exposed extremities. No rash on exposed extremities. +dressing in place over left foot, did not remove for exam    M/S: No cyanosis. No joint deformity. No clubbing. +R BKA  Left foot s/p transmetatarsal amputation currently wrapped in  Component Value Units Date/Time    COVID-19 & Influenza Combo [[id number]] Collected: 01/11/24 1920    Order Status: Completed Specimen: Nasopharyngeal Swab Updated: 01/11/24 1953     SARS-CoV-2 RNA, RT PCR NOT DETECTED     Comment: Not Detected results do not preclude SARS-CoV-2 infection and  should not be used as the sole basis for patient management  decisions.  Results must be combined with clinical observations,  patient history, and epidemiological information.  Testing was performed using TES[employer] MARIA T SARS-CoV-2 and Influenza A/B  nucleic acid assay. This test is a multiplex Real-Time Reverse  Transcriptase Polymerase Chain Reaction (RT-PCR)-based in vitro  diagnostic test intended for the qualitative detection of nucleic  acids from SARS-CoV-2, influenza A, and influenza B in nasopharyngeal  and nasal swab specimens for use under the FDA’s Emergency Use  Authorization (EUA) only.    Patient Fact Sheet:  https://www.fda.gov/media/139950/download  Provider Fact Sheet: https://www.fda.gov/media/082627/download  EUA: https://www.fda.gov/media/484430/download  IFU: https://www.fda.gov/media/103582/download    Methodology:  RT-PCR          INFLUENZA A NOT DETECTED     INFLUENZA B NOT DETECTED    Culture, Blood 2 [[id number]] Collected: 01/11/24 1840    Order Status: Completed Specimen: Blood Updated: 01/16/24 0115     Culture, Blood 2 No Growth after 4 days of incubation.    Narrative:      ORDER#: [id number]                          ORDERED BY: LIANG QUINTANILLA  SOURCE: Blood Antecubital-Lef              COLLECTED:  01/11/24 18:40  ANTIBIOTICS AT [facility].:                      RECEIVED :  01/11/24 18:49  If child <=2 yrs old please draw pediatric bottle.~Blood Culture #2    Culture, Blood 1 [[id number]] Collected: 01/11/24 1701    Order Status: Completed Specimen: Blood Updated: 01/15/24 1715     Blood Culture, Routine No Growth after 4 days of incubation.    Narrative:      ORDER#: [id number]

## 2024-01-17 VITALS
DIASTOLIC BLOOD PRESSURE: 66 MMHG | TEMPERATURE: 97.7 F | BODY MASS INDEX: 21.73 KG/M2 | RESPIRATION RATE: 18 BRPM | SYSTOLIC BLOOD PRESSURE: 110 MMHG | HEIGHT: 71 IN | OXYGEN SATURATION: 96 % | WEIGHT: 155.2 LBS | HEART RATE: 88 BPM

## 2024-01-17 LAB
ANION GAP SERPL CALCULATED.3IONS-SCNC: 8 MMOL/L (ref 3–16)
BUN SERPL-MCNC: 13 MG/DL (ref 7–20)
CALCIUM SERPL-MCNC: 8.6 MG/DL (ref 8.3–10.6)
CHLORIDE SERPL-SCNC: 98 MMOL/L (ref 99–110)
CO2 SERPL-SCNC: 27 MMOL/L (ref 21–32)
CREAT SERPL-MCNC: 0.7 MG/DL (ref 0.9–1.3)
DEPRECATED RDW RBC AUTO: 14.8 % (ref 12.4–15.4)
GFR SERPLBLD CREATININE-BSD FMLA CKD-EPI: >60 ML/MIN/{1.73_M2}
GLUCOSE BLD-MCNC: 182 MG/DL (ref 70–99)
GLUCOSE BLD-MCNC: 233 MG/DL (ref 70–99)
GLUCOSE BLD-MCNC: 327 MG/DL (ref 70–99)
GLUCOSE SERPL-MCNC: 227 MG/DL (ref 70–99)
HCT VFR BLD AUTO: 39.5 % (ref 40.5–52.5)
HGB BLD-MCNC: 13.4 G/DL (ref 13.5–17.5)
MCH RBC QN AUTO: 29.7 PG (ref 26–34)
MCHC RBC AUTO-ENTMCNC: 33.9 G/DL (ref 31–36)
MCV RBC AUTO: 87.7 FL (ref 80–100)
PERFORMED ON: ABNORMAL
PLATELET # BLD AUTO: 118 K/UL (ref 135–450)
PMV BLD AUTO: 8.8 FL (ref 5–10.5)
POTASSIUM SERPL-SCNC: 3.7 MMOL/L (ref 3.5–5.1)
RBC # BLD AUTO: 4.51 M/UL (ref 4.2–5.9)
SODIUM SERPL-SCNC: 133 MMOL/L (ref 136–145)
WBC # BLD AUTO: 6.7 K/UL (ref 4–11)

## 2024-01-17 PROCEDURE — 0Y6N0ZF DETACHMENT AT LEFT FOOT, PARTIAL 5TH RAY, OPEN APPROACH: ICD-10-PCS | Performed by: PODIATRIST

## 2024-01-17 PROCEDURE — 6370000000 HC RX 637 (ALT 250 FOR IP): Performed by: INTERNAL MEDICINE

## 2024-01-17 PROCEDURE — 0Y6N0ZD DETACHMENT AT LEFT FOOT, PARTIAL 4TH RAY, OPEN APPROACH: ICD-10-PCS | Performed by: PODIATRIST

## 2024-01-17 PROCEDURE — 94640 AIRWAY INHALATION TREATMENT: CPT

## 2024-01-17 PROCEDURE — 85027 COMPLETE CBC AUTOMATED: CPT

## 2024-01-17 PROCEDURE — 36415 COLL VENOUS BLD VENIPUNCTURE: CPT

## 2024-01-17 PROCEDURE — 6370000000 HC RX 637 (ALT 250 FOR IP)

## 2024-01-17 PROCEDURE — 80048 BASIC METABOLIC PNL TOTAL CA: CPT

## 2024-01-17 PROCEDURE — 6370000000 HC RX 637 (ALT 250 FOR IP): Performed by: PODIATRIST

## 2024-01-17 PROCEDURE — 0Y6N0Z9 DETACHMENT AT LEFT FOOT, PARTIAL 1ST RAY, OPEN APPROACH: ICD-10-PCS | Performed by: PODIATRIST

## 2024-01-17 PROCEDURE — 94761 N-INVAS EAR/PLS OXIMETRY MLT: CPT

## 2024-01-17 PROCEDURE — 99239 HOSP IP/OBS DSCHRG MGMT >30: CPT | Performed by: INTERNAL MEDICINE

## 2024-01-17 PROCEDURE — 0Y6N0ZB DETACHMENT AT LEFT FOOT, PARTIAL 2ND RAY, OPEN APPROACH: ICD-10-PCS | Performed by: PODIATRIST

## 2024-01-17 PROCEDURE — 0Y6N0ZC DETACHMENT AT LEFT FOOT, PARTIAL 3RD RAY, OPEN APPROACH: ICD-10-PCS | Performed by: PODIATRIST

## 2024-01-17 RX ORDER — OXYCODONE AND ACETAMINOPHEN 10; 325 MG/1; MG/1
1 TABLET ORAL 4 TIMES DAILY
Qty: 12 TABLET | Refills: 0 | Status: SHIPPED | OUTPATIENT
Start: 2024-01-17 | End: 2024-01-20

## 2024-01-17 RX ORDER — CEFUROXIME AXETIL 500 MG/1
500 TABLET ORAL EVERY 12 HOURS SCHEDULED
Qty: 10 TABLET | Refills: 0 | DISCHARGE
Start: 2024-01-17 | End: 2024-01-22

## 2024-01-17 RX ORDER — INSULIN GLARGINE 100 [IU]/ML
25 INJECTION, SOLUTION SUBCUTANEOUS NIGHTLY
DISCHARGE
Start: 2024-01-17

## 2024-01-17 RX ORDER — INSULIN LISPRO 100 [IU]/ML
0-16 INJECTION, SOLUTION INTRAVENOUS; SUBCUTANEOUS
DISCHARGE
Start: 2024-01-17

## 2024-01-17 RX ORDER — INSULIN LISPRO 100 [IU]/ML
0-4 INJECTION, SOLUTION INTRAVENOUS; SUBCUTANEOUS NIGHTLY
DISCHARGE
Start: 2024-01-17

## 2024-01-17 RX ORDER — INSULIN GLARGINE 100 [IU]/ML
10 INJECTION, SOLUTION SUBCUTANEOUS DAILY
DISCHARGE
Start: 2024-01-18

## 2024-01-17 RX ORDER — LISINOPRIL 5 MG/1
5 TABLET ORAL DAILY
DISCHARGE
Start: 2024-01-18

## 2024-01-17 RX ORDER — DOXYCYCLINE HYCLATE 100 MG
100 TABLET ORAL EVERY 12 HOURS SCHEDULED
DISCHARGE
Start: 2024-01-17 | End: 2024-01-22

## 2024-01-17 RX ORDER — CLOPIDOGREL BISULFATE 75 MG/1
75 TABLET ORAL DAILY
DISCHARGE
Start: 2024-01-18

## 2024-01-17 RX ORDER — GABAPENTIN 300 MG/1
300 CAPSULE ORAL 3 TIMES DAILY
DISCHARGE
Start: 2024-01-17 | End: 2024-02-16

## 2024-01-17 RX ADMIN — INSULIN GLARGINE 10 UNITS: 100 INJECTION, SOLUTION SUBCUTANEOUS at 09:25

## 2024-01-17 RX ADMIN — TORSEMIDE 20 MG: 20 TABLET ORAL at 09:13

## 2024-01-17 RX ADMIN — OXYCODONE HYDROCHLORIDE AND ACETAMINOPHEN 1 TABLET: 10; 325 TABLET ORAL at 13:01

## 2024-01-17 RX ADMIN — GABAPENTIN 300 MG: 300 CAPSULE ORAL at 09:13

## 2024-01-17 RX ADMIN — CLOPIDOGREL BISULFATE 75 MG: 75 TABLET ORAL at 09:13

## 2024-01-17 RX ADMIN — INSULIN LISPRO 12 UNITS: 100 INJECTION, SOLUTION INTRAVENOUS; SUBCUTANEOUS at 11:43

## 2024-01-17 RX ADMIN — APIXABAN 5 MG: 5 TABLET, FILM COATED ORAL at 09:14

## 2024-01-17 RX ADMIN — GABAPENTIN 300 MG: 300 CAPSULE ORAL at 13:51

## 2024-01-17 RX ADMIN — SUCRALFATE 1 G: 1 TABLET ORAL at 13:02

## 2024-01-17 RX ADMIN — OXYCODONE HYDROCHLORIDE AND ACETAMINOPHEN 1 TABLET: 10; 325 TABLET ORAL at 09:13

## 2024-01-17 RX ADMIN — DOXYCYCLINE HYCLATE 100 MG: 100 TABLET, COATED ORAL at 09:13

## 2024-01-17 RX ADMIN — Medication 2 PUFF: at 08:24

## 2024-01-17 RX ADMIN — LISINOPRIL 5 MG: 5 TABLET ORAL at 09:14

## 2024-01-17 RX ADMIN — BUSPIRONE HYDROCHLORIDE 5 MG: 5 TABLET ORAL at 09:14

## 2024-01-17 RX ADMIN — PANTOPRAZOLE SODIUM 40 MG: 40 TABLET, DELAYED RELEASE ORAL at 06:26

## 2024-01-17 RX ADMIN — EMPAGLIFLOZIN 10 MG: 10 TABLET, FILM COATED ORAL at 09:14

## 2024-01-17 RX ADMIN — SPIRONOLACTONE 25 MG: 25 TABLET ORAL at 09:13

## 2024-01-17 RX ADMIN — CEFUROXIME AXETIL 500 MG: 500 TABLET ORAL at 09:21

## 2024-01-17 RX ADMIN — SUCRALFATE 1 G: 1 TABLET ORAL at 09:13

## 2024-01-17 ASSESSMENT — PAIN DESCRIPTION - ORIENTATION
ORIENTATION: LEFT
ORIENTATION: LEFT

## 2024-01-17 ASSESSMENT — PAIN DESCRIPTION - DESCRIPTORS: DESCRIPTORS: ACHING

## 2024-01-17 ASSESSMENT — PAIN DESCRIPTION - LOCATION
LOCATION: FOOT
LOCATION: FOOT

## 2024-01-17 ASSESSMENT — PAIN SCALES - GENERAL
PAINLEVEL_OUTOF10: 10
PAINLEVEL_OUTOF10: 10

## 2024-01-17 NOTE — FLOWSHEET NOTE
Patient resting, eyes closed, respirations witnessed as e/e. No signs of distress. Urinal at the bedside. Bed alarm in place. Call light and bedside table is easy reach.     01/17/24 0234   Vital Signs   Temp 98 °F (36.7 °C)   Temp Source Oral   Pulse 81   Heart Rate Source Monitor   Respirations 16   /68   MAP (Calculated) 82   BP Location Left upper arm   BP Method Automatic   Patient Position Semi fowlers   Oxygen Therapy   SpO2 95 %   O2 Device None (Room air)

## 2024-01-17 NOTE — PLAN OF CARE
Problem: Discharge Planning  Goal: Discharge to home or other facility with appropriate resources  1/17/2024 1040 by Rosmery Quinones RN  Outcome: Progressing  1/16/2024 2120 by Jack Rowley RN  Outcome: Progressing     Problem: Safety - Adult  Goal: Free from fall injury  1/17/2024 1040 by Rosmery Quinones RN  Outcome: Progressing  1/16/2024 2120 by Jack Rowley RN  Outcome: Progressing     Problem: Skin/Tissue Integrity  Goal: Absence of new skin breakdown  Description: 1.  Monitor for areas of redness and/or skin breakdown  2.  Assess vascular access sites hourly  3.  Every 4-6 hours minimum:  Change oxygen saturation probe site  4.  Every 4-6 hours:  If on nasal continuous positive airway pressure, respiratory therapy assess nares and determine need for appliance change or resting period.  1/17/2024 1040 by Rosmery Quinones RN  Outcome: Progressing  1/16/2024 2120 by Jack Rowley RN  Outcome: Progressing     Problem: Chronic Conditions and Co-morbidities  Goal: Patient's chronic conditions and co-morbidity symptoms are monitored and maintained or improved  1/17/2024 1040 by Rosmery Quinones RN  Outcome: Progressing  1/16/2024 2120 by Jack Rowley RN  Outcome: Progressing     Problem: Pain  Goal: Verbalizes/displays adequate comfort level or baseline comfort level  1/17/2024 1040 by Rosmery Quinones RN  Outcome: Progressing  1/16/2024 2120 by Jack Rowley RN  Outcome: Progressing

## 2024-01-17 NOTE — FLOWSHEET NOTE
Shift assessment complete. See doc flow. Nightly medications given see MAR. A/O x4. Deaf. IV Abx. Post-op partial left foot amp. Surgical dressing in place, elevated with pillow support. Scheduled Percocet given. Nightly snack given. Urinal at the bedside. Bed alarm in place. Call light and bedside table within easy reach.    01/16/24 2110   Vital Signs   Temp 97.9 °F (36.6 °C)   Temp Source Oral   Pulse 86   Heart Rate Source Monitor   Respirations 16   BP 98/60   MAP (Calculated) 73   BP Location Left upper arm   BP Method Automatic   Patient Position Semi fowlers   Pain Assessment   Response to Pain Intervention Patient satisfied   Oxygen Therapy   SpO2 96 %   O2 Device None (Room air)

## 2024-01-17 NOTE — CARE COORDINATION
CM delivered 2nd IMM delivered within 4 hours for DC, verbal explanation of patient rights at bedside. Pt voiced understanding of discharge MCR rights and is agreeable to discharge.

## 2024-01-17 NOTE — PROGRESS NOTES
RT Nebulizer Bronchodilator Protocol Note    There is a bronchodilator order in the chart from a provider indicating to follow the RT Bronchodilator Protocol and there is an “Initiate RT Bronchodilator Protocol” order as well (see protocol at bottom of note).    CXR Findings:  No results found.    The findings from the last RT Protocol Assessment were as follows:  Smoking: (P) Chronic pulmonary disease  Respiratory Pattern: (P) Regular pattern and RR 12-20 bpm  Breath Sounds: (P) Slightly diminished and/or crackles  Cough: (P) Strong, spontaneous, non-productive  Indication for Bronchodilator Therapy: (P) Decreased or absent breath sounds  Bronchodilator Assessment Score: (P) 4    Aerosolized bronchodilator medication orders have been revised according to the RT Nebulizer Bronchodilator Protocol below.    Respiratory Therapist to perform RT Therapy Protocol Assessment initially then follow the protocol.  Repeat RT Therapy Protocol Assessment PRN for score 0-3 or on second treatment, BID, and PRN for scores above 3.    No Indications - adjust the frequency to every 6 hours PRN wheezing or bronchospasm, if no treatments needed after 48 hours then discontinue using Per Protocol order mode.     If indication present, adjust the RT bronchodilator orders based on the Bronchodilator Assessment Score as indicated below.  If a patient is on this medication at home then do not decrease Frequency below that used at home.    0-3 - enter or revise RT bronchodilator order(s) to equivalent RT Bronchodilator order with Frequency of every 4 hours PRN for wheezing or increased work of breathing using Per Protocol order mode.       4-6 - enter or revise RT Bronchodilator order(s) to two equivalent RT bronchodilator orders with one order with BID Frequency and one order with Frequency of every 4 hours PRN wheezing or increased work of breathing using Per Protocol order mode.         7-10 - enter or revise RT Bronchodilator order(s) to

## 2024-01-17 NOTE — PROGRESS NOTES
RT Inhaler-Nebulizer Bronchodilator Protocol Note    There is a bronchodilator order in the chart from a provider indicating to follow the RT Bronchodilator Protocol and there is an “Initiate RT Inhaler-Nebulizer Bronchodilator Protocol” order as well (see protocol at bottom of note).    CXR Findings:  No results found.    The findings from the last RT Protocol Assessment were as follows:   History Pulmonary Disease: (P) Chronic pulmonary disease  Respiratory Pattern: (P) Regular pattern and RR 12-20 bpm  Breath Sounds: (P) Slightly diminished and/or crackles  Cough: (P) Strong, spontaneous, non-productive  Indication for Bronchodilator Therapy: (P) Decreased or absent breath sounds  Bronchodilator Assessment Score: (P) 4    Aerosolized bronchodilator medication orders have been revised according to the RT Inhaler-Nebulizer Bronchodilator Protocol below.    Respiratory Therapist to perform RT Therapy Protocol Assessment initially then follow the protocol.  Repeat RT Therapy Protocol Assessment PRN for score 0-3 or on second treatment, BID, and PRN for scores above 3.    No Indications - adjust the frequency to every 6 hours PRN wheezing or bronchospasm, if no treatments needed after 48 hours then discontinue using Per Protocol order mode.     If indication present, adjust the RT bronchodilator orders based on the Bronchodilator Assessment Score as indicated below.  Use Inhaler orders unless patient has one or more of the following: on home nebulizer, not able to hold breath for 10 seconds, is not alert and oriented, cannot activate and use MDI correctly, or respiratory rate 25 breaths per minute or more, then use the equivalent nebulizer order(s) with same Frequency and PRN reasons based on the score.  If a patient is on this medication at home then do not decrease Frequency below that used at home.    0-3 - enter or revise RT bronchodilator order(s) to equivalent RT Bronchodilator order with Frequency of every 4

## 2024-01-17 NOTE — PROGRESS NOTES
[place] [facility] [place]  [place] IV D/C per protocol.  Pt being loaded up via stretcher by transport. SHERRELL PERDOMO RN

## 2024-01-17 NOTE — FLOWSHEET NOTE
01/17/24 0800   Vital Signs   Temp 97.3 °F (36.3 °C)   Temp Source Oral   Pulse 85   Heart Rate Source Monitor   Respirations 16   /72   MAP (Calculated) 86   BP Location Left upper arm   BP Method Automatic   Patient Position High fowlers   Oxygen Therapy   SpO2 99 %   O2 Device Nasal cannula   O2 Flow Rate (L/min) 3 L/min     AM assessment completed. Pt c/o pain in left foot will give scheduled Percocet per orders.  Pt is deaf and use  service or pt writes things down.  No signs of symptoms of distress noted. Patient tolerated morning medications well. Respirations easy and even. Bed in lowest position, bed alarm in place and functioning properly, SR up x 2 and bed in low position. Call light within reach.     Bedside Mobility Assessment Tool (BMAT):     Assessment Level 1- Sit and Shake    1. From a semi-reclined position, ask patient to sit up and rotate to a seated position at the side of the bed. Can use the bedrail.    2. Ask patient to reach out and grab your hand and shake making sure patient reaches across his/her midline.   Fail- Patient is unable to perform tasks, patient is MOBILITY LEVEL 1.    Assessment Level 2- Stretch and Point   1. With patient in seated position at the side of the bed, have patient place both feet on the floor (or stool) with knees no higher than hips.    2. Ask patient to stretch one leg and straighten the knee, then bend the ankle/flex and point the toes. If appropriate, repeat with the other leg.   Fail- Patient is unable to complete task. Patient is MOBILITY LEVEL 2.     Assessment Level 3- Stand   1. Ask patient to elevate off the bed or chair (seated to standing) using an assistive device (cane, bedrail).    2. Patient should be able to raise buttocks off be and hold for a count of five. May repeat once.   Fail- Patient unable to demonstrate standing stability. Patient is MOBILITY LEVEL 3.     Assessment Level 4- Walk   1. Ask patient to march in place at

## 2024-01-17 NOTE — PLAN OF CARE
Problem: Discharge Planning  Goal: Discharge to home or other facility with appropriate resources  1/17/2024 1322 by Rosmery Quinones RN  Outcome: Completed  1/17/2024 1040 by Rosmery Quinones RN  Outcome: Progressing     Problem: Safety - Adult  Goal: Free from fall injury  1/17/2024 1322 by Rosmery Quinones RN  Outcome: Completed  1/17/2024 1040 by Rosmery Quinones RN  Outcome: Progressing     Problem: Skin/Tissue Integrity  Goal: Absence of new skin breakdown  Description: 1.  Monitor for areas of redness and/or skin breakdown  2.  Assess vascular access sites hourly  3.  Every 4-6 hours minimum:  Change oxygen saturation probe site  4.  Every 4-6 hours:  If on nasal continuous positive airway pressure, respiratory therapy assess nares and determine need for appliance change or resting period.  1/17/2024 1322 by Rosmery Quinones RN  Outcome: Completed  1/17/2024 1040 by Rosmery Quinones RN  Outcome: Progressing     Problem: Chronic Conditions and Co-morbidities  Goal: Patient's chronic conditions and co-morbidity symptoms are monitored and maintained or improved  1/17/2024 1322 by Rosmery Quinones RN  Outcome: Completed  1/17/2024 1040 by Rosmery Quinones RN  Outcome: Progressing     Problem: Pain  Goal: Verbalizes/displays adequate comfort level or baseline comfort level  1/17/2024 1322 by Rosmery Quinones RN  Outcome: Completed  1/17/2024 1040 by Rosmery Quinones RN  Outcome: Progressing

## 2024-01-17 NOTE — PROGRESS NOTES
Progress Note    Admit Date:  1/11/2024    Patient was admitted for acute left foot osteomyelitis. Of first distal phalanx   S/p partial foot amputation 1/12.     Wound healing .   Pain controlled .     Needs SNF  - pt now agreeable       Subjective:  Mr. Nagy seen and examined with RN in room.    He is hearing impaired,  used to  communicate with patient     Patient is postop day 4 , s/p left foot partial amputation for osteomyelitis   currently on broad-spectrum IV antibiotics.   Previous right BKA.    He is from home - initially refused SNF, agreeable for SNF now   -patient's main complaint was his poor pain control- meds adjusted . On scheduled percocet now. Pain is better controlled.       All questions answered in detail.   Also discussed about discharge planning with pt, CM.   Await SNF bed, precert .     - patient refuses SNF placement and wants home care only ; case management will be informed      Objective:   No data found.         Intake/Output Summary (Last 24 hours) at 1/17/2024 1300  Last data filed at 1/17/2024 1013  Gross per 24 hour   Intake 500 ml   Output 6050 ml   Net -5550 ml         Physical Exam:  Gen: No distress. Alert.  Awake and well-oriented.   Hearing impaired   Eyes: PERRL. No sclera icterus. No conjunctival injection.   ENT: No discharge. Pharynx clear.   Neck: No JVD.  Trachea midline.  Resp: No accessory muscle use. No crackles. No wheezes. No rhonchi.   CV: Regular rate. Regular rhythm. No murmur.  No rub. No edema.   GI: Non-tender. Non-distended.  Normal bowel sounds.  Skin: Warm and dry. No nodule on exposed extremities. No rash on exposed extremities. +dressing in place over left foot, did not remove for exam    M/S: No cyanosis. No joint deformity. No clubbing. +R BKA  Left foot s/p transmetatarsal amputation currently wrapped in dressing  Neuro: Awake. Grossly nonfocal    Psych: Oriented x 3. No anxiety or agitation.

## 2024-01-18 NOTE — DISCHARGE SUMMARY
Name:  Renato Nagy  Room:  0212/0212-01  MRN:    [id number]    Discharge Summary      This discharge summary is in conjunction with a complete physical exam done on the day of discharge.    Discharging Provider: Dr. Manpreet Raymond MD      Admit: 1/11/2024  Discharge: 1/17/2024    HPI taken from admission H&P:    53 y.o. male who presented to [facility] with past medical history of HF PEF, COPD presented to the ED with chief complaint of left foot infection     Patient is hearing impaired noncompensable speech and communication was obtained via writing. Patient reported that he has been taking his medications compliant with however has noticed that his left big toe has been worsening swelling that continued to progress and now spreading around the other digits pain.  No known alleviating factor reports that the pain is very worse no associated chest pain abdominal pain or dysuria.     Diagnoses this Admission and Hospital Course   Acute first distal phalanx left foot osteomyelitis  - left foot xray as above  - prn pain control with morphine  - blood cx ngtd  - IV abx -Cefepime, Flagyl, and Vanc D#5. Switch to Po abx in AM   - podiatry consulted  - s/p partial left foot amputation 1/12. POD #4  - pain control is better today; pt has underlying chronic pain .  Continue scheduled percocet 10/325, cont soel gabapentin. Use prn IV dilaudid.  per pt- morphine did not help      DM type 2 - uncontrolled  - SSI and lantus -dose increased  - carb control diet  - monitor BG      Alcoholic cirrhosis   Hepatitis C   Transaminitis  - LFTs appear chronically elevated   - monitor LFTs     HTN?  - unsure of what pt is on at home  - monitor BP     Chronic Combined CHF  Ischemic cardiomyopathy s/p AICD   - last echo on 1/12/24 with EF of 25%  - monitor daily weights and I&Os  - unclear why pt not on  GDMT  with  ACE-I/ ARB/ ARNi and SGLT2   - start lisinopril, jardiance. Resume aldactone, demedex   - monitor BMP

## 2024-01-23 NOTE — DISCHARGE INSTRUCTIONS
Wound Care Center Physician Orders and Discharge Instructions  [facility]  [address], [address]  [place] [id number]  Telephone: [phone]      Fax: [phone]      Your home care company: [employer] .    Your wound-care supplies will be provided by:  [employer] .    NAME:  Renato Nagy   YOB: 1970  PRIMARY DIAGNOSIS FOR WOUND CARE CENTER:  Diabetic Quan 3 .    Wound cleansing:   Do not scrub or use excessive force.  Wash hands with soap and water before and after dressing changes.  Prior to applying a clean dressing, cleanse wound with normal saline, wound cleanser, or mild soap and water. Ask your physician or nurse before getting the wound(s) wet in the shower.     Wound care for home:    Left foot incision:   Wash with soap and water with dressing changes   Place betadine to incision   Cover with 4x4's, ABD, kerlix, ace (no compression)   Change every 2 days    Post op shoe for transfers.      Please note, all wounds (unless stated otherwise here) were mechanically debrided at the time of cleansing here in the wound-care center today, so a small amount of pain, drainage or bleeding from that process might be expected, and is normal.     All products for home use, including multiple products for a single wound if applicable, are medically necessary in order to achieve the best chance at timely wound healing. See provider documentation for details if needed.    Substituted dressings applied in the [facility] today, if applicable:        New orders for this week (labs, imaging, medications, etc.):    Pt may place weight on left heel for transfers.    Additional instructions for specific diagnoses:    General comments for diabetic / neuropathic ulcers:  *  Unless you've been instructed not to remove your dressings, be sure to inspect your feet daily, and notify us of any major changes.  *  If you do not have a long-term podiatrist, be sure to let us know.  *

## 2024-01-26 NOTE — OP NOTE
[address] [address]                                OPERATIVE REPORT    PATIENT NAME: ISRA CALDERON                    :        1970  MED REC NO:   [id number]                          ROOM:  ACCOUNT NO:   [id number]                           ADMIT DATE: 2024  PROVIDER:     Juan M Quarles DPM    DATE OF PROCEDURE:  2024    PREOPERATIVE DIAGNOSES:  1.  Diabetic foot ulceration, left foot.  2.  Osteomyelitis, left foot.  3.  Diabetes mellitus.    POSTOPERATIVE DIAGNOSES:  1.  Diabetic foot ulceration, left foot.  2.  Osteomyelitis, left foot.  3.  Diabetes mellitus.    OPERATION PERFORMED:  Partial left foot amputation.    SURGEON:  Juan M Quarles DPM    ANESTHESIA:  Local MAC.    HEMOSTASIS:  Ankle tourniquet at 250 mmHg.    ESTIMATED BLOOD LOSS:  Less than 100 mL.    REPORT OF OPERATION:  The patient was brought into the operating room  and placed on the operating table in the supine position.  Under mild IV  sedation, the left forefoot was anesthetized with 20 mL of 1:1 mixture  of 1% lidocaine plain and 0.5% Marcaine plain.  Foot was then scrubbed,  prepped, and draped in the usual sterile manner.  Esmarch was then  utilized to exsanguinate the left foot and the ankle tourniquet was then  inflated to 250 mmHg.    Attention was then directed to the left foot where the ulcerations were  identified on the hallux as well as scattered ulcerations on the left  digits as well.  This was surrounded with periwound erythema and edema  consistent with its preoperative diagnosis of cellulitis or skin  infection.  The ulceration on the hallux did probe down to the level of  the bone, which was soft and discolored, consistent with preoperative  diagnosis of osteomyelitis or bone infection.  At this time, elliptical  incision was then made circumferentially just proximal to the base of  the digits 1 through 5.  Dissection

## 2024-01-29 ENCOUNTER — HOSPITAL ENCOUNTER (OUTPATIENT)
Dept: WOUND CARE | Age: 54
Discharge: HOME | End: 2024-01-29
Attending: PODIATRIST
Payer: MEDICARE

## 2024-01-29 VITALS
BODY MASS INDEX: 21.42 KG/M2 | DIASTOLIC BLOOD PRESSURE: 83 MMHG | HEART RATE: 87 BPM | TEMPERATURE: 97.6 F | WEIGHT: 153 LBS | SYSTOLIC BLOOD PRESSURE: 119 MMHG | RESPIRATION RATE: 18 BRPM | HEIGHT: 71 IN

## 2024-01-29 DIAGNOSIS — L97.509 DIABETIC FOOT ULCER WITH OSTEOMYELITIS (HCC): Primary | ICD-10-CM

## 2024-01-29 DIAGNOSIS — E11.69 DIABETIC FOOT ULCER WITH OSTEOMYELITIS (HCC): Primary | ICD-10-CM

## 2024-01-29 DIAGNOSIS — M86.9 DIABETIC FOOT ULCER WITH OSTEOMYELITIS (HCC): Primary | ICD-10-CM

## 2024-01-29 DIAGNOSIS — E11.621 DIABETIC FOOT ULCER WITH OSTEOMYELITIS (HCC): Primary | ICD-10-CM

## 2024-01-29 PROCEDURE — 99214 OFFICE O/P EST MOD 30 MIN: CPT

## 2024-01-29 RX ORDER — BACITRACIN ZINC AND POLYMYXIN B SULFATE 500; 1000 [USP'U]/G; [USP'U]/G
OINTMENT TOPICAL ONCE
OUTPATIENT
Start: 2024-01-29 | End: 2024-01-29

## 2024-01-29 RX ORDER — LIDOCAINE 50 MG/G
OINTMENT TOPICAL ONCE
OUTPATIENT
Start: 2024-01-29 | End: 2024-01-29

## 2024-01-29 RX ORDER — LIDOCAINE 40 MG/G
CREAM TOPICAL ONCE
OUTPATIENT
Start: 2024-01-29 | End: 2024-01-29

## 2024-01-29 RX ORDER — LIDOCAINE HYDROCHLORIDE 40 MG/ML
SOLUTION TOPICAL ONCE
OUTPATIENT
Start: 2024-01-29 | End: 2024-01-29

## 2024-01-29 RX ORDER — LIDOCAINE 40 MG/G
CREAM TOPICAL ONCE
Status: DISCONTINUED | OUTPATIENT
Start: 2024-01-29 | End: 2024-01-30 | Stop reason: HOSPADM

## 2024-01-29 RX ORDER — SODIUM CHLOR/HYPOCHLOROUS ACID 0.033 %
SOLUTION, IRRIGATION IRRIGATION ONCE
OUTPATIENT
Start: 2024-01-29 | End: 2024-01-29

## 2024-01-29 RX ORDER — TRIAMCINOLONE ACETONIDE 1 MG/G
OINTMENT TOPICAL ONCE
OUTPATIENT
Start: 2024-01-29 | End: 2024-01-29

## 2024-01-29 ASSESSMENT — PAIN DESCRIPTION - LOCATION: LOCATION: FOOT

## 2024-01-29 ASSESSMENT — PAIN SCALES - GENERAL: PAINLEVEL_OUTOF10: 10

## 2024-01-29 ASSESSMENT — PAIN DESCRIPTION - ONSET: ONSET: ON-GOING

## 2024-01-29 ASSESSMENT — PAIN DESCRIPTION - ORIENTATION: ORIENTATION: LEFT

## 2024-01-29 ASSESSMENT — PAIN DESCRIPTION - FREQUENCY: FREQUENCY: CONTINUOUS

## 2024-01-29 ASSESSMENT — PAIN - FUNCTIONAL ASSESSMENT: PAIN_FUNCTIONAL_ASSESSMENT: ACTIVITIES ARE NOT PREVENTED

## 2024-01-29 ASSESSMENT — PAIN DESCRIPTION - PAIN TYPE: TYPE: ACUTE PAIN

## 2024-01-29 ASSESSMENT — PAIN DESCRIPTION - DESCRIPTORS: DESCRIPTORS: ACHING

## 2024-01-29 NOTE — PROGRESS NOTES
[facility] Progress Note      Renato Nagy     : 1970    DATE OF VISIT:  2024    Subjective:     Renato Nagy is a 53 y.o. male who has a chief complaint of a diabetic ulcer located on the left foot.   Video  in room due to patient being deaf.   States pain is controlled fairly well with medication. Has been staying off the foot.    States foot looks good to him.           Mr. Nagy has a past medical history of CHF (congestive heart failure) ([facility]), COPD (chronic obstructive pulmonary disease) ([facility]), and Diabetes ([facility]).    He has a past surgical history that includes Leg amputation below knee (Right); Pacemaker insertion; other surgical history (2024); and Foot surgery (Left, 2024).    His family history is not on file.     Mr. Nagy reports that he has been smoking cigarettes. He has never used smokeless tobacco. He reports that he does not drink alcohol and does not use drugs.    His current medication list consists of albuterol sulfate HFA, apixaban, atorvastatin, busPIRone, clopidogrel, dicyclomine, empagliflozin, ferrous sulfate, gabapentin, insulin glargine, insulin lispro, lactulose, lisinopril, mineral oil-hydrophilic petrolatum, pantoprazole, spironolactone, sucralfate, and torsemide.    Allergies: Azithromycin and Statins    Pertinent items from the review of systems are discussed in the HPI; the remainder of the ROS was reviewed and is negative.       Objective:     /83   Pulse 87   Temp 97.6 °F (36.4 °C) (Oral)   Resp 18   Ht 1.803 m (5' 11\")   Wt 69.4 kg (153 lb)   BMI 21.34 kg/m²       Dorsalis pedis pulse left palpable  Posterior tibial pulse left palpable  Protective sensation absent left LE      Sutures intact left TMA site with very minimal drainage noted. Mild lysing skin periwound.  Mild ecchymosis noted. No abscess noted. No malodor noted.     Right BKA noted.    Today's ulcer measurements are in the wound

## 2024-01-29 NOTE — PLAN OF CARE
Pt to the [facility] for initial appointment.  Pt is at [facility].  Pt had surgery on 1/12/24.  Sutures intact and approximated.  Pt to place betadine to incision and dry dressing to left foot.  Dressing to be changed every 2 days.  Pt to follow up in the [facility] in 1 week.  Pt may put weight on left heel for transfers.  Pt to follow up in the [facility] in 1 week.  Discharge instructions reviewed with patient, all questions answered, copy given to patient. Dressings were applied to all wounds per M.D. Instructions at this visit.

## 2024-01-30 NOTE — DISCHARGE INSTRUCTIONS
Wound Care Center Physician Orders and Discharge Instructions  [place] [address], [address]  [place] [id number]  Telephone: [phone]      Fax: [phone]        Your home care company:  [employer] .     Your wound-care supplies will be provided by:  [employer] .     NAME:  Renato Nagy   YOB: 1970  PRIMARY DIAGNOSIS FOR WOUND CARE CENTER:  Diabetic Quan 3 .     Wound cleansing:   Do not scrub or use excessive force.  Wash hands with soap and water before and after dressing changes.  Prior to applying a clean dressing, cleanse wound with normal saline, wound cleanser, or mild soap and water. Ask your physician or nurse before getting the wound(s) wet in the shower.                Wound care for home:     Left foot incision:   Wash with soap and water with dressing changes   Place betadine to incision   Cover with 4x4's, ABD, kerlix, ace (no compression)   Change every 2 days     Post op shoe for transfers.  Weight on heel.     Stay off of foot as much as possible.        Please note, all wounds (unless stated otherwise here) were mechanically debrided at the time of cleansing here in the wound-care center today, so a small amount of pain, drainage or bleeding from that process might be expected, and is normal.      All products for home use, including multiple products for a single wound if applicable, are medically necessary in order to achieve the best chance at timely wound healing. See provider documentation for details if needed.     Substituted dressings applied in the [facility] today, if applicable:           New orders for this week (labs, imaging, medications, etc.):     Pt may place weight on left heel for transfers.     Additional instructions for specific diagnoses:     General comments for diabetic / neuropathic ulcers:  *  Unless you've been instructed not to remove your dressings, be sure to inspect your feet daily, and notify us of any

## 2024-02-05 ENCOUNTER — HOSPITAL ENCOUNTER (OUTPATIENT)
Dept: WOUND CARE | Age: 54
Discharge: HOME | End: 2024-02-05
Attending: PODIATRIST
Payer: MEDICARE

## 2024-02-05 VITALS
BODY MASS INDEX: 22.6 KG/M2 | WEIGHT: 161.4 LBS | DIASTOLIC BLOOD PRESSURE: 68 MMHG | SYSTOLIC BLOOD PRESSURE: 104 MMHG | RESPIRATION RATE: 18 BRPM | HEART RATE: 87 BPM | HEIGHT: 71 IN | TEMPERATURE: 97.8 F

## 2024-02-05 DIAGNOSIS — L97.509 DIABETIC FOOT ULCER WITH OSTEOMYELITIS (HCC): Primary | ICD-10-CM

## 2024-02-05 DIAGNOSIS — E11.621 DIABETIC FOOT ULCER WITH OSTEOMYELITIS (HCC): Primary | ICD-10-CM

## 2024-02-05 DIAGNOSIS — E11.69 DIABETIC FOOT ULCER WITH OSTEOMYELITIS (HCC): Primary | ICD-10-CM

## 2024-02-05 DIAGNOSIS — M86.9 DIABETIC FOOT ULCER WITH OSTEOMYELITIS (HCC): Primary | ICD-10-CM

## 2024-02-05 PROCEDURE — 99213 OFFICE O/P EST LOW 20 MIN: CPT

## 2024-02-05 RX ORDER — SODIUM CHLOR/HYPOCHLOROUS ACID 0.033 %
SOLUTION, IRRIGATION IRRIGATION ONCE
OUTPATIENT
Start: 2024-02-05 | End: 2024-02-05

## 2024-02-05 RX ORDER — BACITRACIN ZINC AND POLYMYXIN B SULFATE 500; 1000 [USP'U]/G; [USP'U]/G
OINTMENT TOPICAL ONCE
OUTPATIENT
Start: 2024-02-05 | End: 2024-02-05

## 2024-02-05 RX ORDER — LIDOCAINE 40 MG/G
CREAM TOPICAL ONCE
OUTPATIENT
Start: 2024-02-05 | End: 2024-02-05

## 2024-02-05 RX ORDER — TRIAMCINOLONE ACETONIDE 1 MG/G
OINTMENT TOPICAL ONCE
OUTPATIENT
Start: 2024-02-05 | End: 2024-02-05

## 2024-02-05 RX ORDER — LIDOCAINE 40 MG/G
CREAM TOPICAL ONCE
Status: DISCONTINUED | OUTPATIENT
Start: 2024-02-05 | End: 2024-02-06 | Stop reason: HOSPADM

## 2024-02-05 RX ORDER — LIDOCAINE 50 MG/G
OINTMENT TOPICAL ONCE
OUTPATIENT
Start: 2024-02-05 | End: 2024-02-05

## 2024-02-05 RX ORDER — LIDOCAINE HYDROCHLORIDE 40 MG/ML
SOLUTION TOPICAL ONCE
OUTPATIENT
Start: 2024-02-05 | End: 2024-02-05

## 2024-02-05 NOTE — PLAN OF CARE
Pt to the [facility] for follow up appointment.  Sutures intact and approximated.  Pt to continue with weekly dressing.  Pt to follow up in the [facility] in 1 week.  Discharge instructions reviewed with patient, all questions answered, copy given to patient. Dressings were applied to all wounds per M.D. Instructions at this visit.

## 2024-02-08 NOTE — PROGRESS NOTES
[facility] Progress Note      Renato Nagy     : 1970    DATE OF VISIT:  2024    Subjective:     Renato Nagy is a 53 y.o. male who has a chief complaint of a diabetic ulcer located on the left foot.   Video  in room due to patient being deaf.   States pain is controlled fairly well with medication. States he has been staying off the foot.    States foot looks good to him.           Mr. Nagy has a past medical history of CHF (congestive heart failure) ([facility]), COPD (chronic obstructive pulmonary disease) ([facility]), and Diabetes ([facility]).    He has a past surgical history that includes Leg amputation below knee (Right); Pacemaker insertion; other surgical history (2024); and Foot surgery (Left, 2024).    His family history is not on file.     Mr. Nagy reports that he has been smoking cigarettes. He has never used smokeless tobacco. He reports that he does not drink alcohol and does not use drugs.    His current medication list consists of albuterol sulfate HFA, apixaban, atorvastatin, busPIRone, clopidogrel, dicyclomine, empagliflozin, ferrous sulfate, gabapentin, insulin glargine, insulin lispro, lactulose, lisinopril, mineral oil-hydrophilic petrolatum, pantoprazole, spironolactone, sucralfate, and torsemide.    Allergies: Azithromycin and Statins    Pertinent items from the review of systems are discussed in the HPI; the remainder of the ROS was reviewed and is negative.       Objective:     /68   Pulse 87   Temp 97.8 °F (36.6 °C) (Oral)   Resp 18   Ht 1.803 m (5' 11\")   Wt 73.2 kg (161 lb 6.4 oz)   BMI 22.51 kg/m²       Dorsalis pedis pulse left palpable  Posterior tibial pulse left palpable  Protective sensation absent left LE      Sutures intact left TMA site with very minimal drainage noted. Mild lysing skin periwound.  Mild ecchymosis noted. No abscess noted. No malodor noted. Mild irritation of the periwound tissue noted.     Right

## 2024-02-13 NOTE — DISCHARGE INSTRUCTIONS
Wound Care Center Physician Orders and Discharge Instructions  [place] [address] [address], [address]  [place] [id number]  Telephone: [phone]      Fax: [phone]        Your home care company:       Your wound-care supplies will be provided by:      NAME:  Renato Nagy   YOB: 1970  PRIMARY DIAGNOSIS FOR WOUND CARE CENTER:  Diabetic Quan 3 .     Wound cleansing:   Do not scrub or use excessive force.  Wash hands with soap and water before and after dressing changes.  Prior to applying a clean dressing, cleanse wound with normal saline, wound cleanser, or mild soap and water. Ask your physician or nurse before getting the wound(s) wet in the shower.                Wound care for home:     Left foot incision:   Wash with soap and water with dressing changes   Place betadine to incision   Cover with 4x4's, ABD, kerlix, ace (no compression)   Change every 2 days     Post op shoe for transfers.  Weight on heel.      Stay off of foot as much as possible.        Please note, all wounds (unless stated otherwise here) were mechanically debrided at the time of cleansing here in the wound-care center today, so a small amount of pain, drainage or bleeding from that process might be expected, and is normal.      All products for home use, including multiple products for a single wound if applicable, are medically necessary in order to achieve the best chance at timely wound healing. See provider documentation for details if needed.     Substituted dressings applied in the [facility] today, if applicable:           New orders for this week (labs, imaging, medications, etc.):     Pt may place weight on left heel for transfers.     Additional instructions for specific diagnoses:     General comments for diabetic / neuropathic ulcers:  *  Unless you've been instructed not to remove your dressings, be sure to inspect your feet daily, and notify us of any major changes.  *  If you do not

## 2024-02-17 ENCOUNTER — APPOINTMENT (OUTPATIENT)
Dept: GENERAL RADIOLOGY | Age: 54
DRG: 617 | End: 2024-02-17
Payer: MEDICARE

## 2024-02-17 ENCOUNTER — HOSPITAL ENCOUNTER (INPATIENT)
Age: 54
LOS: 5 days | Discharge: HOME HEALTH | DRG: 617 | End: 2024-02-22
Attending: EMERGENCY MEDICINE | Admitting: INTERNAL MEDICINE
Payer: MEDICARE

## 2024-02-17 ENCOUNTER — APPOINTMENT (OUTPATIENT)
Dept: CT IMAGING | Age: 54
DRG: 617 | End: 2024-02-17
Payer: MEDICARE

## 2024-02-17 DIAGNOSIS — L03.116 CELLULITIS OF LEFT LOWER EXTREMITY: Primary | ICD-10-CM

## 2024-02-17 DIAGNOSIS — L03.114 CELLULITIS OF LEFT UPPER EXTREMITY: ICD-10-CM

## 2024-02-17 DIAGNOSIS — L03.116 LEFT LEG CELLULITIS: ICD-10-CM

## 2024-02-17 DIAGNOSIS — Z86.39 HISTORY OF DIABETES MELLITUS: ICD-10-CM

## 2024-02-17 DIAGNOSIS — Z89.429 STATUS POST AMPUTATION OF TOE (HCC): ICD-10-CM

## 2024-02-17 DIAGNOSIS — Z89.512 S/P BELOW KNEE AMPUTATION, LEFT (HCC): ICD-10-CM

## 2024-02-17 PROBLEM — Z91.199 NON-COMPLIANCE: Status: ACTIVE | Noted: 2024-02-17

## 2024-02-17 LAB
ALBUMIN SERPL-MCNC: 3.6 G/DL (ref 3.4–5)
ALBUMIN/GLOB SERPL: 0.9 {RATIO} (ref 1.1–2.2)
ALP SERPL-CCNC: 106 U/L (ref 40–129)
ALT SERPL-CCNC: 180 U/L (ref 10–40)
ANION GAP SERPL CALCULATED.3IONS-SCNC: 11 MMOL/L (ref 3–16)
AST SERPL-CCNC: 138 U/L (ref 15–37)
BASOPHILS # BLD: 0.1 K/UL (ref 0–0.2)
BASOPHILS NFR BLD: 0.8 %
BILIRUB SERPL-MCNC: 0.8 MG/DL (ref 0–1)
BUN SERPL-MCNC: 10 MG/DL (ref 7–20)
CALCIUM SERPL-MCNC: 9.2 MG/DL (ref 8.3–10.6)
CHLORIDE SERPL-SCNC: 94 MMOL/L (ref 99–110)
CO2 SERPL-SCNC: 25 MMOL/L (ref 21–32)
CREAT SERPL-MCNC: 0.6 MG/DL (ref 0.9–1.3)
CRP SERPL-MCNC: 88.9 MG/L (ref 0–5.1)
D DIMER: 0.53 UG/ML FEU (ref 0–0.6)
DEPRECATED RDW RBC AUTO: 14.5 % (ref 12.4–15.4)
EOSINOPHIL # BLD: 0.3 K/UL (ref 0–0.6)
EOSINOPHIL NFR BLD: 3.1 %
ERYTHROCYTE [SEDIMENTATION RATE] IN BLOOD BY WESTERGREN METHOD: 64 MM/HR (ref 0–20)
GFR SERPLBLD CREATININE-BSD FMLA CKD-EPI: >60 ML/MIN/{1.73_M2}
GLUCOSE BLD-MCNC: 234 MG/DL (ref 70–99)
GLUCOSE BLD-MCNC: 267 MG/DL (ref 70–99)
GLUCOSE SERPL-MCNC: 239 MG/DL (ref 70–99)
HCT VFR BLD AUTO: 42 % (ref 40.5–52.5)
HGB BLD-MCNC: 14.6 G/DL (ref 13.5–17.5)
INR PPP: 1.74 (ref 0.84–1.16)
LACTATE BLDV-SCNC: 1.2 MMOL/L (ref 0.4–1.9)
LYMPHOCYTES # BLD: 1.1 K/UL (ref 1–5.1)
LYMPHOCYTES NFR BLD: 12.5 %
MCH RBC QN AUTO: 29.4 PG (ref 26–34)
MCHC RBC AUTO-ENTMCNC: 34.7 G/DL (ref 31–36)
MCV RBC AUTO: 84.9 FL (ref 80–100)
MONOCYTES # BLD: 0.9 K/UL (ref 0–1.3)
MONOCYTES NFR BLD: 11 %
NEUTROPHILS # BLD: 6.2 K/UL (ref 1.7–7.7)
NEUTROPHILS NFR BLD: 72.6 %
PERFORMED ON: ABNORMAL
PERFORMED ON: ABNORMAL
PLATELET # BLD AUTO: 123 K/UL (ref 135–450)
PMV BLD AUTO: 8.1 FL (ref 5–10.5)
POTASSIUM SERPL-SCNC: 4.3 MMOL/L (ref 3.5–5.1)
POTASSIUM SERPL-SCNC: 5.8 MMOL/L (ref 3.5–5.1)
PROCALCITONIN SERPL IA-MCNC: 0.15 NG/ML (ref 0–0.15)
PROT SERPL-MCNC: 7.5 G/DL (ref 6.4–8.2)
PROTHROMBIN TIME: 20.2 SEC (ref 11.5–14.8)
RBC # BLD AUTO: 4.95 M/UL (ref 4.2–5.9)
SODIUM SERPL-SCNC: 130 MMOL/L (ref 136–145)
WBC # BLD AUTO: 8.5 K/UL (ref 4–11)

## 2024-02-17 PROCEDURE — 71045 X-RAY EXAM CHEST 1 VIEW: CPT

## 2024-02-17 PROCEDURE — 2580000003 HC RX 258: Performed by: EMERGENCY MEDICINE

## 2024-02-17 PROCEDURE — 84132 ASSAY OF SERUM POTASSIUM: CPT

## 2024-02-17 PROCEDURE — 6370000000 HC RX 637 (ALT 250 FOR IP)

## 2024-02-17 PROCEDURE — 85025 COMPLETE CBC W/AUTO DIFF WBC: CPT

## 2024-02-17 PROCEDURE — 83605 ASSAY OF LACTIC ACID: CPT

## 2024-02-17 PROCEDURE — 36415 COLL VENOUS BLD VENIPUNCTURE: CPT

## 2024-02-17 PROCEDURE — 84145 PROCALCITONIN (PCT): CPT

## 2024-02-17 PROCEDURE — 73630 X-RAY EXAM OF FOOT: CPT

## 2024-02-17 PROCEDURE — 1200000000 HC SEMI PRIVATE

## 2024-02-17 PROCEDURE — 96375 TX/PRO/DX INJ NEW DRUG ADDON: CPT

## 2024-02-17 PROCEDURE — 87040 BLOOD CULTURE FOR BACTERIA: CPT

## 2024-02-17 PROCEDURE — 6360000002 HC RX W HCPCS: Performed by: EMERGENCY MEDICINE

## 2024-02-17 PROCEDURE — 96374 THER/PROPH/DIAG INJ IV PUSH: CPT

## 2024-02-17 PROCEDURE — 86140 C-REACTIVE PROTEIN: CPT

## 2024-02-17 PROCEDURE — 85652 RBC SED RATE AUTOMATED: CPT

## 2024-02-17 PROCEDURE — 6370000000 HC RX 637 (ALT 250 FOR IP): Performed by: INTERNAL MEDICINE

## 2024-02-17 PROCEDURE — 99285 EMERGENCY DEPT VISIT HI MDM: CPT

## 2024-02-17 PROCEDURE — 99223 1ST HOSP IP/OBS HIGH 75: CPT | Performed by: INTERNAL MEDICINE

## 2024-02-17 PROCEDURE — 2580000003 HC RX 258

## 2024-02-17 PROCEDURE — 85379 FIBRIN DEGRADATION QUANT: CPT

## 2024-02-17 PROCEDURE — 85610 PROTHROMBIN TIME: CPT

## 2024-02-17 PROCEDURE — 70450 CT HEAD/BRAIN W/O DYE: CPT

## 2024-02-17 PROCEDURE — 6360000002 HC RX W HCPCS

## 2024-02-17 PROCEDURE — 80053 COMPREHEN METABOLIC PANEL: CPT

## 2024-02-17 RX ORDER — ACETAMINOPHEN 325 MG/1
650 TABLET ORAL EVERY 6 HOURS PRN
Status: DISCONTINUED | OUTPATIENT
Start: 2024-02-17 | End: 2024-02-22 | Stop reason: HOSPADM

## 2024-02-17 RX ORDER — ATORVASTATIN CALCIUM 40 MG/1
40 TABLET, FILM COATED ORAL NIGHTLY
Status: DISCONTINUED | OUTPATIENT
Start: 2024-02-17 | End: 2024-02-22 | Stop reason: HOSPADM

## 2024-02-17 RX ORDER — ONDANSETRON 4 MG/1
4 TABLET, ORALLY DISINTEGRATING ORAL EVERY 8 HOURS PRN
Status: DISCONTINUED | OUTPATIENT
Start: 2024-02-17 | End: 2024-02-22 | Stop reason: HOSPADM

## 2024-02-17 RX ORDER — BUSPIRONE HYDROCHLORIDE 5 MG/1
5 TABLET ORAL 2 TIMES DAILY
Status: DISCONTINUED | OUTPATIENT
Start: 2024-02-17 | End: 2024-02-22 | Stop reason: HOSPADM

## 2024-02-17 RX ORDER — M-VIT,TX,IRON,MINS/CALC/FOLIC 27MG-0.4MG
1 TABLET ORAL 2 TIMES DAILY
Status: DISCONTINUED | OUTPATIENT
Start: 2024-02-17 | End: 2024-02-22 | Stop reason: HOSPADM

## 2024-02-17 RX ORDER — SPIRONOLACTONE 25 MG/1
25 TABLET ORAL DAILY
Status: DISCONTINUED | OUTPATIENT
Start: 2024-02-18 | End: 2024-02-22 | Stop reason: HOSPADM

## 2024-02-17 RX ORDER — POTASSIUM CHLORIDE 7.45 MG/ML
10 INJECTION INTRAVENOUS PRN
Status: DISCONTINUED | OUTPATIENT
Start: 2024-02-17 | End: 2024-02-22 | Stop reason: HOSPADM

## 2024-02-17 RX ORDER — FERROUS SULFATE 325(65) MG
325 TABLET ORAL 2 TIMES DAILY WITH MEALS
Status: DISCONTINUED | OUTPATIENT
Start: 2024-02-17 | End: 2024-02-22 | Stop reason: HOSPADM

## 2024-02-17 RX ORDER — POTASSIUM CHLORIDE 20 MEQ/1
40 TABLET, EXTENDED RELEASE ORAL PRN
Status: DISCONTINUED | OUTPATIENT
Start: 2024-02-17 | End: 2024-02-22 | Stop reason: HOSPADM

## 2024-02-17 RX ORDER — SUCRALFATE 1 G/1
1 TABLET ORAL 4 TIMES DAILY
Status: DISCONTINUED | OUTPATIENT
Start: 2024-02-17 | End: 2024-02-22 | Stop reason: HOSPADM

## 2024-02-17 RX ORDER — CLOPIDOGREL BISULFATE 75 MG/1
75 TABLET ORAL DAILY
Status: DISCONTINUED | OUTPATIENT
Start: 2024-02-18 | End: 2024-02-22 | Stop reason: HOSPADM

## 2024-02-17 RX ORDER — OXYCODONE HYDROCHLORIDE AND ACETAMINOPHEN 5; 325 MG/1; MG/1
1 TABLET ORAL EVERY 6 HOURS PRN
Status: DISCONTINUED | OUTPATIENT
Start: 2024-02-17 | End: 2024-02-18

## 2024-02-17 RX ORDER — TORSEMIDE 20 MG/1
20 TABLET ORAL DAILY
Status: DISCONTINUED | OUTPATIENT
Start: 2024-02-18 | End: 2024-02-22 | Stop reason: HOSPADM

## 2024-02-17 RX ORDER — SODIUM CHLORIDE 0.9 % (FLUSH) 0.9 %
5-40 SYRINGE (ML) INJECTION EVERY 12 HOURS SCHEDULED
Status: DISCONTINUED | OUTPATIENT
Start: 2024-02-17 | End: 2024-02-22 | Stop reason: HOSPADM

## 2024-02-17 RX ORDER — GLUCAGON 1 MG/ML
1 KIT INJECTION PRN
Status: DISCONTINUED | OUTPATIENT
Start: 2024-02-17 | End: 2024-02-22 | Stop reason: HOSPADM

## 2024-02-17 RX ORDER — SODIUM CHLORIDE 9 MG/ML
INJECTION, SOLUTION INTRAVENOUS PRN
Status: DISCONTINUED | OUTPATIENT
Start: 2024-02-17 | End: 2024-02-22 | Stop reason: HOSPADM

## 2024-02-17 RX ORDER — LACTULOSE 10 G/15ML
20 SOLUTION ORAL EVERY 8 HOURS PRN
Status: DISCONTINUED | OUTPATIENT
Start: 2024-02-17 | End: 2024-02-22 | Stop reason: HOSPADM

## 2024-02-17 RX ORDER — DEXTROSE MONOHYDRATE 100 MG/ML
INJECTION, SOLUTION INTRAVENOUS CONTINUOUS PRN
Status: DISCONTINUED | OUTPATIENT
Start: 2024-02-17 | End: 2024-02-22 | Stop reason: HOSPADM

## 2024-02-17 RX ORDER — LISINOPRIL 5 MG/1
5 TABLET ORAL DAILY
Status: DISCONTINUED | OUTPATIENT
Start: 2024-02-18 | End: 2024-02-22 | Stop reason: HOSPADM

## 2024-02-17 RX ORDER — ALBUTEROL SULFATE 90 UG/1
2 AEROSOL, METERED RESPIRATORY (INHALATION) 4 TIMES DAILY PRN
Status: DISCONTINUED | OUTPATIENT
Start: 2024-02-17 | End: 2024-02-22 | Stop reason: HOSPADM

## 2024-02-17 RX ORDER — ONDANSETRON 2 MG/ML
4 INJECTION INTRAMUSCULAR; INTRAVENOUS EVERY 6 HOURS PRN
Status: DISCONTINUED | OUTPATIENT
Start: 2024-02-17 | End: 2024-02-22 | Stop reason: HOSPADM

## 2024-02-17 RX ORDER — ACETAMINOPHEN 650 MG/1
650 SUPPOSITORY RECTAL EVERY 6 HOURS PRN
Status: DISCONTINUED | OUTPATIENT
Start: 2024-02-17 | End: 2024-02-22 | Stop reason: HOSPADM

## 2024-02-17 RX ORDER — DICYCLOMINE HYDROCHLORIDE 10 MG/1
10 CAPSULE ORAL EVERY 4 HOURS PRN
Status: DISCONTINUED | OUTPATIENT
Start: 2024-02-17 | End: 2024-02-22 | Stop reason: HOSPADM

## 2024-02-17 RX ORDER — MAGNESIUM SULFATE 1 G/100ML
1000 INJECTION INTRAVENOUS PRN
Status: DISCONTINUED | OUTPATIENT
Start: 2024-02-17 | End: 2024-02-22 | Stop reason: HOSPADM

## 2024-02-17 RX ORDER — POLYETHYLENE GLYCOL 3350 17 G/17G
17 POWDER, FOR SOLUTION ORAL DAILY PRN
Status: DISCONTINUED | OUTPATIENT
Start: 2024-02-17 | End: 2024-02-22 | Stop reason: HOSPADM

## 2024-02-17 RX ORDER — SODIUM CHLORIDE 0.9 % (FLUSH) 0.9 %
10 SYRINGE (ML) INJECTION PRN
Status: DISCONTINUED | OUTPATIENT
Start: 2024-02-17 | End: 2024-02-22 | Stop reason: HOSPADM

## 2024-02-17 RX ORDER — OXYCODONE AND ACETAMINOPHEN 10; 325 MG/1; MG/1
1 TABLET ORAL EVERY 8 HOURS PRN
Status: DISCONTINUED | OUTPATIENT
Start: 2024-02-17 | End: 2024-02-17

## 2024-02-17 RX ORDER — INSULIN GLARGINE 100 [IU]/ML
10 INJECTION, SOLUTION SUBCUTANEOUS DAILY
Status: DISCONTINUED | OUTPATIENT
Start: 2024-02-17 | End: 2024-02-22 | Stop reason: HOSPADM

## 2024-02-17 RX ORDER — GABAPENTIN 300 MG/1
300 CAPSULE ORAL 3 TIMES DAILY
Status: DISCONTINUED | OUTPATIENT
Start: 2024-02-17 | End: 2024-02-21

## 2024-02-17 RX ORDER — PANTOPRAZOLE SODIUM 40 MG/1
40 TABLET, DELAYED RELEASE ORAL DAILY
Status: DISCONTINUED | OUTPATIENT
Start: 2024-02-17 | End: 2024-02-22 | Stop reason: HOSPADM

## 2024-02-17 RX ORDER — OXYCODONE AND ACETAMINOPHEN 10; 325 MG/1; MG/1
1 TABLET ORAL EVERY 8 HOURS PRN
Status: ON HOLD | COMMUNITY
End: 2024-02-22 | Stop reason: HOSPADM

## 2024-02-17 RX ORDER — M-VIT,TX,IRON,MINS/CALC/FOLIC 27MG-0.4MG
1 TABLET ORAL 2 TIMES DAILY
COMMUNITY

## 2024-02-17 RX ADMIN — APIXABAN 5 MG: 5 TABLET, FILM COATED ORAL at 20:42

## 2024-02-17 RX ADMIN — VANCOMYCIN HYDROCHLORIDE 1000 MG: 1 INJECTION, POWDER, FOR SOLUTION INTRAVENOUS at 20:47

## 2024-02-17 RX ADMIN — BUSPIRONE HYDROCHLORIDE 5 MG: 5 TABLET ORAL at 20:41

## 2024-02-17 RX ADMIN — OXYCODONE HYDROCHLORIDE AND ACETAMINOPHEN 1 TABLET: 5; 325 TABLET ORAL at 20:42

## 2024-02-17 RX ADMIN — SUCRALFATE 1 G: 1 TABLET ORAL at 20:43

## 2024-02-17 RX ADMIN — GABAPENTIN 300 MG: 300 CAPSULE ORAL at 20:42

## 2024-02-17 RX ADMIN — HYDROMORPHONE HYDROCHLORIDE 1 MG: 1 INJECTION, SOLUTION INTRAMUSCULAR; INTRAVENOUS; SUBCUTANEOUS at 09:49

## 2024-02-17 RX ADMIN — VANCOMYCIN HYDROCHLORIDE 1500 MG: 1 INJECTION, POWDER, LYOPHILIZED, FOR SOLUTION INTRAVENOUS at 11:54

## 2024-02-17 RX ADMIN — CEFEPIME 2000 MG: 2 INJECTION, POWDER, FOR SOLUTION INTRAVENOUS at 16:38

## 2024-02-17 RX ADMIN — OXYCODONE HYDROCHLORIDE AND ACETAMINOPHEN 1 TABLET: 10; 325 TABLET ORAL at 13:24

## 2024-02-17 RX ADMIN — CEFEPIME 2000 MG: 2 INJECTION, POWDER, FOR SOLUTION INTRAVENOUS at 11:21

## 2024-02-17 RX ADMIN — Medication 1 TABLET: at 20:41

## 2024-02-17 RX ADMIN — SUCRALFATE 1 G: 1 TABLET ORAL at 18:11

## 2024-02-17 RX ADMIN — DESMOPRESSIN ACETATE 40 MG: 0.2 TABLET ORAL at 20:41

## 2024-02-17 ASSESSMENT — PAIN SCALES - GENERAL
PAINLEVEL_OUTOF10: 7
PAINLEVEL_OUTOF10: 10
PAINLEVEL_OUTOF10: 5
PAINLEVEL_OUTOF10: 6

## 2024-02-17 ASSESSMENT — PAIN DESCRIPTION - LOCATION: LOCATION: FOOT

## 2024-02-17 ASSESSMENT — PAIN DESCRIPTION - ORIENTATION: ORIENTATION: LEFT;LOWER

## 2024-02-17 NOTE — ED NOTES
Information for Admission Assignment    Leg Pain and Arm Pain (Reports left arm, leg pain x 4 days. Reports right leg amputation recently. Reports having difficulty taking care of himself at home. )    No diagnosis found.     Mental Status: awake and alert; oriented to person, place, and time  O2 Delivery: RA  Ambulation:Right BKA, fall risk  Equipment:Yes: Comment:   Statement of Belief: N/A  Sitter: no  Restraints: no  Other: left foot toe amputation wound.

## 2024-02-17 NOTE — ED PROVIDER NOTES
[facility] ED      CHIEF COMPLAINT  Leg Pain and Arm Pain (Reports left arm, leg pain x 4 days. Reports right leg amputation recently. Reports having difficulty taking care of himself at home. )       HISTORY OF PRESENT ILLNESS  Renato Nagy is a 53 y.o. male  who presents to the ED complaining of concern for left arm and leg pain.  Patient states that symptoms been worsening over the past several days.  He states that he is currently living at home.  He is having trouble with mobility around his house status post amputation of toes on the left foot and has a prosthetic leg on the right from [facility] previously.  Patient states that he has noted worsening pain to that left foot and is supposed to have dressing changes every 2 days but does not have the right dressing material or other resources at home to properly care for his wound.  It was last addressed 4 days ago by his significant other.  Patient states that he has not had any definite known fevers.  He has noted redness and worsening discomfort into the left hand and arm though the which is new.  It is to the point where it hurts to lift the leg or arm make it feel more weak and you became very concerned that possibly he was going to have a stroke or heart attack because of his worsening symptoms.  He has a significant history of diabetes.  Denies any chest pain.  No headache.  He states that he took oral pain medication that was previously prescribed to him but has not helped with the pain.  Due to worsening symptoms he now presents for further evaluation.  He states that he last saw his podiatrist 2 weeks ago but unfortunately missed the appointment last week.  Per chart review, patient's podiatrist is Dr. Quarles.    No other complaints, modifying factors or associated symptoms.     I have reviewed the following from the nursing documentation.    Past Medical History:   Diagnosis Date    CHF (congestive heart failure) (HCC)     COPD (chronic

## 2024-02-17 NOTE — ED NOTES
Performed blood cultures on pt. Wiped the top of the culture bottles down with alcohol wipes for 30 seconds and writer also cleaned the right hand with chloraprep for 30 seconds and let it air dry for another 30 seconds. Writer began to stick the right hand and jose up blood in the waste container first and then the rest of the blood culture in the culture bottles. No complaints from patient.

## 2024-02-17 NOTE — H&P
esophagus  - on PPI and carafate     Hypothyroidism  - on synthroid      Anxiety   Bipolar disorder  - continue home regimen      PAD  - s/p R BKA  - on eliquis, statin, plavix      Bilateral deafness  - communicates via writing  - supportive care     Hx of chronic pain and drug seeking behavior   - continue home regimen- I have advised he does not need IV abx for now      Note LUE and LLE cellulitis makes the patient higher risk for morbidity and mortality requiring testing and treatment.    DVT Prophylaxis: Eliquis   Diet: No diet orders on file  Code Status: Prior    Mack Sanchez MD, 2/17/2024 3:11 PM

## 2024-02-17 NOTE — ED NOTES
1112-Perfect Serv sent by Dr. Petersen to [facility]. [facility] for consult.  1132-Consult completed admission orders placed by Bharti CHAUDHRY.

## 2024-02-17 NOTE — CONSULTS
Pharmacy Note  Vancomycin Consult    Renato Nagy is a 53 y.o. male started on Vancomycin for SSTI; consult received from RICHA Connor to manage therapy. Also receiving the following antibiotics: cefepime.    Allergies:  Azithromycin and Statins       Recent Labs     02/17/24  0941   CREATININE 0.6*       Recent Labs     02/17/24  0941   WBC 8.5       Estimated Creatinine Clearance: 144 mL/min (A) (based on SCr of 0.6 mg/dL (L)).    No intake or output data in the 24 hours ending 02/17/24 1444    Wt Readings from Last 1 Encounters:   02/17/24 73 kg (161 lb)         Body mass index is 22.45 kg/m².    Loading dose (critically ill or in ICU, require dialysis or renal replacement therapy): Vancomycin 25 mg/kg IVPB x 1 (maximum 2500 mg).  Maintenance dose: 10-20 mg/kg (maximum: 2000 mg/dose and 4500 mg/day) starting at the next dosing interval determined by renal function  Pulse dose: fluctuating renal function, MIKE, ESRD  CRRT: 7.5-10 mg/kg q12h   Goal Vancomycin trough: 15-20 mcg/mL   Goal Vancomycin AUC: 400-600     Assessment/Plan:  Will initiate Vancomycin with a one time loading dose of 1500 mg x1, followed by 1000 mg IV every 8 hours. Calculated Vancomycin AUC = 459 mg/L*h with an estimated steady-state vancomycin trough = 12.3 mcg/mL. Vancomycin level ordered for 2/18 @ 1100. Timing of trough level will be determined based on culture results, renal function, and clinical response.     Thank you for the consult.

## 2024-02-18 ENCOUNTER — PREP FOR PROCEDURE (OUTPATIENT)
Dept: SURGERY | Age: 54
End: 2024-02-18

## 2024-02-18 ENCOUNTER — APPOINTMENT (OUTPATIENT)
Dept: GENERAL RADIOLOGY | Age: 54
DRG: 617 | End: 2024-02-18
Payer: MEDICARE

## 2024-02-18 DIAGNOSIS — L03.116 LEFT LEG CELLULITIS: ICD-10-CM

## 2024-02-18 PROBLEM — B18.2 CHRONIC HEPATITIS C WITHOUT HEPATIC COMA (HCC): Status: ACTIVE | Noted: 2024-02-18

## 2024-02-18 PROBLEM — Z89.511 HX OF RIGHT BKA (HCC): Status: ACTIVE | Noted: 2024-02-18

## 2024-02-18 PROBLEM — L03.114 CELLULITIS OF LEFT UPPER EXTREMITY: Status: ACTIVE | Noted: 2024-02-18

## 2024-02-18 LAB
ANION GAP SERPL CALCULATED.3IONS-SCNC: 11 MMOL/L (ref 3–16)
BASOPHILS # BLD: 0.1 K/UL (ref 0–0.2)
BASOPHILS NFR BLD: 0.7 %
BUN SERPL-MCNC: 11 MG/DL (ref 7–20)
CALCIUM SERPL-MCNC: 8.1 MG/DL (ref 8.3–10.6)
CHLORIDE SERPL-SCNC: 97 MMOL/L (ref 99–110)
CO2 SERPL-SCNC: 24 MMOL/L (ref 21–32)
CREAT SERPL-MCNC: 0.6 MG/DL (ref 0.9–1.3)
DEPRECATED RDW RBC AUTO: 14.5 % (ref 12.4–15.4)
EOSINOPHIL # BLD: 0.3 K/UL (ref 0–0.6)
EOSINOPHIL NFR BLD: 4.4 %
GFR SERPLBLD CREATININE-BSD FMLA CKD-EPI: >60 ML/MIN/{1.73_M2}
GLUCOSE BLD-MCNC: 191 MG/DL (ref 70–99)
GLUCOSE BLD-MCNC: 221 MG/DL (ref 70–99)
GLUCOSE BLD-MCNC: 234 MG/DL (ref 70–99)
GLUCOSE BLD-MCNC: 334 MG/DL (ref 70–99)
GLUCOSE SERPL-MCNC: 164 MG/DL (ref 70–99)
HCT VFR BLD AUTO: 40.8 % (ref 40.5–52.5)
HGB BLD-MCNC: 14 G/DL (ref 13.5–17.5)
LYMPHOCYTES # BLD: 1.2 K/UL (ref 1–5.1)
LYMPHOCYTES NFR BLD: 15.5 %
MCH RBC QN AUTO: 29.3 PG (ref 26–34)
MCHC RBC AUTO-ENTMCNC: 34.2 G/DL (ref 31–36)
MCV RBC AUTO: 85.5 FL (ref 80–100)
MONOCYTES # BLD: 0.9 K/UL (ref 0–1.3)
MONOCYTES NFR BLD: 12 %
NEUTROPHILS # BLD: 5.2 K/UL (ref 1.7–7.7)
NEUTROPHILS NFR BLD: 67.4 %
PERFORMED ON: ABNORMAL
PLATELET # BLD AUTO: 109 K/UL (ref 135–450)
PMV BLD AUTO: 8.1 FL (ref 5–10.5)
POTASSIUM SERPL-SCNC: 4.3 MMOL/L (ref 3.5–5.1)
RBC # BLD AUTO: 4.77 M/UL (ref 4.2–5.9)
SODIUM SERPL-SCNC: 132 MMOL/L (ref 136–145)
VANCOMYCIN TROUGH SERPL-MCNC: 18.3 UG/ML (ref 10–20)
WBC # BLD AUTO: 7.7 K/UL (ref 4–11)

## 2024-02-18 PROCEDURE — 1200000000 HC SEMI PRIVATE

## 2024-02-18 PROCEDURE — 6370000000 HC RX 637 (ALT 250 FOR IP): Performed by: INTERNAL MEDICINE

## 2024-02-18 PROCEDURE — 80202 ASSAY OF VANCOMYCIN: CPT

## 2024-02-18 PROCEDURE — 85025 COMPLETE CBC W/AUTO DIFF WBC: CPT

## 2024-02-18 PROCEDURE — 36415 COLL VENOUS BLD VENIPUNCTURE: CPT

## 2024-02-18 PROCEDURE — 6370000000 HC RX 637 (ALT 250 FOR IP)

## 2024-02-18 PROCEDURE — 80048 BASIC METABOLIC PNL TOTAL CA: CPT

## 2024-02-18 PROCEDURE — 99222 1ST HOSP IP/OBS MODERATE 55: CPT | Performed by: SURGERY

## 2024-02-18 PROCEDURE — 2580000003 HC RX 258

## 2024-02-18 PROCEDURE — 99233 SBSQ HOSP IP/OBS HIGH 50: CPT | Performed by: INTERNAL MEDICINE

## 2024-02-18 PROCEDURE — 6360000002 HC RX W HCPCS

## 2024-02-18 PROCEDURE — 73120 X-RAY EXAM OF HAND: CPT

## 2024-02-18 RX ORDER — HYDROXYZINE HYDROCHLORIDE 10 MG/1
10 TABLET, FILM COATED ORAL EVERY 6 HOURS PRN
Status: DISCONTINUED | OUTPATIENT
Start: 2024-02-18 | End: 2024-02-22 | Stop reason: HOSPADM

## 2024-02-18 RX ORDER — OXYCODONE HYDROCHLORIDE AND ACETAMINOPHEN 5; 325 MG/1; MG/1
1 TABLET ORAL EVERY 4 HOURS PRN
Status: DISCONTINUED | OUTPATIENT
Start: 2024-02-18 | End: 2024-02-19

## 2024-02-18 RX ORDER — INSULIN LISPRO 100 [IU]/ML
0-4 INJECTION, SOLUTION INTRAVENOUS; SUBCUTANEOUS NIGHTLY
Status: DISCONTINUED | OUTPATIENT
Start: 2024-02-18 | End: 2024-02-22 | Stop reason: HOSPADM

## 2024-02-18 RX ORDER — MECOBALAMIN 5000 MCG
5 TABLET,DISINTEGRATING ORAL NIGHTLY PRN
Status: DISCONTINUED | OUTPATIENT
Start: 2024-02-18 | End: 2024-02-22 | Stop reason: HOSPADM

## 2024-02-18 RX ORDER — INSULIN LISPRO 100 [IU]/ML
0-8 INJECTION, SOLUTION INTRAVENOUS; SUBCUTANEOUS
Status: DISCONTINUED | OUTPATIENT
Start: 2024-02-18 | End: 2024-02-22 | Stop reason: HOSPADM

## 2024-02-18 RX ORDER — KETOROLAC TROMETHAMINE 30 MG/ML
30 INJECTION, SOLUTION INTRAMUSCULAR; INTRAVENOUS ONCE
Status: COMPLETED | OUTPATIENT
Start: 2024-02-18 | End: 2024-02-19

## 2024-02-18 RX ADMIN — DESMOPRESSIN ACETATE 40 MG: 0.2 TABLET ORAL at 21:40

## 2024-02-18 RX ADMIN — Medication 1 TABLET: at 09:15

## 2024-02-18 RX ADMIN — SUCRALFATE 1 G: 1 TABLET ORAL at 13:19

## 2024-02-18 RX ADMIN — CEFEPIME 2000 MG: 2 INJECTION, POWDER, FOR SOLUTION INTRAVENOUS at 02:50

## 2024-02-18 RX ADMIN — CEFEPIME 2000 MG: 2 INJECTION, POWDER, FOR SOLUTION INTRAVENOUS at 13:21

## 2024-02-18 RX ADMIN — TORSEMIDE 20 MG: 20 TABLET ORAL at 09:14

## 2024-02-18 RX ADMIN — BUSPIRONE HYDROCHLORIDE 5 MG: 5 TABLET ORAL at 21:40

## 2024-02-18 RX ADMIN — INSULIN GLARGINE 10 UNITS: 100 INJECTION, SOLUTION SUBCUTANEOUS at 09:31

## 2024-02-18 RX ADMIN — VANCOMYCIN HYDROCHLORIDE 1000 MG: 1 INJECTION, POWDER, FOR SOLUTION INTRAVENOUS at 12:17

## 2024-02-18 RX ADMIN — GABAPENTIN 300 MG: 300 CAPSULE ORAL at 21:41

## 2024-02-18 RX ADMIN — APIXABAN 5 MG: 5 TABLET, FILM COATED ORAL at 09:12

## 2024-02-18 RX ADMIN — SPIRONOLACTONE 25 MG: 25 TABLET ORAL at 09:13

## 2024-02-18 RX ADMIN — OXYCODONE HYDROCHLORIDE AND ACETAMINOPHEN 1 TABLET: 5; 325 TABLET ORAL at 09:12

## 2024-02-18 RX ADMIN — Medication 1 TABLET: at 21:40

## 2024-02-18 RX ADMIN — INSULIN LISPRO 6 UNITS: 100 INJECTION, SOLUTION INTRAVENOUS; SUBCUTANEOUS at 12:15

## 2024-02-18 RX ADMIN — GABAPENTIN 300 MG: 300 CAPSULE ORAL at 13:19

## 2024-02-18 RX ADMIN — OXYCODONE HYDROCHLORIDE AND ACETAMINOPHEN 1 TABLET: 5; 325 TABLET ORAL at 13:19

## 2024-02-18 RX ADMIN — VANCOMYCIN HYDROCHLORIDE 1000 MG: 1 INJECTION, POWDER, FOR SOLUTION INTRAVENOUS at 21:45

## 2024-02-18 RX ADMIN — OXYCODONE HYDROCHLORIDE AND ACETAMINOPHEN 1 TABLET: 5; 325 TABLET ORAL at 21:40

## 2024-02-18 RX ADMIN — GABAPENTIN 300 MG: 300 CAPSULE ORAL at 09:15

## 2024-02-18 RX ADMIN — OXYCODONE HYDROCHLORIDE AND ACETAMINOPHEN 1 TABLET: 5; 325 TABLET ORAL at 17:26

## 2024-02-18 RX ADMIN — CLOPIDOGREL BISULFATE 75 MG: 75 TABLET ORAL at 09:14

## 2024-02-18 RX ADMIN — BUSPIRONE HYDROCHLORIDE 5 MG: 5 TABLET ORAL at 09:13

## 2024-02-18 RX ADMIN — SUCRALFATE 1 G: 1 TABLET ORAL at 21:40

## 2024-02-18 RX ADMIN — Medication 10 ML: at 09:21

## 2024-02-18 RX ADMIN — OXYCODONE HYDROCHLORIDE AND ACETAMINOPHEN 1 TABLET: 5; 325 TABLET ORAL at 02:50

## 2024-02-18 RX ADMIN — VANCOMYCIN HYDROCHLORIDE 1000 MG: 1 INJECTION, POWDER, FOR SOLUTION INTRAVENOUS at 05:55

## 2024-02-18 RX ADMIN — PANTOPRAZOLE SODIUM 40 MG: 40 TABLET, DELAYED RELEASE ORAL at 09:13

## 2024-02-18 ASSESSMENT — PAIN DESCRIPTION - ORIENTATION
ORIENTATION: LEFT

## 2024-02-18 ASSESSMENT — PAIN SCALES - GENERAL
PAINLEVEL_OUTOF10: 10
PAINLEVEL_OUTOF10: 5

## 2024-02-18 ASSESSMENT — PAIN - FUNCTIONAL ASSESSMENT: PAIN_FUNCTIONAL_ASSESSMENT: PREVENTS OR INTERFERES SOME ACTIVE ACTIVITIES AND ADLS

## 2024-02-18 ASSESSMENT — PAIN DESCRIPTION - LOCATION
LOCATION: FOOT

## 2024-02-18 ASSESSMENT — PAIN DESCRIPTION - DESCRIPTORS: DESCRIPTORS: THROBBING

## 2024-02-18 NOTE — PLAN OF CARE
Problem: Discharge Planning  Goal: Discharge to home or other facility with appropriate resources  2/18/2024 0323 by Rakel Garcia RN  Outcome: Progressing     Problem: Pain  Goal: Verbalizes/displays adequate comfort level or baseline comfort level  2/18/2024 0323 by Rakel Garcia RN  Outcome: Progressing     Problem: Safety - Adult  Goal: Free from fall injury  2/18/2024 0323 by Rakel Garcia RN  Outcome: Progressing

## 2024-02-18 NOTE — CONSULTS
Department of General Surgery Consult    PATIENT NAME: Renato Nagy   YOB: 1970    ADMISSION DATE: 2/17/2024  9:01 AM      TODAY'S DATE: 2/18/2024    Reason for Consult: Infected left TMA wound    Chief Complaint: Left foot pain    Historian: Patient    Requesting Physician:  Erick    HISTORY OF PRESENT ILLNESS:              The patient is a 53 y.o. male who presents with failing left TMA wound.  Patient is deaf, and the visit was facilitated with the use of a video translation service.  He states he has been having pain in his stump.  He has had purulent drainage.  He is pretty adamant that he did not think the TMA would work, and he just wanted to go straight to a below-knee amputation.    Past Medical History:        Diagnosis Date    CHF (congestive heart failure) ([facility])     COPD (chronic obstructive pulmonary disease) ([facility])     Diabetes ([facility])        Past Surgical History:        Procedure Laterality Date    FOOT SURGERY Left 1/12/2024    PARTIAL LEFT FOOT AMPUTATION performed by Juan M Quarles DPM at [facility]    LEG AMPUTATION BELOW KNEE Right     OTHER SURGICAL HISTORY  01/12/2024    PARTIAL LEFT FOOT AMPUTATION - Left    PACEMAKER INSERTION         Current Medications:   Current Facility-Administered Medications: oxyCODONE-acetaminophen (PERCOCET) 5-325 MG per tablet 1 tablet, 1 tablet, Oral, Q4H PRN  insulin lispro (HUMALOG) injection vial 0-8 Units, 0-8 Units, SubCUTAneous, TID WC  insulin lispro (HUMALOG) injection vial 0-4 Units, 0-4 Units, SubCUTAneous, Nightly  albuterol sulfate HFA (PROVENTIL;VENTOLIN;PROAIR) 108 (90 Base) MCG/ACT inhaler 2 puff, 2 puff, Inhalation, 4x Daily PRN  atorvastatin (LIPITOR) tablet 40 mg, 40 mg, Oral, Nightly  busPIRone (BUSPAR) tablet 5 mg, 5 mg, Oral, BID  dicyclomine (BENTYL) capsule 10 mg, 10 mg, Oral, Q4H PRN  ferrous sulfate (IRON 325) tablet 325 mg, 325 mg, Oral, BID WC  gabapentin (NEURONTIN) capsule 300 mg, 300 mg, Oral, TID  insulin

## 2024-02-18 NOTE — FLOWSHEET NOTE
02/17/24 2030   Vital Signs   Temp 98.7 °F (37.1 °C)   Temp Source Oral   Pulse 85   Heart Rate Source Monitor   Respirations 16   /66   MAP (Calculated) 80   BP Location Right upper arm   BP Method Automatic   Patient Position Semi wlers   Pain Assessment   Pain Assessment 0-10   Pain Level 10   Pain Location Foot   Pain Orientation Left;Lower   Opioid-Induced Sedation   POSS Score 1   Oxygen Therapy   SpO2 96 %   O2 Device None (Room air)     HS assessment completed.pt had complaints of pain, prn pain medication given.  No signs or symptoms of distress noted. Patient tolerated night medications well. Respirations easy and even. Bed in lowest position, bed alarm in place and functioning properly, bed rails x2 up,  Call light within reach.     Bedside Mobility Assessment Tool (BMAT):     Assessment Level 1- Sit and Shake    1. From a semi-reclined position, ask patient to sit up and rotate to a seated position at the side of the bed. Can use the bedrail.    2. Ask patient to reach out and grab your hand and shake making sure patient reaches across his/her midline.   Pass- Patient is able to come to a seated position, maintain core strength. Maintains seated balance while reaching across midline. Move on to Assessment Level 2.     Assessment Level 2- Stretch and Point   1. With patient in seated position at the side of the bed, have patient place both feet on the floor (or stool) with knees no higher than hips.    2. Ask patient to stretch one leg and straighten the knee, then bend the ankle/flex and point the toes. If appropriate, repeat with the other leg.   Pass- Patient is able to demonstrate appropriate quad strength on intended weight bearing limb(s). Move onto Assessment Level 3.     Assessment Level 3- Stand   1. Ask patient to elevate off the bed or chair (seated to standing) using an assistive device (cane, bedrail).    2. Patient should be able to raise buttocks off be and hold for a count of

## 2024-02-18 NOTE — PLAN OF CARE
Problem: Discharge Planning  Goal: Discharge to home or other facility with appropriate resources  2/18/2024 1642 by Bharti Okeefe RN  Outcome: Progressing  2/18/2024 0323 by Rakel Garcia RN  Outcome: Progressing     Problem: Pain  Goal: Verbalizes/displays adequate comfort level or baseline comfort level  2/18/2024 1642 by Bharti Okeefe RN  Outcome: Progressing  2/18/2024 0323 by Rakel Garcia RN  Outcome: Progressing     Problem: Safety - Adult  Goal: Free from fall injury  2/18/2024 1642 by Bharti Okeefe RN  Outcome: Progressing  2/18/2024 0323 by Rakel Garcia RN  Outcome: Progressing     Problem: ABCDS Injury Assessment  Goal: Absence of physical injury  Outcome: Progressing     Problem: Skin/Tissue Integrity  Goal: Absence of new skin breakdown  Description: 1.  Monitor for areas of redness and/or skin breakdown  2.  Assess vascular access sites hourly  3.  Every 4-6 hours minimum:  Change oxygen saturation probe site  4.  Every 4-6 hours:  If on nasal continuous positive airway pressure, respiratory therapy assess nares and determine need for appliance change or resting period.  Outcome: Progressing

## 2024-02-19 ENCOUNTER — ANESTHESIA (OUTPATIENT)
Dept: OPERATING ROOM | Age: 54
DRG: 617 | End: 2024-02-19
Payer: MEDICARE

## 2024-02-19 ENCOUNTER — HOSPITAL ENCOUNTER (OUTPATIENT)
Dept: WOUND CARE | Age: 54
Discharge: HOME | End: 2024-02-19
Attending: PODIATRIST

## 2024-02-19 ENCOUNTER — ANESTHESIA EVENT (OUTPATIENT)
Dept: OPERATING ROOM | Age: 54
DRG: 617 | End: 2024-02-19
Payer: MEDICARE

## 2024-02-19 LAB
ANION GAP SERPL CALCULATED.3IONS-SCNC: 9 MMOL/L (ref 3–16)
BASOPHILS # BLD: 0.1 K/UL (ref 0–0.2)
BASOPHILS NFR BLD: 0.8 %
BUN SERPL-MCNC: 12 MG/DL (ref 7–20)
CALCIUM SERPL-MCNC: 8.5 MG/DL (ref 8.3–10.6)
CHLORIDE SERPL-SCNC: 98 MMOL/L (ref 99–110)
CO2 SERPL-SCNC: 27 MMOL/L (ref 21–32)
CREAT SERPL-MCNC: 0.6 MG/DL (ref 0.9–1.3)
DEPRECATED RDW RBC AUTO: 14.2 % (ref 12.4–15.4)
EOSINOPHIL # BLD: 0.2 K/UL (ref 0–0.6)
EOSINOPHIL NFR BLD: 3.2 %
GFR SERPLBLD CREATININE-BSD FMLA CKD-EPI: >60 ML/MIN/{1.73_M2}
GLUCOSE BLD-MCNC: 232 MG/DL (ref 70–99)
GLUCOSE BLD-MCNC: 281 MG/DL (ref 70–99)
GLUCOSE BLD-MCNC: 300 MG/DL (ref 70–99)
GLUCOSE SERPL-MCNC: 180 MG/DL (ref 70–99)
HCT VFR BLD AUTO: 40.6 % (ref 40.5–52.5)
HGB BLD-MCNC: 13.7 G/DL (ref 13.5–17.5)
LYMPHOCYTES # BLD: 1.2 K/UL (ref 1–5.1)
LYMPHOCYTES NFR BLD: 17.8 %
MCH RBC QN AUTO: 28.8 PG (ref 26–34)
MCHC RBC AUTO-ENTMCNC: 33.7 G/DL (ref 31–36)
MCV RBC AUTO: 85.5 FL (ref 80–100)
MONOCYTES # BLD: 0.8 K/UL (ref 0–1.3)
MONOCYTES NFR BLD: 12.3 %
NEUTROPHILS # BLD: 4.4 K/UL (ref 1.7–7.7)
NEUTROPHILS NFR BLD: 65.9 %
PERFORMED ON: ABNORMAL
PLATELET # BLD AUTO: 115 K/UL (ref 135–450)
PMV BLD AUTO: 7.8 FL (ref 5–10.5)
POTASSIUM SERPL-SCNC: 4.1 MMOL/L (ref 3.5–5.1)
RBC # BLD AUTO: 4.75 M/UL (ref 4.2–5.9)
SODIUM SERPL-SCNC: 134 MMOL/L (ref 136–145)
WBC # BLD AUTO: 6.7 K/UL (ref 4–11)

## 2024-02-19 PROCEDURE — 6370000000 HC RX 637 (ALT 250 FOR IP): Performed by: SURGERY

## 2024-02-19 PROCEDURE — 3600000012 HC SURGERY LEVEL 2 ADDTL 15MIN: Performed by: SURGERY

## 2024-02-19 PROCEDURE — 2500000003 HC RX 250 WO HCPCS: Performed by: NURSE ANESTHETIST, CERTIFIED REGISTERED

## 2024-02-19 PROCEDURE — 7100000000 HC PACU RECOVERY - FIRST 15 MIN: Performed by: SURGERY

## 2024-02-19 PROCEDURE — 85025 COMPLETE CBC W/AUTO DIFF WBC: CPT

## 2024-02-19 PROCEDURE — 6360000002 HC RX W HCPCS

## 2024-02-19 PROCEDURE — 3700000001 HC ADD 15 MINUTES (ANESTHESIA): Performed by: SURGERY

## 2024-02-19 PROCEDURE — 88307 TISSUE EXAM BY PATHOLOGIST: CPT

## 2024-02-19 PROCEDURE — 36415 COLL VENOUS BLD VENIPUNCTURE: CPT

## 2024-02-19 PROCEDURE — 6360000002 HC RX W HCPCS: Performed by: SURGERY

## 2024-02-19 PROCEDURE — 6360000002 HC RX W HCPCS: Performed by: ANESTHESIOLOGY

## 2024-02-19 PROCEDURE — 2720000010 HC SURG SUPPLY STERILE: Performed by: SURGERY

## 2024-02-19 PROCEDURE — 27880 AMPUTATION OF LOWER LEG: CPT | Performed by: SURGERY

## 2024-02-19 PROCEDURE — 6370000000 HC RX 637 (ALT 250 FOR IP): Performed by: NURSE ANESTHETIST, CERTIFIED REGISTERED

## 2024-02-19 PROCEDURE — 2580000003 HC RX 258: Performed by: NURSE ANESTHETIST, CERTIFIED REGISTERED

## 2024-02-19 PROCEDURE — 88311 DECALCIFY TISSUE: CPT

## 2024-02-19 PROCEDURE — 3600000002 HC SURGERY LEVEL 2 BASE: Performed by: SURGERY

## 2024-02-19 PROCEDURE — 2580000003 HC RX 258

## 2024-02-19 PROCEDURE — 1200000000 HC SEMI PRIVATE

## 2024-02-19 PROCEDURE — 94150 VITAL CAPACITY TEST: CPT

## 2024-02-19 PROCEDURE — A4217 STERILE WATER/SALINE, 500 ML: HCPCS | Performed by: SURGERY

## 2024-02-19 PROCEDURE — 3700000000 HC ANESTHESIA ATTENDED CARE: Performed by: SURGERY

## 2024-02-19 PROCEDURE — 2709999900 HC NON-CHARGEABLE SUPPLY: Performed by: SURGERY

## 2024-02-19 PROCEDURE — 6360000002 HC RX W HCPCS: Performed by: NURSE PRACTITIONER

## 2024-02-19 PROCEDURE — 0Y6J0Z1 DETACHMENT AT LEFT LOWER LEG, HIGH, OPEN APPROACH: ICD-10-PCS | Performed by: SURGERY

## 2024-02-19 PROCEDURE — 2580000003 HC RX 258: Performed by: SURGERY

## 2024-02-19 PROCEDURE — 99233 SBSQ HOSP IP/OBS HIGH 50: CPT

## 2024-02-19 PROCEDURE — 7100000001 HC PACU RECOVERY - ADDTL 15 MIN: Performed by: SURGERY

## 2024-02-19 PROCEDURE — 80048 BASIC METABOLIC PNL TOTAL CA: CPT

## 2024-02-19 PROCEDURE — 6360000002 HC RX W HCPCS: Performed by: NURSE ANESTHETIST, CERTIFIED REGISTERED

## 2024-02-19 PROCEDURE — L1830 KO IMMOB CANVAS LONG PRE OTS: HCPCS | Performed by: SURGERY

## 2024-02-19 RX ORDER — PROPOFOL 10 MG/ML
INJECTION, EMULSION INTRAVENOUS PRN
Status: DISCONTINUED | OUTPATIENT
Start: 2024-02-19 | End: 2024-02-19 | Stop reason: SDUPTHER

## 2024-02-19 RX ORDER — MEPERIDINE HYDROCHLORIDE 25 MG/ML
12.5 INJECTION INTRAMUSCULAR; INTRAVENOUS; SUBCUTANEOUS EVERY 5 MIN PRN
Status: DISCONTINUED | OUTPATIENT
Start: 2024-02-19 | End: 2024-02-19 | Stop reason: HOSPADM

## 2024-02-19 RX ORDER — SODIUM CHLORIDE 0.9 % (FLUSH) 0.9 %
5-40 SYRINGE (ML) INJECTION PRN
Status: DISCONTINUED | OUTPATIENT
Start: 2024-02-19 | End: 2024-02-19 | Stop reason: HOSPADM

## 2024-02-19 RX ORDER — LIDOCAINE HYDROCHLORIDE 40 MG/ML
SOLUTION TOPICAL PRN
Status: DISCONTINUED | OUTPATIENT
Start: 2024-02-19 | End: 2024-02-19 | Stop reason: SDUPTHER

## 2024-02-19 RX ORDER — OXYCODONE HYDROCHLORIDE 5 MG/1
5 TABLET ORAL
Status: DISCONTINUED | OUTPATIENT
Start: 2024-02-19 | End: 2024-02-19 | Stop reason: HOSPADM

## 2024-02-19 RX ORDER — ONDANSETRON 2 MG/ML
4 INJECTION INTRAMUSCULAR; INTRAVENOUS EVERY 10 MIN PRN
Status: DISCONTINUED | OUTPATIENT
Start: 2024-02-19 | End: 2024-02-19 | Stop reason: HOSPADM

## 2024-02-19 RX ORDER — OXYCODONE HYDROCHLORIDE AND ACETAMINOPHEN 5; 325 MG/1; MG/1
1 TABLET ORAL EVERY 4 HOURS PRN
Status: DISCONTINUED | OUTPATIENT
Start: 2024-02-19 | End: 2024-02-21

## 2024-02-19 RX ORDER — MAGNESIUM HYDROXIDE 1200 MG/15ML
LIQUID ORAL CONTINUOUS PRN
Status: COMPLETED | OUTPATIENT
Start: 2024-02-19 | End: 2024-02-19

## 2024-02-19 RX ORDER — FENTANYL CITRATE 50 UG/ML
INJECTION, SOLUTION INTRAMUSCULAR; INTRAVENOUS PRN
Status: DISCONTINUED | OUTPATIENT
Start: 2024-02-19 | End: 2024-02-19 | Stop reason: SDUPTHER

## 2024-02-19 RX ORDER — MIDAZOLAM HYDROCHLORIDE 1 MG/ML
1 INJECTION INTRAMUSCULAR; INTRAVENOUS ONCE
Status: DISCONTINUED | OUTPATIENT
Start: 2024-02-19 | End: 2024-02-22 | Stop reason: HOSPADM

## 2024-02-19 RX ORDER — MIDAZOLAM HYDROCHLORIDE 1 MG/ML
1 INJECTION INTRAMUSCULAR; INTRAVENOUS EVERY 5 MIN PRN
Status: DISCONTINUED | OUTPATIENT
Start: 2024-02-19 | End: 2024-02-19 | Stop reason: HOSPADM

## 2024-02-19 RX ORDER — HYDROMORPHONE HYDROCHLORIDE 2 MG/ML
INJECTION, SOLUTION INTRAMUSCULAR; INTRAVENOUS; SUBCUTANEOUS PRN
Status: DISCONTINUED | OUTPATIENT
Start: 2024-02-19 | End: 2024-02-19 | Stop reason: SDUPTHER

## 2024-02-19 RX ORDER — HYDRALAZINE HYDROCHLORIDE 20 MG/ML
5 INJECTION INTRAMUSCULAR; INTRAVENOUS
Status: DISCONTINUED | OUTPATIENT
Start: 2024-02-19 | End: 2024-02-19 | Stop reason: HOSPADM

## 2024-02-19 RX ORDER — MORPHINE SULFATE 2 MG/ML
2 INJECTION, SOLUTION INTRAMUSCULAR; INTRAVENOUS
Status: DISCONTINUED | OUTPATIENT
Start: 2024-02-19 | End: 2024-02-22 | Stop reason: HOSPADM

## 2024-02-19 RX ORDER — KETOROLAC TROMETHAMINE 30 MG/ML
30 INJECTION, SOLUTION INTRAMUSCULAR; INTRAVENOUS ONCE
Status: DISCONTINUED | OUTPATIENT
Start: 2024-02-19 | End: 2024-02-22 | Stop reason: HOSPADM

## 2024-02-19 RX ORDER — MIDAZOLAM HYDROCHLORIDE 1 MG/ML
INJECTION INTRAMUSCULAR; INTRAVENOUS PRN
Status: DISCONTINUED | OUTPATIENT
Start: 2024-02-19 | End: 2024-02-19 | Stop reason: SDUPTHER

## 2024-02-19 RX ORDER — SODIUM CHLORIDE 9 MG/ML
INJECTION, SOLUTION INTRAVENOUS PRN
Status: DISCONTINUED | OUTPATIENT
Start: 2024-02-19 | End: 2024-02-19 | Stop reason: HOSPADM

## 2024-02-19 RX ORDER — SODIUM CHLORIDE, SODIUM LACTATE, POTASSIUM CHLORIDE, CALCIUM CHLORIDE 600; 310; 30; 20 MG/100ML; MG/100ML; MG/100ML; MG/100ML
INJECTION, SOLUTION INTRAVENOUS CONTINUOUS PRN
Status: DISCONTINUED | OUTPATIENT
Start: 2024-02-19 | End: 2024-02-19 | Stop reason: SDUPTHER

## 2024-02-19 RX ORDER — OXYCODONE HYDROCHLORIDE AND ACETAMINOPHEN 5; 325 MG/1; MG/1
2 TABLET ORAL EVERY 4 HOURS PRN
Status: DISCONTINUED | OUTPATIENT
Start: 2024-02-19 | End: 2024-02-21

## 2024-02-19 RX ORDER — ROCURONIUM BROMIDE 10 MG/ML
INJECTION, SOLUTION INTRAVENOUS PRN
Status: DISCONTINUED | OUTPATIENT
Start: 2024-02-19 | End: 2024-02-19 | Stop reason: SDUPTHER

## 2024-02-19 RX ORDER — DIPHENHYDRAMINE HYDROCHLORIDE 50 MG/ML
12.5 INJECTION INTRAMUSCULAR; INTRAVENOUS
Status: DISCONTINUED | OUTPATIENT
Start: 2024-02-19 | End: 2024-02-19 | Stop reason: HOSPADM

## 2024-02-19 RX ORDER — SODIUM CHLORIDE 0.9 % (FLUSH) 0.9 %
5-40 SYRINGE (ML) INJECTION EVERY 12 HOURS SCHEDULED
Status: DISCONTINUED | OUTPATIENT
Start: 2024-02-19 | End: 2024-02-19 | Stop reason: HOSPADM

## 2024-02-19 RX ORDER — MORPHINE SULFATE 4 MG/ML
4 INJECTION, SOLUTION INTRAMUSCULAR; INTRAVENOUS
Status: DISCONTINUED | OUTPATIENT
Start: 2024-02-19 | End: 2024-02-22 | Stop reason: HOSPADM

## 2024-02-19 RX ADMIN — MORPHINE SULFATE 4 MG: 4 INJECTION, SOLUTION INTRAMUSCULAR; INTRAVENOUS at 19:23

## 2024-02-19 RX ADMIN — Medication 1 TABLET: at 21:44

## 2024-02-19 RX ADMIN — ROCURONIUM BROMIDE 20 MG: 10 INJECTION, SOLUTION INTRAVENOUS at 08:22

## 2024-02-19 RX ADMIN — TORSEMIDE 20 MG: 20 TABLET ORAL at 10:45

## 2024-02-19 RX ADMIN — MORPHINE SULFATE 4 MG: 4 INJECTION, SOLUTION INTRAMUSCULAR; INTRAVENOUS at 15:22

## 2024-02-19 RX ADMIN — MORPHINE SULFATE 4 MG: 4 INJECTION, SOLUTION INTRAMUSCULAR; INTRAVENOUS at 21:44

## 2024-02-19 RX ADMIN — PANTOPRAZOLE SODIUM 40 MG: 40 TABLET, DELAYED RELEASE ORAL at 10:45

## 2024-02-19 RX ADMIN — GABAPENTIN 300 MG: 300 CAPSULE ORAL at 21:44

## 2024-02-19 RX ADMIN — INSULIN LISPRO 6 UNITS: 100 INJECTION, SOLUTION INTRAVENOUS; SUBCUTANEOUS at 17:20

## 2024-02-19 RX ADMIN — SUCRALFATE 1 G: 1 TABLET ORAL at 21:44

## 2024-02-19 RX ADMIN — HYDROMORPHONE HYDROCHLORIDE 0.5 MG: 1 INJECTION, SOLUTION INTRAMUSCULAR; INTRAVENOUS; SUBCUTANEOUS at 09:37

## 2024-02-19 RX ADMIN — Medication 10 ML: at 10:46

## 2024-02-19 RX ADMIN — CEFEPIME 2000 MG: 2 INJECTION, POWDER, FOR SOLUTION INTRAVENOUS at 15:26

## 2024-02-19 RX ADMIN — MORPHINE SULFATE 4 MG: 4 INJECTION, SOLUTION INTRAMUSCULAR; INTRAVENOUS at 10:45

## 2024-02-19 RX ADMIN — BUSPIRONE HYDROCHLORIDE 5 MG: 5 TABLET ORAL at 21:44

## 2024-02-19 RX ADMIN — FENTANYL CITRATE 100 MCG: 50 INJECTION INTRAMUSCULAR; INTRAVENOUS at 08:20

## 2024-02-19 RX ADMIN — MIDAZOLAM 2 MG: 1 INJECTION INTRAMUSCULAR; INTRAVENOUS at 08:05

## 2024-02-19 RX ADMIN — SUGAMMADEX 200 MG: 100 INJECTION, SOLUTION INTRAVENOUS at 09:01

## 2024-02-19 RX ADMIN — SUCRALFATE 1 G: 1 TABLET ORAL at 13:20

## 2024-02-19 RX ADMIN — OXYCODONE AND ACETAMINOPHEN 2 TABLET: 5; 325 TABLET ORAL at 12:12

## 2024-02-19 RX ADMIN — GABAPENTIN 300 MG: 300 CAPSULE ORAL at 13:20

## 2024-02-19 RX ADMIN — HYDROMORPHONE HYDROCHLORIDE 1 MG: 2 INJECTION, SOLUTION INTRAMUSCULAR; INTRAVENOUS; SUBCUTANEOUS at 08:35

## 2024-02-19 RX ADMIN — VANCOMYCIN HYDROCHLORIDE 1000 MG: 1 INJECTION, POWDER, FOR SOLUTION INTRAVENOUS at 04:24

## 2024-02-19 RX ADMIN — INSULIN GLARGINE 10 UNITS: 100 INJECTION, SOLUTION SUBCUTANEOUS at 10:45

## 2024-02-19 RX ADMIN — VANCOMYCIN HYDROCHLORIDE 1000 MG: 1 INJECTION, POWDER, FOR SOLUTION INTRAVENOUS at 21:51

## 2024-02-19 RX ADMIN — LIDOCAINE HYDROCHLORIDE 100 ML: 40 SOLUTION TOPICAL at 08:09

## 2024-02-19 RX ADMIN — VANCOMYCIN HYDROCHLORIDE 1000 MG: 1 INJECTION, POWDER, FOR SOLUTION INTRAVENOUS at 11:42

## 2024-02-19 RX ADMIN — SUCRALFATE 1 G: 1 TABLET ORAL at 17:21

## 2024-02-19 RX ADMIN — MORPHINE SULFATE 4 MG: 4 INJECTION, SOLUTION INTRAMUSCULAR; INTRAVENOUS at 17:21

## 2024-02-19 RX ADMIN — SODIUM CHLORIDE, POTASSIUM CHLORIDE, SODIUM LACTATE AND CALCIUM CHLORIDE: 600; 310; 30; 20 INJECTION, SOLUTION INTRAVENOUS at 08:05

## 2024-02-19 RX ADMIN — CEFEPIME 2000 MG: 2 INJECTION, POWDER, FOR SOLUTION INTRAVENOUS at 02:15

## 2024-02-19 RX ADMIN — MIDAZOLAM 1 MG: 1 INJECTION INTRAMUSCULAR; INTRAVENOUS at 09:45

## 2024-02-19 RX ADMIN — DESMOPRESSIN ACETATE 40 MG: 0.2 TABLET ORAL at 21:44

## 2024-02-19 RX ADMIN — HYDROMORPHONE HYDROCHLORIDE 1 MG: 2 INJECTION, SOLUTION INTRAMUSCULAR; INTRAVENOUS; SUBCUTANEOUS at 08:29

## 2024-02-19 RX ADMIN — SPIRONOLACTONE 25 MG: 25 TABLET ORAL at 10:45

## 2024-02-19 RX ADMIN — MORPHINE SULFATE 4 MG: 4 INJECTION, SOLUTION INTRAMUSCULAR; INTRAVENOUS at 13:20

## 2024-02-19 RX ADMIN — PROPOFOL 200 MG: 10 INJECTION, EMULSION INTRAVENOUS at 08:09

## 2024-02-19 RX ADMIN — KETOROLAC TROMETHAMINE 30 MG: 30 INJECTION INTRAMUSCULAR; INTRAVENOUS at 02:10

## 2024-02-19 RX ADMIN — Medication 5 MG: at 21:54

## 2024-02-19 ASSESSMENT — PAIN - FUNCTIONAL ASSESSMENT
PAIN_FUNCTIONAL_ASSESSMENT: ACTIVITIES ARE NOT PREVENTED
PAIN_FUNCTIONAL_ASSESSMENT: PREVENTS OR INTERFERES SOME ACTIVE ACTIVITIES AND ADLS
PAIN_FUNCTIONAL_ASSESSMENT: 0-10
PAIN_FUNCTIONAL_ASSESSMENT: ACTIVITIES ARE NOT PREVENTED
PAIN_FUNCTIONAL_ASSESSMENT: ACTIVITIES ARE NOT PREVENTED

## 2024-02-19 ASSESSMENT — PAIN DESCRIPTION - ORIENTATION
ORIENTATION: LEFT

## 2024-02-19 ASSESSMENT — LIFESTYLE VARIABLES: SMOKING_STATUS: 1

## 2024-02-19 ASSESSMENT — PAIN DESCRIPTION - PAIN TYPE
TYPE: SURGICAL PAIN

## 2024-02-19 ASSESSMENT — PAIN SCALES - GENERAL
PAINLEVEL_OUTOF10: 10

## 2024-02-19 ASSESSMENT — PAIN DESCRIPTION - DESCRIPTORS
DESCRIPTORS: ACHING;SHARP;SHOOTING
DESCRIPTORS: STABBING
DESCRIPTORS: DISCOMFORT
DESCRIPTORS: THROBBING
DESCRIPTORS: DISCOMFORT

## 2024-02-19 ASSESSMENT — PAIN DESCRIPTION - FREQUENCY
FREQUENCY: CONTINUOUS
FREQUENCY: CONTINUOUS
FREQUENCY: INTERMITTENT

## 2024-02-19 ASSESSMENT — PAIN DESCRIPTION - LOCATION
LOCATION: LEG
LOCATION: HAND;FOOT
LOCATION: LEG

## 2024-02-19 ASSESSMENT — PAIN DESCRIPTION - ONSET
ONSET: ON-GOING
ONSET: PROGRESSIVE
ONSET: PROGRESSIVE

## 2024-02-19 ASSESSMENT — ENCOUNTER SYMPTOMS: SHORTNESS OF BREATH: 1

## 2024-02-19 NOTE — ANESTHESIA PRE PROCEDURE
behavior Z76.5    Other chronic pain G89.29    Chronic systolic CHF (congestive heart failure) (HCC) I50.22    Cellulitis of left lower extremity L03.116    History of diabetes mellitus Z86.39    Status post amputation of toe (HCC) Z89.429    Non-compliance Z91.199    Cellulitis of left upper extremity L03.114    Chronic hepatitis C without hepatic coma (HCC) B18.2    Hx of right BKA (HCC) Z89.511    Left leg cellulitis L03.116       Past Medical History:        Diagnosis Date    CHF (congestive heart failure) (HCC)     COPD (chronic obstructive pulmonary disease) (HCC)     Deaf     Diabetes (HCC)        Past Surgical History:        Procedure Laterality Date    FOOT SURGERY Left 1/12/2024    PARTIAL LEFT FOOT AMPUTATION performed by Juan M Quarles DPM at [facility] [place]    LEG AMPUTATION BELOW KNEE Right     OTHER SURGICAL HISTORY  01/12/2024    PARTIAL LEFT FOOT AMPUTATION - Left    PACEMAKER INSERTION         Social History:    Social History     Tobacco Use    Smoking status: Every Day     Current packs/day: 1.00     Types: Cigarettes    Smokeless tobacco: Never   Substance Use Topics    Alcohol use: Never                                Ready to quit: Not Answered  Counseling given: Not Answered      Vital Signs (Current):   Vitals:    02/18/24 1831 02/18/24 2130 02/19/24 0221 02/19/24 0727   BP:  96/61 107/67 108/64   Pulse:  85 82 80   Resp:  16 16 16   Temp:  98.1 °F (36.7 °C) 98.2 °F (36.8 °C) 98 °F (36.7 °C)   TempSrc:  Oral Oral Infrared   SpO2:  97% 97% 97%   Weight: 69.9 kg (154 lb)      Height: 1.803 m (5' 11\")                                                 BP Readings from Last 3 Encounters:   02/19/24 108/64   02/05/24 104/68   01/29/24 119/83       NPO Status: Time of last liquid consumption: 0000                        Time of last solid consumption: 0000                        Date of last liquid consumption: 02/18/24                        Date of last solid food consumption: 02/18/24    BMI:   Wt

## 2024-02-19 NOTE — PLAN OF CARE
Problem: Discharge Planning  Goal: Discharge to home or other facility with appropriate resources  2/18/2024 2356 by Edgar Cespedes RN  Outcome: Progressing     Problem: Pain  Goal: Verbalizes/displays adequate comfort level or baseline comfort level  2/19/2024 1106 by Poornima Solomon RN  Outcome: Progressing  2/18/2024 2356 by Edgar Cespedes RN  Outcome: Progressing     Problem: Safety - Adult  Goal: Free from fall injury  2/19/2024 1106 by Poornima Solomon RN  Outcome: Progressing  2/18/2024 2356 by Edgar Cespedes RN  Outcome: Progressing     Problem: ABCDS Injury Assessment  Goal: Absence of physical injury  2/19/2024 1106 by Poornima Solomon RN  Outcome: Progressing  2/18/2024 2356 by Edgar Cespedes RN  Outcome: Progressing     Problem: Skin/Tissue Integrity  Goal: Absence of new skin breakdown  Description: 1.  Monitor for areas of redness and/or skin breakdown  2.  Assess vascular access sites hourly  3.  Every 4-6 hours minimum:  Change oxygen saturation probe site  4.  Every 4-6 hours:  If on nasal continuous positive airway pressure, respiratory therapy assess nares and determine need for appliance change or resting period.  2/18/2024 2356 by Edgar Cespedes RN  Outcome: Progressing

## 2024-02-19 NOTE — CARE COORDINATION
Case Management Assessment  Initial Evaluation    Date/Time of Evaluation: 2/19/2024 1:18 PM  Assessment Completed by: Sary Eller RN    If patient is discharged prior to next notation, then this note serves as note for discharge by case management.    Patient Name: Renato Nagy                   YOB: 1970  Diagnosis: History of diabetes mellitus [Z86.39]  Cellulitis of left upper extremity [L03.114]  Cellulitis of left lower extremity [L03.116]  Status post amputation of toe (HCC) [Z89.429]                   Date / Time: 2/17/2024  9:01 AM    Patient Admission Status: Inpatient   Readmission Risk (Low < 19, Mod (19-27), High > 27): Readmission Risk Score: 27.5    Current PCP: Carrie Mead APRN - NP  PCP verified by CM? (P) Yes    Chart Reviewed: Yes      History Provided by: (P) Medical Record  Patient Orientation: (P) Alert and Oriented, Person, Place, Situation (Deaf- uses video )    Patient Cognition: (P) Alert    Hospitalization in the last 30 days (Readmission):  No    If yes, Readmission Assessment in  Navigator will be completed.    Advance Directives:      Code Status: Full Code   Patient's Primary Decision Maker is: (P) Patient Declined (Legal Next of Kin Remains as Decision Maker) (no next of kin listed- only Anastasiya Marshal ([place]))      Discharge Planning:    Patient lives with: (P) Friends (DUARTE and her mother) Type of Home: (P) House ([place])  Primary Care Giver: (P) Self  Patient Support Systems include: (P) Spouse/Significant Other (DUARTE and her family)   Current Financial resources: (P) Medicare, Medicaid  Current community resources: (P) ECF/Home Care  Current services prior to admission: (P) Durable Medical Equipment, Oxygen Therapy ([facility] home O2)            Current DME: (P) Oxygen Therapy (Comment), Walker, Other (Comment) (Adjustible bed)            Type of Home Care services:  (P) None    ADLS  Prior functional level: (P) Mobility, Housework,

## 2024-02-19 NOTE — CARE COORDINATION
CM Update:    Accepted for admission to [facility]. Will need pre-cert submitted once PT/OT recs available.    no

## 2024-02-19 NOTE — DISCHARGE INSTRUCTIONS
gauge how much effort it is taking for you to do your normal daily tasks.   You should be able to recognize when too much exertion is being expended.    Elements of Energy Conservation:   Prioritize/Plan: Decide what needs to be done today, and what can wait for a later date, write to do lists, plan ahead to avoid extra trips, and gather supplies and equipment needed before starting an activity.   Position: Avoid tiring and awkward posture that may impair breathing.  Pace: Slow and steady pace, never rushing!  Pursed lip breathing.  Pursed Lip Breathing: \"Smell the roses, blow out the candles\".

## 2024-02-19 NOTE — BRIEF OP NOTE
Brief Postoperative Note      Patient: Renato Nagy  YOB: 1970  MRN: [id number]    Date of Procedure: 2/19/2024    Pre-Op Diagnosis Codes:     * Left leg cellulitis [L03.116]    Post-Op Diagnosis: Same       Procedure(s):  LEG AMPUTATION BELOW KNEE    Surgeon(s):  Juan M Monteiro MD    Assistant:  Surgical Assistant: Mario Barnett Debra    Anesthesia: General    Estimated Blood Loss (mL): 200     Complications: None    Specimens:   ID Type Source Tests Collected by Time Destination   A : Left below knee amputation Specimen Leg SURGICAL PATHOLOGY Juan M Monteiro MD 2/19/2024 0823        Implants:  * No implants in log *      Drains: * No LDAs found *    Findings: as above      Electronically signed by JUAN M MONTEIRO MD on 2/19/2024 at 9:15 AM

## 2024-02-19 NOTE — PLAN OF CARE
Problem: Discharge Planning  Goal: Discharge to home or other facility with appropriate resources  2/18/2024 2356 by Edgar Cespedes RN  Outcome: Progressing  2/18/2024 1642 by Bharti Okeefe RN  Outcome: Progressing     Problem: Pain  Goal: Verbalizes/displays adequate comfort level or baseline comfort level  2/18/2024 2356 by Edgar Cespedes RN  Outcome: Progressing  2/18/2024 1642 by Bharti Okeefe RN  Outcome: Progressing     Problem: Safety - Adult  Goal: Free from fall injury  2/18/2024 2356 by Edgar Cespedes RN  Outcome: Progressing  2/18/2024 1642 by Bharti Okeefe RN  Outcome: Progressing     Problem: ABCDS Injury Assessment  Goal: Absence of physical injury  2/18/2024 2356 by Edgar Cespedes RN  Outcome: Progressing  2/18/2024 1642 by Bharti Okeefe RN  Outcome: Progressing     Problem: Skin/Tissue Integrity  Goal: Absence of new skin breakdown  Description: 1.  Monitor for areas of redness and/or skin breakdown  2.  Assess vascular access sites hourly  3.  Every 4-6 hours minimum:  Change oxygen saturation probe site  4.  Every 4-6 hours:  If on nasal continuous positive airway pressure, respiratory therapy assess nares and determine need for appliance change or resting period.  2/18/2024 2356 by Edgar Cespedes RN  Outcome: Progressing  2/18/2024 1642 by Bharti Okeefe RN  Outcome: Progressing

## 2024-02-19 NOTE — ANESTHESIA POSTPROCEDURE EVALUATION
Department of Anesthesiology  Postprocedure Note    Patient: Renato Nagy  MRN: [id number]  YOB: 1970  Date of evaluation: 2/19/2024    Procedure Summary       Date: 02/19/24 Room / Location: [address]    Anesthesia Start: 0805 Anesthesia Stop: 0920    Procedure: LEG AMPUTATION BELOW KNEE (Left: Leg Lower) Diagnosis:       Left leg cellulitis      (Left leg cellulitis [L03.116])    Surgeons: Juan M Monteiro MD Responsible Provider: Hussein Nguyen MD    Anesthesia Type: general ASA Status: 3            Anesthesia Type: No value filed.    Radha Phase I: Radha Score: 9    Radha Phase II:      Anesthesia Post Evaluation    Patient location during evaluation: PACU  Level of consciousness: awake  Airway patency: patent  Nausea & Vomiting: no nausea  Cardiovascular status: blood pressure returned to baseline  Respiratory status: acceptable  Hydration status: euvolemic  Pain management: adequate    No notable events documented.

## 2024-02-19 NOTE — FLOWSHEET NOTE
02/18/24 2130   Vital Signs   Temp 98.1 °F (36.7 °C)   Temp Source Oral   Pulse 85   Heart Rate Source Monitor   Respirations 16   BP 96/61   MAP (Calculated) 73   BP Location Right upper arm   BP Method Automatic   Patient Position Semi livanwlers   Pain Assessment   Pain Assessment 0-10   Pain Level 10   Patient's Stated Pain Goal 0 - No pain   Pain Location Foot   Pain Orientation Left   Pain Descriptors Throbbing   Functional Pain Assessment Prevents or interferes some active activities and ADLs   Opioid-Induced Sedation   POSS Score 1   RASS   Michelle Agitation Sedation Scale (RASS) 0   Oxygen Therapy   SpO2 97 %   O2 Device Nasal cannula   O2 Flow Rate (L/min) 2 L/min     Shift assessment completed. Pt is alert and oriented. Vital signs are WNL. Respirations are even & easy.  Pt complained of not getting enough pain meds or pain meds that were strong enough. Pt denies needs at this time. SR up x 2 and bed in low position. Call light is within reach. Will monitor.

## 2024-02-19 NOTE — OP NOTE
[address] [address]                                OPERATIVE REPORT    PATIENT NAME: ISRA CALDERON                    :        1970  MED REC NO:   [id number]                          ROOM:  ACCOUNT NO:   [id number]                           ADMIT DATE: 2024  PROVIDER:     Juan M Monteiro MD    DATE OF PROCEDURE:  2024    PREOPERATIVE DIAGNOSIS:  Left leg cellulitis and failing transmetatarsal  amputation flap.    POSTOPERATIVE DIAGNOSIS:  Left leg cellulitis and failing  transmetatarsal amputation flap.    PROCEDURE:  Left below-knee amputation.    ANESTHESIA:  General.    SURGEON:  Dr. Juan M Monteiro.    ESTIMATED BLOOD LOSS:  200 mL.    INDICATIONS:  The patient is a 53-year-old gentleman status post recent  left TMA.  He has an infection of his flap with cellulitis and wants to  just proceed with below-knee amputation, which I concur with.    OPERATIVE SUMMARY:  After preoperative evaluation, the patient was  brought in the operating suite and placed in a comfortable supine  position on the operating room table.  Monitoring equipment was attached  and general anesthesia was induced.  His left leg was sterilely prepped  and draped and a posterior flap skin incision was marked out at the  appropriate level.  The anterior skin incision was made and dissected  down through the subcutaneous tissues with cautery.  The muscles medial  and lateral to the tibia were divided with LigaSure device.  The vessels  were divided with LigaSure device as well.  I then encircled the tibia  and fibula and used the periosteal elevator to elevate the periosteum  proximally for division.  The posterior flap incision was then carried  through the skin with a knife and through the subcutaneous tissues with  electrocautery.  The posterior flap was then divided at the level of the  muscle and vessels with the LigaSure device.

## 2024-02-20 LAB
ANION GAP SERPL CALCULATED.3IONS-SCNC: 9 MMOL/L (ref 3–16)
BASOPHILS # BLD: 0 K/UL (ref 0–0.2)
BASOPHILS NFR BLD: 0.2 %
BUN SERPL-MCNC: 17 MG/DL (ref 7–20)
CALCIUM SERPL-MCNC: 8.2 MG/DL (ref 8.3–10.6)
CHLORIDE SERPL-SCNC: 98 MMOL/L (ref 99–110)
CO2 SERPL-SCNC: 26 MMOL/L (ref 21–32)
CREAT SERPL-MCNC: 0.7 MG/DL (ref 0.9–1.3)
DEPRECATED RDW RBC AUTO: 14.4 % (ref 12.4–15.4)
EOSINOPHIL # BLD: 0 K/UL (ref 0–0.6)
EOSINOPHIL NFR BLD: 0.1 %
GFR SERPLBLD CREATININE-BSD FMLA CKD-EPI: >60 ML/MIN/{1.73_M2}
GLUCOSE BLD-MCNC: 231 MG/DL (ref 70–99)
GLUCOSE BLD-MCNC: 247 MG/DL (ref 70–99)
GLUCOSE BLD-MCNC: 316 MG/DL (ref 70–99)
GLUCOSE BLD-MCNC: 318 MG/DL (ref 70–99)
GLUCOSE SERPL-MCNC: 356 MG/DL (ref 70–99)
HCT VFR BLD AUTO: 32.5 % (ref 40.5–52.5)
HGB BLD-MCNC: 11.1 G/DL (ref 13.5–17.5)
LYMPHOCYTES # BLD: 1.1 K/UL (ref 1–5.1)
LYMPHOCYTES NFR BLD: 16.1 %
MCH RBC QN AUTO: 28.9 PG (ref 26–34)
MCHC RBC AUTO-ENTMCNC: 34.2 G/DL (ref 31–36)
MCV RBC AUTO: 84.7 FL (ref 80–100)
MONOCYTES # BLD: 0.8 K/UL (ref 0–1.3)
MONOCYTES NFR BLD: 12.2 %
NEUTROPHILS # BLD: 4.7 K/UL (ref 1.7–7.7)
NEUTROPHILS NFR BLD: 71.4 %
PERFORMED ON: ABNORMAL
PLATELET # BLD AUTO: 117 K/UL (ref 135–450)
PMV BLD AUTO: 8 FL (ref 5–10.5)
POTASSIUM SERPL-SCNC: 4.4 MMOL/L (ref 3.5–5.1)
RBC # BLD AUTO: 3.84 M/UL (ref 4.2–5.9)
SODIUM SERPL-SCNC: 133 MMOL/L (ref 136–145)
WBC # BLD AUTO: 6.6 K/UL (ref 4–11)

## 2024-02-20 PROCEDURE — APPSS15 APP SPLIT SHARED TIME 0-15 MINUTES

## 2024-02-20 PROCEDURE — 97535 SELF CARE MNGMENT TRAINING: CPT

## 2024-02-20 PROCEDURE — 6360000002 HC RX W HCPCS: Performed by: SURGERY

## 2024-02-20 PROCEDURE — 6370000000 HC RX 637 (ALT 250 FOR IP): Performed by: SURGERY

## 2024-02-20 PROCEDURE — 2580000003 HC RX 258: Performed by: SURGERY

## 2024-02-20 PROCEDURE — 97162 PT EVAL MOD COMPLEX 30 MIN: CPT

## 2024-02-20 PROCEDURE — 99024 POSTOP FOLLOW-UP VISIT: CPT | Performed by: SURGERY

## 2024-02-20 PROCEDURE — 36415 COLL VENOUS BLD VENIPUNCTURE: CPT

## 2024-02-20 PROCEDURE — 1200000000 HC SEMI PRIVATE

## 2024-02-20 PROCEDURE — 97166 OT EVAL MOD COMPLEX 45 MIN: CPT

## 2024-02-20 PROCEDURE — 99233 SBSQ HOSP IP/OBS HIGH 50: CPT

## 2024-02-20 PROCEDURE — 97530 THERAPEUTIC ACTIVITIES: CPT

## 2024-02-20 PROCEDURE — 85025 COMPLETE CBC W/AUTO DIFF WBC: CPT

## 2024-02-20 PROCEDURE — 2W3RXYZ IMMOBILIZATION OF LEFT LOWER LEG USING OTHER DEVICE: ICD-10-PCS

## 2024-02-20 PROCEDURE — 99024 POSTOP FOLLOW-UP VISIT: CPT

## 2024-02-20 PROCEDURE — 80048 BASIC METABOLIC PNL TOTAL CA: CPT

## 2024-02-20 RX ADMIN — SUCRALFATE 1 G: 1 TABLET ORAL at 11:51

## 2024-02-20 RX ADMIN — MORPHINE SULFATE 4 MG: 4 INJECTION, SOLUTION INTRAMUSCULAR; INTRAVENOUS at 18:05

## 2024-02-20 RX ADMIN — POLYETHYLENE GLYCOL (3350) 17 G: 17 POWDER, FOR SOLUTION ORAL at 14:02

## 2024-02-20 RX ADMIN — SUCRALFATE 1 G: 1 TABLET ORAL at 17:06

## 2024-02-20 RX ADMIN — Medication 1 TABLET: at 07:51

## 2024-02-20 RX ADMIN — SPIRONOLACTONE 25 MG: 25 TABLET ORAL at 07:51

## 2024-02-20 RX ADMIN — CEFEPIME 2000 MG: 2 INJECTION, POWDER, FOR SOLUTION INTRAVENOUS at 14:13

## 2024-02-20 RX ADMIN — TORSEMIDE 20 MG: 20 TABLET ORAL at 07:52

## 2024-02-20 RX ADMIN — LISINOPRIL 5 MG: 5 TABLET ORAL at 07:51

## 2024-02-20 RX ADMIN — VANCOMYCIN HYDROCHLORIDE 1000 MG: 1 INJECTION, POWDER, FOR SOLUTION INTRAVENOUS at 20:43

## 2024-02-20 RX ADMIN — INSULIN LISPRO 2 UNITS: 100 INJECTION, SOLUTION INTRAVENOUS; SUBCUTANEOUS at 17:06

## 2024-02-20 RX ADMIN — GABAPENTIN 300 MG: 300 CAPSULE ORAL at 20:35

## 2024-02-20 RX ADMIN — PANTOPRAZOLE SODIUM 40 MG: 40 TABLET, DELAYED RELEASE ORAL at 07:52

## 2024-02-20 RX ADMIN — MORPHINE SULFATE 4 MG: 4 INJECTION, SOLUTION INTRAMUSCULAR; INTRAVENOUS at 07:51

## 2024-02-20 RX ADMIN — INSULIN LISPRO 6 UNITS: 100 INJECTION, SOLUTION INTRAVENOUS; SUBCUTANEOUS at 07:52

## 2024-02-20 RX ADMIN — MORPHINE SULFATE 4 MG: 4 INJECTION, SOLUTION INTRAMUSCULAR; INTRAVENOUS at 03:37

## 2024-02-20 RX ADMIN — INSULIN LISPRO 6 UNITS: 100 INJECTION, SOLUTION INTRAVENOUS; SUBCUTANEOUS at 11:51

## 2024-02-20 RX ADMIN — GABAPENTIN 300 MG: 300 CAPSULE ORAL at 07:53

## 2024-02-20 RX ADMIN — SUCRALFATE 1 G: 1 TABLET ORAL at 07:52

## 2024-02-20 RX ADMIN — VANCOMYCIN HYDROCHLORIDE 1000 MG: 1 INJECTION, POWDER, FOR SOLUTION INTRAVENOUS at 11:52

## 2024-02-20 RX ADMIN — OXYCODONE AND ACETAMINOPHEN 2 TABLET: 5; 325 TABLET ORAL at 22:22

## 2024-02-20 RX ADMIN — MORPHINE SULFATE 4 MG: 4 INJECTION, SOLUTION INTRAMUSCULAR; INTRAVENOUS at 14:03

## 2024-02-20 RX ADMIN — MORPHINE SULFATE 4 MG: 4 INJECTION, SOLUTION INTRAMUSCULAR; INTRAVENOUS at 01:10

## 2024-02-20 RX ADMIN — MORPHINE SULFATE 4 MG: 4 INJECTION, SOLUTION INTRAMUSCULAR; INTRAVENOUS at 11:51

## 2024-02-20 RX ADMIN — CEFEPIME 2000 MG: 2 INJECTION, POWDER, FOR SOLUTION INTRAVENOUS at 02:41

## 2024-02-20 RX ADMIN — MORPHINE SULFATE 4 MG: 4 INJECTION, SOLUTION INTRAMUSCULAR; INTRAVENOUS at 09:47

## 2024-02-20 RX ADMIN — MORPHINE SULFATE 4 MG: 4 INJECTION, SOLUTION INTRAMUSCULAR; INTRAVENOUS at 05:38

## 2024-02-20 RX ADMIN — SUCRALFATE 1 G: 1 TABLET ORAL at 20:36

## 2024-02-20 RX ADMIN — POLYETHYLENE GLYCOL (3350) 17 G: 17 POWDER, FOR SOLUTION ORAL at 17:07

## 2024-02-20 RX ADMIN — MORPHINE SULFATE 4 MG: 4 INJECTION, SOLUTION INTRAMUSCULAR; INTRAVENOUS at 23:31

## 2024-02-20 RX ADMIN — Medication 1 TABLET: at 20:35

## 2024-02-20 RX ADMIN — MORPHINE SULFATE 4 MG: 4 INJECTION, SOLUTION INTRAMUSCULAR; INTRAVENOUS at 20:37

## 2024-02-20 RX ADMIN — VANCOMYCIN HYDROCHLORIDE 1000 MG: 1 INJECTION, POWDER, FOR SOLUTION INTRAVENOUS at 03:40

## 2024-02-20 RX ADMIN — Medication 10 ML: at 20:36

## 2024-02-20 RX ADMIN — BUSPIRONE HYDROCHLORIDE 5 MG: 5 TABLET ORAL at 20:35

## 2024-02-20 RX ADMIN — DESMOPRESSIN ACETATE 40 MG: 0.2 TABLET ORAL at 20:35

## 2024-02-20 RX ADMIN — INSULIN GLARGINE 10 UNITS: 100 INJECTION, SOLUTION SUBCUTANEOUS at 07:53

## 2024-02-20 RX ADMIN — MORPHINE SULFATE 4 MG: 4 INJECTION, SOLUTION INTRAMUSCULAR; INTRAVENOUS at 16:02

## 2024-02-20 RX ADMIN — BUSPIRONE HYDROCHLORIDE 5 MG: 5 TABLET ORAL at 07:51

## 2024-02-20 RX ADMIN — GABAPENTIN 300 MG: 300 CAPSULE ORAL at 14:02

## 2024-02-20 ASSESSMENT — PAIN DESCRIPTION - DESCRIPTORS
DESCRIPTORS: ACHING;DISCOMFORT
DESCRIPTORS: ACHING;DISCOMFORT
DESCRIPTORS: THROBBING
DESCRIPTORS: ACHING;DISCOMFORT
DESCRIPTORS: THROBBING
DESCRIPTORS: THROBBING

## 2024-02-20 ASSESSMENT — PAIN DESCRIPTION - LOCATION
LOCATION: LEG

## 2024-02-20 ASSESSMENT — PAIN SCALES - GENERAL
PAINLEVEL_OUTOF10: 7
PAINLEVEL_OUTOF10: 10
PAINLEVEL_OUTOF10: 7
PAINLEVEL_OUTOF10: 2
PAINLEVEL_OUTOF10: 10

## 2024-02-20 ASSESSMENT — PAIN - FUNCTIONAL ASSESSMENT
PAIN_FUNCTIONAL_ASSESSMENT: ACTIVITIES ARE NOT PREVENTED

## 2024-02-20 ASSESSMENT — PAIN DESCRIPTION - ORIENTATION
ORIENTATION: LEFT

## 2024-02-20 NOTE — FLOWSHEET NOTE
02/19/24 2003   Vital Signs   Temp 97.9 °F (36.6 °C)   Temp Source Oral   Pulse 79   Heart Rate Source Monitor   Respirations 18   BP 97/64   MAP (Calculated) 75   BP Location Left upper arm   BP Method Automatic   Patient Position High fowlers   Oxygen Therapy   SpO2 95 %   O2 Device None (Room air)     Shift assessment completed. Pt is alert and oriented. Vital signs are WNL. Respirations are even & easy. No complaints voiced. Pt denies needs at this time. SR up x 2 and bed in low position. Call light is within reach. Will monitor.

## 2024-02-20 NOTE — PLAN OF CARE
Problem: Discharge Planning  Goal: Discharge to home or other facility with appropriate resources  2/20/2024 0926 by Felicia Cesar RN  Outcome: Progressing  2/20/2024 0013 by Edgar Cespedes RN  Outcome: Progressing     Problem: Pain  Goal: Verbalizes/displays adequate comfort level or baseline comfort level  2/20/2024 0926 by Felicia Cesar RN  Outcome: Progressing  2/20/2024 0013 by Edgar Cespedes RN  Outcome: Progressing     Problem: Safety - Adult  Goal: Free from fall injury  2/20/2024 0926 by Felicia Cesar RN  Outcome: Progressing  2/20/2024 0013 by Edgar Cespedes RN  Outcome: Progressing     Problem: ABCDS Injury Assessment  Goal: Absence of physical injury  2/20/2024 0926 by Felicia Cesar RN  Outcome: Progressing  2/20/2024 0013 by Edgar Cespedes RN  Outcome: Progressing     Problem: Skin/Tissue Integrity  Goal: Absence of new skin breakdown  Description: 1.  Monitor for areas of redness and/or skin breakdown  2.  Assess vascular access sites hourly  3.  Every 4-6 hours minimum:  Change oxygen saturation probe site  4.  Every 4-6 hours:  If on nasal continuous positive airway pressure, respiratory therapy assess nares and determine need for appliance change or resting period.  2/20/2024 0926 by Felicia Cesar RN  Outcome: Progressing  2/20/2024 0013 by Edgar Cespedes RN  Outcome: Progressing     Problem: Chronic Conditions and Co-morbidities  Goal: Patient's chronic conditions and co-morbidity symptoms are monitored and maintained or improved  2/20/2024 0926 by Felicia Cesar RN  Outcome: Progressing  2/20/2024 0013 by Edgar Cespedes RN  Outcome: Progressing

## 2024-02-20 NOTE — PLAN OF CARE
Problem: Discharge Planning  Goal: Discharge to home or other facility with appropriate resources  Outcome: Progressing     Problem: Pain  Goal: Verbalizes/displays adequate comfort level or baseline comfort level  2/20/2024 0013 by Edgar Cespedes RN  Outcome: Progressing  2/19/2024 1106 by Poornima Solomon RN  Outcome: Progressing     Problem: Safety - Adult  Goal: Free from fall injury  2/20/2024 0013 by Edgar Cespedes RN  Outcome: Progressing  2/19/2024 1106 by Poornima Solomon RN  Outcome: Progressing     Problem: ABCDS Injury Assessment  Goal: Absence of physical injury  2/20/2024 0013 by Edgar Cespedes RN  Outcome: Progressing  2/19/2024 1106 by Poornima Solomon RN  Outcome: Progressing     Problem: Skin/Tissue Integrity  Goal: Absence of new skin breakdown  Description: 1.  Monitor for areas of redness and/or skin breakdown  2.  Assess vascular access sites hourly  3.  Every 4-6 hours minimum:  Change oxygen saturation probe site  4.  Every 4-6 hours:  If on nasal continuous positive airway pressure, respiratory therapy assess nares and determine need for appliance change or resting period.  Outcome: Progressing     Problem: Chronic Conditions and Co-morbidities  Goal: Patient's chronic conditions and co-morbidity symptoms are monitored and maintained or improved  Outcome: Progressing

## 2024-02-21 PROBLEM — Z89.512 S/P BELOW KNEE AMPUTATION, LEFT (HCC): Status: ACTIVE | Noted: 2024-02-21

## 2024-02-21 LAB
ANION GAP SERPL CALCULATED.3IONS-SCNC: 11 MMOL/L (ref 3–16)
BACTERIA BLD CULT ORG #2: NORMAL
BACTERIA BLD CULT: NORMAL
BUN SERPL-MCNC: 16 MG/DL (ref 7–20)
CALCIUM SERPL-MCNC: 8.2 MG/DL (ref 8.3–10.6)
CHLORIDE SERPL-SCNC: 94 MMOL/L (ref 99–110)
CO2 SERPL-SCNC: 30 MMOL/L (ref 21–32)
CREAT SERPL-MCNC: 0.8 MG/DL (ref 0.9–1.3)
DEPRECATED RDW RBC AUTO: 14.4 % (ref 12.4–15.4)
GFR SERPLBLD CREATININE-BSD FMLA CKD-EPI: >60 ML/MIN/{1.73_M2}
GLUCOSE BLD-MCNC: 191 MG/DL (ref 70–99)
GLUCOSE BLD-MCNC: 216 MG/DL (ref 70–99)
GLUCOSE BLD-MCNC: 257 MG/DL (ref 70–99)
GLUCOSE BLD-MCNC: 285 MG/DL (ref 70–99)
GLUCOSE SERPL-MCNC: 238 MG/DL (ref 70–99)
HCT VFR BLD AUTO: 38.5 % (ref 40.5–52.5)
HGB BLD-MCNC: 12.9 G/DL (ref 13.5–17.5)
MCH RBC QN AUTO: 29 PG (ref 26–34)
MCHC RBC AUTO-ENTMCNC: 33.5 G/DL (ref 31–36)
MCV RBC AUTO: 86.4 FL (ref 80–100)
PERFORMED ON: ABNORMAL
PLATELET # BLD AUTO: 162 K/UL (ref 135–450)
PMV BLD AUTO: 7.4 FL (ref 5–10.5)
POTASSIUM SERPL-SCNC: 4 MMOL/L (ref 3.5–5.1)
RBC # BLD AUTO: 4.45 M/UL (ref 4.2–5.9)
SODIUM SERPL-SCNC: 135 MMOL/L (ref 136–145)
WBC # BLD AUTO: 6.8 K/UL (ref 4–11)

## 2024-02-21 PROCEDURE — 6370000000 HC RX 637 (ALT 250 FOR IP): Performed by: SURGERY

## 2024-02-21 PROCEDURE — 2580000003 HC RX 258

## 2024-02-21 PROCEDURE — 6360000002 HC RX W HCPCS

## 2024-02-21 PROCEDURE — 99024 POSTOP FOLLOW-UP VISIT: CPT

## 2024-02-21 PROCEDURE — 36415 COLL VENOUS BLD VENIPUNCTURE: CPT

## 2024-02-21 PROCEDURE — 99233 SBSQ HOSP IP/OBS HIGH 50: CPT

## 2024-02-21 PROCEDURE — 85027 COMPLETE CBC AUTOMATED: CPT

## 2024-02-21 PROCEDURE — 6360000002 HC RX W HCPCS: Performed by: SURGERY

## 2024-02-21 PROCEDURE — APPSS15 APP SPLIT SHARED TIME 0-15 MINUTES

## 2024-02-21 PROCEDURE — 1200000000 HC SEMI PRIVATE

## 2024-02-21 PROCEDURE — 6370000000 HC RX 637 (ALT 250 FOR IP)

## 2024-02-21 PROCEDURE — 80048 BASIC METABOLIC PNL TOTAL CA: CPT

## 2024-02-21 PROCEDURE — 2580000003 HC RX 258: Performed by: SURGERY

## 2024-02-21 RX ORDER — METHOCARBAMOL 500 MG/1
500 TABLET, FILM COATED ORAL 2 TIMES DAILY PRN
Status: DISCONTINUED | OUTPATIENT
Start: 2024-02-21 | End: 2024-02-22 | Stop reason: HOSPADM

## 2024-02-21 RX ORDER — OXYCODONE HYDROCHLORIDE 5 MG/1
10 TABLET ORAL EVERY 4 HOURS
Status: DISCONTINUED | OUTPATIENT
Start: 2024-02-21 | End: 2024-02-22 | Stop reason: HOSPADM

## 2024-02-21 RX ORDER — GABAPENTIN 400 MG/1
400 CAPSULE ORAL 3 TIMES DAILY
Status: DISCONTINUED | OUTPATIENT
Start: 2024-02-21 | End: 2024-02-22 | Stop reason: HOSPADM

## 2024-02-21 RX ADMIN — LISINOPRIL 5 MG: 5 TABLET ORAL at 08:23

## 2024-02-21 RX ADMIN — INSULIN GLARGINE 10 UNITS: 100 INJECTION, SOLUTION SUBCUTANEOUS at 08:24

## 2024-02-21 RX ADMIN — SPIRONOLACTONE 25 MG: 25 TABLET ORAL at 08:23

## 2024-02-21 RX ADMIN — APIXABAN 5 MG: 5 TABLET, FILM COATED ORAL at 23:00

## 2024-02-21 RX ADMIN — BUSPIRONE HYDROCHLORIDE 5 MG: 5 TABLET ORAL at 23:00

## 2024-02-21 RX ADMIN — Medication 10 ML: at 23:01

## 2024-02-21 RX ADMIN — OXYCODONE AND ACETAMINOPHEN 2 TABLET: 5; 325 TABLET ORAL at 03:17

## 2024-02-21 RX ADMIN — Medication 1 TABLET: at 23:01

## 2024-02-21 RX ADMIN — INSULIN LISPRO 4 UNITS: 100 INJECTION, SOLUTION INTRAVENOUS; SUBCUTANEOUS at 17:52

## 2024-02-21 RX ADMIN — BUSPIRONE HYDROCHLORIDE 5 MG: 5 TABLET ORAL at 08:24

## 2024-02-21 RX ADMIN — MORPHINE SULFATE 4 MG: 4 INJECTION, SOLUTION INTRAMUSCULAR; INTRAVENOUS at 06:09

## 2024-02-21 RX ADMIN — VANCOMYCIN HYDROCHLORIDE 1000 MG: 1 INJECTION, POWDER, FOR SOLUTION INTRAVENOUS at 06:09

## 2024-02-21 RX ADMIN — OXYCODONE AND ACETAMINOPHEN 2 TABLET: 5; 325 TABLET ORAL at 08:23

## 2024-02-21 RX ADMIN — CEFEPIME 2000 MG: 2 INJECTION, POWDER, FOR SOLUTION INTRAVENOUS at 06:07

## 2024-02-21 RX ADMIN — OXYCODONE 10 MG: 5 TABLET ORAL at 16:05

## 2024-02-21 RX ADMIN — SUCRALFATE 1 G: 1 TABLET ORAL at 16:05

## 2024-02-21 RX ADMIN — SUCRALFATE 1 G: 1 TABLET ORAL at 14:04

## 2024-02-21 RX ADMIN — DESMOPRESSIN ACETATE 40 MG: 0.2 TABLET ORAL at 23:00

## 2024-02-21 RX ADMIN — GABAPENTIN 300 MG: 300 CAPSULE ORAL at 08:24

## 2024-02-21 RX ADMIN — PANTOPRAZOLE SODIUM 40 MG: 40 TABLET, DELAYED RELEASE ORAL at 08:24

## 2024-02-21 RX ADMIN — VANCOMYCIN HYDROCHLORIDE 1000 MG: 1 INJECTION, POWDER, FOR SOLUTION INTRAVENOUS at 12:04

## 2024-02-21 RX ADMIN — MORPHINE SULFATE 4 MG: 4 INJECTION, SOLUTION INTRAMUSCULAR; INTRAVENOUS at 11:56

## 2024-02-21 RX ADMIN — SUCRALFATE 1 G: 1 TABLET ORAL at 23:00

## 2024-02-21 RX ADMIN — OXYCODONE 10 MG: 5 TABLET ORAL at 23:06

## 2024-02-21 RX ADMIN — MORPHINE SULFATE 4 MG: 4 INJECTION, SOLUTION INTRAMUSCULAR; INTRAVENOUS at 18:23

## 2024-02-21 RX ADMIN — TORSEMIDE 20 MG: 20 TABLET ORAL at 08:23

## 2024-02-21 RX ADMIN — MORPHINE SULFATE 4 MG: 4 INJECTION, SOLUTION INTRAMUSCULAR; INTRAVENOUS at 01:32

## 2024-02-21 RX ADMIN — INSULIN LISPRO 4 UNITS: 100 INJECTION, SOLUTION INTRAVENOUS; SUBCUTANEOUS at 12:00

## 2024-02-21 RX ADMIN — VANCOMYCIN HYDROCHLORIDE 1000 MG: 1 INJECTION, POWDER, LYOPHILIZED, FOR SOLUTION INTRAVENOUS at 23:10

## 2024-02-21 RX ADMIN — OXYCODONE 10 MG: 5 TABLET ORAL at 19:36

## 2024-02-21 RX ADMIN — MORPHINE SULFATE 4 MG: 4 INJECTION, SOLUTION INTRAMUSCULAR; INTRAVENOUS at 14:04

## 2024-02-21 RX ADMIN — Medication 1 TABLET: at 08:23

## 2024-02-21 RX ADMIN — SUCRALFATE 1 G: 1 TABLET ORAL at 08:23

## 2024-02-21 RX ADMIN — INSULIN LISPRO 2 UNITS: 100 INJECTION, SOLUTION INTRAVENOUS; SUBCUTANEOUS at 08:24

## 2024-02-21 RX ADMIN — GABAPENTIN 400 MG: 400 CAPSULE ORAL at 23:00

## 2024-02-21 RX ADMIN — MORPHINE SULFATE 4 MG: 4 INJECTION, SOLUTION INTRAMUSCULAR; INTRAVENOUS at 09:27

## 2024-02-21 RX ADMIN — CEFEPIME 2000 MG: 2 INJECTION, POWDER, FOR SOLUTION INTRAVENOUS at 14:07

## 2024-02-21 RX ADMIN — GABAPENTIN 300 MG: 300 CAPSULE ORAL at 14:04

## 2024-02-21 ASSESSMENT — PAIN DESCRIPTION - ORIENTATION
ORIENTATION: LEFT

## 2024-02-21 ASSESSMENT — PAIN SCALES - GENERAL
PAINLEVEL_OUTOF10: 10
PAINLEVEL_OUTOF10: 4
PAINLEVEL_OUTOF10: 10
PAINLEVEL_OUTOF10: 9
PAINLEVEL_OUTOF10: 8

## 2024-02-21 ASSESSMENT — PAIN DESCRIPTION - LOCATION
LOCATION: LEG

## 2024-02-21 ASSESSMENT — PAIN - FUNCTIONAL ASSESSMENT: PAIN_FUNCTIONAL_ASSESSMENT: ACTIVITIES ARE NOT PREVENTED

## 2024-02-21 ASSESSMENT — PAIN DESCRIPTION - PAIN TYPE: TYPE: ACUTE PAIN

## 2024-02-21 ASSESSMENT — PAIN DESCRIPTION - DESCRIPTORS
DESCRIPTORS: ACHING;DISCOMFORT
DESCRIPTORS: ACHING;DISCOMFORT
DESCRIPTORS: THROBBING
DESCRIPTORS: ACHING
DESCRIPTORS: ACHING;DISCOMFORT

## 2024-02-21 ASSESSMENT — PAIN DESCRIPTION - FREQUENCY: FREQUENCY: CONTINUOUS

## 2024-02-21 ASSESSMENT — PAIN DESCRIPTION - ONSET: ONSET: ON-GOING

## 2024-02-21 NOTE — PLAN OF CARE
Problem: ABCDS Injury Assessment  Goal: Absence of physical injury  Outcome: Progressing     Problem: Skin/Tissue Integrity  Goal: Absence of new skin breakdown  Description: 1.  Monitor for areas of redness and/or skin breakdown  2.  Assess vascular access sites hourly  3.  Every 4-6 hours minimum:  Change oxygen saturation probe site  4.  Every 4-6 hours:  If on nasal continuous positive airway pressure, respiratory therapy assess nares and determine need for appliance change or resting period.  Outcome: Progressing     Problem: Chronic Conditions and Co-morbidities  Goal: Patient's chronic conditions and co-morbidity symptoms are monitored and maintained or improved  Outcome: Progressing

## 2024-02-21 NOTE — CARE COORDINATION
[facility] - Acute Rehab Unit   After review, this patient is felt to be:       []  Appropriate for Acute Inpatient Rehab    []  Appropriate for Acute Inpatient Rehab Pending Insurance Authorization    []  Not appropriate for Acute Inpatient Rehab    [x]  Referral received and ARU reviewing patient; Evaluation ongoing.      Will follow for therapy progress and patient to demonstrate ability to tolerate 3 hours with current pain. Patient would have to be transitioned to po pain medications.     Will notify DCP with further updates. Thank you for the referral.  Carito Correia RN

## 2024-02-21 NOTE — PLAN OF CARE
HEART FAILURE CARE PLAN:    Comorbidities Reviewed: Yes   Patient has a past medical history of CHF (congestive heart failure) ([facility]), COPD (chronic obstructive pulmonary disease) ([facility]), Deaf, and Diabetes ([facility]).     Weights Reviewed: Yes   Admission weight: 73 kg (161 lb)   Wt Readings from Last 3 Encounters:   02/21/24 65.7 kg (144 lb 14.4 oz)   02/05/24 73.2 kg (161 lb 6.4 oz)   01/29/24 69.4 kg (153 lb)     Intake & Output Reviewed: Yes     Intake/Output Summary (Last 24 hours) at 2/21/2024 1042  Last data filed at 2/21/2024 0742  Gross per 24 hour   Intake 250 ml   Output 2300 ml   Net -2050 ml       ECHOCARDIOGRAM Reviewed: Yes   Patient's Ejection Fraction (EF) is less than or equal to 40%. Discuss HFrEF Guideline Directed Medical Therapy (GDMT) with Cardiologist or Hospitalist:          Medications Reviewed: Yes   SCHEDULED HOSPITAL MEDICATIONS:   ketorolac  30 mg IntraVENous Once    midazolam  1 mg IntraVENous Once    insulin lispro  0-8 Units SubCUTAneous TID WC    insulin lispro  0-4 Units SubCUTAneous Nightly    atorvastatin  40 mg Oral Nightly    busPIRone  5 mg Oral BID    ferrous sulfate  325 mg Oral BID WC    gabapentin  300 mg Oral TID    insulin glargine  10 Units SubCUTAneous Daily    lisinopril  5 mg Oral Daily    therapeutic multivitamin-minerals  1 tablet Oral BID    pantoprazole  40 mg Oral Daily    spironolactone  25 mg Oral Daily    torsemide  20 mg Oral Daily    sucralfate  1 g Oral 4x Daily    sodium chloride flush  5-40 mL IntraVENous 2 times per day    cefepime  2,000 mg IntraVENous Q12H    [Held by provider] clopidogrel  75 mg Oral Daily    [Held by provider] apixaban  5 mg Oral BID    vancomycin  1,000 mg IntraVENous Q8H     HOME MEDICATIONS:  Prior to Admission medications    Medication Sig Start Date End Date Taking? Authorizing Provider   Multiple Vitamins-Minerals (THERAPEUTIC MULTIVITAMIN-MINERALS) tablet Take 1 tablet by mouth in the morning and at bedtime   Yes Provider,
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
## 2024-02-21 NOTE — FLOWSHEET NOTE
02/20/24 2015   Vital Signs   Temp 97.5 °F (36.4 °C)   Temp Source Oral   Pulse 90   Heart Rate Source Monitor   Respirations 16   /66   MAP (Calculated) 79   BP Location Left upper arm   BP Method Automatic   Patient Position Semi fowlers   Opioid-Induced Sedation   POSS Score 1   RASS   Michelle Agitation Sedation Scale (RASS) 0   Oxygen Therapy   SpO2 97 %   Pulse Oximetry Type Intermittent   Pulse Oximeter Device Mode Intermittent   Pulse Oximeter Device Location Left;Finger   O2 Device None (Room air)     Patient A+Ox4, vitals and assessments done at this time. Bed in lowest position, call light within reach.

## 2024-02-22 VITALS
HEART RATE: 80 BPM | WEIGHT: 151.6 LBS | DIASTOLIC BLOOD PRESSURE: 65 MMHG | OXYGEN SATURATION: 92 % | SYSTOLIC BLOOD PRESSURE: 100 MMHG | HEIGHT: 71 IN | RESPIRATION RATE: 18 BRPM | BODY MASS INDEX: 21.22 KG/M2 | TEMPERATURE: 97.2 F

## 2024-02-22 LAB
ANION GAP SERPL CALCULATED.3IONS-SCNC: 11 MMOL/L (ref 3–16)
BUN SERPL-MCNC: 18 MG/DL (ref 7–20)
CALCIUM SERPL-MCNC: 8.4 MG/DL (ref 8.3–10.6)
CHLORIDE SERPL-SCNC: 95 MMOL/L (ref 99–110)
CO2 SERPL-SCNC: 27 MMOL/L (ref 21–32)
CREAT SERPL-MCNC: 0.6 MG/DL (ref 0.9–1.3)
DEPRECATED RDW RBC AUTO: 14.2 % (ref 12.4–15.4)
GFR SERPLBLD CREATININE-BSD FMLA CKD-EPI: >60 ML/MIN/{1.73_M2}
GLUCOSE BLD-MCNC: 156 MG/DL (ref 70–99)
GLUCOSE BLD-MCNC: 261 MG/DL (ref 70–99)
GLUCOSE SERPL-MCNC: 174 MG/DL (ref 70–99)
HCT VFR BLD AUTO: 36.7 % (ref 40.5–52.5)
HGB BLD-MCNC: 12.5 G/DL (ref 13.5–17.5)
MCH RBC QN AUTO: 28.8 PG (ref 26–34)
MCHC RBC AUTO-ENTMCNC: 34.1 G/DL (ref 31–36)
MCV RBC AUTO: 84.6 FL (ref 80–100)
PERFORMED ON: ABNORMAL
PERFORMED ON: ABNORMAL
PLATELET # BLD AUTO: 166 K/UL (ref 135–450)
PMV BLD AUTO: 7.6 FL (ref 5–10.5)
POTASSIUM SERPL-SCNC: 4.1 MMOL/L (ref 3.5–5.1)
RBC # BLD AUTO: 4.34 M/UL (ref 4.2–5.9)
SODIUM SERPL-SCNC: 133 MMOL/L (ref 136–145)
WBC # BLD AUTO: 7.9 K/UL (ref 4–11)

## 2024-02-22 PROCEDURE — 36415 COLL VENOUS BLD VENIPUNCTURE: CPT

## 2024-02-22 PROCEDURE — 6360000002 HC RX W HCPCS: Performed by: SURGERY

## 2024-02-22 PROCEDURE — 6370000000 HC RX 637 (ALT 250 FOR IP)

## 2024-02-22 PROCEDURE — 6370000000 HC RX 637 (ALT 250 FOR IP): Performed by: SURGERY

## 2024-02-22 PROCEDURE — 2580000003 HC RX 258: Performed by: SURGERY

## 2024-02-22 PROCEDURE — 80048 BASIC METABOLIC PNL TOTAL CA: CPT

## 2024-02-22 PROCEDURE — 85027 COMPLETE CBC AUTOMATED: CPT

## 2024-02-22 RX ORDER — LACTOBACILLUS RHAMNOSUS GG 10B CELL
1 CAPSULE ORAL 3 TIMES DAILY
Qty: 30 CAPSULE | Refills: 0
Start: 2024-02-22

## 2024-02-22 RX ORDER — GABAPENTIN 400 MG/1
400 CAPSULE ORAL 3 TIMES DAILY
Qty: 15 CAPSULE | Refills: 0 | Status: SHIPPED | OUTPATIENT
Start: 2024-02-22 | End: 2024-02-27

## 2024-02-22 RX ORDER — METHOCARBAMOL 500 MG/1
500 TABLET, FILM COATED ORAL 2 TIMES DAILY PRN
Qty: 10 TABLET | Refills: 0 | Status: SHIPPED | OUTPATIENT
Start: 2024-02-22 | End: 2024-02-27

## 2024-02-22 RX ORDER — OXYCODONE HYDROCHLORIDE 10 MG/1
10 TABLET ORAL EVERY 4 HOURS
Qty: 30 TABLET | Refills: 0 | Status: SHIPPED | OUTPATIENT
Start: 2024-02-22 | End: 2024-02-27

## 2024-02-22 RX ORDER — NALOXONE HYDROCHLORIDE 4 MG/.1ML
1 SPRAY NASAL PRN
Qty: 2 EACH | Refills: 0 | Status: SHIPPED | OUTPATIENT
Start: 2024-02-22

## 2024-02-22 RX ADMIN — SUCRALFATE 1 G: 1 TABLET ORAL at 08:10

## 2024-02-22 RX ADMIN — OXYCODONE 10 MG: 5 TABLET ORAL at 08:10

## 2024-02-22 RX ADMIN — SUCRALFATE 1 G: 1 TABLET ORAL at 11:51

## 2024-02-22 RX ADMIN — CEFEPIME 2000 MG: 2 INJECTION, POWDER, FOR SOLUTION INTRAVENOUS at 03:06

## 2024-02-22 RX ADMIN — SPIRONOLACTONE 25 MG: 25 TABLET ORAL at 08:10

## 2024-02-22 RX ADMIN — APIXABAN 5 MG: 5 TABLET, FILM COATED ORAL at 08:10

## 2024-02-22 RX ADMIN — GABAPENTIN 400 MG: 400 CAPSULE ORAL at 08:10

## 2024-02-22 RX ADMIN — MORPHINE SULFATE 4 MG: 4 INJECTION, SOLUTION INTRAMUSCULAR; INTRAVENOUS at 03:05

## 2024-02-22 RX ADMIN — CLOPIDOGREL BISULFATE 75 MG: 75 TABLET ORAL at 08:10

## 2024-02-22 RX ADMIN — INSULIN LISPRO 4 UNITS: 100 INJECTION, SOLUTION INTRAVENOUS; SUBCUTANEOUS at 11:51

## 2024-02-22 RX ADMIN — PANTOPRAZOLE SODIUM 40 MG: 40 TABLET, DELAYED RELEASE ORAL at 08:10

## 2024-02-22 RX ADMIN — MORPHINE SULFATE 4 MG: 4 INJECTION, SOLUTION INTRAMUSCULAR; INTRAVENOUS at 09:21

## 2024-02-22 RX ADMIN — INSULIN GLARGINE 10 UNITS: 100 INJECTION, SOLUTION SUBCUTANEOUS at 08:11

## 2024-02-22 RX ADMIN — OXYCODONE 10 MG: 5 TABLET ORAL at 11:51

## 2024-02-22 RX ADMIN — TORSEMIDE 20 MG: 20 TABLET ORAL at 08:10

## 2024-02-22 RX ADMIN — Medication 1 TABLET: at 08:10

## 2024-02-22 RX ADMIN — MORPHINE SULFATE 2 MG: 2 INJECTION, SOLUTION INTRAMUSCULAR; INTRAVENOUS at 12:35

## 2024-02-22 RX ADMIN — LISINOPRIL 5 MG: 5 TABLET ORAL at 08:10

## 2024-02-22 RX ADMIN — OXYCODONE 10 MG: 5 TABLET ORAL at 04:01

## 2024-02-22 RX ADMIN — BUSPIRONE HYDROCHLORIDE 5 MG: 5 TABLET ORAL at 08:10

## 2024-02-22 ASSESSMENT — PAIN DESCRIPTION - DESCRIPTORS
DESCRIPTORS: THROBBING
DESCRIPTORS: ACHING
DESCRIPTORS: ACHING

## 2024-02-22 ASSESSMENT — PAIN DESCRIPTION - ONSET: ONSET: ON-GOING

## 2024-02-22 ASSESSMENT — PAIN SCALES - GENERAL
PAINLEVEL_OUTOF10: 10

## 2024-02-22 ASSESSMENT — PAIN DESCRIPTION - LOCATION
LOCATION: ARM;LEG
LOCATION: LEG
LOCATION: LEG
LOCATION: LEG;ARM

## 2024-02-22 ASSESSMENT — PAIN - FUNCTIONAL ASSESSMENT
PAIN_FUNCTIONAL_ASSESSMENT: ACTIVITIES ARE NOT PREVENTED
PAIN_FUNCTIONAL_ASSESSMENT: ACTIVITIES ARE NOT PREVENTED

## 2024-02-22 ASSESSMENT — PAIN DESCRIPTION - PAIN TYPE: TYPE: ACUTE PAIN

## 2024-02-22 ASSESSMENT — PAIN DESCRIPTION - FREQUENCY: FREQUENCY: CONTINUOUS

## 2024-02-22 ASSESSMENT — PAIN DESCRIPTION - ORIENTATION
ORIENTATION: LEFT

## 2024-02-22 NOTE — DISCHARGE INSTR - DIET

## 2024-02-22 NOTE — DISCHARGE SUMMARY
09:41  ANTIBIOTICS AT [facility].:                      RECEIVED :  02/17/24 09:47  If child <=2 yrs old please draw pediatric bottle.~Blood Culture 1              RADIOLOGY  XR HAND LEFT (2 VIEWS)   Final Result   No acute osseous abnormality.         CT HEAD WO CONTRAST   Final Result   No acute intracranial abnormality.         XR CHEST PORTABLE   Final Result   No acute cardiopulmonary disease.  No change compared to the prior study.         XR FOOT LEFT (MIN 3 VIEWS)   Final Result   Amputation as detailed above, unchanged compared to the prior study.   Consider or MRI with and without contrast to further evaluate as appropriate.             Discharge Medications     Medication List        START taking these medications      lactobacillus capsule  Take 1 capsule by mouth 3 times daily     methocarbamol 500 MG tablet  Commonly known as: ROBAXIN  Take 1 tablet by mouth 2 times daily as needed (muscle spasm)     naloxone 4 MG/0.1ML Liqd nasal spray  1 spray by Nasal route as needed for Opioid Reversal     oxyCODONE HCl 10 MG immediate release tablet  Commonly known as: OXY-IR  Take 1 tablet by mouth every 4 hours for 5 days. Max Daily Amount: 60 mg            CHANGE how you take these medications      gabapentin 400 MG capsule  Commonly known as: NEURONTIN  Take 1 capsule by mouth 3 times daily for 5 days.  What changed:   medication strength  how much to take            CONTINUE taking these medications      albuterol sulfate  (90 Base) MCG/ACT inhaler  Commonly known as: Ventolin HFA  Inhale 2 puffs into the lungs 4 times daily as needed for Wheezing     apixaban 5 MG Tabs tablet  Commonly known as: ELIQUIS     atorvastatin 40 MG tablet  Commonly known as: LIPITOR     busPIRone 5 MG tablet  Commonly known as: BUSPAR     clopidogrel 75 MG tablet  Commonly known as: PLAVIX  Take 1 tablet by mouth daily     dicyclomine 10 MG capsule  Commonly known as: BENTYL     empagliflozin 10 MG tablet  Commonly known as:

## 2024-02-22 NOTE — PLAN OF CARE
Problem: Pain  Goal: Verbalizes/displays adequate comfort level or baseline comfort level  Outcome: Progressing     Problem: Safety - Adult  Goal: Free from fall injury  Outcome: Progressing     Problem: ABCDS Injury Assessment  Goal: Absence of physical injury  2/22/2024 0213 by Yomaira Soriano RN  Outcome: Progressing  2/21/2024 1530 by Tim Jeter RN  Outcome: Progressing     Problem: Skin/Tissue Integrity  Goal: Absence of new skin breakdown  Description: 1.  Monitor for areas of redness and/or skin breakdown  2.  Assess vascular access sites hourly  3.  Every 4-6 hours minimum:  Change oxygen saturation probe site  4.  Every 4-6 hours:  If on nasal continuous positive airway pressure, respiratory therapy assess nares and determine need for appliance change or resting period.  2/21/2024 1530 by Tim Jeter RN  Outcome: Progressing     Problem: Chronic Conditions and Co-morbidities  Goal: Patient's chronic conditions and co-morbidity symptoms are monitored and maintained or improved  2/22/2024 0213 by Yomaira Soriano RN  Outcome: Progressing  Flowsheets (Taken 2/21/2024 2300)  Care Plan - Patient's Chronic Conditions and Co-Morbidity Symptoms are Monitored and Maintained or Improved:   Monitor and assess patient's chronic conditions and comorbid symptoms for stability, deterioration, or improvement   Collaborate with multidisciplinary team to address chronic and comorbid conditions and prevent exacerbation or deterioration   Update acute care plan with appropriate goals if chronic or comorbid symptoms are exacerbated and prevent overall improvement and discharge  2/21/2024 1530 by Tim Jeter RN  Outcome: Progressing

## 2024-02-22 NOTE — PLAN OF CARE
Problem: Discharge Planning  Goal: Discharge to home or other facility with appropriate resources  Outcome: Completed  Flowsheets (Taken 2/21/2024 2300 by Yomaira Soriano RN)  Discharge to home or other facility with appropriate resources: Identify barriers to discharge with patient and caregiver     Problem: Pain  Goal: Verbalizes/displays adequate comfort level or baseline comfort level  2/22/2024 1132 by Tim Jeter RN  Outcome: Completed  2/22/2024 0213 by Yomaira Soriano RN  Outcome: Progressing     Problem: Safety - Adult  Goal: Free from fall injury  2/22/2024 1132 by Tim Jeter RN  Outcome: Completed  2/22/2024 0213 by Yomaira Soriano RN  Outcome: Progressing     Problem: ABCDS Injury Assessment  Goal: Absence of physical injury  2/22/2024 1132 by Tim Jeter RN  Outcome: Completed  2/22/2024 0213 by Yomaira Soriano RN  Outcome: Progressing     Problem: Skin/Tissue Integrity  Goal: Absence of new skin breakdown  Description: 1.  Monitor for areas of redness and/or skin breakdown  2.  Assess vascular access sites hourly  3.  Every 4-6 hours minimum:  Change oxygen saturation probe site  4.  Every 4-6 hours:  If on nasal continuous positive airway pressure, respiratory therapy assess nares and determine need for appliance change or resting period.  Outcome: Completed     Problem: Chronic Conditions and Co-morbidities  Goal: Patient's chronic conditions and co-morbidity symptoms are monitored and maintained or improved  2/22/2024 1132 by Tim Jeter RN  Outcome: Completed  2/22/2024 0213 by Yomaira Soriano RN  Outcome: Progressing  Flowsheets (Taken 2/21/2024 2300)  Care Plan - Patient's Chronic Conditions and Co-Morbidity Symptoms are Monitored and Maintained or Improved:   Monitor and assess patient's chronic conditions and comorbid symptoms for stability, deterioration, or improvement   Collaborate with multidisciplinary team to address chronic and comorbid conditions and prevent

## 2024-02-22 NOTE — PROGRESS NOTES
Pharmacy Vancomycin Consult     Vancomycin Day:   Current Dosinmg q8h  Current indication: SSTI    Temp max:  98    Recent Labs     24  0941 24  0543   BUN 10 11   CREATININE 0.6* 0.6*   WBC 8.5 7.7       Intake/Output Summary (Last 24 hours) at 2024 1150  Last data filed at 2024 0923  Gross per 24 hour   Intake 180 ml   Output 2000 ml   Net -1820 ml     Culture Date      Source                       Results  24               blood                           NGTD    Ht Readings from Last 1 Encounters:   24 1.803 m (5' 11\")        Wt Readings from Last 1 Encounters:   24 73 kg (161 lb)       Body mass index is 22.45 kg/m².    Estimated Creatinine Clearance: 144 mL/min (A) (based on SCr of 0.6 mg/dL (L)).    Trough: 18.3    Assessment/Plan  Loading dose: [id number]  Regimen: 1000 mg IV every 8 hours.  Start time: 12:00 on 2024  Exposure target: AUC24 (range)400-600 mg/L.hr   AUC24,ss: 555 mg/L.hr  Probability of AUC24 > 400: 92 %  Ctrough,ss: 15.6 mg/L  Probability of Ctrough,ss > 20: 21 %  Probability of nephrotoxicity (Lodise GEORGE ): 11 %    Continue current dosing    Alexa Farmer [facility]      
  Physician Progress Note      PATIENT:               ISRA CALDERON  [facility] #:                  [id number]  :                       1970  ADMIT DATE:       2024 9:01 AM  DISCH DATE:  RESPONDING  PROVIDER #:        Ivon Granger DO          QUERY TEXT:    Pt admitted with Left foot cellulitis. Pt noted to have DM in internal   medicine PN on 2024. If possible, please document in progress notes and   discharge summary the relationship, if any, between cellulitis and DM.    The medical record reflects the following:  Risk Factors: DM, PAD  Clinical Indicators: In internal medicin e PN on 2024-\"Left foot   cellulitis-S.p recent left TMA - non compliant with dressing changes or follow   up\".  Glucose-334, 191, 221  Treatment: IV ceFEPIme (MAXIPIME) 2,000 mg, insulin glargine (LANTUS)   injection vial 10 Units , SSI,Iv vancomycin (VANCOCIN) 1500 mg ,carb control   diet, Plan for left BKA on 2024  Options provided:  -- Left foot cellulitis associated with Diabetes  -- Left foot cellulitis unrelated to Diabetes  -- Other - I will add my own diagnosis  -- Disagree - Not applicable / Not valid  -- Disagree - Clinically unable to determine / Unknown  -- Refer to Clinical Documentation Reviewer    PROVIDER RESPONSE TEXT:    Left foot cellulitis associated with Diabetes.    Query created by: Christa Wong on 2024 9:34 AM      Electronically signed by:  Ivon Granger DO 2024 6:46 PM          
/72   Pulse 95   Temp 97.2 °F (36.2 °C) (Oral)   Resp 27   Ht 1.803 m (5' 11\")   Wt 69.9 kg (154 lb)   SpO2 96%   BMI 21.48 kg/m²      Patient back from surgery at this time.   
2/19/24 is my second shift with this patient, and each shift at time of assessment he requests more pain meds or higher doses.  Both shifts I have messaged the providers to honor his request.  He has asked for higher doses of morphine, asked to administer the PO pain medication and the IV medication simultaneously, and has also asked to be woken up to receive the pain medications.  Using the interpretor device via video chat with american sign language, I explained to the patient that administering the pain meds with other pain meds simultaneously is against policy.  I also explained that waking him up to administer pain medication is against policy and that I would not do it.  The education on the policy was translated and he understood.  Will continue to monitor.    
4 Eyes Skin Assessment     NAME:  Renato Nagy  YOB: 1970  MEDICAL RECORD NUMBER:  [id number]    The patient is being assessed for  Admission    I agree that at least one RN has performed a thorough Head to Toe Skin Assessment on the patient. ALL assessment sites listed below have been assessed.      Areas assessed by both nurses:    Head, Face, Ears, Shoulders, Back, Chest, Arms, Elbows, Hands, Sacrum. Buttock, Coccyx, Ischium, Legs. Feet and Heels, and Under Medical Devices     Pt has sutures to left foot toe amputation and foot slightly red also left hand palm is red        Does the Patient have a Wound? No noted wound(s)       Garret Prevention initiated by RN: No  Wound Care Orders initiated by RN: No    Pressure Injury (Stage 3,4, Unstageable, DTI, NWPT, and Complex wounds) if present, place Wound referral order by RN under : No    New Ostomies, if present place, Ostomy referral order under : No     Nurse 1 eSignature: Electronically signed by SHERRELL PERDOMO RN on 2/17/24 at 4:06 PM EST    **SHARE this note so that the co-signing nurse can place an eSignature**    Nurse 2 eSignature: Electronically signed by Madonna Armas RN on 2/17/24 at 4:08 PM EST    
AM assessment completed. Pain 10/10, meds given, see MAR. No signs of symptoms of distress noted. Patient tolerated morning medications well. Respirations easy and even. Bed in lowest position, bed alarm in place and functioning properly, SR up x 2 and bed in low position. Call light within reach.     Bedside Mobility Assessment Tool (BMAT):     Assessment Level 1- Sit and Shake    1. From a semi-reclined position, ask patient to sit up and rotate to a seated position at the side of the bed. Can use the bedrail.    2. Ask patient to reach out and grab your hand and shake making sure patient reaches across his/her midline.   Pass- Patient is able to come to a seated position, maintain core strength. Maintains seated balance while reaching across midline. Move on to Assessment Level 2.     Assessment Level 2- Stretch and Point   1. With patient in seated position at the side of the bed, have patient place both feet on the floor (or stool) with knees no higher than hips.    2. Ask patient to stretch one leg and straighten the knee, then bend the ankle/flex and point the toes. If appropriate, repeat with the other leg.   Fail- Patient is unable to complete task. Patient is MOBILITY LEVEL 2.     Assessment Level 3- Stand   1. Ask patient to elevate off the bed or chair (seated to standing) using an assistive device (cane, bedrail).    2. Patient should be able to raise buttocks off be and hold for a count of five. May repeat once.   Fail- Patient unable to demonstrate standing stability. Patient is MOBILITY LEVEL 3.     Assessment Level 4- Walk   1. Ask patient to march in place at bedside.    2. Then ask patient to advance step and return each foot. Some medical conditions may render a patient from stepping backwards, use your best clinical judgement.   Fail- Patient not able to complete tasks OR requires use of assistive device. Patient is MOBILITY LEVEL 3.       Mobility Level- 2     
Attempted to obtain consent for surgery tomorrow, patient has further specific questions for Dr Monteiro. Pre op RN will have to obtain informed consent tomorrow AM prior to surgery and w/ use of ASL .     Patient has been calm and cooperative all day, but this evening pt is more agitated, irritable, demanding \"something stronger for pain\", \" I need Morphine to get rid of the pain\" \"I just want to sleep tonight\" Perfect serve sent to Dr Sanchez. PRN Hydroxyzine ordered, pt declined PRN Hydroxyzine and is angrily demanding IV pain meds.     Another message sent to night shift doctor on call - explaining pts c/o increased pain and requests for IV pain meds. No new orders for IV pain medication per night shift doctor.   
Bed side report given to AVINASH Hernández. Call light within reach. Bed alarm on.   
Bedside Mobility Assessment Tool (BMAT):     Assessment Level 1- Sit and Shake    1. From a semi-reclined position, ask patient to sit up and rotate to a seated position at the side of the bed. Can use the bedrail.    2. Ask patient to reach out and grab your hand and shake making sure patient reaches across his/her midline.   Pass- Patient is able to come to a seated position, maintain core strength. Maintains seated balance while reaching across midline. Move on to Assessment Level 2.     Assessment Level 2- Stretch and Point   1. With patient in seated position at the side of the bed, have patient place both feet on the floor (or stool) with knees no higher than hips.    2. Ask patient to stretch one leg and straighten the knee, then bend the ankle/flex and point the toes. If appropriate, repeat with the other leg.   Fail- Patient is unable to complete task. Patient is MOBILITY LEVEL 2.     Assessment Level 3- Stand   1. Ask patient to elevate off the bed or chair (seated to standing) using an assistive device (cane, bedrail).    2. Patient should be able to raise buttocks off be and hold for a count of five. May repeat once.   Fail- Patient unable to demonstrate standing stability. Patient is MOBILITY LEVEL 3.     Assessment Level 4- Walk   1. Ask patient to march in place at bedside.    2. Then ask patient to advance step and return each foot. Some medical conditions may render a patient from stepping backwards, use your best clinical judgement.   Fail- Patient not able to complete tasks OR requires use of assistive device. Patient is MOBILITY LEVEL 3.       Mobility Level- 1    Patient bilateral BKA    
Consult has been called to Dr. garcia on 2/18/24. Spoke with janna. 9:31 AM    Carito Flanagan  2/18/2024  
Consult has been called to Dr. morton on 2/17/24. Spoke with dr morton via perfect serve. 2:24 PM    Carito Flanagan  2/17/2024  
Consult has been called to Dr. poe on 2/18/24. Spoke with dr poe via perfect serve. 9:37 AM    Carito Flanagan  2/18/2024  
General Surgery  Daily Progress Note    Pt Name: Renato Nagy  Medical Record Number: [id number]  Date of Birth 1970   Today's Date: 2/21/2024    SUBJECTIVE  No complaints today. Tolerated dressing change well.       OBJECTIVE  Vitals:    02/21/24 0600 02/21/24 0609 02/21/24 0639 02/21/24 0815   BP:    110/69   Pulse:    70   Resp:  18 18 18   Temp:    97 °F (36.1 °C)   TempSrc:    Oral   SpO2:    93%   Weight: 65.7 kg (144 lb 14.4 oz)      Height:           Gen: No distress. Alert.   Resp: No accessory muscle use. No crackles. No wheezes. No rhonchi.   CV: Regular rate. Regular rhythm. No murmur.  No rub. No edema.   GI: Non-tender. Non-distended.  Normal bowel sounds.  Skin: Warm and dry. No nodule or rash on exposed extremities.   MSK: s/p right BKA remotely. POD #2 left BKA wound looks great, minimal bleeding. Dressings and ACE changed. Knee immobilizer replaced.       I/O last 3 completed shifts:  In: 2787.9 [P.O.:240; I.V.:10; IV Piggyback:2537.9]  Out: 3050 [Urine:3050]  I/O this shift:  In: -   Out: 300 [Urine:300]    LABS  CBC:   Recent Labs     02/19/24  0652 02/20/24  0626   WBC 6.7 6.6   HGB 13.7 11.1*   HCT 40.6 32.5*   MCV 85.5 84.7   * 117*     BMP:   Recent Labs     02/19/24  0652 02/20/24  0626   * 133*   K 4.1 4.4   CL 98* 98*   CO2 27 26   BUN 12 17   CREATININE 0.6* 0.7*     LIVER PROFILE:   No results for input(s): \"AST\", \"ALT\", \"LIPASE\", \"AMYLASE\", \"ALB\", \"BILIDIR\", \"BILITOT\", \"ALKPHOS\" in the last 72 hours.    PT/INR:   No results for input(s): \"PROTIME\", \"INR\" in the last 72 hours.    APTT: No results for input(s): \"APTT\" in the last 72 hours.  UA:No results for input(s): \"NITRITE\", \"COLORU\", \"PHUR\", \"LABCAST\", \"WBCUA\", \"RBCUA\", \"MUCUS\", \"TRICHOMONAS\", \"YEAST\", \"BACTERIA\", \"CLARITYU\", \"SPECGRAV\", \"LEUKOCYTESUR\", \"UROBILINOGEN\", \"BILIRUBINUR\", \"BLOODU\", \"GLUCOSEU\", \"AMORPHOUS\" in the last 72 hours.    Invalid input(s): \"KETONESU\"      IMAGING  XR HAND LEFT (2 VIEWS) 
General Surgery  Daily Progress Note    Pt Name: Renato Nagy  Medical Record Number: [id number]  Date of Birth 1970   Today's Date: 2/20/2024    SUBJECTIVE  Communication today with the help of video translation service. Feels well overall but he's having pain in left leg disrupting his sleep. He will bring this to the attention of the hospitalist service who is managing his pain. He is ready to work with therapy today. Discussed timing of his first dressing change tomorrow.       OBJECTIVE  Vitals:    02/20/24 0110 02/20/24 0337 02/20/24 0538 02/20/24 0745   BP:  108/75  100/62   Pulse:  79  74   Resp: 16 16 16 16   Temp:  97.6 °F (36.4 °C)  96.8 °F (36 °C)   TempSrc:  Oral  Oral   SpO2:  99%  97%   Weight:       Height:           Gen: No distress. Alert.   Resp: No accessory muscle use. No crackles. No wheezes. No rhonchi.   CV: Regular rate. Regular rhythm. No murmur.  No rub. No edema.   GI: Non-tender. Non-distended.  Normal bowel sounds.  Skin: Warm and dry. No nodule or rash on exposed extremities.   MSK: s/p right BKA remotely. POD #1 left BKA in clean and dry ACE and knee immobilizer in place.     I/O last 3 completed shifts:  In: 1220 [P.O.:720; I.V.:500]  Out: 3300 [Urine:3150; Blood:150]  I/O this shift:  In: 2537.9 [IV Piggyback:2537.9]  Out: -     LABS  CBC:   Recent Labs     02/18/24  0543 02/19/24  0652 02/20/24  0626   WBC 7.7 6.7 6.6   HGB 14.0 13.7 11.1*   HCT 40.8 40.6 32.5*   MCV 85.5 85.5 84.7   * 115* 117*     BMP:   Recent Labs     02/18/24  0543 02/19/24  0652 02/20/24  0626   * 134* 133*   K 4.3 4.1 4.4   CL 97* 98* 98*   CO2 24 27 26   BUN 11 12 17   CREATININE 0.6* 0.6* 0.7*     LIVER PROFILE:   Recent Labs     02/17/24  0941   *   *   BILITOT 0.8   ALKPHOS 106     PT/INR:   Recent Labs     02/17/24  0941   PROTIME 20.2*   INR 1.74*     APTT: No results for input(s): \"APTT\" in the last 72 hours.  UA:No results for input(s): \"NITRITE\", \"COLORU\", \"PHUR\", 
Inpatient Physical Therapy Evaluation & Treatment    Unit: [facility]  Date:  2/20/2024  Patient Name:    Renato Nagy  Admitting diagnosis:  History of diabetes mellitus [Z86.39]  Cellulitis of left upper extremity [L03.114]  Cellulitis of left lower extremity [L03.116]  Status post amputation of toe (HCC) [Z89.429]  Admit Date:  2/17/2024  Precautions/Restrictions/WB Status/ Lines/ Wounds/ Oxygen: Fall risk, Bed/chair alarm, Lines (IV), Knee immobilizer, Specific Ortho Precautions: NWB LLE, and Isolation Precautions: Contact  Bilateral BKA (LLE new) - has prosthetic for RLE in room. Able to don independently.   Deaf - used video  during this entire encounter.     Pt seen for cotreatment this date due to patient safety, patient endurance, and limited functional status information    Treatment Time:  15:30-16:10  Treatment Number:  1   Timed Code Treatment Minutes: 40 minutes  Total Treatment Minutes:  40  minutes    Patient Stated Goals for Therapy: \" I want a new prosthetic for both legs \"          Discharge Recommendations: SNF  DME needs for discharge: Needs Met       Therapy recommendation for EMS Transport: can transport by wheelchair    Therapy recommendations for staff:   Assist of 1 for transfers with use of rolling walker (RW) and gait belt to/from BSC  to/from chair    History of Present Illness:   As per Homa Manuel PA-C progress note of 2/21/24:   \"53-year-old male with a history of diabetes, alcoholic cirrhosis, hep C ,hypertension, CHF paroxysmal A-fib, COPD, deafness, multiple ER visits-23 visits in the last 3 months history of noncompliance who came to the emergency room with drainage and wound of the left foot   Recent left TMA, non-compliant with follow up and dressing changes  Podiatry and surgery following.\"  2/19/24: left BKA by Dr. Monteiro    [facility] S4 Level Recommendation:  NA        AM-PAC Mobility Score       AM-PAC Inpatient Mobility without Stair Climbing Raw Score : 
PRN morphine given for left BKA pain. See MAR for administration details.    Patient requesting higher pain medication dose or for the frequency to be shortened. Patient informed that this nurse already addressed these issues with the doctor today.   
PRN morphine given left BKA pain rated 10 out of 10. See MAR for administration details  
PRN percocet given for left below knee amputation. See MAR for administration details.  
Patient has been yelling out frequently today stating he is \"in so much pain\" and is requesting pain meds within 30 minutes of the previous dose being given. Patient yelling that \"doctors are dumb and they cut my bone so it hurts a lot\". Patient also states that when he got his first BKA in [place] that they \"gave him IV meds every hour for a whole week and wants us to do that too so he can sleep\" patient educated on the way medication was ordered here and the protocol we will follow to maintain his safety. Patient angered by this and continuing to yell out.  
Patient has sign language interpreting service via [employer] Delikatesser in place at this time.  
Patient is repeatedly asking for more pain medication.  If not the medication he asks when he can have it again.  I explained to him that the toradol given earlier was a 1 time dose and he wants to see if he can get more.  
Patient off the floor to surgery at this time. Patient wore glasses down to sign consent papers  
Progress Note    Admit Date:  2/17/2024    53-year-old male with a history of diabetes, alcoholic cirrhosis, hep C ,hypertension, CHF paroxysmal A-fib, COPD, deafness, multiple ER visits-23 visits in the last 3 months history of noncompliance who came to the emergency room with drainage and wound of the left foot   Recent left TMA, non-compliant with follow up and dressing changes  Podiatry and surgery consulted   S/p left BKA     Subjective:  Mr. Nagy seen, sleeping in bed following surgery  No signs of distress noted  Patient not woken, will assess later as able    Objective:   Patient Vitals for the past 4 hrs:   BP Temp Temp src Pulse Resp SpO2   02/19/24 1212 -- -- -- -- 18 --   02/19/24 1137 114/77 97.7 °F (36.5 °C) Oral 100 18 95 %   02/19/24 1115 -- -- -- -- 16 --   02/19/24 1045 -- -- -- -- 18 --   02/19/24 1021 108/72 97.2 °F (36.2 °C) Oral 95 -- 96 %   02/19/24 1010 -- -- -- (!) 105 27 99 %   02/19/24 1005 107/64 -- -- 99 14 99 %   02/19/24 1002 -- -- -- 96 -- --   02/19/24 1000 113/66 -- -- 97 15 99 %   02/19/24 0955 115/68 -- -- 99 14 98 %   02/19/24 0950 -- -- -- (!) 107 19 97 %   02/19/24 0945 109/70 -- -- (!) 108 17 99 %   02/19/24 0940 -- -- -- (!) 112 20 100 %   02/19/24 0935 -- -- -- (!) 113 (!) 34 (!) 82 %   02/19/24 0930 122/76 -- -- (!) 109 26 100 %   02/19/24 0925 116/70 -- -- (!) 108 18 (!) 89 %   02/19/24 0916 -- 99.3 °F (37.4 °C) Infrared (!) 108 -- --          Intake/Output Summary (Last 24 hours) at 2/19/2024 1214  Last data filed at 2/19/2024 0914  Gross per 24 hour   Intake 980 ml   Output 2000 ml   Net -1020 ml       Physical Exam:  Gen: Middle aged male, chronically ill appearing. No distress. Asleep.   Eyes: PERRL. No sclera icterus. No conjunctival injection.   ENT: No discharge.  Neck: No JVD. Trachea midline.  Resp: No accessory muscle use. No crackles. No wheezes. No rhonchi.   CV: Regular rate. Regular rhythm. No murmur.  No rub. No edema.   Capillary Refill: Brisk,< 3 
Progress Note    Admit Date:  2/17/2024    53-year-old male with a history of diabetes, alcoholic cirrhosis, hep C ,hypertension, CHF paroxysmal A-fib, COPD, deafness, multiple ER visits-23 visits in the last 3 months history of noncompliance who came to the emergency room with drainage and wound of the left foot   Recent left TMA, non-compliant with follow up and dressing changes  Podiatry and surgery following   POD#1 left BKA     Subjective:  Mr. Nagy seen, encounter completed with assistance of   Patient c/o pain, asking for pain medication dose to be increased   Otherwise no complaints    Objective:   Patient Vitals for the past 4 hrs:   BP Temp Temp src Pulse Resp SpO2   02/20/24 0745 100/62 96.8 °F (36 °C) Oral 74 16 97 %          Intake/Output Summary (Last 24 hours) at 2/20/2024 1043  Last data filed at 2/20/2024 0927  Gross per 24 hour   Intake 3017.92 ml   Output 1950 ml   Net 1067.92 ml       Physical Exam:  Gen: Middle aged male, chronically ill appearing. No distress. Alert  Eyes: PERRL. No sclera icterus. No conjunctival injection.   ENT: No discharge.  Neck: No JVD. Trachea midline.  Resp: No accessory muscle use. No crackles. No wheezes. No rhonchi.   CV: Regular rate. Regular rhythm. No murmur.  No rub. No edema.   Capillary Refill: Brisk,< 3 seconds   Peripheral Pulses: +2 palpable, equal bilaterally   GI: Non-tender. Non-distended. Normal bowel sounds.  Skin: Left forearm in wrap. Right BKA stump healthy. S/p left BKA, in knee immobilizer and ACE wrap      Scheduled Meds:   ketorolac  30 mg IntraVENous Once    midazolam  1 mg IntraVENous Once    insulin lispro  0-8 Units SubCUTAneous TID WC    insulin lispro  0-4 Units SubCUTAneous Nightly    atorvastatin  40 mg Oral Nightly    busPIRone  5 mg Oral BID    ferrous sulfate  325 mg Oral BID WC    gabapentin  300 mg Oral TID    insulin glargine  10 Units SubCUTAneous Daily    lisinopril  5 mg Oral Daily    therapeutic 
Progress Note    Admit Date:  2/17/2024    Subjective:  Mr. Nagy is deaf and I used an  during the interview.    Is a 53-year-old male with a history of diabetes, alcoholic cirrhosis, hep C ,hypertension, CHF paroxysmal A-fib, COPD, deafness, multiple ER visits-23 visits in the last 3 months history of noncompliance who came to the emergency room with drainage and wound of the left foot.  He had had a transmetatarsal amputation done before and sent to rehab.  He spent 2 weeks in the rehab and he was discharged for insurance reasons per patient.  At home he has not changed his foot dressing.  He noticed increased pain.  He also has complaints of left arm and hand pain and is unable to  with his left hand.  He has a mild rash in the left medial aspect of his palm.    This morning he complains of pain in the left foot.  Is draining.  He also complains of pain in the left hand.    Objective:   BP 94/63   Pulse 80   Temp 97.7 °F (36.5 °C) (Oral)   Resp 16   Wt 73 kg (161 lb)   SpO2 97%   BMI 22.45 kg/m²        Intake/Output Summary (Last 24 hours) at 2/18/2024 0907  Last data filed at 2/18/2024 0734  Gross per 24 hour   Intake --   Output 2000 ml   Net -2000 ml       Physical Exam:    General appearance: alert, appears stated age and cooperative/chronically ill-appearing  Head: Normocephalic, without obvious abnormality, atraumatic  Eyes: conjunctivae/corneas clear. PERRL, EOM's intact.  Neck: no adenopathy, no carotid bruit, no JVD, supple, symmetrical, trachea midline and thyroid not enlarged, symmetric, no tenderness/mass/nodules  Lungs: clear to auscultation bilaterally  Heart: regular rate and rhythm, S1, S2 normal, no murmur, click, rub or gallop  Abdomen: soft, non-tender; bowel sounds normal; no masses,  no organomegaly  Extremities: Trans metatarsal amputation of the left foot.  Sutures intact.  Erythema present.  Drainage present.  He is unable to  my left hand.  He has a rash on the 
Pt admitted from ED and pt alert and oriented x 4.  Pt is deaf and video  used for admission questions.  Left foot old sutures in place cleaned with normal saline and left open to air per Dr. Sanchez. SHERRELL PERDOMO RN    
Pt c/o pain 10/10 in left wrist and LLE described as aching gave 4 mg Morphine IV.  
Pt leaving via ems to [facility]. Discharge instructions given. Pt voiced understanding. Copy of discharge and scripts with patient. Iv removed. CP and PE completed. No further needs at discharge. Pt leaving with all personal belonging.     Discharge instructions given to NH. IV removed. Ok to give ivp morphine before patient leaves per PA.   
Pt requesting pain medication. Explained to pt that morphine is not available for 20 more minutes. Pt refuses PO pain medication at this time and expresses that he will wait for IV medication.  
Shift assessment complete. VSS. Pt A/Ox4. C/o pain 10/10 in left foot and left wrist/arm. MD at bed side assessing patient w/ help from ASL . Scheduled meds given. See MAR. PRN Percocet given. See MAR. Pt pivoted to wheelchair to use toilet to have BM, when pt returned to bed, drainage noted on left incision/wound. Cleansed area and dressing applied. Pt denies further needs at this time. Call light within reach. Bed alarm on.   
Shift report given to Felicia at bedside. Patient is stable. All end of shift needs have been met. No further assistance needed at this time.    
Shift report given to Poornima at bedside. Patient is stable. All end of shift needs have been met. No further assistance needed at this time.    
Transferred care to AVINASH Botello. Face to face bedside report given, no need voiced at this time.     
Vancomycin Day: 3/7  Current Regimen: 1000 mg IV every 8 hours    Patient's labs, cultures, vitals, and vancomycin regimen reviewed.   SCr stable  U/O not measured/well documented  [facility] updated    Plan:   No change today  Carey Moser Pharm D 2/19/20241:24 PM  .        
Vancomycin Day: 4/7  Current Regimen: 1000 mg IV every 8 hours    Patient's labs, cultures, vitals, and vancomycin regimen reviewed.   SCr increased slightly from 0.6 to 0.7  U/O not measured/well documented  [facility] updated    Plan:   No change today  Carey Moser Pharm D 2/20/202411:47 AM  .        
  Workup in Er showed mild cellulitis to left fore arm and left foot sutures in place and areas of redness and recommended admission\"    Leg Amputation Below Knee performed on 2/19/24    Home Health S4 Level Recommendation:  NA    AM-PAC Score: AM-PAC Inpatient Daily Activity Raw Score: 18     Subjective:  Patient lying reclined in bed with no family present.   Pt agreeable to this OT session.     Cognition:    A&O orientation not directly assessed.    Able to follow 2 step commands    Pain:   Yes  Location: KATHERINE SALINAS  Rating: severe /10   Pt. demonstrated frustration and agitation at times during session, mostly secondary to pain.   Pain Medicine Status: Received pain med prior to tx and RN notified - received IV pain meds during session    Preadmission Environment: from 1/15/24 OT eval  Pt lives with                                         with family (mother in law) and with Significant Other  Home environment:                            duplex  Steps to enter first floor:                     2 steps to enter  Steps to second floor/basement:        N/A  Laundry:                                              Unknown  Bathroom:                                           unknown  Pt sleeps in a:                                     Adjustable bed  Equipment owned:                              RW and home O2 (2L) continous     Preadmission Status:  Pt able to drive: Yes but car is currently broken down   Pt fully independent with ADLs: Yes  Pt receives assistance from family for: Cleaning, Cooking, Laundry , and grocery shopping  Pt independent for functional transfers and utilized RW for mobility in home.  History of falls:            No  Home Health Services:None  Objective:  Does this pt have an acute or acute on chronic diagnosis of CHF? No    Upper Extremity ROM:    Appears WFL's      Upper Extremity Strength:    BUE strength WFL, but not formally assessed w/ MMT    Upper Extremity Sensation:    WFL; appears WFL during 
device. Patient is MOBILITY LEVEL 3.       Mobility Level- 3     
every 8 hours as needed for Pain. Max Daily Amount: 3 tablets   Yes Kalpesh Hutton MD   gabapentin (NEURONTIN) 300 MG capsule Take 1 capsule by mouth 3 times daily for 30 days. 1/17/24 2/17/24  Manpreet Raymond MD   empagliflozin (JARDIANCE) 10 MG tablet Take 1 tablet by mouth daily 1/18/24   Manpreet Raymond MD   insulin glargine (LANTUS) 100 UNIT/ML injection vial Inject 10 Units into the skin daily 1/18/24   Manpreet Raymond MD   lisinopril (PRINIVIL;ZESTRIL) 5 MG tablet Take 1 tablet by mouth daily 1/18/24   Manpreet Raymond MD   clopidogrel (PLAVIX) 75 MG tablet Take 1 tablet by mouth daily 1/18/24   Manpreet Raymond MD   albuterol sulfate HFA (VENTOLIN HFA) 108 (90 Base) MCG/ACT inhaler Inhale 2 puffs into the lungs 4 times daily as needed for Wheezing 8/22/23   Mack Sanchez MD   mineral oil-hydrophilic petrolatum (AQUAPHOR) ointment Apply topically as needed for Dry Skin Apply topically as needed to left lower extremity    Kalpesh Hutton MD   lactulose (CHRONULAC) 10 GM/15ML solution Take 30 mLs by mouth every 8 hours as needed (maintain 2-3 BM per day) 8/17/23   Homa Manuel PA-C   torsemide (DEMADEX) 20 MG tablet Take 1 tablet by mouth daily 8/3/23   Homa Manuel PA-C   spironolactone (ALDACTONE) 25 MG tablet Take 1 tablet by mouth daily 8/4/23   Homa Manuel PA-C   pantoprazole (PROTONIX) 40 MG tablet Take 1 tablet by mouth daily 2/24/21   Kalpesh Hutton MD   sucralfate (CARAFATE) 1 GM tablet Take 1 tablet by mouth 4 times daily 5/1/22   Kalpesh Hutton MD   ferrous sulfate (IRON 325) 325 (65 Fe) MG tablet Take 1 tablet by mouth 2 times daily (with meals) 6/29/23   Kalpesh Hutton MD   dicyclomine (BENTYL) 10 MG capsule Take 1 capsule by mouth every 4 hours as needed 7/15/23   Kalpesh Hutton MD   busPIRone (BUSPAR) 5 MG tablet Take 1 tablet by mouth 2 times daily 10/22/22   Provider, MD Kalpesh   apixaban (ELIQUIS) 5 MG TABS tablet Take 1 tablet by 
maintains standing stability for at least 5 seconds, proceed to assessment level 4.    Assessment Level 4- Walk   1. Ask patient to march in place at bedside.    2. Then ask patient to advance step and return each foot. Some medical conditions may render a patient from stepping backwards, use your best clinical judgement.   Fail- Patient not able to complete tasks OR requires use of assistive device. Patient is MOBILITY LEVEL 3.       Mobility Level- 3 NWB to LLE, has prosthesis to right leg     
                     ORDERED BY: JULIA REED  SOURCE: Blood Antecubital-Rig              COLLECTED:  02/17/24 09:41  ANTIBIOTICS AT [facility].:                      RECEIVED :  02/17/24 09:47  If child <=2 yrs old please draw pediatric bottle.~Blood Culture 1               RADIOLOGY  XR HAND LEFT (2 VIEWS)   Final Result   No acute osseous abnormality.         CT HEAD WO CONTRAST   Final Result   No acute intracranial abnormality.         XR CHEST PORTABLE   Final Result   No acute cardiopulmonary disease.  No change compared to the prior study.         XR FOOT LEFT (MIN 3 VIEWS)   Final Result   Amputation as detailed above, unchanged compared to the prior study.   Consider or MRI with and without contrast to further evaluate as appropriate.             Assessment/Plan:    #Left foot cellulitis   #S/p recent left TMA, non-healing wuond   -non-compliant with dressing changes and follow up    -left foot xray as above with no osteo  -ESR 64 and CRP 88.9 on admission   -blood cx NGTD   -vanc and cefepime D#5  -podiatry consulted  -general surgery consulted   -s/p BKA of left leg 02/21/24  -first dressing change done by surgery today  -keep knee immobilizer in place when not working with therapy  -NWB to LLE  -PT/OT  -precert started for [facility]    #Acute hypoxia - resolved   -noted post-operatively  -continue IS     #Left arm cellulitis  -on abx as above  -no concern for shingles per my colleague  -XR left hand negative  -increased percocet to q4h     #DM type 2 - uncontrolled  -SSI and lantus   -carb control diet  -monitor BG      #Alcoholic cirrhosis   #Hepatitis C   #Transaminitis  -LFTs appear chronically elevated   -monitor LFTs     #HTN  -on lisinopril   -monitor BP     #Chronic Combined CHF  #Ischemic cardiomyopathy s/p AICD   -last echo on 1/12/24 with EF of 25%  -on lisinopril, jardiance, aldactone, demedex  -no ARNi or beta blocker   -monitor daily weights and I&Os     #PAF  #Secondary hypercoagulable state  -AC on

## 2024-02-26 ENCOUNTER — HOSPITAL ENCOUNTER (OUTPATIENT)
Dept: WOUND CARE | Age: 54
Discharge: HOME | End: 2024-02-26
Attending: PODIATRIST

## 2024-03-14 ENCOUNTER — HOSPITAL ENCOUNTER (EMERGENCY)
Age: 54
Discharge: HOME | End: 2024-03-14
Attending: EMERGENCY MEDICINE
Payer: MEDICARE

## 2024-03-14 VITALS
SYSTOLIC BLOOD PRESSURE: 104 MMHG | TEMPERATURE: 97.6 F | HEART RATE: 84 BPM | RESPIRATION RATE: 14 BRPM | OXYGEN SATURATION: 100 % | DIASTOLIC BLOOD PRESSURE: 76 MMHG

## 2024-03-14 DIAGNOSIS — F41.1 ANXIETY STATE: ICD-10-CM

## 2024-03-14 DIAGNOSIS — Z71.89 ENCOUNTER FOR COUNSELING FOR CARE MANAGEMENT OF PATIENT WITH CHRONIC CONDITIONS AND COMPLEX HEALTH NEEDS USING NURSE-BASED MODEL: Primary | ICD-10-CM

## 2024-03-14 DIAGNOSIS — Z89.9 STATUS POST AMPUTATION: ICD-10-CM

## 2024-03-14 PROCEDURE — 99282 EMERGENCY DEPT VISIT SF MDM: CPT

## 2024-03-15 NOTE — ED PROVIDER NOTES
[facility]      CHIEF COMPLAINT  Care Management       HISTORY OF PRESENT ILLNESS  Renato Nagy is a 53 y.o. male  who presents to the ED complaining of concern for confusion regarding his care at home and lack of home health services he is awaiting to have.  Patient very anxious about his home care, stating that the house is not clean, he did not get clear instructions about his care, and that he just does not feel right as if his oxygen is not okay.  Patient is deaf and all communication performed through , Domingo, with  services iPad.  Discussed at length with patient and patient initially not very forthcoming stating that he has had no care since being discharged from the nursing facility 1 week ago after he was told that his insurance was.  He states that he was initially was thought to be staying until March 25.  He is very upset about this stating that he is not having any home health visits until 3 weeks from now and is concerned about his care since being home.  He initially told me that he has not had any nursing care or follow-up but then later told me that his dressing was just changed today by the nurse and was able to tell me that he did follow-up today regarding his recent BKA and had the dressing changed at that time.  No fevers at all.  No vomiting.    No other complaints, modifying factors or associated symptoms.     I have reviewed the following from the nursing documentation.    Past Medical History:   Diagnosis Date    CHF (congestive heart failure) (HCC)     COPD (chronic obstructive pulmonary disease) (HCC)     Deaf     Diabetes (HCC)      Past Surgical History:   Procedure Laterality Date    FOOT SURGERY Left 1/12/2024    PARTIAL LEFT FOOT AMPUTATION performed by Juan M Quarles DPM at [facility] [place]    LEG AMPUTATION BELOW KNEE Right     LEG AMPUTATION BELOW KNEE Left 2/19/2024    LEG AMPUTATION BELOW KNEE performed by Juan M Monteiro MD at [facility] [place] [place]

## 2024-03-15 NOTE — ED TRIAGE NOTES
With  present pt had c/o needing a nurse at his home today, immediately. States he was d/c from a skilled facility earlier than he was supposed to and no one explained his care at home or his medications. Pt stated that he has had no care since he left the facility. Pt states he has O2 at home as well, and after examining the recent amputation dressing, pt states he was seen by a MD today and they changed the dressing. MD present for triaging, pt was explained we can put in order for case management to help him but we are not able to facilitate anything for tonight. Pt provided with d/c paperwork, showed where medications were listed along with route, times, and dosages; pt verbalized understanding at this with  and expressed appreciation for the explanation.

## 2024-03-19 ENCOUNTER — TELEPHONE (OUTPATIENT)
Dept: SURGERY | Age: 54
End: 2024-03-19

## 2024-03-19 NOTE — TELEPHONE ENCOUNTER
Pt needs a refill on pain medication oxy   Also pts nurse is looking for wound supplies where to order them....   Mart-1 - Negative Histology Text: MART-1 staining demonstrates a normal density and pattern of melanocytes along the dermal-epidermal junction. The surgical margins are negative for tumor cells.

## 2024-03-20 PROBLEM — K72.90 DECOMPENSATION OF CIRRHOSIS OF LIVER (HCC): Status: ACTIVE | Noted: 2021-02-12

## 2024-03-20 PROBLEM — E11.65 TYPE 2 DIABETES MELLITUS WITH HYPERGLYCEMIA (HCC): Status: ACTIVE | Noted: 2021-01-08

## 2024-03-20 PROBLEM — G89.29 CHRONIC BACK PAIN: Status: ACTIVE | Noted: 2023-12-01

## 2024-03-20 PROBLEM — I48.91 UNSPECIFIED ATRIAL FIBRILLATION (HCC): Status: ACTIVE | Noted: 2023-06-03

## 2024-03-20 PROBLEM — R55 SYNCOPE AND COLLAPSE: Status: ACTIVE | Noted: 2021-07-04

## 2024-03-20 PROBLEM — G89.4 CHRONIC PAIN DISORDER: Status: ACTIVE | Noted: 2023-09-01

## 2024-03-20 PROBLEM — H91.93 UNSPECIFIED HEARING LOSS, BILATERAL: Status: ACTIVE | Noted: 2024-01-17

## 2024-03-20 PROBLEM — I73.9 PAD (PERIPHERAL ARTERY DISEASE) (HCC): Chronic | Status: ACTIVE | Noted: 2021-04-25

## 2024-03-20 PROBLEM — S98.922D: Status: ACTIVE | Noted: 2024-01-17

## 2024-03-20 PROBLEM — E11.9 TYPE 2 DIABETES MELLITUS (HCC): Status: ACTIVE | Noted: 2023-04-18

## 2024-03-20 PROBLEM — I21.4 NSTEMI (NON-ST ELEVATED MYOCARDIAL INFARCTION) (HCC): Status: ACTIVE | Noted: 2023-05-18

## 2024-03-20 PROBLEM — F11.20 OPIATE DEPENDENCE, CONTINUOUS (HCC): Chronic | Status: ACTIVE | Noted: 2023-09-02

## 2024-03-20 PROBLEM — Z91.199 POOR COMPLIANCE: Status: ACTIVE | Noted: 2018-06-22

## 2024-03-20 PROBLEM — Z99.81 DEPENDENCE ON NOCTURNAL OXYGEN THERAPY: Status: ACTIVE | Noted: 2024-01-02

## 2024-03-20 PROBLEM — R10.84 GENERALIZED ABDOMINAL PAIN: Status: ACTIVE | Noted: 2023-04-10

## 2024-03-20 PROBLEM — I96 GANGRENE OF TOE OF LEFT FOOT (HCC): Status: RESOLVED | Noted: 2024-01-11 | Resolved: 2024-03-20

## 2024-03-20 PROBLEM — I1A.0 RESISTANT HYPERTENSION: Status: ACTIVE | Noted: 2024-01-17

## 2024-03-20 PROBLEM — I63.9 CVA (CEREBROVASCULAR ACCIDENT) (HCC): Status: ACTIVE | Noted: 2023-05-18

## 2024-03-20 PROBLEM — F10.21 ALCOHOL DEPENDENCE IN REMISSION (HCC): Chronic | Status: ACTIVE | Noted: 2023-09-02

## 2024-03-20 PROBLEM — T14.8XXA WOUND INFECTION: Status: RESOLVED | Noted: 2022-01-05 | Resolved: 2024-03-20

## 2024-03-20 PROBLEM — G47.33 OSA (OBSTRUCTIVE SLEEP APNEA): Status: ACTIVE | Noted: 2021-04-06

## 2024-03-20 PROBLEM — D64.9 NORMOCYTIC ANEMIA: Status: ACTIVE | Noted: 2021-11-03

## 2024-03-20 PROBLEM — Z95.810 ISCHEMIC CARDIOMYOPATHY WITH IMPLANTABLE CARDIOVERTER-DEFIBRILLATOR (ICD): Status: ACTIVE | Noted: 2019-09-04

## 2024-03-20 PROBLEM — M86.172 OTHER ACUTE OSTEOMYELITIS, LEFT ANKLE AND FOOT (HCC): Status: RESOLVED | Noted: 2024-01-17 | Resolved: 2024-03-20

## 2024-03-20 PROBLEM — L08.9 WOUND INFECTION: Status: RESOLVED | Noted: 2022-01-05 | Resolved: 2024-03-20

## 2024-03-20 PROBLEM — E78.00 PURE HYPERCHOLESTEROLEMIA: Status: ACTIVE | Noted: 2020-08-31

## 2024-03-20 PROBLEM — R00.2 PALPITATION: Status: ACTIVE | Noted: 2023-07-17

## 2024-03-20 PROBLEM — S88.119A BELOW KNEE AMPUTATION (HCC): Status: ACTIVE | Noted: 2022-04-28

## 2024-03-20 PROBLEM — E86.0 DEHYDRATION WITH HYPONATREMIA: Status: ACTIVE | Noted: 2023-04-22

## 2024-03-20 PROBLEM — I42.9 CARDIOMYOPATHY (HCC): Status: ACTIVE | Noted: 2023-05-18

## 2024-03-20 PROBLEM — U07.1 COVID-19 VIRUS INFECTION: Status: RESOLVED | Noted: 2022-07-28 | Resolved: 2024-03-20

## 2024-03-20 PROBLEM — R26.9 UNSPECIFIED ABNORMALITIES OF GAIT AND MOBILITY: Status: ACTIVE | Noted: 2024-01-17

## 2024-03-20 PROBLEM — F43.20 ADJUSTMENT DISORDER: Status: ACTIVE | Noted: 2022-08-24

## 2024-03-20 PROBLEM — J90 PLEURAL EFFUSION: Status: ACTIVE | Noted: 2023-04-10

## 2024-03-20 PROBLEM — E87.1 HYPONATREMIA: Status: ACTIVE | Noted: 2021-11-02

## 2024-03-20 PROBLEM — M62.81 MUSCLE WEAKNESS (GENERALIZED): Status: ACTIVE | Noted: 2024-01-17

## 2024-03-20 PROBLEM — F10.10 ALCOHOL ABUSE: Status: ACTIVE | Noted: 2024-03-20

## 2024-03-20 PROBLEM — T50.905A MEDICATION INDUCED COAGULOPATHY (HCC): Status: ACTIVE | Noted: 2021-02-14

## 2024-03-20 PROBLEM — E88.09 HYPOALBUMINEMIA: Status: ACTIVE | Noted: 2023-04-18

## 2024-03-20 PROBLEM — R07.89 ATYPICAL CHEST PAIN: Status: ACTIVE | Noted: 2019-12-06

## 2024-03-20 PROBLEM — Z59.00 HOMELESSNESS: Status: ACTIVE | Noted: 2023-04-18

## 2024-03-20 PROBLEM — Z95.0 PACEMAKER: Status: ACTIVE | Noted: 2023-05-18

## 2024-03-20 PROBLEM — F39 MOOD DISORDER (HCC): Chronic | Status: ACTIVE | Noted: 2023-09-02

## 2024-03-20 PROBLEM — B19.20 HEPATITIS C VIRUS INFECTION: Status: ACTIVE | Noted: 2018-08-03

## 2024-03-20 PROBLEM — D69.6 THROMBOCYTOPENIA (HCC): Chronic | Status: ACTIVE | Noted: 2023-08-12

## 2024-03-20 PROBLEM — E87.1 DEHYDRATION WITH HYPONATREMIA: Status: ACTIVE | Noted: 2023-04-22

## 2024-03-20 PROBLEM — R71.8 LOW MEAN CORPUSCULAR VOLUME (MCV): Status: ACTIVE | Noted: 2023-07-17

## 2024-03-20 PROBLEM — D72.829 LEUKOCYTOSIS: Status: ACTIVE | Noted: 2023-05-18

## 2024-03-20 PROBLEM — R55 PRE-SYNCOPE: Status: ACTIVE | Noted: 2022-12-03

## 2024-03-20 PROBLEM — K21.9 GASTRO-ESOPHAGEAL REFLUX DISEASE WITHOUT ESOPHAGITIS: Status: ACTIVE | Noted: 2024-01-17

## 2024-03-20 PROBLEM — A41.9 SEVERE SEPSIS (HCC): Status: RESOLVED | Noted: 2023-04-22 | Resolved: 2024-03-20

## 2024-03-20 PROBLEM — I20.89 STABLE ANGINA PECTORIS: Chronic | Status: ACTIVE | Noted: 2020-02-06

## 2024-03-20 PROBLEM — I95.9 HYPOTENSION: Status: ACTIVE | Noted: 2023-04-22

## 2024-03-20 PROBLEM — E87.5 HYPERKALEMIA: Status: ACTIVE | Noted: 2023-04-22

## 2024-03-20 PROBLEM — F41.9 ANXIETY: Status: ACTIVE | Noted: 2023-06-02

## 2024-03-20 PROBLEM — L24.9 IRRITANT CONTACT DERMATITIS: Status: ACTIVE | Noted: 2023-07-04

## 2024-03-20 PROBLEM — D68.9 MEDICATION INDUCED COAGULOPATHY (HCC): Status: ACTIVE | Noted: 2021-02-14

## 2024-03-20 PROBLEM — J18.9 RIGHT UPPER LOBE PNEUMONIA: Status: RESOLVED | Noted: 2017-07-20 | Resolved: 2024-03-20

## 2024-03-20 PROBLEM — B18.2 CHRONIC HEPATITIS C WITHOUT HEPATIC COMA (HCC): Chronic | Status: ACTIVE | Noted: 2021-03-29

## 2024-03-20 PROBLEM — R06.02 SOB (SHORTNESS OF BREATH): Status: ACTIVE | Noted: 2019-12-07

## 2024-03-20 PROBLEM — K65.2 SBP (SPONTANEOUS BACTERIAL PERITONITIS) (HCC): Chronic | Status: RESOLVED | Noted: 2023-04-22 | Resolved: 2024-03-20

## 2024-03-20 PROBLEM — I70.229 CRITICAL LOWER LIMB ISCHEMIA (HCC): Status: ACTIVE | Noted: 2024-01-11

## 2024-03-20 PROBLEM — R18.8 ABDOMINAL ASCITES: Status: ACTIVE | Noted: 2021-10-29

## 2024-03-20 PROBLEM — I25.110 CORONARY ARTERY DISEASE INVOLVING NATIVE HEART WITH UNSTABLE ANGINA PECTORIS (HCC): Status: ACTIVE | Noted: 2017-05-01

## 2024-03-20 PROBLEM — J44.9 COPD (CHRONIC OBSTRUCTIVE PULMONARY DISEASE) (HCC): Status: ACTIVE | Noted: 2021-06-04

## 2024-03-20 PROBLEM — I50.20 HFREF (HEART FAILURE WITH REDUCED EJECTION FRACTION) (HCC): Status: ACTIVE | Noted: 2021-02-14

## 2024-03-20 PROBLEM — E11.40 DIABETIC NEUROPATHY ASSOCIATED WITH TYPE 2 DIABETES MELLITUS (HCC): Status: ACTIVE | Noted: 2021-05-18

## 2024-03-20 PROBLEM — E44.0 MODERATE PROTEIN-CALORIE MALNUTRITION (HCC): Chronic | Status: ACTIVE | Noted: 2023-09-07

## 2024-03-20 PROBLEM — Z89.511 S/P BKA (BELOW KNEE AMPUTATION) UNILATERAL, RIGHT (HCC): Status: ACTIVE | Noted: 2021-12-14

## 2024-03-20 PROBLEM — F19.11 HX OF SUBSTANCE ABUSE (HCC): Status: ACTIVE | Noted: 2017-03-16

## 2024-03-20 PROBLEM — S76.811S: Status: ACTIVE | Noted: 2022-01-08

## 2024-03-20 PROBLEM — I34.0 MITRAL REGURGITATION: Status: ACTIVE | Noted: 2023-05-18

## 2024-03-20 PROBLEM — R79.89 TROPONIN I ABOVE REFERENCE RANGE: Status: ACTIVE | Noted: 2018-07-31

## 2024-03-20 PROBLEM — H91.90 DEAF: Status: ACTIVE | Noted: 2018-04-24

## 2024-03-20 PROBLEM — K22.70 BARRETTS ESOPHAGUS: Status: ACTIVE | Noted: 2023-05-18

## 2024-03-20 PROBLEM — N17.9 ACUTE KIDNEY INJURY (HCC): Status: ACTIVE | Noted: 2021-11-24

## 2024-03-20 PROBLEM — I25.2 OLD MYOCARDIAL INFARCTION: Status: RESOLVED | Noted: 2024-01-17 | Resolved: 2024-03-20

## 2024-03-20 PROBLEM — R06.83 SNORING: Status: ACTIVE | Noted: 2020-06-30

## 2024-03-20 PROBLEM — L03.032 CELLULITIS OF TOE OF LEFT FOOT: Status: ACTIVE | Noted: 2023-08-04

## 2024-03-20 PROBLEM — K74.60 DECOMPENSATION OF CIRRHOSIS OF LIVER (HCC): Status: ACTIVE | Noted: 2021-02-12

## 2024-03-20 PROBLEM — E11.3293 MILD NONPROLIFERATIVE DIABETIC RETINOPATHY OF BOTH EYES WITHOUT MACULAR EDEMA ASSOCIATED WITH TYPE 2 DIABETES MELLITUS (HCC): Status: ACTIVE | Noted: 2019-01-29

## 2024-03-20 PROBLEM — F10.11 HISTORY OF ALCOHOL ABUSE: Status: ACTIVE | Noted: 2023-05-18

## 2024-03-20 PROBLEM — G60.3 IDIOPATHIC PROGRESSIVE NEUROPATHY: Status: ACTIVE | Noted: 2021-03-18

## 2024-03-20 PROBLEM — I50.42 CHRONIC COMBINED SYSTOLIC AND DIASTOLIC CONGESTIVE HEART FAILURE (HCC): Status: ACTIVE | Noted: 2021-10-29

## 2024-03-20 PROBLEM — Z47.81 ENCOUNTER FOR ORTHOPEDIC AFTERCARE FOLLOWING SURGICAL AMPUTATION: Status: ACTIVE | Noted: 2024-01-17

## 2024-03-20 PROBLEM — N14.19 VANCOMYCIN-INDUCED NEPHROTOXICITY: Status: ACTIVE | Noted: 2021-11-24

## 2024-03-20 PROBLEM — F32.9 MDD (MAJOR DEPRESSIVE DISORDER): Status: ACTIVE | Noted: 2023-06-02

## 2024-03-20 PROBLEM — K70.31 ALCOHOLIC CIRRHOSIS OF LIVER WITH ASCITES (HCC): Chronic | Status: ACTIVE | Noted: 2023-04-10

## 2024-03-20 PROBLEM — E87.20 LACTIC ACIDOSIS: Status: ACTIVE | Noted: 2023-04-22

## 2024-03-20 PROBLEM — R65.20 SEVERE SEPSIS (HCC): Status: RESOLVED | Noted: 2023-04-22 | Resolved: 2024-03-20

## 2024-03-20 PROBLEM — H35.3131 EARLY DRY STAGE NONEXUDATIVE AGE-RELATED MACULAR DEGENERATION OF BOTH EYES: Status: ACTIVE | Noted: 2019-01-29

## 2024-03-20 PROBLEM — M86.9 OSTEOMYELITIS (HCC): Status: ACTIVE | Noted: 2022-01-06

## 2024-03-20 PROBLEM — L03.311 CELLULITIS OF ABDOMINAL WALL: Status: ACTIVE | Noted: 2023-04-22

## 2024-03-20 PROBLEM — R10.9 ACUTE ABDOMINAL PAIN: Status: ACTIVE | Noted: 2023-06-08

## 2024-03-20 PROBLEM — Z95.810 S/P ICD (INTERNAL CARDIAC DEFIBRILLATOR) PROCEDURE: Status: ACTIVE | Noted: 2023-06-02

## 2024-03-20 PROBLEM — G47.00 INSOMNIA: Status: ACTIVE | Noted: 2022-08-11

## 2024-03-20 PROBLEM — F15.10 METHAMPHETAMINE ABUSE (HCC): Status: ACTIVE | Noted: 2021-11-03

## 2024-03-20 PROBLEM — T36.8X5A VANCOMYCIN-INDUCED NEPHROTOXICITY: Status: ACTIVE | Noted: 2021-11-24

## 2024-03-20 PROBLEM — F43.10 PTSD (POST-TRAUMATIC STRESS DISORDER): Status: ACTIVE | Noted: 2023-06-02

## 2024-03-20 PROBLEM — D50.9 MICROCYTIC ANEMIA: Status: ACTIVE | Noted: 2022-01-08

## 2024-03-20 PROBLEM — I27.20 PULMONARY HYPERTENSION (HCC): Status: ACTIVE | Noted: 2023-05-18

## 2024-03-20 PROBLEM — I10 HYPERTENSION: Status: ACTIVE | Noted: 2017-07-20

## 2024-03-20 PROBLEM — N52.9 ERECTILE DYSFUNCTION: Status: ACTIVE | Noted: 2018-07-31

## 2024-03-20 PROBLEM — Z89.512 ACQUIRED ABSENCE OF LEFT LEG BELOW KNEE (HCC): Status: ACTIVE | Noted: 2024-02-22

## 2024-03-20 PROBLEM — M54.9 CHRONIC BACK PAIN: Status: ACTIVE | Noted: 2023-12-01

## 2024-03-20 PROBLEM — N50.89 SCROTAL EDEMA: Status: ACTIVE | Noted: 2021-11-03

## 2024-03-20 PROBLEM — R12 HEARTBURN: Status: ACTIVE | Noted: 2021-01-15

## 2024-03-20 PROBLEM — F17.210 SMOKING GREATER THAN 20 PACK YEARS: Status: ACTIVE | Noted: 2020-02-06

## 2024-03-20 PROBLEM — Z95.810 AICD (AUTOMATIC CARDIOVERTER/DEFIBRILLATOR) PRESENT: Status: ACTIVE | Noted: 2020-01-21

## 2024-03-20 PROBLEM — J18.9 CAP (COMMUNITY ACQUIRED PNEUMONIA): Status: RESOLVED | Noted: 2021-10-29 | Resolved: 2024-03-20

## 2024-03-20 PROBLEM — E03.9 ACQUIRED HYPOTHYROIDISM: Status: ACTIVE | Noted: 2023-08-30

## 2024-03-20 PROBLEM — E72.09 FANCONI SYNDROME (HCC): Chronic | Status: ACTIVE | Noted: 2020-08-13

## 2024-03-20 PROBLEM — I25.5 ISCHEMIC CARDIOMYOPATHY WITH IMPLANTABLE CARDIOVERTER-DEFIBRILLATOR (ICD): Status: ACTIVE | Noted: 2019-09-04

## 2024-03-20 RX ORDER — POLYETHYLENE GLYCOL 3350 17 G/17G
17 POWDER, FOR SOLUTION ORAL DAILY PRN
COMMUNITY
Start: 2023-09-19 | End: 2024-09-13

## 2024-03-20 RX ORDER — METHOCARBAMOL 500 MG/1
TABLET, FILM COATED ORAL
COMMUNITY

## 2024-03-20 RX ORDER — INSULIN LISPRO 100 [IU]/ML
INJECTION, SOLUTION INTRAVENOUS; SUBCUTANEOUS
COMMUNITY

## 2024-03-20 RX ORDER — INSULIN GLARGINE 100 [IU]/ML
INJECTION, SOLUTION SUBCUTANEOUS
COMMUNITY
Start: 2024-03-09

## 2024-03-20 RX ORDER — LACTULOSE 10 G/15ML
SOLUTION ORAL; RECTAL
COMMUNITY
Start: 2024-03-09

## 2024-03-21 ENCOUNTER — OFFICE VISIT (OUTPATIENT)
Dept: SURGERY | Age: 54
End: 2024-03-21

## 2024-03-21 VITALS — SYSTOLIC BLOOD PRESSURE: 110 MMHG | DIASTOLIC BLOOD PRESSURE: 68 MMHG

## 2024-03-21 DIAGNOSIS — Z89.512 LEFT BELOW-KNEE AMPUTEE (HCC): Primary | ICD-10-CM

## 2024-03-21 DIAGNOSIS — Z98.890 POST-OPERATIVE STATE: ICD-10-CM

## 2024-03-21 PROCEDURE — 99024 POSTOP FOLLOW-UP VISIT: CPT | Performed by: SURGERY

## 2024-03-21 RX ORDER — OXYCODONE HYDROCHLORIDE AND ACETAMINOPHEN 5; 325 MG/1; MG/1
1 TABLET ORAL EVERY 6 HOURS PRN
Qty: 20 TABLET | Refills: 0 | Status: SHIPPED | OUTPATIENT
Start: 2024-03-21 | End: 2024-03-26

## 2024-03-21 NOTE — PROGRESS NOTES
[facility][1] SURGERY      S:   Patient presents s/p left below-knee amputation.  He reports doing well except for some discomfort.    O:   Comfortable         Incision site healing well.  Staples removed              A:   S/P left below-knee amputation    P:   Follow up as needed.  The prosthetists was in my office today, so I arranged for referral and he was seen today   Called mom back.  Unable to leave message

## 2024-04-07 ENCOUNTER — ANCILLARY PROCEDURE (OUTPATIENT)
Dept: EMERGENCY DEPT | Age: 54
End: 2024-04-07
Attending: EMERGENCY MEDICINE
Payer: MEDICARE

## 2024-04-07 ENCOUNTER — APPOINTMENT (OUTPATIENT)
Dept: CT IMAGING | Age: 54
End: 2024-04-07
Payer: MEDICARE

## 2024-04-07 ENCOUNTER — HOSPITAL ENCOUNTER (EMERGENCY)
Age: 54
Discharge: ANOTHER HOSPITAL | End: 2024-04-08
Attending: EMERGENCY MEDICINE
Payer: MEDICARE

## 2024-04-07 ENCOUNTER — APPOINTMENT (OUTPATIENT)
Dept: GENERAL RADIOLOGY | Age: 54
End: 2024-04-07
Payer: MEDICARE

## 2024-04-07 DIAGNOSIS — R41.82 ALTERED MENTAL STATUS, UNSPECIFIED ALTERED MENTAL STATUS TYPE: ICD-10-CM

## 2024-04-07 DIAGNOSIS — R79.89 ELEVATED TROPONIN: ICD-10-CM

## 2024-04-07 DIAGNOSIS — I46.9 CARDIAC ARREST (HCC): Primary | ICD-10-CM

## 2024-04-07 DIAGNOSIS — T50.904A DRUG OVERDOSE OF UNDETERMINED INTENT, INITIAL ENCOUNTER: ICD-10-CM

## 2024-04-07 DIAGNOSIS — I47.20 VENTRICULAR TACHYCARDIA (HCC): ICD-10-CM

## 2024-04-07 LAB
ANION GAP SERPL CALCULATED.3IONS-SCNC: 15 MMOL/L (ref 3–16)
ANISOCYTOSIS BLD QL SMEAR: ABNORMAL
BASE EXCESS BLDV CALC-SCNC: -4.6 MMOL/L (ref -3–3)
BASOPHILS # BLD: 0 K/UL (ref 0–0.2)
BASOPHILS NFR BLD: 0 %
BUN SERPL-MCNC: 13 MG/DL (ref 7–20)
CALCIUM SERPL-MCNC: 10.4 MG/DL (ref 8.3–10.6)
CHLORIDE SERPL-SCNC: 94 MMOL/L (ref 99–110)
CO2 BLDV-SCNC: 25 MMOL/L
CO2 SERPL-SCNC: 24 MMOL/L (ref 21–32)
COHGB MFR BLDV: 3.7 % (ref 0–1.5)
CREAT SERPL-MCNC: 1 MG/DL (ref 0.9–1.3)
DEPRECATED RDW RBC AUTO: 17.6 % (ref 12.4–15.4)
EOSINOPHIL # BLD: 0.4 K/UL (ref 0–0.6)
EOSINOPHIL NFR BLD: 3 %
GFR SERPLBLD CREATININE-BSD FMLA CKD-EPI: 88 ML/MIN/{1.73_M2}
GLUCOSE SERPL-MCNC: 734 MG/DL (ref 70–99)
HCO3 BLDV-SCNC: 23 MMOL/L (ref 23–29)
HCT VFR BLD AUTO: 44.2 % (ref 40.5–52.5)
HGB BLD-MCNC: 14.3 G/DL (ref 13.5–17.5)
LYMPHOCYTES # BLD: 8 K/UL (ref 1–5.1)
LYMPHOCYTES NFR BLD: 60 %
MAGNESIUM SERPL-MCNC: 8.4 MG/DL (ref 1.8–2.4)
MCH RBC QN AUTO: 28.6 PG (ref 26–34)
MCHC RBC AUTO-ENTMCNC: 32.4 G/DL (ref 31–36)
MCV RBC AUTO: 88.3 FL (ref 80–100)
METHGB MFR BLDV: 0.3 %
MONOCYTES # BLD: 0.5 K/UL (ref 0–1.3)
MONOCYTES NFR BLD: 4 %
NEUTROPHILS # BLD: 4 K/UL (ref 1.7–7.7)
NEUTROPHILS NFR BLD: 31 %
NRBC BLD-RTO: 1 /100 WBC
O2 THERAPY: ABNORMAL
PATH INTERP BLD-IMP: YES
PCO2 BLDV: 51.6 MMHG (ref 40–50)
PH BLDV: 7.27 [PH] (ref 7.35–7.45)
PLATELET # BLD AUTO: 210 K/UL (ref 135–450)
PMV BLD AUTO: 9 FL (ref 5–10.5)
PO2 BLDV: 41 MMHG (ref 25–40)
POIKILOCYTOSIS BLD QL SMEAR: ABNORMAL
POTASSIUM SERPL-SCNC: 3.4 MMOL/L (ref 3.5–5.1)
RBC # BLD AUTO: 5 M/UL (ref 4.2–5.9)
SAO2 % BLDV: 69 %
SLIDE REVIEW: ABNORMAL
SODIUM SERPL-SCNC: 133 MMOL/L (ref 136–145)
TROPONIN, HIGH SENSITIVITY: 25 NG/L (ref 0–22)
VARIANT LYMPHS NFR BLD MANUAL: 2 % (ref 0–6)
WBC # BLD AUTO: 12.9 K/UL (ref 4–11)

## 2024-04-07 PROCEDURE — 72125 CT NECK SPINE W/O DYE: CPT

## 2024-04-07 PROCEDURE — 6360000002 HC RX W HCPCS

## 2024-04-07 PROCEDURE — 51701 INSERT BLADDER CATHETER: CPT

## 2024-04-07 PROCEDURE — 85025 COMPLETE CBC W/AUTO DIFF WBC: CPT

## 2024-04-07 PROCEDURE — 82803 BLOOD GASES ANY COMBINATION: CPT

## 2024-04-07 PROCEDURE — 2500000003 HC RX 250 WO HCPCS: Performed by: EMERGENCY MEDICINE

## 2024-04-07 PROCEDURE — 76937 US GUIDE VASCULAR ACCESS: CPT

## 2024-04-07 PROCEDURE — 80048 BASIC METABOLIC PNL TOTAL CA: CPT

## 2024-04-07 PROCEDURE — 70450 CT HEAD/BRAIN W/O DYE: CPT

## 2024-04-07 PROCEDURE — 36556 INSERT NON-TUNNEL CV CATH: CPT

## 2024-04-07 PROCEDURE — 71045 X-RAY EXAM CHEST 1 VIEW: CPT

## 2024-04-07 PROCEDURE — 2580000003 HC RX 258: Performed by: EMERGENCY MEDICINE

## 2024-04-07 PROCEDURE — 92960 CARDIOVERSION ELECTRIC EXT: CPT

## 2024-04-07 PROCEDURE — 99292 CRITICAL CARE ADDL 30 MIN: CPT

## 2024-04-07 PROCEDURE — 99291 CRITICAL CARE FIRST HOUR: CPT

## 2024-04-07 PROCEDURE — 6360000002 HC RX W HCPCS: Performed by: EMERGENCY MEDICINE

## 2024-04-07 PROCEDURE — 36415 COLL VENOUS BLD VENIPUNCTURE: CPT

## 2024-04-07 PROCEDURE — 96374 THER/PROPH/DIAG INJ IV PUSH: CPT

## 2024-04-07 PROCEDURE — 2500000003 HC RX 250 WO HCPCS

## 2024-04-07 PROCEDURE — 83735 ASSAY OF MAGNESIUM: CPT

## 2024-04-07 PROCEDURE — 31500 INSERT EMERGENCY AIRWAY: CPT

## 2024-04-07 PROCEDURE — 71250 CT THORAX DX C-: CPT

## 2024-04-07 PROCEDURE — 93005 ELECTROCARDIOGRAM TRACING: CPT | Performed by: EMERGENCY MEDICINE

## 2024-04-07 PROCEDURE — 84484 ASSAY OF TROPONIN QUANT: CPT

## 2024-04-07 RX ORDER — CALCIUM CHLORIDE 100 MG/ML
INJECTION INTRAVENOUS; INTRAVENTRICULAR DAILY PRN
Status: COMPLETED | OUTPATIENT
Start: 2024-04-07 | End: 2024-04-07

## 2024-04-07 RX ORDER — DEXTROSE MONOHYDRATE 25 G/50ML
INJECTION, SOLUTION INTRAVENOUS DAILY PRN
Status: COMPLETED | OUTPATIENT
Start: 2024-04-07 | End: 2024-04-07

## 2024-04-07 RX ORDER — NALOXONE HYDROCHLORIDE 0.4 MG/ML
INJECTION, SOLUTION INTRAMUSCULAR; INTRAVENOUS; SUBCUTANEOUS DAILY PRN
Status: COMPLETED | OUTPATIENT
Start: 2024-04-07 | End: 2024-04-07

## 2024-04-07 RX ORDER — FENTANYL CITRATE-0.9 % NACL/PF 10 MCG/ML
25-200 PLASTIC BAG, INJECTION (ML) INTRAVENOUS CONTINUOUS
Status: DISCONTINUED | OUTPATIENT
Start: 2024-04-07 | End: 2024-04-08 | Stop reason: HOSPADM

## 2024-04-07 RX ORDER — ROCURONIUM BROMIDE 10 MG/ML
INJECTION, SOLUTION INTRAVENOUS
Status: COMPLETED
Start: 2024-04-07 | End: 2024-04-07

## 2024-04-07 RX ORDER — 0.9 % SODIUM CHLORIDE 0.9 %
1000 INTRAVENOUS SOLUTION INTRAVENOUS ONCE
Status: DISCONTINUED | OUTPATIENT
Start: 2024-04-08 | End: 2024-04-08 | Stop reason: HOSPADM

## 2024-04-07 RX ORDER — ONDANSETRON 2 MG/ML
INJECTION INTRAMUSCULAR; INTRAVENOUS
Status: COMPLETED
Start: 2024-04-07 | End: 2024-04-07

## 2024-04-07 RX ORDER — EPINEPHRINE IN SOD CHLOR,ISO 1 MG/10 ML
SYRINGE (ML) INTRAVENOUS DAILY PRN
Status: COMPLETED | OUTPATIENT
Start: 2024-04-07 | End: 2024-04-07

## 2024-04-07 RX ORDER — ROCURONIUM BROMIDE 10 MG/ML
0.6 INJECTION, SOLUTION INTRAVENOUS ONCE
Status: DISCONTINUED | OUTPATIENT
Start: 2024-04-07 | End: 2024-04-07

## 2024-04-07 RX ORDER — FENTANYL CITRATE 50 UG/ML
100 INJECTION, SOLUTION INTRAMUSCULAR; INTRAVENOUS ONCE
Status: COMPLETED | OUTPATIENT
Start: 2024-04-07 | End: 2024-04-07

## 2024-04-07 RX ORDER — ETOMIDATE 2 MG/ML
20 INJECTION INTRAVENOUS ONCE
Status: COMPLETED | OUTPATIENT
Start: 2024-04-07 | End: 2024-04-07

## 2024-04-07 RX ORDER — ROCURONIUM BROMIDE 10 MG/ML
100 INJECTION, SOLUTION INTRAVENOUS ONCE
Status: COMPLETED | OUTPATIENT
Start: 2024-04-07 | End: 2024-04-07

## 2024-04-07 RX ORDER — MAGNESIUM SULFATE HEPTAHYDRATE 500 MG/ML
INJECTION, SOLUTION INTRAMUSCULAR; INTRAVENOUS DAILY PRN
Status: COMPLETED | OUTPATIENT
Start: 2024-04-07 | End: 2024-04-07

## 2024-04-07 RX ORDER — AMIODARONE HYDROCHLORIDE 150 MG/3ML
INJECTION, SOLUTION INTRAVENOUS DAILY PRN
Status: COMPLETED | OUTPATIENT
Start: 2024-04-07 | End: 2024-04-07

## 2024-04-07 RX ORDER — ROCURONIUM BROMIDE 10 MG/ML
INJECTION, SOLUTION INTRAVENOUS
Status: DISCONTINUED
Start: 2024-04-07 | End: 2024-04-08 | Stop reason: HOSPADM

## 2024-04-07 RX ORDER — FENTANYL CITRATE 50 UG/ML
INJECTION, SOLUTION INTRAMUSCULAR; INTRAVENOUS
Status: COMPLETED
Start: 2024-04-07 | End: 2024-04-07

## 2024-04-07 RX ORDER — ONDANSETRON 2 MG/ML
4 INJECTION INTRAMUSCULAR; INTRAVENOUS ONCE
Status: COMPLETED | OUTPATIENT
Start: 2024-04-07 | End: 2024-04-07

## 2024-04-07 RX ORDER — ETOMIDATE 2 MG/ML
INJECTION INTRAVENOUS
Status: COMPLETED
Start: 2024-04-07 | End: 2024-04-07

## 2024-04-07 RX ORDER — PROPOFOL 10 MG/ML
5-50 INJECTION, EMULSION INTRAVENOUS CONTINUOUS
Status: DISCONTINUED | OUTPATIENT
Start: 2024-04-07 | End: 2024-04-08

## 2024-04-07 RX ADMIN — ETOMIDATE 20 MG: 2 INJECTION, SOLUTION INTRAVENOUS at 22:38

## 2024-04-07 RX ADMIN — MAGNESIUM SULFATE HEPTAHYDRATE 2000 MG: 500 INJECTION, SOLUTION INTRAMUSCULAR; INTRAVENOUS at 21:34

## 2024-04-07 RX ADMIN — ETOMIDATE 20 MG: 2 INJECTION INTRAVENOUS at 22:38

## 2024-04-07 RX ADMIN — FENTANYL CITRATE 100 MCG: 50 INJECTION, SOLUTION INTRAMUSCULAR; INTRAVENOUS at 22:46

## 2024-04-07 RX ADMIN — ONDANSETRON 4 MG: 2 INJECTION INTRAMUSCULAR; INTRAVENOUS at 22:45

## 2024-04-07 RX ADMIN — NALOXONE HYDROCHLORIDE 2 MG: 0.4 INJECTION, SOLUTION INTRAMUSCULAR; INTRAVENOUS; SUBCUTANEOUS at 21:28

## 2024-04-07 RX ADMIN — SODIUM BICARBONATE 50 MEQ: 84 INJECTION, SOLUTION INTRAVENOUS at 21:36

## 2024-04-07 RX ADMIN — SODIUM BICARBONATE 50 MEQ: 84 INJECTION, SOLUTION INTRAVENOUS at 21:33

## 2024-04-07 RX ADMIN — DEXTROSE MONOHYDRATE 25 G: 25 INJECTION, SOLUTION INTRAVENOUS at 21:37

## 2024-04-07 RX ADMIN — ROCURONIUM BROMIDE 100 MG: 10 INJECTION, SOLUTION INTRAVENOUS at 22:41

## 2024-04-07 RX ADMIN — CALCIUM CHLORIDE 1000 MG: 100 INJECTION, SOLUTION INTRAVENOUS; INTRAVENTRICULAR at 21:37

## 2024-04-07 RX ADMIN — EPINEPHRINE 1 MG: 0.1 INJECTION, SOLUTION ENDOTRACHEAL; INTRACARDIAC; INTRAVENOUS at 21:32

## 2024-04-07 RX ADMIN — FENTANYL CITRATE 25 MCG/HR: 50 INJECTION, SOLUTION INTRAMUSCULAR; INTRAVENOUS at 23:06

## 2024-04-07 RX ADMIN — AMIODARONE HYDROCHLORIDE 300 MG: 50 INJECTION, SOLUTION INTRAVENOUS at 21:35

## 2024-04-07 ASSESSMENT — PULMONARY FUNCTION TESTS: PIF_VALUE: 16

## 2024-04-08 ENCOUNTER — APPOINTMENT (OUTPATIENT)
Dept: GENERAL RADIOLOGY | Age: 54
End: 2024-04-08
Attending: FAMILY MEDICINE
Payer: MEDICARE

## 2024-04-08 ENCOUNTER — HOSPITAL ENCOUNTER (INPATIENT)
Age: 54
LOS: 4 days | Discharge: NURSING, REHAB OR LONG-TERM CARE | End: 2024-04-12
Attending: FAMILY MEDICINE | Admitting: FAMILY MEDICINE
Payer: MEDICARE

## 2024-04-08 VITALS
RESPIRATION RATE: 16 BRPM | WEIGHT: 160 LBS | BODY MASS INDEX: 22.32 KG/M2 | SYSTOLIC BLOOD PRESSURE: 96 MMHG | DIASTOLIC BLOOD PRESSURE: 64 MMHG | OXYGEN SATURATION: 95 % | HEART RATE: 82 BPM | TEMPERATURE: 97.2 F

## 2024-04-08 DIAGNOSIS — I46.9 CARDIOPULMONARY ARREST WITH SUCCESSFUL RESUSCITATION (HCC): Primary | ICD-10-CM

## 2024-04-08 PROBLEM — F17.200 CURRENT SMOKER: Status: ACTIVE | Noted: 2024-04-08

## 2024-04-08 PROBLEM — I47.20 VENTRICULAR TACHYCARDIA (HCC): Status: ACTIVE | Noted: 2024-04-08

## 2024-04-08 PROBLEM — S22.42XD CLOSED FRACTURE OF MULTIPLE RIBS OF LEFT SIDE WITH ROUTINE HEALING: Status: ACTIVE | Noted: 2024-04-08

## 2024-04-08 PROBLEM — Z79.891 CHRONIC PRESCRIPTION OPIATE USE: Status: ACTIVE | Noted: 2024-04-08

## 2024-04-08 PROBLEM — I25.10 CORONARY ARTERY CALCIFICATION SEEN ON CT SCAN: Status: ACTIVE | Noted: 2024-04-08

## 2024-04-08 PROBLEM — S22.22XK CLOSED FRACTURE OF BODY OF STERNUM WITH NONUNION: Status: ACTIVE | Noted: 2024-04-08

## 2024-04-08 PROBLEM — R79.89 ELEVATED LFTS: Status: ACTIVE | Noted: 2024-04-08

## 2024-04-08 PROBLEM — R79.89 ELEVATED TROPONIN: Status: ACTIVE | Noted: 2024-04-08

## 2024-04-08 PROBLEM — I49.01 CARDIAC ARREST WITH VENTRICULAR FIBRILLATION (HCC): Status: ACTIVE | Noted: 2024-04-08

## 2024-04-08 PROBLEM — E11.65 UNCONTROLLED TYPE 2 DIABETES MELLITUS WITH HYPERGLYCEMIA (HCC): Status: ACTIVE | Noted: 2024-04-08

## 2024-04-08 LAB
ALBUMIN SERPL-MCNC: 3.4 G/DL (ref 3.4–5)
ALBUMIN/GLOB SERPL: 1.1 {RATIO} (ref 1.1–2.2)
ALP SERPL-CCNC: 130 U/L (ref 40–129)
ALT SERPL-CCNC: 194 U/L (ref 10–40)
AMPHETAMINES UR QL SCN>1000 NG/ML: ABNORMAL
ANION GAP SERPL CALCULATED.3IONS-SCNC: 13 MMOL/L (ref 3–16)
APAP SERPL-MCNC: <5 UG/ML (ref 10–30)
AST SERPL-CCNC: 155 U/L (ref 15–37)
BARBITURATES UR QL SCN>200 NG/ML: ABNORMAL
BASE EXCESS BLDA CALC-SCNC: -3.5 MMOL/L (ref -3–3)
BASOPHILS # BLD: 0.1 K/UL (ref 0–0.2)
BASOPHILS NFR BLD: 0.4 %
BENZODIAZ UR QL SCN>200 NG/ML: ABNORMAL
BILIRUB SERPL-MCNC: 1 MG/DL (ref 0–1)
BILIRUB UR QL STRIP.AUTO: NEGATIVE
BUN SERPL-MCNC: 11 MG/DL (ref 7–20)
CALCIUM SERPL-MCNC: 8.7 MG/DL (ref 8.3–10.6)
CANNABINOIDS UR QL SCN>50 NG/ML: ABNORMAL
CHLORIDE SERPL-SCNC: 103 MMOL/L (ref 99–110)
CK SERPL-CCNC: 54 U/L (ref 39–308)
CLARITY UR: CLEAR
CO2 BLDA-SCNC: 22.7 MMOL/L
CO2 SERPL-SCNC: 24 MMOL/L (ref 21–32)
COCAINE UR QL SCN: ABNORMAL
COHGB MFR BLDA: 1.4 % (ref 0–1.5)
COLOR UR: YELLOW
CREAT SERPL-MCNC: 0.7 MG/DL (ref 0.9–1.3)
DEPRECATED RDW RBC AUTO: 17.4 % (ref 12.4–15.4)
DRUG SCREEN COMMENT UR-IMP: ABNORMAL
EOSINOPHIL # BLD: 0 K/UL (ref 0–0.6)
EOSINOPHIL NFR BLD: 0.2 %
ETHANOLAMINE SERPL-MCNC: NORMAL MG/DL (ref 0–0.08)
FENTANYL SCREEN, URINE: POSITIVE
GFR SERPLBLD CREATININE-BSD FMLA CKD-EPI: >90 ML/MIN/{1.73_M2}
GLUCOSE BLD-MCNC: 197 MG/DL (ref 70–99)
GLUCOSE BLD-MCNC: 225 MG/DL (ref 70–99)
GLUCOSE BLD-MCNC: 255 MG/DL (ref 70–99)
GLUCOSE BLD-MCNC: 289 MG/DL (ref 70–99)
GLUCOSE BLD-MCNC: 295 MG/DL (ref 70–99)
GLUCOSE SERPL-MCNC: 354 MG/DL (ref 70–99)
GLUCOSE UR STRIP.AUTO-MCNC: >=1000 MG/DL
HCO3 BLDA-SCNC: 21.5 MMOL/L (ref 21–29)
HCT VFR BLD AUTO: 42.8 % (ref 40.5–52.5)
HGB BLD-MCNC: 14 G/DL (ref 13.5–17.5)
HGB BLDA-MCNC: 14.9 G/DL (ref 13.5–17.5)
HGB UR QL STRIP.AUTO: NEGATIVE
KETONES UR STRIP.AUTO-MCNC: NEGATIVE MG/DL
LEUKOCYTE ESTERASE UR QL STRIP.AUTO: NEGATIVE
LYMPHOCYTES # BLD: 1.4 K/UL (ref 1–5.1)
LYMPHOCYTES NFR BLD: 11.2 %
MAGNESIUM SERPL-MCNC: 1.9 MG/DL (ref 1.8–2.4)
MCH RBC QN AUTO: 28.7 PG (ref 26–34)
MCHC RBC AUTO-ENTMCNC: 32.7 G/DL (ref 31–36)
MCV RBC AUTO: 87.8 FL (ref 80–100)
METHADONE UR QL SCN>300 NG/ML: ABNORMAL
METHGB MFR BLDA: 0.2 %
MONOCYTES # BLD: 1.4 K/UL (ref 0–1.3)
MONOCYTES NFR BLD: 11.1 %
NEUTROPHILS # BLD: 9.8 K/UL (ref 1.7–7.7)
NEUTROPHILS NFR BLD: 77.1 %
NITRITE UR QL STRIP.AUTO: NEGATIVE
NT-PROBNP SERPL-MCNC: 1169 PG/ML (ref 0–124)
O2 THERAPY: ABNORMAL
OPIATES UR QL SCN>300 NG/ML: ABNORMAL
OXYCODONE UR QL SCN: ABNORMAL
PATH INTERP BLD-IMP: NORMAL
PCO2 BLDA: 38.9 MMHG (ref 35–45)
PCP UR QL SCN>25 NG/ML: ABNORMAL
PERFORMED ON: ABNORMAL
PH BLDA: 7.36 [PH] (ref 7.35–7.45)
PH UR STRIP.AUTO: 6 [PH] (ref 5–8)
PH UR STRIP: 6 [PH]
PHOSPHATE SERPL-MCNC: 3.7 MG/DL (ref 2.5–4.9)
PLATELET # BLD AUTO: 157 K/UL (ref 135–450)
PMV BLD AUTO: 8.6 FL (ref 5–10.5)
PO2 BLDA: 132.2 MMHG (ref 75–108)
POTASSIUM SERPL-SCNC: 4.2 MMOL/L (ref 3.5–5.1)
PROT SERPL-MCNC: 6.5 G/DL (ref 6.4–8.2)
PROT UR STRIP.AUTO-MCNC: NEGATIVE MG/DL
RBC # BLD AUTO: 4.87 M/UL (ref 4.2–5.9)
SALICYLATES SERPL-MCNC: <0.3 MG/DL (ref 15–30)
SAO2 % BLDA: 98.7 %
SODIUM SERPL-SCNC: 140 MMOL/L (ref 136–145)
SP GR UR STRIP.AUTO: 1.01 (ref 1–1.03)
TROPONIN, HIGH SENSITIVITY: 76 NG/L (ref 0–22)
UA COMPLETE W REFLEX CULTURE PNL UR: ABNORMAL
UA DIPSTICK W REFLEX MICRO PNL UR: ABNORMAL
URN SPEC COLLECT METH UR: ABNORMAL
UROBILINOGEN UR STRIP-ACNC: 0.2 E.U./DL
WBC # BLD AUTO: 12.7 K/UL (ref 4–11)

## 2024-04-08 PROCEDURE — 99222 1ST HOSP IP/OBS MODERATE 55: CPT | Performed by: INTERNAL MEDICINE

## 2024-04-08 PROCEDURE — 6360000002 HC RX W HCPCS: Performed by: FAMILY MEDICINE

## 2024-04-08 PROCEDURE — 84484 ASSAY OF TROPONIN QUANT: CPT

## 2024-04-08 PROCEDURE — 5A1935Z RESPIRATORY VENTILATION, LESS THAN 24 CONSECUTIVE HOURS: ICD-10-PCS | Performed by: INTERNAL MEDICINE

## 2024-04-08 PROCEDURE — 36415 COLL VENOUS BLD VENIPUNCTURE: CPT

## 2024-04-08 PROCEDURE — 2000000000 HC ICU R&B

## 2024-04-08 PROCEDURE — 84100 ASSAY OF PHOSPHORUS: CPT

## 2024-04-08 PROCEDURE — 99291 CRITICAL CARE FIRST HOUR: CPT | Performed by: INTERNAL MEDICINE

## 2024-04-08 PROCEDURE — 97162 PT EVAL MOD COMPLEX 30 MIN: CPT

## 2024-04-08 PROCEDURE — 97530 THERAPEUTIC ACTIVITIES: CPT

## 2024-04-08 PROCEDURE — 80179 DRUG ASSAY SALICYLATE: CPT

## 2024-04-08 PROCEDURE — 94002 VENT MGMT INPAT INIT DAY: CPT

## 2024-04-08 PROCEDURE — 6360000002 HC RX W HCPCS: Performed by: INTERNAL MEDICINE

## 2024-04-08 PROCEDURE — 83880 ASSAY OF NATRIURETIC PEPTIDE: CPT

## 2024-04-08 PROCEDURE — 6370000000 HC RX 637 (ALT 250 FOR IP): Performed by: NURSE PRACTITIONER

## 2024-04-08 PROCEDURE — 82077 ASSAY SPEC XCP UR&BREATH IA: CPT

## 2024-04-08 PROCEDURE — 85025 COMPLETE CBC W/AUTO DIFF WBC: CPT

## 2024-04-08 PROCEDURE — 82803 BLOOD GASES ANY COMBINATION: CPT

## 2024-04-08 PROCEDURE — 6370000000 HC RX 637 (ALT 250 FOR IP): Performed by: INTERNAL MEDICINE

## 2024-04-08 PROCEDURE — 94761 N-INVAS EAR/PLS OXIMETRY MLT: CPT

## 2024-04-08 PROCEDURE — 6370000000 HC RX 637 (ALT 250 FOR IP): Performed by: FAMILY MEDICINE

## 2024-04-08 PROCEDURE — 71045 X-RAY EXAM CHEST 1 VIEW: CPT

## 2024-04-08 PROCEDURE — 2580000003 HC RX 258: Performed by: FAMILY MEDICINE

## 2024-04-08 PROCEDURE — 83735 ASSAY OF MAGNESIUM: CPT

## 2024-04-08 PROCEDURE — 83036 HEMOGLOBIN GLYCOSYLATED A1C: CPT

## 2024-04-08 PROCEDURE — C9113 INJ PANTOPRAZOLE SODIUM, VIA: HCPCS | Performed by: INTERNAL MEDICINE

## 2024-04-08 PROCEDURE — 93005 ELECTROCARDIOGRAM TRACING: CPT | Performed by: FAMILY MEDICINE

## 2024-04-08 PROCEDURE — 80143 DRUG ASSAY ACETAMINOPHEN: CPT

## 2024-04-08 PROCEDURE — 2500000003 HC RX 250 WO HCPCS: Performed by: NURSE PRACTITIONER

## 2024-04-08 PROCEDURE — 80307 DRUG TEST PRSMV CHEM ANLYZR: CPT

## 2024-04-08 PROCEDURE — 82550 ASSAY OF CK (CPK): CPT

## 2024-04-08 PROCEDURE — 2700000000 HC OXYGEN THERAPY PER DAY

## 2024-04-08 PROCEDURE — 80053 COMPREHEN METABOLIC PANEL: CPT

## 2024-04-08 PROCEDURE — 6360000002 HC RX W HCPCS: Performed by: STUDENT IN AN ORGANIZED HEALTH CARE EDUCATION/TRAINING PROGRAM

## 2024-04-08 PROCEDURE — 81003 URINALYSIS AUTO W/O SCOPE: CPT

## 2024-04-08 PROCEDURE — P9047 ALBUMIN (HUMAN), 25%, 50ML: HCPCS | Performed by: FAMILY MEDICINE

## 2024-04-08 RX ORDER — GABAPENTIN 300 MG/1
300 CAPSULE ORAL 3 TIMES DAILY
Status: DISCONTINUED | OUTPATIENT
Start: 2024-04-08 | End: 2024-04-12 | Stop reason: HOSPADM

## 2024-04-08 RX ORDER — PANTOPRAZOLE SODIUM 40 MG/1
40 TABLET, DELAYED RELEASE ORAL
Status: DISCONTINUED | OUTPATIENT
Start: 2024-04-09 | End: 2024-04-12 | Stop reason: HOSPADM

## 2024-04-08 RX ORDER — DEXTROSE MONOHYDRATE 100 MG/ML
INJECTION, SOLUTION INTRAVENOUS CONTINUOUS PRN
Status: DISCONTINUED | OUTPATIENT
Start: 2024-04-08 | End: 2024-04-12 | Stop reason: HOSPADM

## 2024-04-08 RX ORDER — DOPAMINE HYDROCHLORIDE 160 MG/100ML
1-20 INJECTION, SOLUTION INTRAVENOUS CONTINUOUS
Status: DISCONTINUED | OUTPATIENT
Start: 2024-04-08 | End: 2024-04-08

## 2024-04-08 RX ORDER — MIDAZOLAM HYDROCHLORIDE 1 MG/ML
4 INJECTION INTRAMUSCULAR; INTRAVENOUS ONCE
Status: COMPLETED | OUTPATIENT
Start: 2024-04-08 | End: 2024-04-08

## 2024-04-08 RX ORDER — PANTOPRAZOLE SODIUM 40 MG/10ML
40 INJECTION, POWDER, LYOPHILIZED, FOR SOLUTION INTRAVENOUS DAILY
Status: DISCONTINUED | OUTPATIENT
Start: 2024-04-08 | End: 2024-04-08

## 2024-04-08 RX ORDER — OXYCODONE HYDROCHLORIDE 5 MG/1
5 TABLET ORAL EVERY 6 HOURS PRN
Status: DISCONTINUED | OUTPATIENT
Start: 2024-04-08 | End: 2024-04-11

## 2024-04-08 RX ORDER — MIDODRINE HYDROCHLORIDE 5 MG/1
5 TABLET ORAL
Status: DISCONTINUED | OUTPATIENT
Start: 2024-04-08 | End: 2024-04-12 | Stop reason: HOSPADM

## 2024-04-08 RX ORDER — ACETAMINOPHEN 650 MG/1
650 SUPPOSITORY RECTAL EVERY 6 HOURS PRN
Status: DISCONTINUED | OUTPATIENT
Start: 2024-04-08 | End: 2024-04-12 | Stop reason: HOSPADM

## 2024-04-08 RX ORDER — PROPOFOL 10 MG/ML
5-50 INJECTION, EMULSION INTRAVENOUS CONTINUOUS
Status: DISCONTINUED | OUTPATIENT
Start: 2024-04-08 | End: 2024-04-08

## 2024-04-08 RX ORDER — ALBUMIN (HUMAN) 12.5 G/50ML
25 SOLUTION INTRAVENOUS ONCE
Status: COMPLETED | OUTPATIENT
Start: 2024-04-09 | End: 2024-04-09

## 2024-04-08 RX ORDER — ACETAMINOPHEN 325 MG/1
650 TABLET ORAL EVERY 6 HOURS PRN
Status: DISCONTINUED | OUTPATIENT
Start: 2024-04-08 | End: 2024-04-12 | Stop reason: HOSPADM

## 2024-04-08 RX ORDER — MIDAZOLAM HYDROCHLORIDE 5 MG/ML
INJECTION INTRAMUSCULAR; INTRAVENOUS
Status: DISCONTINUED
Start: 2024-04-08 | End: 2024-04-08

## 2024-04-08 RX ORDER — SODIUM CHLORIDE 9 MG/ML
INJECTION, SOLUTION INTRAVENOUS PRN
Status: DISCONTINUED | OUTPATIENT
Start: 2024-04-08 | End: 2024-04-12 | Stop reason: HOSPADM

## 2024-04-08 RX ORDER — SODIUM CHLORIDE 0.9 % (FLUSH) 0.9 %
5-40 SYRINGE (ML) INJECTION PRN
Status: DISCONTINUED | OUTPATIENT
Start: 2024-04-08 | End: 2024-04-12 | Stop reason: HOSPADM

## 2024-04-08 RX ORDER — INSULIN GLARGINE 100 [IU]/ML
10 INJECTION, SOLUTION SUBCUTANEOUS NIGHTLY
Status: DISCONTINUED | OUTPATIENT
Start: 2024-04-08 | End: 2024-04-09

## 2024-04-08 RX ORDER — INSULIN LISPRO 100 [IU]/ML
0-8 INJECTION, SOLUTION INTRAVENOUS; SUBCUTANEOUS EVERY 4 HOURS
Status: DISCONTINUED | OUTPATIENT
Start: 2024-04-08 | End: 2024-04-09

## 2024-04-08 RX ORDER — POLYETHYLENE GLYCOL 3350 17 G/17G
17 POWDER, FOR SOLUTION ORAL DAILY PRN
Status: DISCONTINUED | OUTPATIENT
Start: 2024-04-08 | End: 2024-04-12 | Stop reason: HOSPADM

## 2024-04-08 RX ORDER — SODIUM CHLORIDE, SODIUM LACTATE, POTASSIUM CHLORIDE, CALCIUM CHLORIDE 600; 310; 30; 20 MG/100ML; MG/100ML; MG/100ML; MG/100ML
INJECTION, SOLUTION INTRAVENOUS CONTINUOUS
Status: DISCONTINUED | OUTPATIENT
Start: 2024-04-08 | End: 2024-04-08

## 2024-04-08 RX ORDER — GABAPENTIN 300 MG/1
300 CAPSULE ORAL 3 TIMES DAILY
COMMUNITY

## 2024-04-08 RX ORDER — ONDANSETRON 2 MG/ML
4 INJECTION INTRAMUSCULAR; INTRAVENOUS EVERY 6 HOURS PRN
Status: DISCONTINUED | OUTPATIENT
Start: 2024-04-08 | End: 2024-04-12

## 2024-04-08 RX ORDER — DEXMEDETOMIDINE HYDROCHLORIDE 4 UG/ML
.1-1.5 INJECTION, SOLUTION INTRAVENOUS CONTINUOUS
Status: DISCONTINUED | OUTPATIENT
Start: 2024-04-08 | End: 2024-04-08

## 2024-04-08 RX ORDER — POTASSIUM CHLORIDE 29.8 MG/ML
20 INJECTION INTRAVENOUS PRN
Status: DISCONTINUED | OUTPATIENT
Start: 2024-04-08 | End: 2024-04-12 | Stop reason: HOSPADM

## 2024-04-08 RX ORDER — ONDANSETRON 4 MG/1
4 TABLET, ORALLY DISINTEGRATING ORAL EVERY 8 HOURS PRN
Status: DISCONTINUED | OUTPATIENT
Start: 2024-04-08 | End: 2024-04-12

## 2024-04-08 RX ORDER — SODIUM CHLORIDE 0.9 % (FLUSH) 0.9 %
5-40 SYRINGE (ML) INJECTION EVERY 12 HOURS SCHEDULED
Status: DISCONTINUED | OUTPATIENT
Start: 2024-04-08 | End: 2024-04-12 | Stop reason: HOSPADM

## 2024-04-08 RX ORDER — MAGNESIUM SULFATE IN WATER 40 MG/ML
2000 INJECTION, SOLUTION INTRAVENOUS PRN
Status: DISCONTINUED | OUTPATIENT
Start: 2024-04-08 | End: 2024-04-12 | Stop reason: HOSPADM

## 2024-04-08 RX ORDER — POTASSIUM CHLORIDE 7.45 MG/ML
10 INJECTION INTRAVENOUS PRN
Status: DISCONTINUED | OUTPATIENT
Start: 2024-04-08 | End: 2024-04-12 | Stop reason: HOSPADM

## 2024-04-08 RX ADMIN — Medication 10 ML: at 20:46

## 2024-04-08 RX ADMIN — GABAPENTIN 300 MG: 300 CAPSULE ORAL at 16:38

## 2024-04-08 RX ADMIN — DEXTROSE MONOHYDRATE 0.5 MG/MIN: 50 INJECTION, SOLUTION INTRAVENOUS at 12:07

## 2024-04-08 RX ADMIN — OXYCODONE 5 MG: 5 TABLET ORAL at 16:38

## 2024-04-08 RX ADMIN — Medication 0.2 MCG/KG/HR: at 03:43

## 2024-04-08 RX ADMIN — AMIODARONE HYDROCHLORIDE 1 MG/MIN: 50 INJECTION, SOLUTION INTRAVENOUS at 04:48

## 2024-04-08 RX ADMIN — INSULIN LISPRO 2 UNITS: 100 INJECTION, SOLUTION INTRAVENOUS; SUBCUTANEOUS at 15:49

## 2024-04-08 RX ADMIN — GABAPENTIN 300 MG: 300 CAPSULE ORAL at 20:46

## 2024-04-08 RX ADMIN — Medication 10 ML: at 09:10

## 2024-04-08 RX ADMIN — ALBUMIN (HUMAN) 25 G: 0.25 INJECTION, SOLUTION INTRAVENOUS at 23:55

## 2024-04-08 RX ADMIN — ACETAMINOPHEN 650 MG: 325 TABLET ORAL at 16:38

## 2024-04-08 RX ADMIN — INSULIN LISPRO 2 UNITS: 100 INJECTION, SOLUTION INTRAVENOUS; SUBCUTANEOUS at 09:09

## 2024-04-08 RX ADMIN — SODIUM CHLORIDE, POTASSIUM CHLORIDE, SODIUM LACTATE AND CALCIUM CHLORIDE: 600; 310; 30; 20 INJECTION, SOLUTION INTRAVENOUS at 04:33

## 2024-04-08 RX ADMIN — INSULIN LISPRO 4 UNITS: 100 INJECTION, SOLUTION INTRAVENOUS; SUBCUTANEOUS at 04:57

## 2024-04-08 RX ADMIN — MIDAZOLAM 4 MG: 1 INJECTION INTRAMUSCULAR; INTRAVENOUS at 02:29

## 2024-04-08 RX ADMIN — OXYCODONE 5 MG: 5 TABLET ORAL at 22:59

## 2024-04-08 RX ADMIN — INSULIN LISPRO 4 UNITS: 100 INJECTION, SOLUTION INTRAVENOUS; SUBCUTANEOUS at 20:46

## 2024-04-08 RX ADMIN — MUPIROCIN: 20 OINTMENT TOPICAL at 20:46

## 2024-04-08 RX ADMIN — MIDODRINE HYDROCHLORIDE 5 MG: 5 TABLET ORAL at 15:49

## 2024-04-08 RX ADMIN — PANTOPRAZOLE SODIUM 40 MG: 40 INJECTION, POWDER, FOR SOLUTION INTRAVENOUS at 12:08

## 2024-04-08 RX ADMIN — INSULIN LISPRO 2 UNITS: 100 INJECTION, SOLUTION INTRAVENOUS; SUBCUTANEOUS at 23:58

## 2024-04-08 RX ADMIN — DOPAMINE HYDROCHLORIDE 2.5 MCG/KG/MIN: 160 INJECTION, SOLUTION INTRAVENOUS at 04:52

## 2024-04-08 RX ADMIN — MUPIROCIN: 20 OINTMENT TOPICAL at 09:10

## 2024-04-08 RX ADMIN — INSULIN GLARGINE 10 UNITS: 100 INJECTION, SOLUTION SUBCUTANEOUS at 20:46

## 2024-04-08 ASSESSMENT — PULMONARY FUNCTION TESTS
PIF_VALUE: 16
PIF_VALUE: 16
PIF_VALUE: 15
PIF_VALUE: 12
PIF_VALUE: 15
PIF_VALUE: 16
PIF_VALUE: 15

## 2024-04-08 ASSESSMENT — PAIN DESCRIPTION - LOCATION: LOCATION: CHEST

## 2024-04-08 ASSESSMENT — PAIN SCALES - GENERAL: PAINLEVEL_OUTOF10: 10

## 2024-04-08 ASSESSMENT — PAIN DESCRIPTION - DESCRIPTORS: DESCRIPTORS: ACHING

## 2024-04-08 NOTE — ED NOTES
Pt pulled from private vehicle unresponsive, ashen with a thready weak pulse. Friend that accompanied pt states he passed out and hit head. Pt has small amount of blood to top of head. Pt pulled onto stretcher and intranasal narcan administered. Pt found to be pulseless and compressions initiated. Dr. Rivas to bedside.

## 2024-04-08 NOTE — PROGRESS NOTES
Pt extubated per order. Tolerated well, pt now on 4L NC. Pt asking for clothes, showed pt everything he had in pt belonging bag which included jacket (which had been cut through) and a handful of cards/ cash. Pt went through everything with this writer at bedside. Pt requested a new bag to place cards/ cash in. Pt requested to keep bag of cards/ cash at bedside until nurse returned. Pt allowed me to put back coat in his closet. RN Notified

## 2024-04-08 NOTE — PROGRESS NOTES
04/07/24 2230   Patient Observation   Observations 8.0 ETT / 24 @ lip   Breath Sounds   Breath Sounds Bilateral Clear;Diminished   Vent Information   Vent Mode AC/VC   $Ventilation $Initial Day   Ventilator Settings   FiO2  30 %   Vt (Set, mL) 450 mL   Resp Rate (Set) 16 bpm   PEEP/CPAP (cmH2O) 5   Vent Patient Data (Readings)   Vt (Measured) 467 mL   Peak Inspiratory Pressure (cmH2O) 16 cmH2O   Rate Measured 16 br/min   Minute Volume (L/min) 7.7 Liters   Peak Inspiratory Flow (lpm) 55 L/sec   Mean Airway Pressure (cmH2O) 6.8 cmH20   Vent Alarm Settings   High Pressure (cmH2O) 40 cmH2O   Low Exhaled Vt (ml) 250 mL   High Exhaled Vt (ml) 900 mL   RR High (bpm) 40 br/min   Apnea (secs) 20 secs   Additional Respiratoray Assessments   Humidification Source Heated wire   Humidification Temp 36.5   Ambu Bag With Mask At Bedside Yes   Airway Clearance   Suction ET Tube   Suction Device Inline suction catheter   Sputum Method Obtained Endotracheal   Sputum Amount Other (comment)  (None)

## 2024-04-08 NOTE — PROGRESS NOTES
HOSPITAL MEDICINE  - BRIEF NOTE    Seen by my colleague today. History/record reviewed, patient seen and examined.    Problem list:    S/P cardiac arrest, VT post ACLS with cardioversion x 3- no evidence of STEMI. Cardiology consult will be obtained  Acute/chronic respiratory failure with hypoxia- sec to above. Will remain on vent support for now and will order spontaneous awakening trial, request pulmonology for management  Questionable Drug overdose- as reported by friend. UDS shows fentanyl however this was obtained after patient was administered while in ED. Otherwise negative for other substances  Hyponatremia- resolved  Hypokalemia- resolved  Type 2 DM with hyperglycemia- improving. On FBG with SSI coverage. Currently we do not have an accurate account of patient medication list. Informed by nurse that a family member is supposed to come in to assist with obtaining medication list  Chronic HFrEF- with AICD in place. Appears stable. Will obtain echo given episode as described above  CAD- See above  COPD  Chronic hepatitis C  Liver cirrhosis    EVANS ORTA MD  4/8/2024  1:46 PM

## 2024-04-08 NOTE — PROGRESS NOTES
cleared for therapy.   Subjective  Subjective: Pt semi fowlers in bed upon arrival (with RN present), agreeable to therapy.         Social/Functional History    Preadmission Environment: (copied from 1/15/24 PT evaluation during past hospitalization, pt confirms that he still lives in duplex with 2 LESTER, but maybe only lives with girlfriend now)  Pt lives with                                         with family (mother in law) and with Significant Other  Home environment:                            duplex  Steps to enter first floor:                     2 steps to enter  Steps to second floor/basement:        N/A  Laundry:                                              unknown  Bathroom:                                           unknown  Pt sleeps in a:                                     Adjustable bed  Equipment owned:                              RW and home O2 (2L) continous  Preadmission Status:(copied from 1/15/24 PT evaluation during past hospitalization)  Pt able to drive: Yes but car is currently broken down   Pt fully independent with ADLs: Yes  Pt receives assistance from family for: Cleaning, Cooking, Laundry , and grocery shopping  Pt independent for functional transfers and utilized RW for mobility in home and Unknown out in community  History of falls:            Yes  Home Health Services:None    Vision/Hearing  Hearing  Hearing: Exceptions to WFL  Hearing Exceptions: Hard of hearing/hearing concerns (deaf)    Cognition   Orientation  Orientation Level: Oriented to situation;Oriented to person  Cognition  Overall Cognitive Status: Exceptions  Following Commands: Follows one step commands with repetition  Attention Span: Appears intact  Safety Judgement: Decreased awareness of need for safety;Decreased awareness of need for assistance  Problem Solving: Assistance required to identify errors made  Insights: Decreased awareness of deficits  Initiation: Does not require cues  Sequencing: Does not require  much help from another person moving to and from a bed to a chair?: Total  How much help from another person needed to walk in hospital room?: Total  AM-PAC Inpatient Mobility without Stair Climbing Raw Score : 9  AM-PAC Inpatient without Stair Climbing T-Scale Score : 32.44  Mobility Inpatient CMS 0-100% Score: 76.07  Mobility Inpatient without Stair CMS G-Code Modifier : CL        Goals  Short Term Goals  Time Frame for Short Term Goals: 1 week 4/15/24  Short Term Goal 1: Pt to perform bed mobility with CGA.  Short Term Goal 2: Pt to perform bed <> Chair transfer with mod A.  Short Term Goal 3: Pt to tolerate assessment for gait and transfers with prosthetic limb if appropriate.  Short Term Goal 4: Pt to perform w/c mobility for 50 ft with SBA.  Short Term Goal 5: To be met by 4/12: Pt to perform 12-15 reps of LE HEP to target strength/ROM.   Patient Goals   Patient Goals : pt did not verbalize       Education  Patient Education  Education Given To: Patient  Education Provided: Role of Therapy;Plan of Care;Transfer Training  Education Method: Verbal  Barriers to Learning: Hearing  Education Outcome: Verbalized understanding;Continued education needed      Therapy Time   Individual Concurrent Group Co-treatment   Time In 1837         Time Out 1926         Minutes 49         Timed Code Treatment Minutes: 39 Minutes (10 min eval)       Niyah Salazar, PT

## 2024-04-08 NOTE — PROGRESS NOTES
04/08/24 0334   Patient Observation   Pulse 86   Respirations 30   SpO2 100 %   Vent Information   Vent Mode AC/VC   Ventilator Settings   Vt (Set, mL) 450 mL   Resp Rate (Set) 16 bpm   PEEP/CPAP (cmH2O) 5   FiO2  60 %   Vent Patient Data (Readings)   Vt (Measured) 380 mL   Rate Measured 14 br/min   Additional Respiratoray Assessments   Humidification Source HME   ETT    Placement Date/Time: 04/07/24 2234   Present on Admission/Arrival: No  Placed By: (c) In ED;Other (comment)  Placement Verified By: Chest X-ray;Colorimetric ETCO2 device;Auscultation;Capnometry  Preoxygenation: Yes  Location: Oral   Secured At 26 cm   Measured From Lips   ETT Placement Center   Secured By Commercial tube hodges   Cuff Pressure 30 cm H2O

## 2024-04-08 NOTE — PROGRESS NOTES
Spontaneous Awakening Trial    - Absolute contraindications to SAT include:   -Delirium tremens.  -On neuromuscular blocking agent.  -Active seizures.  -Therapeutic hypothermia with core body temperature less than 37 degrees Celsius.  -Prone position.  -Withdrawal of life support planned.    - If patient has any relative contraindications:  Call the provider to decide if should proceed with SAT.  -Withdrawal from alcohol.  -Acute myocardial infarction in past 24 hours.  -Traumatic brain injury in past 24 hours.  -Intracranial pressure (ICP) greater than 20mmHg.  -RASS goal less than or equal to -4 or current RASS greater than +2.     - If patient has no absolute contraindications and no provider unapproved relative contraindication to SAT:   -Titrate all sedative medications including analgesics per the orders in the MAR.  -Discontinue all sedative medications including analgesics if used for the sole purpose of sedation.    -Continue to give intermittent analgesic prn pain medications during SAT.    - Criteria for SAT failure:   -RR greater than 35 for greater than 5 minutes.  -Increased use of accessory respiratory muscles.  -Sp02 less than 88% for greater than 5 minutes.  -Acute Cardiac Arrhythmias.  -ICP increase to greater than 20 mmHg.  -End tidal C02 increases or decreases by greater than 5 mmHg.    - If patient fails SAT: re-initiate sedation at half the previous dose and follow titration parameters within the sedation medication order.     - Reassess RASS hourly during SAT: if patient does not develop any SAT failure criteria and reaches a RASS of -1 or greater then notify RT for Spontaneous Breathing Trial (SBT)    Patient meets criteria for spontaneous awakening trial. Started at 0617 hours.     Sedation is currently maintained.     Current RASS score is RASS 0 (Alert and Calm).     Safety assessed. Restraints continued.

## 2024-04-08 NOTE — ED NOTES
Zofran 4mg given 2146  Mag 2mg given 2146  Fentanyl 50mcg given 2146  Amiodarone 150mg given 2149  Amiodarone infusion started at 2200   Etomidate 20mg given - 2218 by Georgiana DA SILVA   Rocuronium 100mg given - 2219 by Georgiana DA SILVA     Intubated at 2221 by Dr. Rivsa   ET tube 24 at the lip.   OG at 51 placed by Tonio DA SILVA   Central line placed by Dr. Rivas 2232

## 2024-04-08 NOTE — PROGRESS NOTES
Wasted 40mL of Fentanyl  with Julia Emanuel RN. Med disposed of into the Rx Destroyer per protocol.   *Fentanyl continued from [facility], order not continued upon admission to [facility].     Primary Nurse eSignature: Electronically signed by Amy Campo RN on 4/8/24 at 5:03 AM EDT    **SHARE this note so that the co-signing nurse is able to place an eSignature**    Co-signer eSignature: Electronically signed by Julia Emanuel RN on 4/8/24 at 5:05 AM EDT

## 2024-04-08 NOTE — PROGRESS NOTES
ET tube advanced 2cm per Dr. Rivas request with the help of JOHN Davis. Patient's ET tube now sits at 26cm at the lip.

## 2024-04-08 NOTE — CONSULTS
INPATIENT PULMONARY CRITICAL CARE CONSULT NOTE      Chief Complaint/Referring Provider:  Patient is being seen at the request of Dr. Tejada for a consultation for cardiopulmonary arrest     Presenting HPI: Patient was brought to the hospital because of cardiac arrest and ventricular tachycardia    As per the admitting provider-54 y.o. male with PMHx significant for CAD/MI, chronic HFrEF, AICD,Type 2 DM, chronic hep c, cirrhosis,.Chronic respiratory failure,bilateral BKA, deafness, impaired vision,opiate use who is here transferred from [facility] ED. AS per ED provider, patient was dropped off in front of ED by friend who stated he may have had a drug overdose. He was unresponsive and upon evaluation was noted to be pulseless, he was given narcan, ACLS protocol initiated, monitor showed VT, he was given 1 round of epinephrine, synchronized cardioversions, achieved ST and ROSC after 3 cardioversions.. he was placed on amiodarone drip and he was intubated for airway protection. EKG did nor demonstrate STEMI and he did not require emergent cardiac cath. He is transferred here to [place] for further care, he is seen by me as he is arriving in his room in ICU, currently intubated and sedated.  Later following my exam he became awake and alert and was able to communicate with staff via write board, he is asking for pain medication  . He was examined by nurse and noted to be neurologically intact with no focal deficits. I am not able to get history from patient     Patient when seen this morning continues to be in critical state on mechanical vent support, patient was also getting IV sedation with Precedex as ordered by the admitting provider along with that patient also was getting amiodarone infusion and dopamine infusion to maintain hemodynamics, patient has no significant increase in the secretions in the endotracheal tube, patient has a Tmax of 99.2 °F, patient had uncontrolled diabetes mellitus, patient had sinus  rhythm on the monitor, patient was on 40% oxygen PEEP of 5 overnight, patient urine output as documented was on the lower side, patient has been n.p.o. overnight, no other pertinent review of system could be obtained because of patient clinical status which remains precarious and critical     Patient Active Problem List    Diagnosis Date Noted    Cardiopulmonary arrest with successful resuscitation ([facility]) 04/08/2024    Ventricular tachycardia ([facility]) 04/08/2024    Closed fracture of body of sternum with nonunion 04/08/2024    Closed fracture of multiple ribs of left side with routine healing 04/08/2024    Elevated LFTs 04/08/2024    Uncontrolled type 2 diabetes mellitus with hyperglycemia ([facility]) 04/08/2024    Chronic prescription opiate use 04/08/2024    Elevated troponin 04/08/2024    Coronary artery calcification seen on CT scan 04/08/2024    Current smoker 04/08/2024    Alcohol abuse 03/20/2024    Acquired absence of left leg below knee ([facility]) 02/22/2024    S/P below knee amputation, left ([facility]) 02/21/2024    Cellulitis of left upper limb 02/18/2024    Left leg cellulitis 02/18/2024    History of diabetes mellitus 02/17/2024    Status post amputation of toe ([facility]) 02/17/2024    Unspecified hearing loss, bilateral 01/17/2024    Unspecified hearing loss, bilateral 01/17/2024    Encounter for orthopedic aftercare following surgical amputation 01/17/2024    Gastro-esophageal reflux disease without esophagitis 01/17/2024    Resistant hypertension 01/17/2024    Muscle weakness (generalized) 01/17/2024    Partial traumatic amputation of left foot, level unspecified, subsequent encounter ([facility]) 01/17/2024    Unspecified abnormalities of gait and mobility 01/17/2024    Other chronic pain 01/15/2024    Drug-seeking behavior 01/13/2024    Diabetic foot ulcer with osteomyelitis ([facility]) 01/12/2024    Critical lower limb ischemia ([facility]) 01/11/2024    Dependence on nocturnal oxygen therapy 01/02/2024    Chronic back pain 12/01/2023

## 2024-04-08 NOTE — H&P
SOFT TISSUES/SKULL:  No acute abnormality of the visualized skull or soft tissues.  Probable lipoma at the upper portion of the neck on the right. CT CERVICAL SPINE loss of the cervical lordosis. No fracture or subluxation. Facets are normally aligned. Odontoid is intact. Spinous processes are intact. No prevertebral soft tissue swelling. Lung apices are clear. Skull base is intact.  NG tube and endotracheal tube are in place.  The endotracheal tube. Appears to be above the thoracic inlet. Craniocervical junction is intact. C1-2 is unremarkable C 2-3 unremarkable C3-4 unremarkable C4-5 unremarkable C5-6 anterior posterior spurring.  Probable calcified central disc protrusion. C6-7 Anterior and posterior spurring.  Loss of disc space height.  Facet arthropathy. C7-T1  unremarkable     CT BRAIN no acute intracranial abnormality. CT CERVICAL SPINE 1.No acute fracture. 2.  Endotracheal tube appears to be high.  Is incompletely evaluated.  CT of the chest is already been ordered.     POC US FOR VASCULAR ACCESS    Result Date: 4/7/2024  POCUS_Proc_Central_Line     Exam Information:          Exam type:  Clinically indicated     Indication(s) for Exam:          The exam was performed with the following indications::  Other indications as noted in the H&P     Location:          Right         Specific site of central line::  Femoral vein     Complications:          None     Interpretation:          Successful U/S-guided central line insertion  Electronically signed by Tristan Rivas on Sunday, April 7, 2024 at 11:27 PM : Attending:       PCP: Carrie Mead APRN - NP    Past Medical History:        Diagnosis Date    CAP (community acquired pneumonia) 10/29/2021    CHF (congestive heart failure) (HCC)     Cirrhosis (HCC)     COPD (chronic obstructive pulmonary disease) (HCC)     COVID-19 virus infection 07/28/2022    Deaf     Diabetes (HCC)     Hepatitis C     Old myocardial infarction 01/17/2024    Other acute  osteomyelitis, left ankle and foot ([facility]) 01/17/2024    Right upper lobe pneumonia 07/20/2017    SBP (spontaneous bacterial peritonitis) ([facility]) 04/22/2023    Formatting of this note might be different from the original.   Suspected.    Severe sepsis ([facility]) 04/22/2023       Past Surgical History:        Procedure Laterality Date    FOOT SURGERY Left 1/12/2024    PARTIAL LEFT FOOT AMPUTATION performed by Juan M Quarles DPM at [facility] [place]    LEG AMPUTATION BELOW KNEE Right     LEG AMPUTATION BELOW KNEE Left 2/19/2024    LEG AMPUTATION BELOW KNEE performed by Juan M Monteiro MD at [facility] OR    OTHER SURGICAL HISTORY  01/12/2024    PARTIAL LEFT FOOT AMPUTATION - Left    PACEMAKER INSERTION         Medications Prior to Admission:   Prior to Admission medications    Medication Sig Start Date End Date Taking? Authorizing Provider   insulin lispro, 1 Unit Dial, (HUMALOG/ADMELOG) 100 UNIT/ML SOPN     Kalpesh Hutton MD   ENULOSE 10 GM/15ML SOLN solution  3/9/24   Kalpesh Hutton MD   methocarbamol (ROBAXIN) 500 MG tablet Take 1 tablet every 12 hours by oral route.    Kalpesh Hutton MD   polyethylene glycol (GLYCOLAX) 17 GM/SCOOP powder Take 17 g by mouth daily as needed 9/19/23 9/13/24  Kalpesh Hutton MD   LANTUS SOLOSTAR 100 UNIT/ML injection pen  3/9/24   Kalpesh Hutton MD   nitroGLYCERIN (NITROSTAT) 0.4 MG SL tablet PLACE 1 TABLET UNDER TONGUE EVERY 5 MINS, UP TO 3 DOSES AS NEEDED FOR CHEST PAIN 10/12/23   Kalpesh Hutton MD   sacubitril-valsartan (ENTRESTO) 24-26 MG per tablet Take 1 tablet by mouth 2 times daily 10/12/23   Kalpesh Hutton MD   umeclidinium-vilanterol (ANORO ELLIPTA) 62.5-25 MCG/ACT inhaler Inhale 1 puff into the lungs daily 5/30/23   Kalpesh Hutton MD   gabapentin (NEURONTIN) 400 MG capsule Take 1 capsule by mouth 3 times daily for 5 days. 2/22/24 2/27/24  Homa Manuel PA-C   naloxone 4 MG/0.1ML LIQD nasal spray 1 spray by Nasal route as needed for Opioid

## 2024-04-08 NOTE — PROGRESS NOTES
Shift: [id number]    Admitting diagnosis: cardiac arrest    Presentation to hospital: transfer from [place] following cardiac arrest. Friend dropped pt. Off at ED saying he thought he overdosed. Patient coded upon arrival to ED, 2 mins CPR followed by 3 shocks for VT. Patient came to but was intubated for airway protection following episode of emesis.     Surgery: no     Nursing assessment at handoff  stable    Emergency Contact/POA:Anastasiya  Family updated: yes - called and notified of transfer to [facility]    Most recent vitals: BP (!) 84/52   Pulse 71   Temp 97.5 °F (36.4 °C) (Oral)   Resp 16   Wt 66.6 kg (146 lb 13.2 oz)   SpO2 99%   BMI 20.48 kg/m²      Rhythm: Normal Sinus Rhythm - ICD     NC/HFNC- 0 lpm  Respiratory support: - Ventilator Settings:    Vt (Set, mL): 450 mL  Resp Rate (Set): 16 bpm  FiO2 : 40 %    PEEP/CPAP (cmH2O): 5       Vent days: Day 0    Increased O2 requirements: no    Admission weight Weight - Scale: 66.6 kg (146 lb 13.2 oz)  Today's weight   Wt Readings from Last 1 Encounters:   04/08/24 66.6 kg (146 lb 13.2 oz)         UOP >30ml/hr: yes     Araya need assessed each shift: yes, critically ill on ventilator    Restraints: yes  Order current and documentation up to date? yes    Lines/Drains  LDA Insertion Date Discontinued Date Dressing Changes   PIV  4/7x2   4/8       TLC  4/7     Arterial       Araya  4/7     Vas Cath      ETT       Surgical drains        Night Shift Hospitalist Interventions    Problem(Brief) Date Time Intervention Physician contacted                                               Drip rates at handoff:    dexmedeTOMIDine 0.4 mcg/kg/hr (04/08/24 0615)    propofol      dextrose      amiodarone 1 mg/min (04/08/24 0615)    Followed by    amiodarone      lactated ringers IV soln 100 mL/hr at 04/08/24 0615    sodium chloride      DOPamine 5 mcg/kg/min (04/08/24 0615)       Hospital Course Daily Updates:  Admit Day# 0 Nights  -transfer from [place]  -started on precedex, alert

## 2024-04-08 NOTE — PLAN OF CARE
Problem: Chronic Conditions and Co-morbidities  Goal: Patient's chronic conditions and co-morbidity symptoms are monitored and maintained or improved  Outcome: Progressing     Problem: Discharge Planning  Goal: Discharge to home or other facility with appropriate resources  Outcome: Progressing     Problem: Pain  Goal: Verbalizes/displays adequate comfort level or baseline comfort level  Outcome: Progressing     Problem: Safety - Medical Restraint  Goal: Remains free of injury from restraints (Restraint for Interference with Medical Device)  Description: INTERVENTIONS:  1. Determine that other, less restrictive measures have been tried or would not be effective before applying the restraint  2. Evaluate the patient's condition at the time of restraint application  3. Inform patient/family regarding the reason for restraint  4. Q2H: Monitor safety, psychosocial status, comfort, nutrition and hydration  Outcome: Progressing

## 2024-04-08 NOTE — ED PROVIDER NOTES
[facility] ED  EMERGENCY DEPARTMENT ENCOUNTER        Patient Name: Renato Nagy  MRN: [id number]  Birthdate 1970  Date of evaluation: 4/7/2024  Provider: Tristan Rivas MD  PCP: Carrie Mead APRN - NP  Note Started: 11:52 PM EDT 4/7/24    CHIEF COMPLAINT       Drug Overdose      HISTORY OF PRESENT ILLNESS: 1 or more Elements     History from : Friend    Limitations to history : Altered Mental Status    Renato Nagy is a 54 y.o. male who presents for apparent drug overdose.  He was dropped off by a friend in a car in front of the hospital, the friend only stated that he may have overdosed.  Did not provide any additional information.  The patient was unresponsive and was brought back to room and noted to have no pulses when CPR was initiated    Nursing Notes were all reviewed and agreed with or any disagreements were addressed in the HPI.    REVIEW OF SYSTEMS :      Review of Systems    Unable to obtain secondary to unresponsive state    SURGICAL HISTORY     Past Surgical History:   Procedure Laterality Date    FOOT SURGERY Left 1/12/2024    PARTIAL LEFT FOOT AMPUTATION performed by Juan M Quarles DPM at [facility] OR    LEG AMPUTATION BELOW KNEE Right     LEG AMPUTATION BELOW KNEE Left 2/19/2024    LEG AMPUTATION BELOW KNEE performed by Juan M Monteiro MD at [facility] OR    OTHER SURGICAL HISTORY  01/12/2024    PARTIAL LEFT FOOT AMPUTATION - Left    PACEMAKER INSERTION         CURRENTMEDICATIONS       Previous Medications    ALBUTEROL SULFATE HFA (VENTOLIN HFA) 108 (90 BASE) MCG/ACT INHALER    Inhale 2 puffs into the lungs 4 times daily as needed for Wheezing    APIXABAN (ELIQUIS) 5 MG TABS TABLET    Take 1 tablet by mouth 2 times daily    ATORVASTATIN (LIPITOR) 40 MG TABLET    Take 1 tablet by mouth nightly    BUSPIRONE (BUSPAR) 5 MG TABLET    Take 1 tablet by mouth 2 times daily    CLOPIDOGREL (PLAVIX) 75 MG TABLET    Take 1 tablet by mouth daily    DICYCLOMINE (BENTYL) 10 MG CAPSULE

## 2024-04-08 NOTE — PROGRESS NOTES
Patient's point of contact, Anastasiya Araya, notified of patient's transfer to [facility]. Discussed plan of care with her and she states she will be at the hospital this morning.     Electronically signed by Amy Campo RN on 4/8/2024 at 5:55 AM

## 2024-04-08 NOTE — PROGRESS NOTES
Comprehensive Nutrition Assessment    Type and Reason for Visit:  Initial    Nutrition Recommendations/Plan:   NPO  If unable to extubate, recommend TF initiation. Order: \"Diet: Tube feed continuous/ NPO\".  Initiate Glucerna 1.5 (Diabetic formula) at 10 mL/hr and as tolerated, increase by 10 mL/hr q 4 hours until goal of 45 mL/hr is met via O/G   Recommend 60 mL H20 flush q 4 hours.  Monitor IVF infusion, Na labs and need for adjustments in water flush  Recommend 1 Bottle Proteinex P2Go daily via syringe. Flush with 30 mL H20 before and after  Monitor TF tolerance (abd distention, bowel habits, N/V, cramping)  Monitor nutrition adequacy, pertinent labs, bowel habits, wt changes, and clinical progress     Malnutrition Assessment:  Malnutrition Status:  At risk for malnutrition (Comment) (04/08/24 0901)    Context:  Acute Illness     Findings of the 6 clinical characteristics of malnutrition:  Energy Intake:  Mild decrease in energy intake (Comment)    Nutrition Assessment:    New vent: 54 y.o. m w/ PMHx significant for CAD/MI, CHF, AICD,Type 2 DM, chronic hep c, cirrhosis, Chronic respiratory failure,bilateral BKA, deafness, impaired vision,opiate use, s/p BKA admitted w/ possible drug overdose and cardiac arrest. Intubated. Continues on vent support today, possible extubation today. Sedated on precedex, plan to stop today for possible extubation. OGT placed. Weight trending down, unable to verify it if is true wt loss d/t leg aumputation. If unable to extubate, recommend TF initiation. Recommendations included. Will monitor.    Nutrition Related Findings:    LR @ 100 ml/hr. Emesis. No BM. Labs reviewed. -734 since admission. Wound Type: None       Current Nutrition Intake & Therapies:    Average Meal Intake: NPO  Average Supplements Intake: NPO  Diet NPO  Current Tube Feeding (TF) Orders:  Feeding Route: Orogastric  Formula: Diabetic  Schedule: Continuous   Additives/Modulars: Protein  Goal TF & Flush Orders

## 2024-04-08 NOTE — CONSULTS
[facility]   Cardiovascular Evaluation    PATIENT: Renato Nagy  DATE: 2024  MRN: [id number]  CSN: [id number]  : 1970    Primary Care Doctor/Referring provider: Carrie Mead APRN - NP, Terrell Ward MD     Reason for evaluation/Chief complaint:   Cardiac arrest    Subjective:    History of present illness on initial date of evaluation:   Renato Nagy is a 54 y.o. patient who presented to the hospital after being found pulseless.  The patient is unable to provide detailed information as he is currently intubated in the medical intensive care unit.  Majority information is taken from reviewing the medical chart and discussion with medical staff.   Patient has an extensive cardiovascular history including ischemic cardiomyopathy status post ICD.  As per the note, patient was dropped off in the emergency department by her friend who stated that the patient may have a drug overdose.  He was unresponsive on initial evaluation.  He was given Narcan and ACLS protocol was initiated.  There is a possible history of arrhythmia and the patient was cardioverted.  Eventually the patient was intubated and referred to [facility] for medical intensive care unit evaluation and cardiology consultation.      Patient Active Problem List   Diagnosis    Acute on chronic congestive heart failure, unspecified heart failure type (HCC)    Acute pulmonary edema (HCC)    Below knee amputation (HCC)    Cellulitis of right lower extremity    QT prolongation    SOB (shortness of breath)    Decompensation of cirrhosis of liver (HCC)    Generalized abdominal pain    Pleural effusion    Abdominal ascites    Acute abdominal pain    HFrEF (heart failure with reduced ejection fraction) (HCC)    PAD (peripheral artery disease) (HCC)    Unable to care for self    Coronary artery disease involving native heart with unstable angina pectoris (HCC)    Type 2 diabetes mellitus (HCC)     Homa Manuel PA-C   lactobacillus (CULTURELLE) capsule Take 1 capsule by mouth 3 times daily 2/22/24   Homa Manuel PA-C   Multiple Vitamins-Minerals (THERAPEUTIC MULTIVITAMIN-MINERALS) tablet Take 1 tablet by mouth in the morning and at bedtime    Kalpesh Hutton MD   empagliflozin (JARDIANCE) 10 MG tablet Take 1 tablet by mouth daily 1/18/24   Manpreet Raymond MD   lisinopril (PRINIVIL;ZESTRIL) 5 MG tablet Take 1 tablet by mouth daily 1/18/24   Manpreet Raymond MD   clopidogrel (PLAVIX) 75 MG tablet Take 1 tablet by mouth daily 1/18/24   Manpreet Raymond MD   albuterol sulfate HFA (VENTOLIN HFA) 108 (90 Base) MCG/ACT inhaler Inhale 2 puffs into the lungs 4 times daily as needed for Wheezing 8/22/23   Mack Sanchez MD   mineral oil-hydrophilic petrolatum (AQUAPHOR) ointment Apply topically as needed for Dry Skin Apply topically as needed to left lower extremity    Kalpesh Hutton MD   lactulose (CHRONULAC) 10 GM/15ML solution Take 30 mLs by mouth every 8 hours as needed (maintain 2-3 BM per day) 8/17/23   Homa Manuel PA-C   torsemide (DEMADEX) 20 MG tablet Take 1 tablet by mouth daily 8/3/23   Homa Manuel PA-C   spironolactone (ALDACTONE) 25 MG tablet Take 1 tablet by mouth daily 8/4/23   Homa Manuel PA-C   pantoprazole (PROTONIX) 40 MG tablet Take 1 tablet by mouth daily 2/24/21   Kalpesh Hutton MD   sucralfate (CARAFATE) 1 GM tablet Take 1 tablet by mouth 4 times daily 5/1/22   Kalpesh Hutton MD   ferrous sulfate (IRON 325) 325 (65 Fe) MG tablet Take 1 tablet by mouth 2 times daily (with meals) 6/29/23   Kalpesh Hutton MD   dicyclomine (BENTYL) 10 MG capsule Take 1 capsule by mouth every 4 hours as needed 7/15/23   Kalpesh Hutton MD   busPIRone (BUSPAR) 5 MG tablet Take 1 tablet by mouth 2 times daily 10/22/22   Kalpesh Hutton MD   apixaban (ELIQUIS) 5 MG TABS tablet Take 1 tablet by mouth 2 times daily 8/18/20   ProviderKalpesh MD

## 2024-04-08 NOTE — PROGRESS NOTES
Patient admitted to [facility] at 0334, drips infusing through femoral central venous catheter. Four eyed skin assessment and CHG bath completed at this time. Patient remains restless in bed, attempting to pull lines. MD at bedside, sedation and restraints ordered.    Electronically signed by Amy Campo RN on 4/8/2024 at 4:45 AM

## 2024-04-08 NOTE — ED NOTES
2341: Placed ADT20 to transfer patient to [facility] if beds are available per Dr. Rivas     0001: Access center called in with Dr. Ward ( [facility] Hospitalist) to speak with Dr. Rivas regarding transfer and report.

## 2024-04-08 NOTE — PROGRESS NOTES
medical transport escorted patient out of facility at this time with fentanyl drip and amiodarone drip running. Report called to Amy at [facility] ICU.

## 2024-04-08 NOTE — PROGRESS NOTES
04/08/24 0820   Patient Observation   Pulse 77   Respirations 26   SpO2 100 %   Vent Information   Vent Mode (S)  CPAP/PS   Ventilator Settings   PEEP/CPAP (cmH2O) 5   FiO2  40 %   Pressure Support (cm H2O) 10 cm H2O   Vent Patient Data (Readings)   Vt (Measured) 742 mL   Peak Inspiratory Pressure (cmH2O) 16 cmH2O   Rate Measured 28 br/min   Minute Volume (L/min) 17.8 Liters   Mean Airway Pressure (cmH2O) 9.2 cmH20   Plateau Pressure (cm H2O) 0 cm H2O   Driving Pressure -5   I:E Ratio 1:1.10   Backup Apnea On   Vent Alarm Settings   High Pressure (cmH2O) 45 cmH2O   Low Minute Volume (lpm) 2 L/min   Low Exhaled Vt (ml) 200 mL   RR High (bpm) 50 br/min   Apnea (secs) 20 secs   ETT    Placement Date/Time: 04/07/24 2234   Present on Admission/Arrival: No  Placed By: (c) In ED;Other (comment)  Placement Verified By: Chest X-ray;Colorimetric ETCO2 device;Auscultation;Capnometry  Preoxygenation: Yes  Location: Oral   Secured At 26 cm   Measured From Lips   ETT Placement Center   Secured By Commercial tube hodges   Site Assessment Dry   B: Both Spontaneous Awakening and Breathing Trials   Spontaneous Awakening Trial (SAT) Outcome Anxiety, agitation, or pain - SAT failure   Was Patient Receiving Mechanical Ventilation Yes     Trialed pt on cpap 10/5, very agitated when woken up. Will try again when precedex is titrated

## 2024-04-08 NOTE — PROGRESS NOTES
Shift: [id number]    Admitting diagnosis: cardiac arrest    Presentation to hospital: transfer from [place] following cardiac arrest. Friend dropped pt. Off at ED saying he thought he overdosed. Patient coded upon arrival to ED, 2 mins CPR followed by 3 shocks for VT. Patient came to but was intubated for airway protection following episode of emesis.     Surgery: no     Nursing assessment at handoff  stable    Emergency Contact/POA:Anastasiya  Family updated: yes - called and notified of transfer to [facility]    Most recent vitals: BP (!) 83/63   Pulse 70   Temp 99.2 °F (37.3 °C) (Oral)   Resp 24   Ht 1.651 m (5' 5\")   Wt 66.6 kg (146 lb 13.2 oz)   SpO2 98%   BMI 24.43 kg/m²      Rhythm: Normal Sinus Rhythm - ICD     NC/HFNC- 4 lpm      Vent days: Day 0    Increased O2 requirements: no    Admission weight Weight - Scale: 66.6 kg (146 lb 13.2 oz)  Today's weight   Wt Readings from Last 1 Encounters:   04/08/24 66.6 kg (146 lb 13.2 oz)         UOP >30ml/hr: yes     Araya need assessed each shift: yes, critically ill on ventilator    Restraints: yes  Order current and documentation up to date? yes    Lines/Drains  LDA Insertion Date Discontinued Date Dressing Changes   PIV  4/7x2   4/8       TLC  4/7     Arterial       Araya  4/7     Vas Cath      ETT       Surgical drains        Night Shift Hospitalist Interventions    Problem(Brief) Date Time Intervention Physician contacted                                               Drip rates at handoff:    dextrose      amiodarone 0.5 mg/min (04/08/24 1207)    sodium chloride         Hospital Course Daily Updates:  Admit Day# 0 Nights  -transfer from [place]  -started on precedex, alert and appropriate (patient is deaf and uses ASL/writing)  -amiodarone continued  - OG advanced to 60 cm  -started on dopamine for hypotension (60/40's)     4/8 Days  -extubated approx 11am to 4L  -passed swallow, diet started  -patient using communication board appropriately  -very anxious  -oxy for

## 2024-04-09 LAB
ANION GAP SERPL CALCULATED.3IONS-SCNC: 8 MMOL/L (ref 3–16)
BASOPHILS # BLD: 0.1 K/UL (ref 0–0.2)
BASOPHILS NFR BLD: 0.7 %
BUN SERPL-MCNC: 10 MG/DL (ref 7–20)
CALCIUM SERPL-MCNC: 8.6 MG/DL (ref 8.3–10.6)
CHLORIDE SERPL-SCNC: 95 MMOL/L (ref 99–110)
CO2 SERPL-SCNC: 28 MMOL/L (ref 21–32)
CREAT SERPL-MCNC: 0.8 MG/DL (ref 0.9–1.3)
DEPRECATED RDW RBC AUTO: 17.9 % (ref 12.4–15.4)
EKG ATRIAL RATE: 100 BPM
EKG ATRIAL RATE: 74 BPM
EKG DIAGNOSIS: NORMAL
EKG DIAGNOSIS: NORMAL
EKG P AXIS: 53 DEGREES
EKG P AXIS: 78 DEGREES
EKG P-R INTERVAL: 210 MS
EKG P-R INTERVAL: 224 MS
EKG Q-T INTERVAL: 350 MS
EKG Q-T INTERVAL: 494 MS
EKG QRS DURATION: 12 MS
EKG QRS DURATION: 130 MS
EKG QTC CALCULATION (BAZETT): 451 MS
EKG QTC CALCULATION (BAZETT): 548 MS
EKG R AXIS: 0 DEGREES
EKG R AXIS: 264 DEGREES
EKG T AXIS: 74 DEGREES
EKG T AXIS: 86 DEGREES
EKG VENTRICULAR RATE: 100 BPM
EKG VENTRICULAR RATE: 74 BPM
EOSINOPHIL # BLD: 0.1 K/UL (ref 0–0.6)
EOSINOPHIL NFR BLD: 1 %
EST. AVERAGE GLUCOSE BLD GHB EST-MCNC: 228.8 MG/DL
GFR SERPLBLD CREATININE-BSD FMLA CKD-EPI: >90 ML/MIN/{1.73_M2}
GLUCOSE BLD-MCNC: 171 MG/DL (ref 70–99)
GLUCOSE BLD-MCNC: 242 MG/DL (ref 70–99)
GLUCOSE BLD-MCNC: 245 MG/DL (ref 70–99)
GLUCOSE BLD-MCNC: 300 MG/DL (ref 70–99)
GLUCOSE BLD-MCNC: 406 MG/DL (ref 70–99)
GLUCOSE BLD-MCNC: 456 MG/DL (ref 70–99)
GLUCOSE BLD-MCNC: 529 MG/DL (ref 70–99)
GLUCOSE BLD-MCNC: 532 MG/DL (ref 70–99)
GLUCOSE SERPL-MCNC: 265 MG/DL (ref 70–99)
HBA1C MFR BLD: 9.6 %
HCT VFR BLD AUTO: 38.7 % (ref 40.5–52.5)
HGB BLD-MCNC: 12.6 G/DL (ref 13.5–17.5)
LYMPHOCYTES # BLD: 1.8 K/UL (ref 1–5.1)
LYMPHOCYTES NFR BLD: 19.5 %
MCH RBC QN AUTO: 28.7 PG (ref 26–34)
MCHC RBC AUTO-ENTMCNC: 32.5 G/DL (ref 31–36)
MCV RBC AUTO: 88.3 FL (ref 80–100)
MONOCYTES # BLD: 1.1 K/UL (ref 0–1.3)
MONOCYTES NFR BLD: 11.7 %
NEUTROPHILS # BLD: 6.1 K/UL (ref 1.7–7.7)
NEUTROPHILS NFR BLD: 67.1 %
PERFORMED ON: ABNORMAL
PLATELET # BLD AUTO: 105 K/UL (ref 135–450)
PMV BLD AUTO: 8.7 FL (ref 5–10.5)
POTASSIUM SERPL-SCNC: 4.6 MMOL/L (ref 3.5–5.1)
RBC # BLD AUTO: 4.38 M/UL (ref 4.2–5.9)
SODIUM SERPL-SCNC: 131 MMOL/L (ref 136–145)
WBC # BLD AUTO: 9.1 K/UL (ref 4–11)

## 2024-04-09 PROCEDURE — 6370000000 HC RX 637 (ALT 250 FOR IP): Performed by: NURSE PRACTITIONER

## 2024-04-09 PROCEDURE — 6370000000 HC RX 637 (ALT 250 FOR IP): Performed by: STUDENT IN AN ORGANIZED HEALTH CARE EDUCATION/TRAINING PROGRAM

## 2024-04-09 PROCEDURE — 93010 ELECTROCARDIOGRAM REPORT: CPT | Performed by: INTERNAL MEDICINE

## 2024-04-09 PROCEDURE — 6360000002 HC RX W HCPCS

## 2024-04-09 PROCEDURE — 85025 COMPLETE CBC W/AUTO DIFF WBC: CPT

## 2024-04-09 PROCEDURE — 2700000000 HC OXYGEN THERAPY PER DAY

## 2024-04-09 PROCEDURE — 6370000000 HC RX 637 (ALT 250 FOR IP): Performed by: INTERNAL MEDICINE

## 2024-04-09 PROCEDURE — 2580000003 HC RX 258: Performed by: FAMILY MEDICINE

## 2024-04-09 PROCEDURE — 94761 N-INVAS EAR/PLS OXIMETRY MLT: CPT

## 2024-04-09 PROCEDURE — 99291 CRITICAL CARE FIRST HOUR: CPT | Performed by: INTERNAL MEDICINE

## 2024-04-09 PROCEDURE — 1200000000 HC SEMI PRIVATE

## 2024-04-09 PROCEDURE — 6360000002 HC RX W HCPCS: Performed by: FAMILY MEDICINE

## 2024-04-09 PROCEDURE — 97530 THERAPEUTIC ACTIVITIES: CPT

## 2024-04-09 PROCEDURE — 6370000000 HC RX 637 (ALT 250 FOR IP): Performed by: FAMILY MEDICINE

## 2024-04-09 PROCEDURE — 80048 BASIC METABOLIC PNL TOTAL CA: CPT

## 2024-04-09 PROCEDURE — 97167 OT EVAL HIGH COMPLEX 60 MIN: CPT

## 2024-04-09 PROCEDURE — 99233 SBSQ HOSP IP/OBS HIGH 50: CPT | Performed by: INTERNAL MEDICINE

## 2024-04-09 RX ORDER — KETOROLAC TROMETHAMINE 30 MG/ML
30 INJECTION, SOLUTION INTRAMUSCULAR; INTRAVENOUS ONCE
Status: DISCONTINUED | OUTPATIENT
Start: 2024-04-09 | End: 2024-04-12 | Stop reason: HOSPADM

## 2024-04-09 RX ORDER — INSULIN LISPRO 100 [IU]/ML
0-16 INJECTION, SOLUTION INTRAVENOUS; SUBCUTANEOUS
Status: DISCONTINUED | OUTPATIENT
Start: 2024-04-09 | End: 2024-04-12 | Stop reason: HOSPADM

## 2024-04-09 RX ORDER — METHOCARBAMOL 500 MG/1
500 TABLET, FILM COATED ORAL EVERY 12 HOURS
Status: DISCONTINUED | OUTPATIENT
Start: 2024-04-09 | End: 2024-04-12 | Stop reason: HOSPADM

## 2024-04-09 RX ORDER — LIDOCAINE 4 G/G
1 PATCH TOPICAL DAILY
Status: DISCONTINUED | OUTPATIENT
Start: 2024-04-09 | End: 2024-04-12 | Stop reason: HOSPADM

## 2024-04-09 RX ORDER — INSULIN GLARGINE 100 [IU]/ML
15 INJECTION, SOLUTION SUBCUTANEOUS NIGHTLY
Status: DISCONTINUED | OUTPATIENT
Start: 2024-04-09 | End: 2024-04-10

## 2024-04-09 RX ORDER — KETOROLAC TROMETHAMINE 30 MG/ML
INJECTION, SOLUTION INTRAMUSCULAR; INTRAVENOUS
Status: COMPLETED
Start: 2024-04-09 | End: 2024-04-09

## 2024-04-09 RX ORDER — INSULIN LISPRO 100 [IU]/ML
0-4 INJECTION, SOLUTION INTRAVENOUS; SUBCUTANEOUS NIGHTLY
Status: DISCONTINUED | OUTPATIENT
Start: 2024-04-09 | End: 2024-04-12 | Stop reason: HOSPADM

## 2024-04-09 RX ORDER — OXYCODONE HYDROCHLORIDE AND ACETAMINOPHEN 5; 325 MG/1; MG/1
2 TABLET ORAL EVERY 4 HOURS PRN
Status: DISCONTINUED | OUTPATIENT
Start: 2024-04-09 | End: 2024-04-12 | Stop reason: HOSPADM

## 2024-04-09 RX ORDER — OXYCODONE HYDROCHLORIDE AND ACETAMINOPHEN 5; 325 MG/1; MG/1
1 TABLET ORAL EVERY 4 HOURS PRN
Status: DISCONTINUED | OUTPATIENT
Start: 2024-04-09 | End: 2024-04-12 | Stop reason: HOSPADM

## 2024-04-09 RX ORDER — AMIODARONE HYDROCHLORIDE 200 MG/1
200 TABLET ORAL 2 TIMES DAILY
Status: DISCONTINUED | OUTPATIENT
Start: 2024-04-09 | End: 2024-04-12 | Stop reason: HOSPADM

## 2024-04-09 RX ADMIN — METHOCARBAMOL 500 MG: 500 TABLET ORAL at 13:44

## 2024-04-09 RX ADMIN — OXYCODONE AND ACETAMINOPHEN 2 TABLET: 5; 325 TABLET ORAL at 20:06

## 2024-04-09 RX ADMIN — OXYCODONE 5 MG: 5 TABLET ORAL at 07:03

## 2024-04-09 RX ADMIN — GABAPENTIN 300 MG: 300 CAPSULE ORAL at 07:53

## 2024-04-09 RX ADMIN — AMIODARONE HYDROCHLORIDE 200 MG: 200 TABLET ORAL at 21:14

## 2024-04-09 RX ADMIN — INSULIN LISPRO 16 UNITS: 100 INJECTION, SOLUTION INTRAVENOUS; SUBCUTANEOUS at 17:29

## 2024-04-09 RX ADMIN — APIXABAN 5 MG: 5 TABLET, FILM COATED ORAL at 21:13

## 2024-04-09 RX ADMIN — MIDODRINE HYDROCHLORIDE 5 MG: 5 TABLET ORAL at 07:54

## 2024-04-09 RX ADMIN — INSULIN GLARGINE 15 UNITS: 100 INJECTION, SOLUTION SUBCUTANEOUS at 21:13

## 2024-04-09 RX ADMIN — Medication 10 ML: at 07:54

## 2024-04-09 RX ADMIN — APIXABAN 5 MG: 5 TABLET, FILM COATED ORAL at 09:27

## 2024-04-09 RX ADMIN — MIDODRINE HYDROCHLORIDE 5 MG: 5 TABLET ORAL at 13:44

## 2024-04-09 RX ADMIN — MUPIROCIN: 20 OINTMENT TOPICAL at 07:54

## 2024-04-09 RX ADMIN — METHOCARBAMOL 500 MG: 500 TABLET ORAL at 22:50

## 2024-04-09 RX ADMIN — PANTOPRAZOLE SODIUM 40 MG: 40 TABLET, DELAYED RELEASE ORAL at 07:54

## 2024-04-09 RX ADMIN — DEXTROSE MONOHYDRATE 0.5 MG/MIN: 50 INJECTION, SOLUTION INTRAVENOUS at 03:38

## 2024-04-09 RX ADMIN — GABAPENTIN 300 MG: 300 CAPSULE ORAL at 21:14

## 2024-04-09 RX ADMIN — KETOROLAC TROMETHAMINE 30 MG: 30 INJECTION, SOLUTION INTRAMUSCULAR at 11:14

## 2024-04-09 RX ADMIN — MIDODRINE HYDROCHLORIDE 5 MG: 5 TABLET ORAL at 17:29

## 2024-04-09 RX ADMIN — OXYCODONE AND ACETAMINOPHEN 2 TABLET: 5; 325 TABLET ORAL at 15:31

## 2024-04-09 RX ADMIN — AMIODARONE HYDROCHLORIDE 200 MG: 200 TABLET ORAL at 09:27

## 2024-04-09 RX ADMIN — INSULIN LISPRO 4 UNITS: 100 INJECTION, SOLUTION INTRAVENOUS; SUBCUTANEOUS at 21:13

## 2024-04-09 RX ADMIN — INSULIN LISPRO 2 UNITS: 100 INJECTION, SOLUTION INTRAVENOUS; SUBCUTANEOUS at 04:51

## 2024-04-09 RX ADMIN — GABAPENTIN 300 MG: 300 CAPSULE ORAL at 13:44

## 2024-04-09 RX ADMIN — Medication 10 ML: at 21:14

## 2024-04-09 ASSESSMENT — PAIN SCALES - GENERAL
PAINLEVEL_OUTOF10: 9
PAINLEVEL_OUTOF10: 5
PAINLEVEL_OUTOF10: 9
PAINLEVEL_OUTOF10: 8
PAINLEVEL_OUTOF10: 5
PAINLEVEL_OUTOF10: 9
PAINLEVEL_OUTOF10: 8

## 2024-04-09 ASSESSMENT — PAIN DESCRIPTION - LOCATION
LOCATION: CHEST
LOCATION: RIB CAGE

## 2024-04-09 ASSESSMENT — PAIN DESCRIPTION - ORIENTATION
ORIENTATION: MID
ORIENTATION: MID

## 2024-04-09 ASSESSMENT — PAIN DESCRIPTION - DESCRIPTORS: DESCRIPTORS: ACHING;CRUSHING

## 2024-04-09 NOTE — PROGRESS NOTES
Occupational Therapy  Facility/Department: [facility] A2 CARD TELEMETRY  Occupational Therapy Initial Assessment    Name: Renato Nagy  : 1970  MRN: [id number]  Date of Service: 2024    Discharge Recommendations:  Subacute/Skilled Nursing Facility  OT Equipment Recommendations  Equipment Needed: No       Patient Diagnosis(es): There were no encounter diagnoses.  Past Medical History:  has a past medical history of CAP (community acquired pneumonia), CHF (congestive heart failure) (HCC), Cirrhosis (HCC), COPD (chronic obstructive pulmonary disease) (HCC), COVID-19 virus infection, Deaf, Diabetes (HCC), Hepatitis C, Old myocardial infarction, Other acute osteomyelitis, left ankle and foot (HCC), Right upper lobe pneumonia, SBP (spontaneous bacterial peritonitis) (HCC), and Severe sepsis (HCC).  Past Surgical History:  has a past surgical history that includes Leg amputation below knee (Right); Pacemaker insertion; other surgical history (2024); Foot surgery (Left, 2024); and Leg amputation below knee (Left, 2024).           Assessment   Performance deficits / Impairments: Decreased functional mobility ;Decreased safe awareness;Decreased coordination;Decreased balance;Decreased cognition;Decreased ADL status;Decreased high-level IADLs;Decreased endurance;Decreased strength  Assessment: Pt [facility] to [facility] with cardiopulmonary arrest with successful resuscitation. Pt very limited in participated this date due to pain in chest, very irritable/distracted/unfocused on therapy session ( appointment, clothes being cut during CPR, broken ribs). Pt completed bed mobility with Min A x 1 and sit<>stand with Min A x 1 + CGA with RW. Pt completed donning of RLE prosthesis with set up. Extensive time spent with education on therapy process and purpose in acute care. Pt PLOF seems to be IND with ADLs, has some support for mobility for stairs but has mostly been w/c until can get L prosthesis. Pt would

## 2024-04-09 NOTE — PROGRESS NOTES
continue monitor closely.  Hypokalemia-improved.  Potassium 4.6.  Type 2 DM with hyperglycemia-continue basal bolus correction.  Chronic HFrEF- with AICD in place.  Cardiology following.  COPD.  Monitor respiratory status closely.  Chronic hepatitis C/liver cirrhosis..  Monitor LFTs.  Monitor neurostatus.      Physical Exam Performed:      General appearance:  No apparent distress  Respiratory:  Normal respiratory effort.   Cardiovascular:  Regular rate and rhythm.  Tender anterior chest wall.  Abdomen:  Soft, non-tender, non-distended.  Musculoskeletal: Bilateral BKA's  Neurologic:  Non-focal  Psychiatric:  Alert and oriented    /75   Pulse 73   Temp 97.9 °F (36.6 °C) (Oral)   Resp 18   Ht 1.651 m (5' 5\")   Wt 69.9 kg (154 lb 1.6 oz)   SpO2 100%   BMI 25.64 kg/m²     Diet: ADULT DIET; Regular; 4 carb choices (60 gm/meal)  DVT Prophylaxis: []PPx LMWH  []SQ Heparin  []IPC/SCDs  [x]Eliquis  []Xarelto  []Coumadin  []Other -      Code status: Full Code  PT/OT Eval Status:   [x]NOT yet ordered  []Ordered and Pending   []Seen with Recommendations for:  []Home independently  []Home w/ assist  []HHC  []SNF  []Acute Rehab    Anticipated Discharge Day/Date: 2 to 3 days    Anticipated Discharge Location: [x]Home  []HHC  []SNF  []Acute Rehab  []ECF  []LTAC  []Hospice  []Other -      Consults:      IP CONSULT TO CARDIOLOGY  IP CONSULT TO PULMONOLOGY  IP CONSULT TO PSYCHIATRY      This patient has a high likelihood of being discharged tomorrow and is appropriate for A1 Discharge Unit in AM pending clinical course overnight: []Yes  [x]No     ------------------------------------------------------------------------------------------------------------------------------------------------------------------------    MDM    Patient with 1 or more chronic illnesses with severe exacerbation or side effects of treatment and/or 1 acute or chronic illness that poses threat to life or bodily function.    Decision regarding

## 2024-04-09 NOTE — CARE COORDINATION
Late Entry- seen 4/8/24    Case Management Assessment  Initial Evaluation    Date/Time of Evaluation: 4/9/2024 9:55 AM  Assessment Completed by: Risa Paez RN    If patient is discharged prior to next notation, then this note serves as note for discharge by case management.    Patient Name: Renato Nagy                   YOB: 1970  Diagnosis: Cardiac arrest with ventricular fibrillation (HCC) [I46.9, I49.01]  Cardiac arrest (HCC) [I46.9]                   Date / Time: 4/8/2024  3:31 AM    Patient Admission Status: Inpatient   Readmission Risk (Low < 19, Mod (19-27), High > 27): Readmission Risk Score: 34.9    Current PCP: Carrie Mead APRN - NP  PCP verified by CM?      Chart Reviewed: Yes      History Provided by: Patient, Medical Record  Patient Orientation: Alert and Oriented, Person, Place, Situation, Self    Patient Cognition: Alert    Hospitalization in the last 30 days (Readmission):  No    If yes, Readmission Assessment in  Navigator will be completed.    Advance Directives:      Code Status: Full Code   Patient's Primary Decision Maker is: Legal Next of Kin      Discharge Planning:    Patient lives with: Spouse/Significant Other Type of Home: Skilled Nursing Facility  Primary Care Giver: Self  Patient Support Systems include: Spouse/Significant Other   Current Financial resources: Medicaid, Medicare  Current community resources: None  Current services prior to admission: Home Care            Current DME:              Type of Home Care services:  OT, PT, Skilled Therapy    ADLS  Prior functional level: Assistance with the following:, Cooking, Housework, Shopping, Mobility  Current functional level: Assistance with the following:, Bathing, Dressing, Toileting, Cooking, Housework, Shopping, Mobility    PT AM-PAC:   /24  OT AM-PAC:   /24    Family can provide assistance at DC:    Would you like Case Management to discuss the discharge plan with any other family members/significant  others, and if so, who?    Plans to Return to Present Housing: Unknown at present  Other Identified Issues/Barriers to RETURNING to current housing: yes - appears the ex GF and her mother are not helping him. He has bilat amputations and uses sign language  Potential Assistance needed at discharge: Skilled Nursing Facility, Home Care            Potential DME:    Patient expects to discharge to: House  Plan for transportation at discharge:      Financial    Payor: [facility] MEDICARE / Plan: [employer] MEDIBLUE ESSENTIAL/PLUS / Product Type: *No Product type* /     Does insurance require precert for SNF: Yes    Potential assistance Purchasing Medications:    Meds-to-Beds request: Yes      [facility] - [place], [place] - [address] - P [phone] - F [phone]  [address]  [address]  [facility] [address]  Phone: [phone] Fax: [phone]    [facility] [id number] - [place] - [address] - P [phone] - F [phone]  [address] [facility] [place] [address]  Phone: [phone] Fax: [phone]      Notes:    Factors facilitating achievement of predicted outcomes: Motivated and Good insight into deficits    Barriers to discharge: Pain, No family support, No caregiver support, Impulsivity, Anxiety, Limited insight into deficits, Unrealistic expectations, Communication deficit, Medical complications, and Medication managment    Additional Case Management Notes: Attempted to speak to the pt yesterday with [employer] video  for 20 minutes. THe pt was overwhelming. He kept getting fixed on his mean and stupid MIL (GF's mother) per his words. Then he wanted to know why he isn't getting therapy and how he had an appt yesterday I think for his new leg prosthetic. He has bilat BKA and only one prosthetic. THe other one apparently broke. He wouldn't stay calm and answer my questions about his home arrangements. He said he needs me to find him HUD housing. Questionable if the GF is

## 2024-04-09 NOTE — PROGRESS NOTES
[facility] [place]   Cardiovascular Evaluation    PATIENT: Renato Nagy  DATE: 2024  MRN: [id number]  CSN: [id number]  : 1970    Primary Care Doctor/Referring provider: Carrie Mead APRN - NP, Terrell Ward MD     Reason for evaluation/Chief complaint:   Cardiac arrest    Subjective: The patient was extubated and is awake and alert.    History of present illness on initial date of evaluation:   Renato Nagy is a 54 y.o. patient who presented to the hospital after being found pulseless.  The patient is unable to provide detailed information as he is currently intubated in the medical intensive care unit.  Majority information is taken from reviewing the medical chart and discussion with medical staff.   Patient has an extensive cardiovascular history including ischemic cardiomyopathy status post ICD.  As per the note, patient was dropped off in the emergency department by her friend who stated that the patient may have a drug overdose.  He was unresponsive on initial evaluation.  He was given Narcan and ACLS protocol was initiated.  There is a possible history of arrhythmia and the patient was cardioverted.  Eventually the patient was intubated and referred to [facility] for medical intensive care unit evaluation and cardiology consultation.      Patient Active Problem List   Diagnosis    Acute on chronic congestive heart failure, unspecified heart failure type (HCC)    Acute pulmonary edema (HCC)    Below knee amputation (HCC)    Cellulitis of right lower extremity    QT prolongation    SOB (shortness of breath)    Liver cirrhosis (HCC)    Generalized abdominal pain    Pleural effusion    Abdominal ascites    Acute abdominal pain    HFrEF (heart failure with reduced ejection fraction) (HCC)    PAD (peripheral artery disease) (HCC)    Unable to care for self    Coronary artery disease involving native heart with unstable angina pectoris (HCC)    Type 2  overdose.  Continue recommend supportive care.  Echocardiogram pending.  Device interrogation showed VT  Electrolyte correction.  On oral Amiodarone.  Recommend medical management of his underlying cardiac issues,        Medical Decision Making:  The following items were considered in medical decision making:  Independent review of images  Review / order clinical lab tests  Review / order radiology tests  Decision to obtain old records  Review and summation of old records as accessed through [facility] (a summary of my findings in these old records)      Time Based Itemization    Due to the high probability of clinically significant life threating deterioration of the patient's condition that required my urgent intervention, a total critical care time 35 minutes was used. This time excludes any time that may have been spent performing procedures. This includes but not limited to vital sign monitoring, telemetry monitoring, continuous pulse oximety, IV medication, clinical response to the IV medications, documentation time , consultation time, interpretation of lab data, review of nursing notes and old record review.         (X )Preparing to see the patient and reviewing records  (X ) Individual interpretation of results  ( ) Discussion or coordination of care with other health care professionals  (X ) Ordering of unique tests, medications, or procedures  (X ) Documentation within the EHR       All questions and concerns were addressed to the patient/family. Alternatives to my treatment were discussed. The note was completed using EMR. Every effort was made to ensure accuracy; however, inadvertent computerized transcription errors may be present.    Meri Garsia MD, ERIC, [facility], [facility]  [phone] [facility]  [phone] [facility]  4/9/2024  2:29 PM

## 2024-04-09 NOTE — PLAN OF CARE
Problem: Chronic Conditions and Co-morbidities  Goal: Patient's chronic conditions and co-morbidity symptoms are monitored and maintained or improved  Outcome: Progressing  Flowsheets (Taken 4/8/2024 2000)  Care Plan - Patient's Chronic Conditions and Co-Morbidity Symptoms are Monitored and Maintained or Improved: Monitor and assess patient's chronic conditions and comorbid symptoms for stability, deterioration, or improvement     Problem: Discharge Planning  Goal: Discharge to home or other facility with appropriate resources  Outcome: Progressing  Flowsheets (Taken 4/8/2024 2000)  Discharge to home or other facility with appropriate resources:   Identify barriers to discharge with patient and caregiver   Arrange for needed discharge resources and transportation as appropriate   Identify discharge learning needs (meds, wound care, etc)   Refer to discharge planning if patient needs post-hospital services based on physician order or complex needs related to functional status, cognitive ability or social support system     Problem: Pain  Goal: Verbalizes/displays adequate comfort level or baseline comfort level  Outcome: Progressing     Problem: Safety - Medical Restraint  Goal: Remains free of injury from restraints (Restraint for Interference with Medical Device)  Description: INTERVENTIONS:  1. Determine that other, less restrictive measures have been tried or would not be effective before applying the restraint  2. Evaluate the patient's condition at the time of restraint application  3. Inform patient/family regarding the reason for restraint  4. Q2H: Monitor safety, psychosocial status, comfort, nutrition and hydration  Outcome: Progressing     Problem: Skin/Tissue Integrity  Goal: Absence of new skin breakdown  Description: 1.  Monitor for areas of redness and/or skin breakdown  2.  Assess vascular access sites hourly  3.  Every 4-6 hours minimum:  Change oxygen saturation probe site  4.  Every 4-6 hours:

## 2024-04-09 NOTE — CARE COORDINATION
Spoke with patient and patient's ex-girlfriend, Anastasiya,  at bedside for introduction.  Patient lives in a house with Anastasiya and Anastasiya's mom.  Patient is normally independent at home with ADL's.  He has a wheelchair, shower chair and oxygen at home.  Discussed therapy rec's of SNF and they are in agreement with this plan.  They would like a SNF where patient will be able to smoke.  They would like to defer conversation to a later time as far as SNF choice.   Patient had a recent skilled stay at [facility] but he does NOT want to go back there.  Patient is going for a prosthetic fitting tomorrow afternoon. Will follow up with them after this has been done.

## 2024-04-09 NOTE — PROGRESS NOTES
infarct.  There is no evidence of hydrocephalus. The ventricles are minimally prominent.  No midline shift. ORBITS: The visualized portion of the orbits demonstrate no acute abnormality. SINUSES: The visualized paranasal sinuses and mastoid air cells demonstrate no acute abnormality. SOFT TISSUES/SKULL:  No acute abnormality of the visualized skull or soft tissues.  Probable lipoma at the upper portion of the neck on the right. CT CERVICAL SPINE loss of the cervical lordosis. No fracture or subluxation. Facets are normally aligned. Odontoid is intact. Spinous processes are intact. No prevertebral soft tissue swelling. Lung apices are clear. Skull base is intact.  NG tube and endotracheal tube are in place.  The endotracheal tube. Appears to be above the thoracic inlet. Craniocervical junction is intact. C1-2 is unremarkable C 2-3 unremarkable C3-4 unremarkable C4-5 unremarkable C5-6 anterior posterior spurring.  Probable calcified central disc protrusion. C6-7 Anterior and posterior spurring.  Loss of disc space height.  Facet arthropathy. C7-T1  unremarkable     CT BRAIN no acute intracranial abnormality. CT CERVICAL SPINE 1.No acute fracture. 2.  Endotracheal tube appears to be high.  Is incompletely evaluated.  CT of the chest is already been ordered.     POC US FOR VASCULAR ACCESS    Result Date: 4/7/2024  POCUS_Proc_Central_Line     Exam Information:          Exam type:  Clinically indicated     Indication(s) for Exam:          The exam was performed with the following indications::  Other indications as noted in the H&P     Location:          Right         Specific site of central line::  Femoral vein     Complications:          None     Interpretation:          Successful U/S-guided central line insertion  Electronically signed by Tristan Rivas on Sunday, April 7, 2024 at 11:27 PM : Attending:             Assessment:  Principal Problem:    Cardiopulmonary arrest with successful resuscitation  signed by:  PATITO VALENZUELA MD    4/9/2024    6:57 AM.

## 2024-04-09 NOTE — PROGRESS NOTES
very tangential with answers throughout session (i.e. often fixated on his difficult relationship with MIL), so communication difficult as a result.  Using ASL/deaf interpreters throughout session to assist with communication.  Pain: Pt c/o 10/10 acute pain in chest and ribs.  RN and ICU NP aware and present to administer pain medication.   Orientation: Within Functional Limits    Objective   Vitals  Vitals:       BP: 106/75   Pulse: 73   Resp:    Temp: 97.8 °F (36.6 °C)   SpO2: 97%       Bed Mobility Training  Interventions: Safety awareness training;Tactile cues;Visual cues  Rolling: Stand-by assistance  Supine to Sit: Minimum assistance (moving toward R side, HOB elevated ~30 degrees)  Sit to Supine: Minimum assistance  Scooting: Minimum assistance (to scoot up in bed and forward to EOB)    Balance  Sitting: Intact  Standing: Impaired  Standing - Static: Fair;Constant support (using RW)  Standing - Dynamic: Fair;Constant support (using RW)    Transfer Training  Interventions: Safety awareness training;Visual cues;Tactile cues  Sit to Stand: Minimum assistance;Assist X2 (Ana x 1 + CGA x 1 from EOB to RW)  Stand to Sit: Minimum assistance  Bed to Chair:  (MARA - pt declined to attempt today 2* pain)    Other Activities  Pt able to don RLE below-knee prosthesis while seated EOB with setup/SBA.    Safety Devices  Type of Devices: Bed alarm in place;Left in bed;Nurse notified;All fall risk precautions in place;Call light within reach;Patient at risk for falls;Gait belt       Goals  Short Term Goals  Time Frame for Short Term Goals: 1 week, 4/15/24 (unless otherwise specified)  Short Term Goal 1: Pt to perform bed mobility with CGA.  Short Term Goal 2: Pt to perform bed <> Chair transfer with mod A.  Short Term Goal 3: Pt to tolerate assessment for gait and transfers with prosthetic limb if appropriate.  New goal 4/09/24: Pt will transfer sit <-> stand and bed>chair using RW with Ana x 1.  Short Term Goal 4: Pt to    Timed Code Treatment Minutes: 86 Minutes       Manda Parmar, PT, DPT #[id number]

## 2024-04-09 NOTE — PROGRESS NOTES
The pt is kept having a SBP of 80, with a map of 65-75. Perfect serve sent to the MD to ask for some Albumin or fluid bolus. Albumin 25 mg order is obtained and done. Electronically signed by EVELYN MERRILL RN on 4/9/24 at 12:31 AM EDT

## 2024-04-10 LAB
ANION GAP SERPL CALCULATED.3IONS-SCNC: 9 MMOL/L (ref 3–16)
BASOPHILS # BLD: 0 K/UL (ref 0–0.2)
BASOPHILS NFR BLD: 1.1 %
BUN SERPL-MCNC: 13 MG/DL (ref 7–20)
CALCIUM SERPL-MCNC: 8.2 MG/DL (ref 8.3–10.6)
CHLORIDE SERPL-SCNC: 98 MMOL/L (ref 99–110)
CO2 SERPL-SCNC: 25 MMOL/L (ref 21–32)
CREAT SERPL-MCNC: 0.7 MG/DL (ref 0.9–1.3)
DEPRECATED RDW RBC AUTO: 17.6 % (ref 12.4–15.4)
EOSINOPHIL # BLD: 0.1 K/UL (ref 0–0.6)
EOSINOPHIL NFR BLD: 2.4 %
GFR SERPLBLD CREATININE-BSD FMLA CKD-EPI: >90 ML/MIN/{1.73_M2}
GLUCOSE BLD-MCNC: 139 MG/DL (ref 70–99)
GLUCOSE BLD-MCNC: 157 MG/DL (ref 70–99)
GLUCOSE BLD-MCNC: 225 MG/DL (ref 70–99)
GLUCOSE BLD-MCNC: 286 MG/DL (ref 70–99)
GLUCOSE BLD-MCNC: 333 MG/DL (ref 70–99)
GLUCOSE SERPL-MCNC: 270 MG/DL (ref 70–99)
HCT VFR BLD AUTO: 37.3 % (ref 40.5–52.5)
HGB BLD-MCNC: 12.3 G/DL (ref 13.5–17.5)
LYMPHOCYTES # BLD: 1.1 K/UL (ref 1–5.1)
LYMPHOCYTES NFR BLD: 25.9 %
MCH RBC QN AUTO: 28.9 PG (ref 26–34)
MCHC RBC AUTO-ENTMCNC: 32.9 G/DL (ref 31–36)
MCV RBC AUTO: 87.8 FL (ref 80–100)
MONOCYTES # BLD: 0.5 K/UL (ref 0–1.3)
MONOCYTES NFR BLD: 11.7 %
NEUTROPHILS # BLD: 2.6 K/UL (ref 1.7–7.7)
NEUTROPHILS NFR BLD: 58.9 %
PERFORMED ON: ABNORMAL
PLATELET # BLD AUTO: 86 K/UL (ref 135–450)
PLATELET BLD QL SMEAR: ABNORMAL
PMV BLD AUTO: 8.8 FL (ref 5–10.5)
POTASSIUM SERPL-SCNC: 4.5 MMOL/L (ref 3.5–5.1)
RBC # BLD AUTO: 4.25 M/UL (ref 4.2–5.9)
SLIDE REVIEW: ABNORMAL
SODIUM SERPL-SCNC: 132 MMOL/L (ref 136–145)
WBC # BLD AUTO: 4.4 K/UL (ref 4–11)

## 2024-04-10 PROCEDURE — 6370000000 HC RX 637 (ALT 250 FOR IP): Performed by: INTERNAL MEDICINE

## 2024-04-10 PROCEDURE — 6360000002 HC RX W HCPCS

## 2024-04-10 PROCEDURE — 1200000000 HC SEMI PRIVATE

## 2024-04-10 PROCEDURE — 6370000000 HC RX 637 (ALT 250 FOR IP): Performed by: NURSE PRACTITIONER

## 2024-04-10 PROCEDURE — 99233 SBSQ HOSP IP/OBS HIGH 50: CPT | Performed by: INTERNAL MEDICINE

## 2024-04-10 PROCEDURE — 36415 COLL VENOUS BLD VENIPUNCTURE: CPT

## 2024-04-10 PROCEDURE — 94761 N-INVAS EAR/PLS OXIMETRY MLT: CPT

## 2024-04-10 PROCEDURE — 97530 THERAPEUTIC ACTIVITIES: CPT

## 2024-04-10 PROCEDURE — 97535 SELF CARE MNGMENT TRAINING: CPT

## 2024-04-10 PROCEDURE — 6370000000 HC RX 637 (ALT 250 FOR IP): Performed by: STUDENT IN AN ORGANIZED HEALTH CARE EDUCATION/TRAINING PROGRAM

## 2024-04-10 PROCEDURE — 99232 SBSQ HOSP IP/OBS MODERATE 35: CPT | Performed by: INTERNAL MEDICINE

## 2024-04-10 PROCEDURE — 85025 COMPLETE CBC W/AUTO DIFF WBC: CPT

## 2024-04-10 PROCEDURE — 6370000000 HC RX 637 (ALT 250 FOR IP)

## 2024-04-10 PROCEDURE — 80048 BASIC METABOLIC PNL TOTAL CA: CPT

## 2024-04-10 PROCEDURE — 2700000000 HC OXYGEN THERAPY PER DAY

## 2024-04-10 PROCEDURE — 2580000003 HC RX 258: Performed by: FAMILY MEDICINE

## 2024-04-10 RX ORDER — INSULIN LISPRO 100 [IU]/ML
7 INJECTION, SOLUTION INTRAVENOUS; SUBCUTANEOUS
Status: DISCONTINUED | OUTPATIENT
Start: 2024-04-10 | End: 2024-04-12 | Stop reason: HOSPADM

## 2024-04-10 RX ORDER — KETOROLAC TROMETHAMINE 30 MG/ML
30 INJECTION, SOLUTION INTRAMUSCULAR; INTRAVENOUS EVERY 6 HOURS PRN
Status: DISCONTINUED | OUTPATIENT
Start: 2024-04-10 | End: 2024-04-11

## 2024-04-10 RX ORDER — INSULIN GLARGINE 100 [IU]/ML
25 INJECTION, SOLUTION SUBCUTANEOUS NIGHTLY
Status: DISCONTINUED | OUTPATIENT
Start: 2024-04-10 | End: 2024-04-11

## 2024-04-10 RX ADMIN — PANTOPRAZOLE SODIUM 40 MG: 40 TABLET, DELAYED RELEASE ORAL at 05:05

## 2024-04-10 RX ADMIN — INSULIN GLARGINE 25 UNITS: 100 INJECTION, SOLUTION SUBCUTANEOUS at 21:11

## 2024-04-10 RX ADMIN — INSULIN LISPRO 7 UNITS: 100 INJECTION, SOLUTION INTRAVENOUS; SUBCUTANEOUS at 12:55

## 2024-04-10 RX ADMIN — AMIODARONE HYDROCHLORIDE 200 MG: 200 TABLET ORAL at 09:35

## 2024-04-10 RX ADMIN — INSULIN LISPRO 8 UNITS: 100 INJECTION, SOLUTION INTRAVENOUS; SUBCUTANEOUS at 16:39

## 2024-04-10 RX ADMIN — METHOCARBAMOL 500 MG: 500 TABLET ORAL at 12:56

## 2024-04-10 RX ADMIN — KETOROLAC TROMETHAMINE 30 MG: 30 INJECTION, SOLUTION INTRAMUSCULAR; INTRAVENOUS at 09:55

## 2024-04-10 RX ADMIN — GABAPENTIN 300 MG: 300 CAPSULE ORAL at 15:08

## 2024-04-10 RX ADMIN — GABAPENTIN 300 MG: 300 CAPSULE ORAL at 09:34

## 2024-04-10 RX ADMIN — AMIODARONE HYDROCHLORIDE 200 MG: 200 TABLET ORAL at 21:08

## 2024-04-10 RX ADMIN — INSULIN LISPRO 12 UNITS: 100 INJECTION, SOLUTION INTRAVENOUS; SUBCUTANEOUS at 09:35

## 2024-04-10 RX ADMIN — OXYCODONE AND ACETAMINOPHEN 2 TABLET: 5; 325 TABLET ORAL at 02:39

## 2024-04-10 RX ADMIN — INSULIN LISPRO 4 UNITS: 100 INJECTION, SOLUTION INTRAVENOUS; SUBCUTANEOUS at 12:56

## 2024-04-10 RX ADMIN — APIXABAN 5 MG: 5 TABLET, FILM COATED ORAL at 21:08

## 2024-04-10 RX ADMIN — KETOROLAC TROMETHAMINE 30 MG: 30 INJECTION, SOLUTION INTRAMUSCULAR; INTRAVENOUS at 18:47

## 2024-04-10 RX ADMIN — OXYCODONE AND ACETAMINOPHEN 2 TABLET: 5; 325 TABLET ORAL at 06:39

## 2024-04-10 RX ADMIN — APIXABAN 5 MG: 5 TABLET, FILM COATED ORAL at 09:34

## 2024-04-10 RX ADMIN — Medication 10 ML: at 09:35

## 2024-04-10 RX ADMIN — INSULIN LISPRO 7 UNITS: 100 INJECTION, SOLUTION INTRAVENOUS; SUBCUTANEOUS at 16:40

## 2024-04-10 RX ADMIN — MIDODRINE HYDROCHLORIDE 5 MG: 5 TABLET ORAL at 12:56

## 2024-04-10 RX ADMIN — GABAPENTIN 300 MG: 300 CAPSULE ORAL at 21:08

## 2024-04-10 RX ADMIN — MIDODRINE HYDROCHLORIDE 5 MG: 5 TABLET ORAL at 09:34

## 2024-04-10 ASSESSMENT — PAIN DESCRIPTION - LOCATION
LOCATION: CHEST

## 2024-04-10 ASSESSMENT — PAIN SCALES - GENERAL
PAINLEVEL_OUTOF10: 10
PAINLEVEL_OUTOF10: 8
PAINLEVEL_OUTOF10: 10
PAINLEVEL_OUTOF10: 9

## 2024-04-10 ASSESSMENT — PAIN DESCRIPTION - DESCRIPTORS
DESCRIPTORS: ACHING

## 2024-04-10 ASSESSMENT — PAIN DESCRIPTION - ORIENTATION
ORIENTATION: MID
ORIENTATION: MID

## 2024-04-10 ASSESSMENT — PAIN - FUNCTIONAL ASSESSMENT: PAIN_FUNCTIONAL_ASSESSMENT: ACTIVITIES ARE NOT PREVENTED

## 2024-04-10 NOTE — PROGRESS NOTES
[facility] [place]   Cardiovascular Evaluation    PATIENT: Renato Nagy  DATE: 4/10/2024  MRN: [id number]  CSN: [id number]  : 1970    Primary Care Doctor/Referring provider: Carrie Mead APRN - NP, Terrell Ward MD     Reason for evaluation/Chief complaint:   Cardiac arrest    Subjective: The patient was moved to the floor. No cp/SOB    History of present illness on initial date of evaluation:   Renato Nagy is a 54 y.o. patient who presented to the hospital after being found pulseless.  The patient is unable to provide detailed information as he is currently intubated in the medical intensive care unit.  Majority information is taken from reviewing the medical chart and discussion with medical staff.   Patient has an extensive cardiovascular history including ischemic cardiomyopathy status post ICD.  As per the note, patient was dropped off in the emergency department by her friend who stated that the patient may have a drug overdose.  He was unresponsive on initial evaluation.  He was given Narcan and ACLS protocol was initiated.  There is a possible history of arrhythmia and the patient was cardioverted.  Eventually the patient was intubated and referred to [facility] for medical intensive care unit evaluation and cardiology consultation.      Patient Active Problem List   Diagnosis    Acute on chronic congestive heart failure, unspecified heart failure type (HCC)    Acute pulmonary edema (HCC)    Below knee amputation (HCC)    Cellulitis of right lower extremity    QT prolongation    SOB (shortness of breath)    Liver cirrhosis (HCC)    Generalized abdominal pain    Pleural effusion    Abdominal ascites    Acute abdominal pain    HFrEF (heart failure with reduced ejection fraction) (HCC)    PAD (peripheral artery disease) (HCC)    Unable to care for self    Coronary artery disease involving native heart with unstable angina pectoris (HCC)    Type 2  diabetes mellitus (HCC)    Abdominal pain, chronic, epigastric    Atypical chest pain    Osteomyelitis (HCC)    Diabetic foot ulcer with osteomyelitis (HCC)    Troponin I above reference range    Drug-seeking behavior    Other chronic pain    Chronic combined systolic and diastolic congestive heart failure (HCC)    Cellulitis of toe of left foot    History of diabetes mellitus    Status post amputation of toe (HCC)    Poor compliance    Cellulitis of left upper limb    Chronic hepatitis C without hepatic coma (HCC)    S/P BKA (below knee amputation) unilateral, right (HCC)    Left leg cellulitis    S/P below knee amputation, left (HCC)    Acquired absence of left leg below knee (HCC)    Acquired hypothyroidism    Acute kidney injury (HCC)    Adjustment disorder    Adult ADHD    MDD (major depressive disorder)    Mood disorder (HCC)    AICD (automatic cardioverter/defibrillator) present    Alcohol abuse    Alcoholic cirrhosis of liver with ascites (HCC)    Anxiety    Barretts esophagus    Cardiomyopathy (HCC)    Cellulitis of abdominal wall    Unspecified hearing loss, bilateral    Unspecified hearing loss, bilateral    COPD (chronic obstructive pulmonary disease) (HCC)    Critical lower limb ischemia (HCC)    Smoking greater than 20 pack years    CVA (cerebrovascular accident) (HCC)    Deaf    Dehydration with hyponatremia    Dependence on nocturnal oxygen therapy    Diabetic neuropathy associated with type 2 diabetes mellitus (HCC)    Early dry stage nonexudative age-related macular degeneration of both eyes    Encounter for orthopedic aftercare following surgical amputation    Erectile dysfunction    Fanconi syndrome (HCC)    Gastro-esophageal reflux disease without esophagitis    Heartburn    History of alcohol abuse    Homelessness    Hx of substance abuse (HCC)    Hyperkalemia    Hypertension    Hypotension    Pre-syncope    Resistant hypertension    Hypoalbuminemia    Hyponatremia    Low mean corpuscular

## 2024-04-10 NOTE — PROGRESS NOTES
INPATIENT PULMONARY CRITICAL CARE PROGRESS NOTE      Reason for visit    cardiopulmonary arrest       SUBJECTIVE: Patient is tolerating extubation well, patient does not have any significant shortness of breath or wheezing or any chest pain, patient has been afebrile and hemodynamically maintained, patient's blood sugars are not optimally controlled, patient has sinus rhythm on the monitor, patient was on 1 L nasal oxygen with saturation 99%, patient urine output was adequate, patient has a cumulative fluid balance of -539 mL, no other pertinent review of system of concern             Physical Exam:  Blood pressure 109/76, pulse 69, temperature 97.7 °F (36.5 °C), temperature source Oral, resp. rate 18, height 1.651 m (5' 5\"), weight 75.5 kg (166 lb 7.2 oz), SpO2 98 %.'       Constitutional:  No acute distress  HENT: No facial asymmetry no thyromegaly.  Eyes:  Conjunctivae are normal. Pupils equal, round, and reactive to light. No scleral icterus.   Neck: . No tracheal deviation present. No obvious thyroid mass.   Cardiovascular: Normal rate, regular rhythm, normal heart sounds.  No right ventricular heave. No lower extremity edema.  Pulmonary/Chest: No wheezes.  No rales.  Chest wall is not dull to percussion.  No accessory muscle usage or stridor.  Decreased breath sound intensity  Abdominal: Soft. Bowel sounds present. No distension or hernia. No tenderness.    Musculoskeletal: No cyanosis. No clubbing.  Bilateral BKA  Lymphadenopathy: No cervical or supraclavicular adenopathy.   Skin: Skin is warm and dry. No rash or nodules on the exposed extremities.  Neurologic, Hard of hearing    Results:  CBC:   Recent Labs     04/08/24  0356 04/09/24  0930 04/10/24  0522   WBC 12.7* 9.1 4.4   HGB 14.0 12.6* 12.3*   HCT 42.8 38.7* 37.3*   MCV 87.8 88.3 87.8    105* 86*       BMP:   Recent Labs     04/08/24  0356 04/09/24  0930 04/10/24  0522    131* 132*   K 4.2 4.6 4.5    95* 98*   CO2 24 28 25   PHOS 3.7

## 2024-04-10 NOTE — CONSULTS
[facility] [place] [address] [place] [address]                              CONSULTATION      PATIENT NAME: ISRA CALDERON              : 1970  MED REC NO: [id number]                      ROOM: 0211  ACCOUNT NO: [id number]                       ADMIT DATE: 2024  PROVIDER: Rad Driscoll MD    PSYCHIATRY CONSULTATION    CONSULT DATE:  04/10/2024    REFERRING PHYSICIAN:  Dr. [place]      HISTORY OF THE PRESENT ILLNESS:  The patient is a 54-year-old male with a past medical history significant for coronary artery disease, status post myocardial infarction; chronic heart failure; type 2 diabetes; chronic hepatitis C; history of alcohol dependence, in remission; chronic respiratory failure; deafness, who initially presented to [facility] after being taken there by his girlfriend after the patient became unresponsive.  He was noted to be without a pulse.  He was given Narcan.  ACLS was initiated and he was eventually stabilized and admitted here at [facility] in the intensive care unit on the ventilator for ongoing care.  He was subsequently extubated and Psychiatry was asked to evaluate his ability to make informed consent decisions.    I did use an  service that was through iPad in his room as a means of communication.  The patient indicated that he remembers going out to [facility] with his girlfriend and went to use the bathroom and started feeling lightheaded and dizzy and remembers worrying that he was about to black out.  He denies that he took anything at the [facility] or previously and denies using any kind of illicit drugs, but cannot explain how the fentanyl got in his system.  His urine tox screens in the past have always been positive for opiates, but he denies any efforts to hurt himself.  He does state that he is under some stress because he and his girlfriend have been living with her mother

## 2024-04-10 NOTE — PLAN OF CARE
Problem: Chronic Conditions and Co-morbidities  Goal: Patient's chronic conditions and co-morbidity symptoms are monitored and maintained or improved  4/10/2024 1117 by Rubén Teixeira RN  Outcome: Progressing  Note: Pt will have accuchecks before meals and at bedtime with sliding scale insulin in place for coverage. Will continue to monitor for signs and symptoms of hypoglycemia and hyperglycemia throughout shift.  Problem: Pain  Goal: Verbalizes/displays adequate comfort level or baseline comfort level  Outcome: Progressing  Note: Pt will be satisfied with pain control with PRN madhu and PRN toradol. Pt uses numeric pain rating scale with reassessments after pain med administration. Will continue to monitor progression throughout shift.       Problem: Skin/Tissue Integrity  Goal: Absence of new skin breakdown  Description: 1.  Monitor for areas of redness and/or skin breakdown  2.  Assess vascular access sites hourly  3.  Every 4-6 hours minimum:  Change oxygen saturation probe site  4.  Every 4-6 hours:  If on nasal continuous positive airway pressure, respiratory therapy assess nares and determine need for appliance change or resting period.  Outcome: Progressing  Note: Pt is at risk for skin breakdown. Pt will have skin assessments every shift and friction and shear prevented when possible. Will continue to monitor for signs of skin breakdown and enforce prevention measures.       Problem: Safety - Adult  Goal: Free from fall injury  Outcome: Progressing  Note: Pt will remain free from falls throughout hospital stay. Fall precautions in place, bed alarm on, bed in lowest position with wheels locked and side rails 2/4 up. Room door open and hourly rounding completed. Will continue to monitor throughout shift.

## 2024-04-10 NOTE — PROGRESS NOTES
cues;Tactile cues  Sit to Stand: Minimum assistance;Assist X1;Adaptive equipment;Additional time (RW and in parrelle bars)  Stand to Sit: Minimum assistance;Assist X1;Adaptive equipment;Additional time  Stand Pivot Transfers: Minimum assistance;Assist X2;Adaptive equipment;Additional time (to w/c via sit pivot transfer with Min A x 2)  Toilet Transfer: Minimum assistance;Moderate assistance;Assist X2;Adaptive equipment;Additional time (Min A + Mod A)     ADL  Feeding: Independent  UE Dressing: Minimal assistance  LE Dressing: Moderate assistance  LE Dressing Skilled Clinical Factors: assist to pull up over hips  Toileting: Moderate assistance  Toileting Skilled Clinical Factors: pt able to complete joseph care but required assistance for clothing management        Safety Devices  Type of Devices: Bed alarm in place;Left in bed;Nurse notified;All fall risk precautions in place;Call light within reach;Patient at risk for falls;Gait belt  Restraints  Restraints Initially in Place: No     Patient Education  Education Given To: Patient  Education Provided: Role of Therapy;Equipment;Transfer Training;Plan of Care;Precautions;Orientation  Education Method: Demonstration;Verbal  Barriers to Learning: None  Education Outcome: Verbalized understanding;Continued education needed    Disease Specific Education: Pt educated on importance of OOB mobility, prevention of complications of bedrest, and general safety during hospitalization. Pt verbalized understanding    Goals  Short Term Goals  Time Frame for Short Term Goals: 4/15, unless otherwise noted - ALL GOALS ONGOING 4/10  Short Term Goal 1: Pt will complete functional transfer/toilet transfer with CGA  Short Term Goal 2: Pt will complete toileting with CGA  Short Term Goal 3: Pt will complete ADLs while seated EOB by 4/13  Short Term Goal 4: Pt will complete UE HEP 15x reps    AM-PAC - ADL  AM-PAC Daily Activity - Inpatient   How much help is needed for putting on and taking  off regular lower body clothing?: A Lot  How much help is needed for bathing (which includes washing, rinsing, drying)?: A Lot  How much help is needed for toileting (which includes using toilet, bedpan, or urinal)?: A Lot  How much help is needed for putting on and taking off regular upper body clothing?: A Lot  How much help is needed for taking care of personal grooming?: A Lot  How much help for eating meals?: None  AM-[facility] Inpatient Daily Activity Raw Score: 14  AM-PAC Inpatient ADL T-Scale Score : 33.39  ADL Inpatient CMS 0-100% Score: 59.67  ADL Inpatient CMS G-Code Modifier : CK    Therapy Time   Individual Concurrent Group Co-treatment   Time In 1315         Time Out 1425         Minutes 70         Timed Code Treatment Minutes: 70 Minutes       Thu Gandara, OT

## 2024-04-10 NOTE — PROGRESS NOTES
V2.0    [facility] Progress Note      Name:  Renato Nagy /Age/Sex: 1970  (54 y.o. male)   MRN & CSN:  [id number] & [id number] Encounter Date/Time: 4/10/2024 7:39 AM EDT   Location:   PCP: Carrie Mead APRN - NP     Attending:Marcelo Golden MD       Hospital Day: 3    Assessment and Recommendations   Renato Nagy is a 54 y.o. male with pmh of CAD/MI, HFrEF, AICD, T2DM, Chronic Hep C with cirrhosis, chronic resp failure, bilateral BKA, opiate use, deafness, impaired vision who presents with Cardiopulmonary arrest with successful resuscitation (HCC)      Plan:   CAD s/p cardiac arrest  - with AICD  - dropped off at [facility] by friend for possible drug overdose. Pt unresponsive, pulseless. S/p narcan, VT post ACLS with cardioversion x3. No evidence of STEMI  - Device interrogation showed VT  - Continue Home Eliquis 5 mg BID  - Midodrine 5 mg TID  - PO Amio 200 mg BID  - Toradol PRN for pain  - Cardiology following    Chronic HFrEF  - Cardiology following  - Held home lisinopril 5 mg, torsemide 20 mg, Aldactone 25 mg, Plavix 75 mg, Jardiance 10 mg, Lipitor 40 mg. Per chart review, also on Entresto?  - Echo pending    Acute on chronic respiratory failure with hypoxia  - Sternal and rib fx likely 2/2 ACLS  - Intubated , extub   - s/p Dopamine gtt  - Pulmonology following    Possible drug overdose  - Questionable drug overdose per friend's report  - UDS +fentanyl (after pt admitted to ED) and ACLS    Hyponatremia  Hypokalemia  - resolved    T2DM with hyperglycemia  - s/p bilateral BKA with only one prosthetic (2nd broken) and wheelchair  - SSI    Chronic hepatitic C with cirrhosis  - Held home torsemide 20 mg and aldactone 25 mg daily      Diet ADULT DIET; Regular; 4 carb choices (60 gm/meal)   DVT Prophylaxis [] Lovenox, []  Heparin, [] SCDs, [] Ambulation,  [x] Eliquis, [] Xarelto  [] Coumadin   Code Status Full Code   Disposition From: home  Expected Disposition: SNF  Estimated  differentiation is maintained without evidence of an acute infarct.  There is no evidence of hydrocephalus. The ventricles are minimally prominent.  No midline shift. ORBITS: The visualized portion of the orbits demonstrate no acute abnormality. SINUSES: The visualized paranasal sinuses and mastoid air cells demonstrate no acute abnormality. SOFT TISSUES/SKULL:  No acute abnormality of the visualized skull or soft tissues.  Probable lipoma at the upper portion of the neck on the right. CT CERVICAL SPINE loss of the cervical lordosis. No fracture or subluxation. Facets are normally aligned. Odontoid is intact. Spinous processes are intact. No prevertebral soft tissue swelling. Lung apices are clear. Skull base is intact.  NG tube and endotracheal tube are in place.  The endotracheal tube. Appears to be above the thoracic inlet. Craniocervical junction is intact. C1-2 is unremarkable C 2-3 unremarkable C3-4 unremarkable C4-5 unremarkable C5-6 anterior posterior spurring.  Probable calcified central disc protrusion. C6-7 Anterior and posterior spurring.  Loss of disc space height.  Facet arthropathy. C7-T1  unremarkable     CT BRAIN no acute intracranial abnormality. CT CERVICAL SPINE 1.No acute fracture. 2.  Endotracheal tube appears to be high.  Is incompletely evaluated.  CT of the chest is already been ordered.     CT CERVICAL SPINE WO CONTRAST    Result Date: 4/8/2024  EXAMINATION: CT OF THE HEAD WITHOUT CONTRAST; CT OF THE CERVICAL SPINE WITHOUT CONTRAST 4/7/2024 10:02 pm TECHNIQUE: CT of the head was performed without the administration of intravenous contrast. Automated exposure control, iterative reconstruction, and/or weight based adjustment of the mA/kV was utilized to reduce the radiation dose to as low as reasonably achievable.; CT of the cervical spine was performed without the administration of intravenous contrast. Multiplanar reformatted images are provided for review. Automated exposure control,

## 2024-04-10 NOTE — PLAN OF CARE
Problem: Chronic Conditions and Co-morbidities  Goal: Patient's chronic conditions and co-morbidity symptoms are monitored and maintained or improved  Outcome: Progressing   The patient will demonstrate an understanding of s/s & treatment of hyperglycemia, with the goal of completion at discharge.

## 2024-04-10 NOTE — PROGRESS NOTES
Physical Therapy  Facility/Department: [facility] A2 CARD TELEMETRY  Daily Treatment Note  NAME: Renato Nagy  : 1970  MRN: [id number]    Date of Service: 4/10/2024    Discharge Recommendations:  Subacute/Skilled Nursing Facility   PT Equipment Recommendations  Equipment Needed: No  Other: TBD at rehab facility    Therapy discharge recommendations are subject to collaboration from the patient’s interdisciplinary healthcare team, including MD and case management recommendations.    Barriers to Home Discharge:   [x] Steps to access home entry or bed/bath: 3-4 LESTER home   [x] Unable to transfer, ambulate, or propel wheelchair household distances without assist   [x] Limited available assist at home upon discharge (pt reports family members unable to provide current level of physical assist)   [x] Patient or family requests d/c to post-acute facility    [] Poor cognition/safety awareness for d/c to home alone    [] Unable to maintain ordered weight bearing status    [] Patient with salient signs of long-standing immobility   [x] Decreased independence with ADLs   [x] Increased risk for falls    If pt is unable to be seen after this session, please let this note serve as discharge summary.  Please see case management note for discharge disposition.  Thank you.    Patient Diagnosis(es): There were no encounter diagnoses.    Assessment   Assessment: Pt participated well with therapy today; seen in conjunction with OT for co-treat in light of current deficits, in order to safely progress functional mobility.  Pt pleasant and cooperative thrhoughout session.  Pt able to perform bed mobility with supervision and transfers with assist ranging from Ana x 1 to Ana x 1 + modA x 1.  Pt varies between use of RW for support while transferring and performing pivot transfers.  Pt able to don below-knee prosthesis with setup/SBA while seated EOB.  Pt still too weak and in too much pain (chest & ribs) to attempt amb.  Performed

## 2024-04-10 NOTE — PROGRESS NOTES
Patient was very upset about his lunch tray not being what he ordered. Rubén DA SILVA explained to patient that he is on an easy to chew diet and some things he wants he cannot order. Patient understood but was very upset. Patient gave Rubén RN \"the middle finger\" and said \"fuck you!\". Patient very agitated and seemed like he was going to get aggressive and throw the whiteboard at the RN. Lisa DA SILVA the  also in the room at the time. Report given to Marcus DA SILVA at this time to take care of the patient.

## 2024-04-10 NOTE — CONSULTS
Full  consult dictated.  Used the  service in his room    Dx:  opiate dependence    Rec:  1). At this time I believe he has the ability to make informed consent decisions. He denies accessing fentanyl but can't explain how that got in his urine.  He denies any efforts to hurt himself.  He has good information about himself, his medial problems and the care being given and proposed.  He may be discharged whenever the medical team feels it is appropriate.      Rad Driscoll MD

## 2024-04-11 LAB
ANION GAP SERPL CALCULATED.3IONS-SCNC: 8 MMOL/L (ref 3–16)
BASOPHILS # BLD: 0 K/UL (ref 0–0.2)
BASOPHILS NFR BLD: 0.8 %
BUN SERPL-MCNC: 23 MG/DL (ref 7–20)
CALCIUM SERPL-MCNC: 8.3 MG/DL (ref 8.3–10.6)
CHLORIDE SERPL-SCNC: 99 MMOL/L (ref 99–110)
CO2 SERPL-SCNC: 24 MMOL/L (ref 21–32)
CREAT SERPL-MCNC: 0.8 MG/DL (ref 0.9–1.3)
DEPRECATED RDW RBC AUTO: 17 % (ref 12.4–15.4)
EOSINOPHIL # BLD: 0.1 K/UL (ref 0–0.6)
EOSINOPHIL NFR BLD: 1.7 %
GFR SERPLBLD CREATININE-BSD FMLA CKD-EPI: >90 ML/MIN/{1.73_M2}
GLUCOSE BLD-MCNC: 278 MG/DL (ref 70–99)
GLUCOSE BLD-MCNC: 287 MG/DL (ref 70–99)
GLUCOSE BLD-MCNC: 317 MG/DL (ref 70–99)
GLUCOSE BLD-MCNC: 427 MG/DL (ref 70–99)
GLUCOSE SERPL-MCNC: 237 MG/DL (ref 70–99)
HCT VFR BLD AUTO: 35.8 % (ref 40.5–52.5)
HGB BLD-MCNC: 12 G/DL (ref 13.5–17.5)
LYMPHOCYTES # BLD: 0.8 K/UL (ref 1–5.1)
LYMPHOCYTES NFR BLD: 22.3 %
MCH RBC QN AUTO: 29.3 PG (ref 26–34)
MCHC RBC AUTO-ENTMCNC: 33.4 G/DL (ref 31–36)
MCV RBC AUTO: 87.5 FL (ref 80–100)
MONOCYTES # BLD: 0.4 K/UL (ref 0–1.3)
MONOCYTES NFR BLD: 10.3 %
NEUTROPHILS # BLD: 2.3 K/UL (ref 1.7–7.7)
NEUTROPHILS NFR BLD: 64.9 %
PERFORMED ON: ABNORMAL
PLATELET # BLD AUTO: 93 K/UL (ref 135–450)
PMV BLD AUTO: 8.9 FL (ref 5–10.5)
POTASSIUM SERPL-SCNC: 4.8 MMOL/L (ref 3.5–5.1)
RBC # BLD AUTO: 4.09 M/UL (ref 4.2–5.9)
SODIUM SERPL-SCNC: 131 MMOL/L (ref 136–145)
WBC # BLD AUTO: 3.5 K/UL (ref 4–11)

## 2024-04-11 PROCEDURE — 2580000003 HC RX 258: Performed by: FAMILY MEDICINE

## 2024-04-11 PROCEDURE — 80048 BASIC METABOLIC PNL TOTAL CA: CPT

## 2024-04-11 PROCEDURE — 6370000000 HC RX 637 (ALT 250 FOR IP): Performed by: NURSE PRACTITIONER

## 2024-04-11 PROCEDURE — 93306 TTE W/DOPPLER COMPLETE: CPT

## 2024-04-11 PROCEDURE — 6360000002 HC RX W HCPCS

## 2024-04-11 PROCEDURE — 6370000000 HC RX 637 (ALT 250 FOR IP): Performed by: STUDENT IN AN ORGANIZED HEALTH CARE EDUCATION/TRAINING PROGRAM

## 2024-04-11 PROCEDURE — 2700000000 HC OXYGEN THERAPY PER DAY

## 2024-04-11 PROCEDURE — 6370000000 HC RX 637 (ALT 250 FOR IP): Performed by: INTERNAL MEDICINE

## 2024-04-11 PROCEDURE — 93356 MYOCRD STRAIN IMG SPCKL TRCK: CPT

## 2024-04-11 PROCEDURE — 94761 N-INVAS EAR/PLS OXIMETRY MLT: CPT

## 2024-04-11 PROCEDURE — 36415 COLL VENOUS BLD VENIPUNCTURE: CPT

## 2024-04-11 PROCEDURE — 6370000000 HC RX 637 (ALT 250 FOR IP)

## 2024-04-11 PROCEDURE — 1200000000 HC SEMI PRIVATE

## 2024-04-11 PROCEDURE — 85025 COMPLETE CBC W/AUTO DIFF WBC: CPT

## 2024-04-11 RX ORDER — INSULIN GLARGINE 100 [IU]/ML
28 INJECTION, SOLUTION SUBCUTANEOUS NIGHTLY
Status: DISCONTINUED | OUTPATIENT
Start: 2024-04-11 | End: 2024-04-12 | Stop reason: HOSPADM

## 2024-04-11 RX ADMIN — INSULIN LISPRO 16 UNITS: 100 INJECTION, SOLUTION INTRAVENOUS; SUBCUTANEOUS at 12:41

## 2024-04-11 RX ADMIN — INSULIN LISPRO 8 UNITS: 100 INJECTION, SOLUTION INTRAVENOUS; SUBCUTANEOUS at 18:09

## 2024-04-11 RX ADMIN — INSULIN GLARGINE 28 UNITS: 100 INJECTION, SOLUTION SUBCUTANEOUS at 21:37

## 2024-04-11 RX ADMIN — INSULIN LISPRO 4 UNITS: 100 INJECTION, SOLUTION INTRAVENOUS; SUBCUTANEOUS at 21:37

## 2024-04-11 RX ADMIN — METHOCARBAMOL 500 MG: 500 TABLET ORAL at 00:51

## 2024-04-11 RX ADMIN — METHOCARBAMOL 500 MG: 500 TABLET ORAL at 23:15

## 2024-04-11 RX ADMIN — INSULIN LISPRO 7 UNITS: 100 INJECTION, SOLUTION INTRAVENOUS; SUBCUTANEOUS at 18:09

## 2024-04-11 RX ADMIN — INSULIN LISPRO 7 UNITS: 100 INJECTION, SOLUTION INTRAVENOUS; SUBCUTANEOUS at 12:42

## 2024-04-11 RX ADMIN — GABAPENTIN 300 MG: 300 CAPSULE ORAL at 10:20

## 2024-04-11 RX ADMIN — MIDODRINE HYDROCHLORIDE 5 MG: 5 TABLET ORAL at 12:41

## 2024-04-11 RX ADMIN — Medication 10 ML: at 21:39

## 2024-04-11 RX ADMIN — MIDODRINE HYDROCHLORIDE 5 MG: 5 TABLET ORAL at 10:20

## 2024-04-11 RX ADMIN — KETOROLAC TROMETHAMINE 30 MG: 30 INJECTION, SOLUTION INTRAMUSCULAR; INTRAVENOUS at 06:32

## 2024-04-11 RX ADMIN — PANTOPRAZOLE SODIUM 40 MG: 40 TABLET, DELAYED RELEASE ORAL at 06:32

## 2024-04-11 RX ADMIN — OXYCODONE AND ACETAMINOPHEN 2 TABLET: 5; 325 TABLET ORAL at 03:54

## 2024-04-11 RX ADMIN — METHOCARBAMOL 500 MG: 500 TABLET ORAL at 12:41

## 2024-04-11 RX ADMIN — APIXABAN 5 MG: 5 TABLET, FILM COATED ORAL at 21:36

## 2024-04-11 RX ADMIN — OXYCODONE AND ACETAMINOPHEN 2 TABLET: 5; 325 TABLET ORAL at 11:16

## 2024-04-11 RX ADMIN — AMIODARONE HYDROCHLORIDE 200 MG: 200 TABLET ORAL at 21:36

## 2024-04-11 RX ADMIN — APIXABAN 5 MG: 5 TABLET, FILM COATED ORAL at 10:20

## 2024-04-11 RX ADMIN — AMIODARONE HYDROCHLORIDE 200 MG: 200 TABLET ORAL at 10:19

## 2024-04-11 RX ADMIN — KETOROLAC TROMETHAMINE 30 MG: 30 INJECTION, SOLUTION INTRAMUSCULAR; INTRAVENOUS at 00:51

## 2024-04-11 RX ADMIN — MIDODRINE HYDROCHLORIDE 5 MG: 5 TABLET ORAL at 16:28

## 2024-04-11 RX ADMIN — INSULIN LISPRO 8 UNITS: 100 INJECTION, SOLUTION INTRAVENOUS; SUBCUTANEOUS at 10:20

## 2024-04-11 RX ADMIN — GABAPENTIN 300 MG: 300 CAPSULE ORAL at 21:36

## 2024-04-11 RX ADMIN — OXYCODONE AND ACETAMINOPHEN 2 TABLET: 5; 325 TABLET ORAL at 21:35

## 2024-04-11 RX ADMIN — OXYCODONE AND ACETAMINOPHEN 2 TABLET: 5; 325 TABLET ORAL at 16:18

## 2024-04-11 RX ADMIN — GABAPENTIN 300 MG: 300 CAPSULE ORAL at 12:41

## 2024-04-11 RX ADMIN — INSULIN LISPRO 7 UNITS: 100 INJECTION, SOLUTION INTRAVENOUS; SUBCUTANEOUS at 10:20

## 2024-04-11 ASSESSMENT — PAIN DESCRIPTION - LOCATION
LOCATION: CHEST

## 2024-04-11 ASSESSMENT — PAIN DESCRIPTION - DESCRIPTORS
DESCRIPTORS: ACHING

## 2024-04-11 ASSESSMENT — PAIN - FUNCTIONAL ASSESSMENT
PAIN_FUNCTIONAL_ASSESSMENT: ACTIVITIES ARE NOT PREVENTED

## 2024-04-11 ASSESSMENT — PAIN SCALES - GENERAL
PAINLEVEL_OUTOF10: 5
PAINLEVEL_OUTOF10: 10
PAINLEVEL_OUTOF10: 10
PAINLEVEL_OUTOF10: 3
PAINLEVEL_OUTOF10: 5
PAINLEVEL_OUTOF10: 10
PAINLEVEL_OUTOF10: 6
PAINLEVEL_OUTOF10: 6
PAINLEVEL_OUTOF10: 10
PAINLEVEL_OUTOF10: 9

## 2024-04-11 ASSESSMENT — PAIN DESCRIPTION - ORIENTATION
ORIENTATION: MID
ORIENTATION: MID
ORIENTATION: LEFT
ORIENTATION: LEFT
ORIENTATION: MID
ORIENTATION: LEFT;MID

## 2024-04-11 NOTE — PLAN OF CARE
Problem: Chronic Conditions and Co-morbidities  Goal: Patient's chronic conditions and co-morbidity symptoms are monitored and maintained or improved  4/10/2024 2204 by Claudia Piper RN  Outcome: Progressing  Q4 Accuchecks will be performed and BS addressed per orders.   4/10/2024 1117 by Rubén Teixeira RN  Outcome: Progressing  Note: Pt will have accuchecks before meals and at bedtime with sliding scale insulin in place for coverage. Will continue to monitor for signs and symptoms of hypoglycemia and hyperglycemia throughout shift.       Problem: Discharge Planning  Goal: Discharge to home or other facility with appropriate resources  Outcome: Progressing     Problem: Pain  Goal: Verbalizes/displays adequate comfort level or baseline comfort level  4/10/2024 2204 by Claudia Piper RN  Outcome: Not Progressing    Patient demonstrates dissatisfaction with frequency of  IVP pain medication  4/10/2024 1117 by Rubén Teixeira RN  Outcome: Progressing  Note: Pt will be satisfied with pain control with PRN madhu and PRN toradol. Pt uses numeric pain rating scale with reassessments after pain med administration. Will continue to monitor progression throughout shift.       Problem: Skin/Tissue Integrity  Goal: Absence of new skin breakdown  Description: 1.  Monitor for areas of redness and/or skin breakdown  2.  Assess vascular access sites hourly  3.  Every 4-6 hours minimum:  Change oxygen saturation probe site  4.  Every 4-6 hours:  If on nasal continuous positive airway pressure, respiratory therapy assess nares and determine need for appliance change or resting period.  4/10/2024 2204 by Claudia Piper RN  Outcome: Progressing  4/10/2024 1117 by Rubén Teixeira RN  Outcome: Progressing  Note: Pt is at risk for skin breakdown. Pt will have skin assessments every shift and friction and shear prevented when possible. Will continue to monitor for signs of skin breakdown and enforce prevention measures.

## 2024-04-11 NOTE — CARE COORDINATION
Spoke with MD who states patient is able to discharge without the fitting on a new prosthetic due to it can be done outpatient.  Patient and SO had requested to discuss SNF placement after this was going to happen.  Spoke with patient at bedside via . He is now aware of the above and still interested in discharging to a SNF where he will be able to smoke.  He does not a specific preference as long as it is not [facility].  Placed call to Nancy with [facility] [place] as they allow for smoking.  Notified her of the above and of the referral.  She confirmed that they are both in network with patient's [place] and will allow smoking.  She states they will review and call back with whether or not they will be able to accept.  If so, will initiate precert.

## 2024-04-11 NOTE — PROGRESS NOTES
Patient requesting IV pain medication for 10/10 pain. RN brought PRN toradol in. Pt got on  and stated that he wanted toradol to be listed as an allergy due to the burning sensation it causes through PIV and is requesting it to be changed to morphine. Writer made Dr. Golden aware. Toradol discontinued.

## 2024-04-11 NOTE — PLAN OF CARE
Problem: Chronic Conditions and Co-morbidities  Goal: Patient's chronic conditions and co-morbidity symptoms are monitored and maintained or improved  Outcome: Progressing     Problem: Discharge Planning  Goal: Discharge to home or other facility with appropriate resources  Outcome: Progressing     Problem: Pain  Goal: Verbalizes/displays adequate comfort level or baseline comfort level  Outcome: Progressing     Problem: Skin/Tissue Integrity  Goal: Absence of new skin breakdown  Description: 1.  Monitor for areas of redness and/or skin breakdown  2.  Assess vascular access sites hourly  3.  Every 4-6 hours minimum:  Change oxygen saturation probe site  4.  Every 4-6 hours:  If on nasal continuous positive airway pressure, respiratory therapy assess nares and determine need for appliance change or resting period.  Outcome: Progressing     Problem: Safety - Adult  Goal: Free from fall injury  Outcome: Progressing     Problem: Nutrition Deficit:  Goal: Optimize nutritional status  Outcome: Progressing

## 2024-04-11 NOTE — PROGRESS NOTES
Patient transfer to room 109 from [facility]. Patient sent with all belongings including right prosthetic leg. Pt aware and in agreement of room transfer. Pt's ex GF made aware. Bedside report given to AVINASH Farmer.

## 2024-04-11 NOTE — PROGRESS NOTES
V2.0    [facility] [place]      Name:  Renato Nagy /Age/Sex: 1970  (54 y.o. male)   MRN & CSN:  [id number] & [id number] Encounter Date/Time: 2024 7:39 AM EDT   Location:   PCP: Carrie Mead APRN - NP     Attending:Marcelo Golden MD       Hospital Day: 4    Assessment and Recommendations   Renaot Nagy is a 54 y.o. male with pmh of CAD/MI, HFrEF, AICD, T2DM, Chronic Hep C with cirrhosis, chronic resp failure, bilateral BKA, opiate use, deafness, impaired vision who presents with Cardiopulmonary arrest with successful resuscitation (HCC)    Patient agreeable for SNF.  to coordinate placement.    Plan:   CAD s/p cardiac arrest  - with AICD  - dropped off at [place] ED by friend for possible drug overdose. Pt unresponsive, pulseless. S/p narcan, VT post ACLS with cardioversion x3. No evidence of STEMI  - Device interrogation showed VT  - Continue Home Eliquis 5 mg BID  - Midodrine 5 mg TID  - PO Amio 200 mg BID  - Toradol discontinued due to patient discomfort  - Percocet PRN for pain  - Cardiology signed off    Chronic HFrEF  - Cardiology following  - Held home lisinopril 5 mg, torsemide 20 mg, Aldactone 25 mg, Plavix 75 mg, Jardiance 10 mg, Lipitor 40 mg. Per chart review, also on Entresto?  - Echo pending    Acute on chronic respiratory failure with hypoxia  - Sternal and rib fx likely 2/2 ACLS  - Intubated , extub   - s/p Dopamine gtt  - Pulmonology following    Possible drug overdose  - Questionable drug overdose per friend's report  - UDS +fentanyl (after pt admitted to ED) and ACLS  - No withdrawal symptoms noted  - Psychiatry consulted, noting patient does have capacity and hx of opiate dependence.    Hyponatremia  Hypokalemia  - resolved    T2DM with hyperglycemia  - s/p bilateral BKA with only one prosthetic (2nd broken) and wheelchair  - Increase Lantus to 28 U QHS  - Lispro 7 U with meals  - High dose SSI    Chronic hepatitic C with cirrhosis  -  soft tissue swelling. Lung apices are clear. Skull base is intact.  NG tube and endotracheal tube are in place.  The endotracheal tube. Appears to be above the thoracic inlet. Craniocervical junction is intact. C1-2 is unremarkable C 2-3 unremarkable C3-4 unremarkable C4-5 unremarkable C5-6 anterior posterior spurring.  Probable calcified central disc protrusion. C6-7 Anterior and posterior spurring.  Loss of disc space height.  Facet arthropathy. C7-T1  unremarkable     CT BRAIN no acute intracranial abnormality. CT CERVICAL SPINE 1.No acute fracture. 2.  Endotracheal tube appears to be high.  Is incompletely evaluated.  CT of the chest is already been ordered.     POC US FOR VASCULAR ACCESS    Result Date: 4/7/2024  POCUS_Proc_Central_Line     Exam Information:          Exam type:  Clinically indicated     Indication(s) for Exam:          The exam was performed with the following indications::  Other indications as noted in the H&P     Location:          Right         Specific site of central line::  Femoral vein     Complications:          None     Interpretation:          Successful U/S-guided central line insertion  Electronically signed by Tristan Rivas on Sunday, April 7, 2024 at 11:27 PM : Attending:       CBC:   Recent Labs     04/09/24  0930 04/10/24  0522 04/11/24  0518   WBC 9.1 4.4 3.5*   HGB 12.6* 12.3* 12.0*   * 86* 93*     BMP:    Recent Labs     04/09/24 0930 04/10/24  0522 04/11/24  0518   * 132* 131*   K 4.6 4.5 4.8   CL 95* 98* 99   CO2 28 25 24   BUN 10 13 23*   CREATININE 0.8* 0.7* 0.8*   GLUCOSE 265* 270* 237*     Hepatic:   No results for input(s): \"AST\", \"ALT\", \"ALB\", \"BILITOT\", \"ALKPHOS\" in the last 72 hours.    Lipids:   Lab Results   Component Value Date/Time    CHOL 73 09/10/2023 05:07 AM    HDL 36 09/10/2023 05:07 AM    TRIG 35 09/10/2023 05:07 AM     Hemoglobin A1C:   Lab Results   Component Value Date/Time    LABA1C 9.6 04/08/2024 03:56 AM     TSH: No results

## 2024-04-11 NOTE — PROGRESS NOTES
Nurse responded to patient call light and found patient attempting to reach NC on the wall. Patient demanded that he be placed on 2 LNC and became verbally threatening, and attempted to lung out of bed when I told him his 02 level did not require oxygen. The decision was made to place him on 2 LNC rather than escalate the situation.

## 2024-04-12 VITALS
RESPIRATION RATE: 18 BRPM | BODY MASS INDEX: 27.18 KG/M2 | TEMPERATURE: 98 F | DIASTOLIC BLOOD PRESSURE: 62 MMHG | OXYGEN SATURATION: 95 % | SYSTOLIC BLOOD PRESSURE: 95 MMHG | HEIGHT: 65 IN | WEIGHT: 163.14 LBS | HEART RATE: 66 BPM

## 2024-04-12 LAB
ANION GAP SERPL CALCULATED.3IONS-SCNC: 9 MMOL/L (ref 3–16)
BASOPHILS # BLD: 0 K/UL (ref 0–0.2)
BASOPHILS NFR BLD: 1.2 %
BUN SERPL-MCNC: 16 MG/DL (ref 7–20)
CALCIUM SERPL-MCNC: 8.3 MG/DL (ref 8.3–10.6)
CHLORIDE SERPL-SCNC: 101 MMOL/L (ref 99–110)
CO2 SERPL-SCNC: 25 MMOL/L (ref 21–32)
CREAT SERPL-MCNC: 0.6 MG/DL (ref 0.9–1.3)
DEPRECATED RDW RBC AUTO: 17.3 % (ref 12.4–15.4)
EOSINOPHIL # BLD: 0.1 K/UL (ref 0–0.6)
EOSINOPHIL NFR BLD: 1.8 %
GFR SERPLBLD CREATININE-BSD FMLA CKD-EPI: >90 ML/MIN/{1.73_M2}
GLUCOSE BLD-MCNC: 109 MG/DL (ref 70–99)
GLUCOSE BLD-MCNC: 149 MG/DL (ref 70–99)
GLUCOSE BLD-MCNC: 214 MG/DL (ref 70–99)
GLUCOSE SERPL-MCNC: 188 MG/DL (ref 70–99)
HCT VFR BLD AUTO: 35.6 % (ref 40.5–52.5)
HGB BLD-MCNC: 11.9 G/DL (ref 13.5–17.5)
LYMPHOCYTES # BLD: 0.8 K/UL (ref 1–5.1)
LYMPHOCYTES NFR BLD: 27.5 %
MCH RBC QN AUTO: 29.4 PG (ref 26–34)
MCHC RBC AUTO-ENTMCNC: 33.4 G/DL (ref 31–36)
MCV RBC AUTO: 87.9 FL (ref 80–100)
MONOCYTES # BLD: 0.4 K/UL (ref 0–1.3)
MONOCYTES NFR BLD: 13.9 %
NEUTROPHILS # BLD: 1.7 K/UL (ref 1.7–7.7)
NEUTROPHILS NFR BLD: 55.6 %
PERFORMED ON: ABNORMAL
PLATELET # BLD AUTO: 100 K/UL (ref 135–450)
PMV BLD AUTO: 8.7 FL (ref 5–10.5)
POTASSIUM SERPL-SCNC: 5.2 MMOL/L (ref 3.5–5.1)
RBC # BLD AUTO: 4.05 M/UL (ref 4.2–5.9)
SODIUM SERPL-SCNC: 135 MMOL/L (ref 136–145)
WBC # BLD AUTO: 3 K/UL (ref 4–11)

## 2024-04-12 PROCEDURE — 97535 SELF CARE MNGMENT TRAINING: CPT

## 2024-04-12 PROCEDURE — 6360000002 HC RX W HCPCS: Performed by: FAMILY MEDICINE

## 2024-04-12 PROCEDURE — 6370000000 HC RX 637 (ALT 250 FOR IP): Performed by: INTERNAL MEDICINE

## 2024-04-12 PROCEDURE — 6370000000 HC RX 637 (ALT 250 FOR IP)

## 2024-04-12 PROCEDURE — 94761 N-INVAS EAR/PLS OXIMETRY MLT: CPT

## 2024-04-12 PROCEDURE — 2580000003 HC RX 258: Performed by: FAMILY MEDICINE

## 2024-04-12 PROCEDURE — 97110 THERAPEUTIC EXERCISES: CPT

## 2024-04-12 PROCEDURE — 97530 THERAPEUTIC ACTIVITIES: CPT

## 2024-04-12 PROCEDURE — 6370000000 HC RX 637 (ALT 250 FOR IP): Performed by: NURSE PRACTITIONER

## 2024-04-12 PROCEDURE — 85025 COMPLETE CBC W/AUTO DIFF WBC: CPT

## 2024-04-12 PROCEDURE — 2700000000 HC OXYGEN THERAPY PER DAY

## 2024-04-12 PROCEDURE — 80048 BASIC METABOLIC PNL TOTAL CA: CPT

## 2024-04-12 PROCEDURE — 6370000000 HC RX 637 (ALT 250 FOR IP): Performed by: STUDENT IN AN ORGANIZED HEALTH CARE EDUCATION/TRAINING PROGRAM

## 2024-04-12 PROCEDURE — 36415 COLL VENOUS BLD VENIPUNCTURE: CPT

## 2024-04-12 RX ORDER — AMIODARONE HYDROCHLORIDE 200 MG/1
200 TABLET ORAL 2 TIMES DAILY
Qty: 60 TABLET | Refills: 0
Start: 2024-04-12

## 2024-04-12 RX ORDER — CLONIDINE HYDROCHLORIDE 0.1 MG/1
0.1 TABLET ORAL EVERY 6 HOURS PRN
Status: DISCONTINUED | OUTPATIENT
Start: 2024-04-12 | End: 2024-04-12 | Stop reason: HOSPADM

## 2024-04-12 RX ORDER — PROMETHAZINE HYDROCHLORIDE 25 MG/1
25 TABLET ORAL EVERY 6 HOURS PRN
Qty: 20 TABLET | Refills: 0
Start: 2024-04-12 | End: 2024-04-19

## 2024-04-12 RX ORDER — MIDODRINE HYDROCHLORIDE 5 MG/1
5 TABLET ORAL
Qty: 90 TABLET | Refills: 0
Start: 2024-04-12

## 2024-04-12 RX ORDER — TRAZODONE HYDROCHLORIDE 50 MG/1
50 TABLET ORAL NIGHTLY PRN
Status: DISCONTINUED | OUTPATIENT
Start: 2024-04-12 | End: 2024-04-12 | Stop reason: HOSPADM

## 2024-04-12 RX ORDER — DICYCLOMINE HCL 20 MG
20 TABLET ORAL EVERY 6 HOURS PRN
Status: DISCONTINUED | OUTPATIENT
Start: 2024-04-12 | End: 2024-04-12 | Stop reason: HOSPADM

## 2024-04-12 RX ORDER — PROMETHAZINE HYDROCHLORIDE 25 MG/1
25 TABLET ORAL EVERY 6 HOURS PRN
Status: DISCONTINUED | OUTPATIENT
Start: 2024-04-12 | End: 2024-04-12 | Stop reason: HOSPADM

## 2024-04-12 RX ORDER — HYDROXYZINE PAMOATE 25 MG/1
50 CAPSULE ORAL EVERY 8 HOURS PRN
Status: DISCONTINUED | OUTPATIENT
Start: 2024-04-12 | End: 2024-04-12 | Stop reason: HOSPADM

## 2024-04-12 RX ORDER — OXYCODONE HYDROCHLORIDE AND ACETAMINOPHEN 5; 325 MG/1; MG/1
1 TABLET ORAL EVERY 6 HOURS PRN
Qty: 12 TABLET | Refills: 0 | Status: SHIPPED | OUTPATIENT
Start: 2024-04-12 | End: 2024-04-15

## 2024-04-12 RX ORDER — TORSEMIDE 20 MG/1
20 TABLET ORAL DAILY
Status: DISCONTINUED | OUTPATIENT
Start: 2024-04-12 | End: 2024-04-12 | Stop reason: HOSPADM

## 2024-04-12 RX ADMIN — INSULIN LISPRO 7 UNITS: 100 INJECTION, SOLUTION INTRAVENOUS; SUBCUTANEOUS at 12:28

## 2024-04-12 RX ADMIN — Medication 10 ML: at 09:16

## 2024-04-12 RX ADMIN — OXYCODONE AND ACETAMINOPHEN 2 TABLET: 5; 325 TABLET ORAL at 04:41

## 2024-04-12 RX ADMIN — APIXABAN 5 MG: 5 TABLET, FILM COATED ORAL at 09:22

## 2024-04-12 RX ADMIN — ONDANSETRON 4 MG: 2 INJECTION INTRAMUSCULAR; INTRAVENOUS at 09:15

## 2024-04-12 RX ADMIN — AMIODARONE HYDROCHLORIDE 200 MG: 200 TABLET ORAL at 09:18

## 2024-04-12 RX ADMIN — INSULIN LISPRO 4 UNITS: 100 INJECTION, SOLUTION INTRAVENOUS; SUBCUTANEOUS at 12:28

## 2024-04-12 RX ADMIN — GABAPENTIN 300 MG: 300 CAPSULE ORAL at 09:18

## 2024-04-12 RX ADMIN — INSULIN LISPRO 7 UNITS: 100 INJECTION, SOLUTION INTRAVENOUS; SUBCUTANEOUS at 09:22

## 2024-04-12 RX ADMIN — PANTOPRAZOLE SODIUM 40 MG: 40 TABLET, DELAYED RELEASE ORAL at 05:49

## 2024-04-12 RX ADMIN — INSULIN LISPRO 7 UNITS: 100 INJECTION, SOLUTION INTRAVENOUS; SUBCUTANEOUS at 17:01

## 2024-04-12 RX ADMIN — MIDODRINE HYDROCHLORIDE 5 MG: 5 TABLET ORAL at 12:28

## 2024-04-12 RX ADMIN — OXYCODONE AND ACETAMINOPHEN 2 TABLET: 5; 325 TABLET ORAL at 13:45

## 2024-04-12 RX ADMIN — METHOCARBAMOL 500 MG: 500 TABLET ORAL at 12:28

## 2024-04-12 RX ADMIN — OXYCODONE AND ACETAMINOPHEN 2 TABLET: 5; 325 TABLET ORAL at 09:22

## 2024-04-12 RX ADMIN — TORSEMIDE 20 MG: 20 TABLET ORAL at 12:28

## 2024-04-12 RX ADMIN — HYDROXYZINE PAMOATE 50 MG: 25 CAPSULE ORAL at 12:28

## 2024-04-12 RX ADMIN — MIDODRINE HYDROCHLORIDE 5 MG: 5 TABLET ORAL at 17:01

## 2024-04-12 RX ADMIN — GABAPENTIN 300 MG: 300 CAPSULE ORAL at 13:45

## 2024-04-12 RX ADMIN — PROMETHAZINE HYDROCHLORIDE 25 MG: 25 TABLET ORAL at 12:28

## 2024-04-12 ASSESSMENT — PAIN - FUNCTIONAL ASSESSMENT: PAIN_FUNCTIONAL_ASSESSMENT: ACTIVITIES ARE NOT PREVENTED

## 2024-04-12 ASSESSMENT — PAIN SCALES - GENERAL
PAINLEVEL_OUTOF10: 10

## 2024-04-12 ASSESSMENT — PAIN DESCRIPTION - DESCRIPTORS: DESCRIPTORS: ACHING

## 2024-04-12 ASSESSMENT — PAIN DESCRIPTION - LOCATION: LOCATION: CHEST

## 2024-04-12 ASSESSMENT — PAIN DESCRIPTION - ORIENTATION: ORIENTATION: MID

## 2024-04-12 NOTE — PROGRESS NOTES
Occupational Therapy  Facility/Department: [facility] [id number] [facility]  Daily Treatment Note  NAME: Renato Nagy  : 1970  MRN: [id number]    Date of Service: 2024    Discharge Recommendations:  Subacute/Skilled Nursing Facility  OT Equipment Recommendations  Equipment Needed: No  Other: defer    Barriers to Home Discharge:   [x] Steps to access home entry or bed/bath:   [x] Unable to transfer, ambulate, or propel wheelchair household distances without assist   [] Limited available assist at home upon discharge    [x] Patient or family requests d/c to post-acute facility    [] Poor cognition/safety awareness for d/c to home alone    [] Unable to maintain ordered weight bearing status    [] Patient with salient signs of long-standing immobility   [x] Decreased independence with ADLs   [x] Increased risk for falls   [] Other:     If pt is unable to be seen after this session, please let this note serve as discharge summary.  Please see case management note for discharge disposition.  Thank you.    Patient Diagnosis(es): There were no encounter diagnoses.     Assessment    Assessment: Pt supine in bed at start of session. Pt tolerates OT session well. Pt completes bed mobility with supervision and bed > w/c transfer with CGA. Pt slightly impulsive at times and requires cues for safety throughout session. Pt completes UB and LB dressing with SBA and seated grooming tasks with setup at sink. Pt is limited by safety awareness and generalized weakness - OT recommends SNF to increase pt's independence with ADLs/ mobility. Pt will continue to benefit from continued skilled OT services at this time. Co-tx collaboration this date to safely meet goals and will have better occupational performance outcomes with in a co-treatment than 1:1 session.  Activity Tolerance: Patient tolerated treatment well  Discharge Recommendations: Subacute/Skilled Nursing Facility  Equipment Needed: No  Other: defer      Plan  Supervision;Adaptive equipment  Sit to Supine: Supervision;Additional time;Adaptive equipment  Balance  Sitting: Intact  Standing: Impaired (pt uses grab bars, hand placement on WC to perform stand pivot transfer to  from toilet, mildly unsteady during trasnfer but does not need physical assist.)  Standing - Static: Fair;Constant support  Standing - Dynamic: Fair;Constant support  Transfer Training  Transfer Training: Yes  Overall Level of Assistance: Contact-guard assistance;Additional time;Adaptive equipment  Interventions: Safety awareness training;Visual cues;Tactile cues  Sit to Stand: Contact-guard assistance;Additional time;Adaptive equipment  Stand to Sit: Contact-guard assistance;Additional time;Adaptive equipment  Bed to Chair: CGA via squat pivot (Bed > w/c > toilet > bed)  Toilet transfer: CGA via squat pivot  Wheelchair Management  Wheelchair Management: Yes  Overall Level of Assistance: Supervision  Interventions: Demonstration  Distance (ft): 40 Feet     ADL  Grooming: Setup;Increased time to complete  Grooming Skilled Clinical Factors: Seated in w/c at sink pt brushed teeth  UE Dressing: Stand by assistance  UE Dressing Skilled Clinical Factors: Seated EOB donned shirt  LE Dressing: Stand by assistance  LE Dressing Skilled Clinical Factors: donned RLE prosthetic  Toileting: Supervision;Increased time to complete  Toileting Skilled Clinical Factors: used urinal supine in bed  Skin Care: N/A        Safety Devices  Type of Devices: Bed alarm in place;Left in bed;Nurse notified;All fall risk precautions in place;Call light within reach;Patient at risk for falls;Gait belt  Restraints  Restraints Initially in Place: No     Patient Education  Education Given To: Patient  Education Provided: Role of Therapy;Equipment;Transfer Training;Plan of Care;Precautions;Orientation;ADL Adaptive Strategies  Education Provided Comments: POC. importance of increased activity, safe transfer technique, d/c rec  Education

## 2024-04-12 NOTE — CARE COORDINATION
Cert is approved through [facility].   This authorization is valid for admission through 04/15/2024.   CM is aware.     [facility]

## 2024-04-12 NOTE — CARE COORDINATION
Patient just received to  for possible dc; patient with referral to [facility] per coworker Lisa Arias but facility states they are unable to accept and will refer to sister facilities to determine if bed available and able to accept.       Call to [facility] to inquire about bed availability and smoking policy; per coworker, patient insistent on going to facility where he can smoke outside; [facility] does not allow smoking and declines referral.     Call to [facility] and [facility] facilities to check availability and status; unable to accept [place] insurance at any facility.     Call to [facility] and [facility] admissions; they are not in [place] network and cannot take referral.     Call to [facility]; message left for Katherine in admissions; they no longer take [place] and cannot accept referral.     Call to [facility] who states they have patient's information from prior to admission when patient was active with [facility] and was trying to get patient placed in SNF from the community and then patient was admitted to hospital. States they will review updates to admission status and can possibly accept; await return call to confirm bed available and can accept.       Working on care plan; do not have accepting facility at this time.      LG

## 2024-04-12 NOTE — PROGRESS NOTES
4 Eyes Skin Assessment     NAME:  Renato Nagy  YOB: 1970  MEDICAL RECORD NUMBER:  [id number]    The patient is being assessed for  Transfer to New Unit    I agree that at least one RN has performed a thorough Head to Toe Skin Assessment on the patient. ALL assessment sites listed below have been assessed.      Areas assessed by both nurses:    Head, Face, Ears, Shoulders, Back, Chest, Arms, Elbows, Hands, Sacrum. Buttock, Coccyx, Ischium, and Legs. Feet and Heels        Does the Patient have a Wound? No noted wound(s)       Garret Prevention initiated by RN: No  Wound Care Orders initiated by RN: No    Pressure Injury (Stage 3,4, Unstageable, DTI, NWPT, and Complex wounds) if present, place Wound referral order by RN under : No    New Ostomies, if present place, Ostomy referral order under : No     Nurse 1 eSignature: Electronically signed by Eliza Lund RN on 4/11/24 at 10:29 PM EDT    **SHARE this note so that the co-signing nurse can place an eSignature**    Nurse 2 eSignature: Electronically signed by Marleny Navarro RN on 4/11/24 at 10:31 PM EDT

## 2024-04-12 NOTE — PROGRESS NOTES
needed climbing 3-5 steps with a railing?: Total  AM-PAC Inpatient Mobility Raw Score : 19  AM-PAC Inpatient T-Scale Score : 45.44  Mobility Inpatient CMS 0-100% Score: 41.77  Mobility Inpatient CMS G-Code Modifier : CK         Therapy Time   Individual Concurrent Group Co-treatment   Time In       1120   Time Out       1215   Minutes       55   Timed Code Treatment Minutes: 55 Minutes       Dipak Jefferson, PT, DPT  If pt is unable to be seen after this session, please let this note serve as discharge summary.  Please see case management note for discharge disposition.  Thank you.

## 2024-04-12 NOTE — FLOWSHEET NOTE
04/11/24 2035   Vital Signs   Temp 97.4 °F (36.3 °C)   Temp Source Oral   Pulse 73   Heart Rate Source Monitor   Respirations 18   /76   MAP (Calculated) 91   BP Location Left upper arm   BP Method Automatic   Patient Position Semi dannielle   Pain Assessment   Pain Assessment None - Denies Pain   Care Plan - Pain Goals   Verbalizes/displays adequate comfort level or baseline comfort level Encourage patient to monitor pain and request assistance   Opioid-Induced Sedation   POSS Score 1   RASS   Michelle Agitation Sedation Scale (RASS) 0   Oxygen Therapy   SpO2 97 %   Pulse Oximetry Type Intermittent   Pulse Oximeter Device Mode Intermittent   Pulse Oximeter Device Location Right;Finger   O2 Device Nasal cannula   Skin Assessment Clean, dry, & intact   Skin Protection for O2 Device Yes   Orientation Bilateral   Location Ear   Intervention(s) Ear Cushion;Other (Comment)  (Pt requested to place cushions when he is ready-on bedsde table)   O2 Flow Rate (L/min) 2 L/min   Oxygen Therapy Supplemental oxygen

## 2024-04-12 NOTE — PROGRESS NOTES
Comprehensive Nutrition Assessment    Type and Reason for Visit:  Reassess    Nutrition Recommendations/Plan:   Continue CC4 diet and encourage PO intake   RD to add strawberry glucerna once daily   Monitor nutrition adequacy, pertinent labs, bowel habits, wt changes, and clinical progress     Malnutrition Assessment:  Malnutrition Status:  At risk for malnutrition (Comment) (04/12/24 7967)    Context:  Acute Illness     Findings of the 6 clinical characteristics of malnutrition:  Energy Intake:  Mild decrease in energy intake (Comment)    Nutrition Assessment:    Follow up: Pt extubated on 4/8. Advanced to carb control diet on 4/10. PO intakes % of meals since diet advancement. Pt reports good appetite, eating most meals. Reports sometimes he does not eat as much d/t medication or his mood but overall eating well. Reports stable weight, no wt loss in EMR. Encouraged protein intake. Would like to trial glucerna, RD to add once daily. Not appropriate for carb control diet education at time of visit, will provide in AVS. Continue to encourage PO intake, will continue to monitor.    Nutrition Related Findings:    Na 135, K+ 5.2. -427 x 24 hours. No BM documented. Wound Type: None       Current Nutrition Intake & Therapies:    Average Meal Intake: %  Average Supplements Intake: None Ordered  ADULT DIET; Regular; 4 carb choices (60 gm/meal)  ADULT ORAL NUTRITION SUPPLEMENT; Dinner; Diabetic Oral Supplement    Anthropometric Measures:  Height: 165.1 cm (5' 5\")  Ideal Body Weight (IBW): 136 lbs (62 kg)       Current Body Weight: 73.9 kg (163 lb), 107.4 % IBW. Weight Source: Bed Scale  Current BMI (kg/m2): 27.1        Weight Adjustment For: Amputation  Total Adjusted Percentage (Calculated): 5.9  Adjusted Ideal Body Weight (lbs) (Calculated): 128 lbs  Adjusted Ideal Body Weight (kg) (Calculated): 58.18 kg  Adjusted % Ideal Body Weight (Calculated): 114.1  Adjusted BMI (kg/m2) (Calculated): 25.7  BMI

## 2024-04-12 NOTE — CARE COORDINATION
CASE  MANAGEMENT DISCHARGE SUMMARY:    Discharge to: [facility]    Transportation Mode/Time: Transport by [facility] at 5:30 due to debility and need for medical supervision.     Family Notified: patient aware and agreeable    New Durable Medical Equipment: prosthesis as outpatient    HENS if applicable: completed    Precertification obtained if needed: obtained by case management assistant Dayana Leung       RN to RN report if SNF at dc: [phone]: [facility]    Nurse responsible for completion of pg 1/2 of KATIA (continuity of care form), med reconciliation/AVS, and to place copies of each in patient's hard chart.     Nurse to ensure that any written prescriptions; copy of the patients chart to accompany patient to facility.

## 2024-04-12 NOTE — CARE COORDINATION
Call received from [facility], agreeable to accept patient at dc; precert initiated at this time.      LG

## 2024-04-12 NOTE — PLAN OF CARE
Problem: Chronic Conditions and Co-morbidities  Goal: Patient's chronic conditions and co-morbidity symptoms are monitored and maintained or improved  4/11/2024 2212 by Eliza Lund RN  Outcome: Progressing  Flowsheets (Taken 4/11/2024 2035)  Care Plan - Patient's Chronic Conditions and Co-Morbidity Symptoms are Monitored and Maintained or Improved: Monitor and assess patient's chronic conditions and comorbid symptoms for stability, deterioration, or improvement  4/11/2024 1228 by Adina Mccarthy RN  Outcome: Progressing     Problem: Discharge Planning  Goal: Discharge to home or other facility with appropriate resources  4/11/2024 2212 by Eliza Lund RN  Outcome: Progressing  Flowsheets (Taken 4/11/2024 2035)  Discharge to home or other facility with appropriate resources: Identify barriers to discharge with patient and caregiver  4/11/2024 1228 by Adina Mccarthy RN  Outcome: Progressing     Problem: Pain  Goal: Verbalizes/displays adequate comfort level or baseline comfort level  4/11/2024 2212 by Eliza Lund RN  Outcome: Progressing  Flowsheets (Taken 4/11/2024 2035)  Verbalizes/displays adequate comfort level or baseline comfort level: Encourage patient to monitor pain and request assistance  4/11/2024 1228 by Adina Mccarthy RN  Outcome: Progressing     Problem: Skin/Tissue Integrity  Goal: Absence of new skin breakdown  Description: 1.  Monitor for areas of redness and/or skin breakdown  2.  Assess vascular access sites hourly  3.  Every 4-6 hours minimum:  Change oxygen saturation probe site  4.  Every 4-6 hours:  If on nasal continuous positive airway pressure, respiratory therapy assess nares and determine need for appliance change or resting period.  4/11/2024 2212 by Eliza Lund, RN  Outcome: Progressing  4/11/2024 1228 by Adina Mccarthy RN  Outcome: Progressing     Problem: Safety - Adult  Goal: Free from fall injury  4/11/2024 2212 by  Eliza Lund, RN  Outcome: Progressing  4/11/2024 1228 by Adina Mccarthy, RN  Outcome: Progressing     Problem: Nutrition Deficit:  Goal: Optimize nutritional status  4/11/2024 2212 by Eliza Lund, RN  Outcome: Progressing  4/11/2024 1228 by Adina Mccarthy, RN  Outcome: Progressing

## 2024-04-12 NOTE — FLOWSHEET NOTE
04/11/24 2307   Vital Signs   Temp 98.2 °F (36.8 °C)   Pulse 74   /82   MAP (Calculated) 92   Oxygen Therapy   SpO2 98 %

## 2024-04-12 NOTE — PROGRESS NOTES
Pt offered interpretor services several times during shift and declined, each time he shook his head no and choose to write on his bedside white board.     Pt insisted on wearing 2L of oxygen for comfort.    Pt requested evening medication early and communicated that he did not want to be disturbed until morning.     Will continue to monitor.

## 2024-04-12 NOTE — DISCHARGE INSTR - DIET

## 2024-04-12 NOTE — CARE COORDINATION
Spoke to Nancy with [facility] who states staff at facility do not think they will be able to manage patient's needs there.  She is going to pass the referral on to their other sister facilities.   Informed her that patient is now being cooperative with staff and that behaviors have subsided.  She states she will be at the hospital today to do her own assessment due to patient would like to be closer to home.

## 2024-04-12 NOTE — DISCHARGE INSTR - COC
Continuity of Care Form    Patient Name: Renato Nagy   :  1970  MRN:  [id number]    Admit date:  2024  Discharge date:  24      Code Status Order: Full Code   Advance Directives:     Admitting Physician:  Terrell Ward MD  PCP: Carrie Mead APRN - NP    Discharging Nurse: Latosha Jimenez RN  Discharging Hospital Unit/Room#: 0109/0109-01  Discharging Unit Phone Number: [phone]    Emergency Contact:   Extended Emergency Contact Information  Primary Emergency Contact: Anastasiya Araya  Address: [address] [place] [address]  Home Phone: [phone]  Mobile Phone: [phone]  Relation: Other    Past Surgical History:  Past Surgical History:   Procedure Laterality Date    FOOT SURGERY Left 2024    PARTIAL LEFT FOOT AMPUTATION performed by Juan M Quarles DPM at [facility]    LEG AMPUTATION BELOW KNEE Right     LEG AMPUTATION BELOW KNEE Left 2024    LEG AMPUTATION BELOW KNEE performed by Juan M Monteiro MD at [facility]    OTHER SURGICAL HISTORY  2024    PARTIAL LEFT FOOT AMPUTATION - Left    PACEMAKER INSERTION         Immunization History:   Immunization History   Administered Date(s) Administered    Hep A, HAVRIX, VAQTA, (age 19y+), IM, 1mL 2019    Influenza Virus Vaccine 1999, 10/21/2020    Influenza, FLUBLOK, (age 18 y+), PF, 0.5mL 2019    Pneumococcal, PPSV23, PNEUMOVAX 23, (age 2y+), SC/IM, 0.5mL 2007, 2014    TDaP, ADACEL (age 10y-64y), BOOSTRIX (age 10y+), IM, 0.5mL 10/21/2020    Td vaccine (adult) 2007, 2007    Zoster Recombinant (Shingrix) 10/21/2020       Active Problems:  Patient Active Problem List   Diagnosis Code    Acute on chronic congestive heart failure, unspecified heart failure type (HCC) I50.9    Acute pulmonary edema (HCC) J81.0    Below knee amputation (HCC) S88.119A    Cellulitis of right lower extremity L03.115    QT prolongation R94.31    SOB (shortness of  20 pack years F17.210    CVA (cerebrovascular accident) ([facility]) I63.9    Deaf H91.90    Dehydration with hyponatremia E86.0, E87.1    Dependence on nocturnal oxygen therapy Z99.81    Diabetic neuropathy associated with type 2 diabetes mellitus ([facility]) E11.40    Early dry stage nonexudative age-related macular degeneration of both eyes H35.3131    Encounter for orthopedic aftercare following surgical amputation Z47.81    Erectile dysfunction N52.9    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5, Z95.810    Lactic acidosis E87.20    Leukocytosis D72.829    Medication induced coagulopathy ([facility]) D68.9, T50.905A    Methamphetamine abuse ([facility]) F15.10    Mild nonproliferative diabetic retinopathy of both eyes without macular edema associated with type 2 diabetes mellitus ([facility]) E11.3293    Mitral regurgitation I34.0    Moderate protein-calorie malnutrition ([facility]) E44.0    Muscle weakness (generalized) M62.81    Microcytic anemia D50.9    Normocytic anemia D64.9    NSTEMI (non-ST elevated myocardial infarction) ([facility]) I21.4    Obesity E66.9    Opiate dependence, continuous ([facility]) F11.20    IGNACIA (obstructive sleep apnea) G47.33    Pacemaker Z95.0    Palpitation R00.2    Unspecified atrial fibrillation ([facility]) I48.91    Partial traumatic amputation of left foot, level unspecified, subsequent encounter ([facility]) S98.922D    PTSD (post-traumatic stress disorder) F43.10    Pulmonary hypertension ([facility])  normal

## 2024-04-12 NOTE — PROGRESS NOTES
V2.0    [facility] [place]      Name:  Renato Nagy /Age/Sex: 1970  (54 y.o. male)   MRN & CSN:  [id number] & [id number] Encounter Date/Time: 2024 7:39 AM EDT   Location:   PCP: Carrie Mead APRN - NP     Attending:Marcelo Golden MD       Hospital Day: 5    Assessment and Recommendations   Renato Nagy is a 54 y.o. male with pmh of CAD/MI, HFrEF, AICD, T2DM, Chronic Hep C with cirrhosis, chronic resp failure, bilateral BKA, opiate use, deafness, impaired vision who presents with Cardiopulmonary arrest with successful resuscitation ([facility])    Waiting for SNF placement.    Plan:   CAD s/p cardiac arrest  - with AICD  - dropped off at [place] ED by friend for possible drug overdose. Pt unresponsive, pulseless. S/p narcan, VT post ACLS with cardioversion x3. No evidence of STEMI  - Device interrogation showed VT  - Continue Home Eliquis 5 mg BID  - Midodrine 5 mg TID  - PO Amio 200 mg BID  - Toradol discontinued due to patient discomfort  - Percocet PRN for pain. Avoiding escalation to Morphine  - Cardiology signed off    Chronic HFrEF  - Cardiology following  - Held home lisinopril 5 mg, torsemide 20 mg, Aldactone 25 mg, Plavix 75 mg, Jardiance 10 mg, Lipitor 40 mg. Per chart review, also on Entresto?  - Echo pending    Acute on chronic respiratory failure with hypoxia  - Sternal and rib fx likely 2/2 ACLS  - Intubated , extub   - s/p Dopamine gtt  - Pulmonology following    Possible drug overdose  - Questionable drug overdose per friend's report  - UDS +fentanyl (after pt admitted to ED) and ACLS  - Psychiatry consulted, noting patient does have capacity and hx of opiate dependence.  - Nausea, decreased appetite, agitation possibly withdrawal symptoms.  - Phenergan, Trazodone, Clonidine PRN    Hyponatremia  Hypokalemia  - resolved    T2DM with hyperglycemia  - s/p bilateral BKA with only one prosthetic (2nd broken) and wheelchair  - Lantus 28 U QHS  - Lispro 7 U with  0.7* 0.8* 0.6*   GLUCOSE 270* 237* 188*     Hepatic:   No results for input(s): \"AST\", \"ALT\", \"ALB\", \"BILITOT\", \"ALKPHOS\" in the last 72 hours.    Lipids:   Lab Results   Component Value Date/Time    CHOL 73 09/10/2023 05:07 AM    HDL 36 09/10/2023 05:07 AM    TRIG 35 09/10/2023 05:07 AM     Hemoglobin A1C:   Lab Results   Component Value Date/Time    LABA1C 9.6 04/08/2024 03:56 AM     TSH: No results found for: \"TSH\"  Troponin: No results found for: \"TROPONINT\"  Lactic Acid: No results for input(s): \"LACTA\" in the last 72 hours.  BNP:   No results for input(s): \"PROBNP\" in the last 72 hours.    UA:  Lab Results   Component Value Date/Time    NITRU Negative 04/08/2024 12:48 AM    COLORU Yellow 04/08/2024 12:48 AM    PHUR 6.0 04/08/2024 12:48 AM    PHUR 6.0 04/08/2024 12:48 AM    WBCUA 0-2 08/16/2023 09:17 PM    RBCUA 0-2 08/16/2023 09:17 PM    BACTERIA Rare 08/16/2023 09:17 PM    CLARITYU Clear 04/08/2024 12:48 AM    SPECGRAV 1.010 04/08/2024 12:48 AM    LEUKOCYTESUR Negative 04/08/2024 12:48 AM    UROBILINOGEN 0.2 04/08/2024 12:48 AM    BILIRUBINUR Negative 04/08/2024 12:48 AM    BLOODU Negative 04/08/2024 12:48 AM    GLUCOSEU >=1000 04/08/2024 12:48 AM    KETUA Negative 04/08/2024 12:48 AM    AMORPHOUS Rare 08/16/2023 09:17 PM     Urine Cultures: No results found for: \"LABURIN\"  Blood Cultures:   Lab Results   Component Value Date/Time    BC No Growth after 4 days of incubation. 02/17/2024 09:41 AM     Lab Results   Component Value Date/Time    BLOODCULT2 No Growth after 4 days of incubation. 02/17/2024 10:18 AM     Organism: No results found for: \"ORG\"      Electronically signed by Diamante Awad DO on 4/12/2024 at 11:08 AM

## 2024-04-12 NOTE — FLOWSHEET NOTE
04/12/24 0434   Vital Signs   Temp 97.8 °F (36.6 °C)   Temp Source Oral   Pulse 73   Heart Rate Source Monitor   Respirations 18   /79   MAP (Calculated) 91   BP Location Left upper arm   BP Method Automatic   Patient Position Semi fowlers   Pain Assessment   Pain Assessment 0-10   Pain Level 10   Patient's Stated Pain Goal 0 - No pain   Pain Location Chest   Pain Orientation Mid   Pain Descriptors Aching   Functional Pain Assessment Activities are not prevented   Oxygen Therapy   SpO2 97 %   Pulse Oximetry Type Intermittent   Pulse Oximeter Device Mode Intermittent   Pulse Oximeter Device Location Right;Finger   O2 Device Nasal cannula   Skin Assessment Clean, dry, & intact   O2 Flow Rate (L/min) 2 L/min   Height and Weight   Weight - Scale 74 kg (163 lb 2.3 oz)   Weight Method Bed scale   BMI (Calculated) 27.2

## 2024-04-13 NOTE — PROGRESS NOTES
Physician Progress Note      PATIENT:               ISRA CALDERON  CSN #:                  [id number]  :                       1970  ADMIT DATE:       2024 3:31 AM  DISCH DATE:        2024 6:07 PM  RESPONDING  PROVIDER #:        Marcelo HARDWICK MD          QUERY TEXT:    Pt admitted with VF arrest. Pt noted to have hypotension treated with   pressors. If possible, please document in the progress notes and discharge   summary if you are evaluating and/or treating any of the following:      The medical record reflects the following:  Risk Factors: Cardiac arrest, acute respiratory failure, opioid dependence  Clinical Indicators: BP 65/45, S/P cardiac arrest, VT post ACLS with   cardioversion x 3- no evidence of STEMI.  Per pulmonology notes \"Patient's   dopamine to be discontinued and patient blood pressure to be monitored closely   after the appointment is discontinued\".  Treatment: Dopamine drip, albumin, ICU care, serial labs, I&O's, supportive   care    Thank you,  Aleja Porras RN BSN  Options provided:  -- Cardiogenic Shock  -- Hypovolemic Shock  -- Other - I will add my own diagnosis  -- Disagree - Not applicable / Not valid  -- Disagree - Clinically unable to determine / Unknown  -- Refer to Clinical Documentation Reviewer    PROVIDER RESPONSE TEXT:    This patient is in cardiogenic shock.    Query created by: Aleja Porras on 4/10/2024 3:42 PM      Electronically signed by:  Marcelo HARDWICK MD 2024 10:17 PM

## 2024-04-13 NOTE — DISCHARGE SUMMARY
tenderness, normal active bowel sounds  Genitourinary: no suprapubic tenderness  Musculoskeletal: No edema. S/p bilateral BKA  Skin: warm, dry  Neuro: Alert. Deaf with impaired vision at baseline.  Psych: Mood appropriate.     Patient Discharge Instructions:      Follow up:    1.  Primary Care Provider Carrie Mead APRN - NP in the next 1-2 weeks.  2.  Follow up with cardiology in 1-2 weeks    The patient was seen and examined on day of discharge and this discharge summary is in conjunction with any daily progress note from day of discharge. Time spent on discharge: 32 minutes in the examination, evaluation, counseling and review of medications and discharge plan.    ------------------------------------------------------------------------------------------------------------------------------------------------------    Discharge Medications:   Discharge Medication List as of 4/12/2024  5:44 PM        START taking these medications    Details   amiodarone (CORDARONE) 200 MG tablet Take 1 tablet by mouth 2 times daily, Disp-60 tablet, R-0NO PRINT      midodrine (PROAMATINE) 5 MG tablet Take 1 tablet by mouth 3 times daily (with meals), Disp-90 tablet, R-0NO PRINT      promethazine (PHENERGAN) 25 MG tablet Take 1 tablet by mouth every 6 hours as needed for Nausea (Restless Leg Symptoms), Disp-20 tablet, R-0NO PRINT      oxyCODONE-acetaminophen (PERCOCET) 5-325 MG per tablet Take 1 tablet by mouth every 6 hours as needed for Pain for up to 3 days. Max Daily Amount: 4 tablets, Disp-12 tablet, R-0Print           Discharge Medication List as of 4/12/2024  5:44 PM        Discharge Medication List as of 4/12/2024  5:44 PM        CONTINUE these medications which have NOT CHANGED    Details   gabapentin (NEURONTIN) 300 MG capsule Take 1 capsule by mouth 3 times daily.Historical Med      insulin lispro, 1 Unit Dial, (HUMALOG/ADMELOG) 100 UNIT/ML SOPN If 151-200=2units 201-250=4 units 251-300=6units 301-350=8units  12.3* 12.0* 11.9*   HCT 37.3* 35.8* 35.6*   PLT 86* 93* 100*     Recent Labs     04/10/24  0522 04/11/24  0518 04/12/24  0634   * 131* 135*   K 4.5 4.8 5.2*   CL 98* 99 101   CO2 25 24 25   BUN 13 23* 16   CREATININE 0.7* 0.8* 0.6*   CALCIUM 8.2* 8.3 8.3     No results for input(s): \"PROBNP\", \"TROPHS\" in the last 72 hours.  No results for input(s): \"LABA1C\" in the last 72 hours.  No results for input(s): \"AST\", \"ALT\", \"BILIDIR\", \"BILITOT\", \"ALKPHOS\" in the last 72 hours.  No results for input(s): \"INR\", \"LACTA\", \"TSH\" in the last 72 hours.    Urine Cultures: No results found for: \"LABURIN\"  Blood Cultures:   Lab Results   Component Value Date/Time    BC No Growth after 4 days of incubation. 02/17/2024 09:41 AM     Lab Results   Component Value Date/Time    BLOODCULT2 No Growth after 4 days of incubation. 02/17/2024 10:18 AM     Organism: No results found for: \"ORG\"    Signed:    Marcelo Golden MD

## 2024-05-08 PROBLEM — R79.89 ELEVATED TROPONIN: Status: RESOLVED | Noted: 2024-04-08 | Resolved: 2024-05-08

## 2024-10-10 ENCOUNTER — HOSPITAL ENCOUNTER (EMERGENCY)
Age: 54
Discharge: ANOTHER HOSPITAL | End: 2024-10-11
Attending: EMERGENCY MEDICINE
Payer: MEDICARE

## 2024-10-10 ENCOUNTER — APPOINTMENT (OUTPATIENT)
Dept: GENERAL RADIOLOGY | Age: 54
End: 2024-10-10
Payer: MEDICARE

## 2024-10-10 DIAGNOSIS — K04.7 DENTAL INFECTION: ICD-10-CM

## 2024-10-10 DIAGNOSIS — I47.20 VENTRICULAR TACHYARRHYTHMIA (HCC): Primary | ICD-10-CM

## 2024-10-10 DIAGNOSIS — E87.1 HYPONATREMIA: ICD-10-CM

## 2024-10-10 DIAGNOSIS — R07.9 CHEST PAIN, UNSPECIFIED TYPE: ICD-10-CM

## 2024-10-10 LAB
ALBUMIN SERPL-MCNC: 3.5 G/DL (ref 3.4–5)
ALBUMIN/GLOB SERPL: 0.9 {RATIO} (ref 1.1–2.2)
ALP SERPL-CCNC: 146 U/L (ref 40–129)
ALT SERPL-CCNC: 104 U/L (ref 10–40)
ANION GAP SERPL CALCULATED.3IONS-SCNC: 11 MMOL/L (ref 3–16)
AST SERPL-CCNC: 73 U/L (ref 15–37)
BASOPHILS # BLD: 0.1 K/UL (ref 0–0.2)
BASOPHILS NFR BLD: 1 %
BILIRUB SERPL-MCNC: 0.6 MG/DL (ref 0–1)
BUN SERPL-MCNC: 17 MG/DL (ref 7–20)
CALCIUM SERPL-MCNC: 8.4 MG/DL (ref 8.3–10.6)
CHLORIDE SERPL-SCNC: 98 MMOL/L (ref 99–110)
CO2 SERPL-SCNC: 21 MMOL/L (ref 21–32)
CREAT SERPL-MCNC: 0.6 MG/DL (ref 0.9–1.3)
DEPRECATED RDW RBC AUTO: 15.7 % (ref 12.4–15.4)
EOSINOPHIL # BLD: 0.1 K/UL (ref 0–0.6)
EOSINOPHIL NFR BLD: 1.4 %
GFR SERPLBLD CREATININE-BSD FMLA CKD-EPI: >90 ML/MIN/{1.73_M2}
GLUCOSE SERPL-MCNC: 344 MG/DL (ref 70–99)
HCT VFR BLD AUTO: 46.4 % (ref 40.5–52.5)
HGB BLD-MCNC: 15.7 G/DL (ref 13.5–17.5)
LYMPHOCYTES # BLD: 2.3 K/UL (ref 1–5.1)
LYMPHOCYTES NFR BLD: 29.8 %
MCH RBC QN AUTO: 29.2 PG (ref 26–34)
MCHC RBC AUTO-ENTMCNC: 33.8 G/DL (ref 31–36)
MCV RBC AUTO: 86.2 FL (ref 80–100)
MONOCYTES # BLD: 0.7 K/UL (ref 0–1.3)
MONOCYTES NFR BLD: 9.4 %
NEUTROPHILS # BLD: 4.5 K/UL (ref 1.7–7.7)
NEUTROPHILS NFR BLD: 58.4 %
NT-PROBNP SERPL-MCNC: 1516 PG/ML (ref 0–124)
PLATELET # BLD AUTO: 134 K/UL (ref 135–450)
PMV BLD AUTO: 9.3 FL (ref 5–10.5)
POTASSIUM SERPL-SCNC: 4.2 MMOL/L (ref 3.5–5.1)
PROT SERPL-MCNC: 7.5 G/DL (ref 6.4–8.2)
RBC # BLD AUTO: 5.38 M/UL (ref 4.2–5.9)
REASON FOR REJECTION: NORMAL
REJECTED TEST: NORMAL
SODIUM SERPL-SCNC: 130 MMOL/L (ref 136–145)
TROPONIN, HIGH SENSITIVITY: 16 NG/L (ref 0–22)
TROPONIN, HIGH SENSITIVITY: 19 NG/L (ref 0–22)
WBC # BLD AUTO: 7.7 K/UL (ref 4–11)

## 2024-10-10 PROCEDURE — 2580000003 HC RX 258: Performed by: EMERGENCY MEDICINE

## 2024-10-10 PROCEDURE — 71045 X-RAY EXAM CHEST 1 VIEW: CPT

## 2024-10-10 PROCEDURE — 96375 TX/PRO/DX INJ NEW DRUG ADDON: CPT

## 2024-10-10 PROCEDURE — 96365 THER/PROPH/DIAG IV INF INIT: CPT

## 2024-10-10 PROCEDURE — 80053 COMPREHEN METABOLIC PANEL: CPT

## 2024-10-10 PROCEDURE — 96366 THER/PROPH/DIAG IV INF ADDON: CPT

## 2024-10-10 PROCEDURE — 83880 ASSAY OF NATRIURETIC PEPTIDE: CPT

## 2024-10-10 PROCEDURE — 96374 THER/PROPH/DIAG INJ IV PUSH: CPT

## 2024-10-10 PROCEDURE — 84484 ASSAY OF TROPONIN QUANT: CPT

## 2024-10-10 PROCEDURE — 99285 EMERGENCY DEPT VISIT HI MDM: CPT

## 2024-10-10 PROCEDURE — 93005 ELECTROCARDIOGRAM TRACING: CPT | Performed by: EMERGENCY MEDICINE

## 2024-10-10 PROCEDURE — 85025 COMPLETE CBC W/AUTO DIFF WBC: CPT

## 2024-10-10 PROCEDURE — 36415 COLL VENOUS BLD VENIPUNCTURE: CPT

## 2024-10-10 PROCEDURE — 6370000000 HC RX 637 (ALT 250 FOR IP): Performed by: EMERGENCY MEDICINE

## 2024-10-10 PROCEDURE — 6360000002 HC RX W HCPCS: Performed by: EMERGENCY MEDICINE

## 2024-10-10 RX ORDER — AMIODARONE HYDROCHLORIDE 150 MG/3ML
150 INJECTION, SOLUTION INTRAVENOUS ONCE
Status: COMPLETED | OUTPATIENT
Start: 2024-10-10 | End: 2024-10-10

## 2024-10-10 RX ORDER — CLINDAMYCIN HCL 150 MG
150 CAPSULE ORAL ONCE
Status: COMPLETED | OUTPATIENT
Start: 2024-10-10 | End: 2024-10-10

## 2024-10-10 RX ORDER — LIDOCAINE HYDROCHLORIDE 20 MG/ML
15 SOLUTION OROPHARYNGEAL ONCE
Status: COMPLETED | OUTPATIENT
Start: 2024-10-11 | End: 2024-10-11

## 2024-10-10 RX ORDER — FENTANYL CITRATE 50 UG/ML
25 INJECTION, SOLUTION INTRAMUSCULAR; INTRAVENOUS ONCE
Status: COMPLETED | OUTPATIENT
Start: 2024-10-10 | End: 2024-10-10

## 2024-10-10 RX ADMIN — FENTANYL CITRATE 25 MCG: 50 INJECTION, SOLUTION INTRAMUSCULAR; INTRAVENOUS at 20:28

## 2024-10-10 RX ADMIN — CLINDAMYCIN HYDROCHLORIDE 150 MG: 150 CAPSULE ORAL at 20:29

## 2024-10-10 RX ADMIN — AMIODARONE HYDROCHLORIDE 1 MG/MIN: 50 INJECTION, SOLUTION INTRAVENOUS at 20:52

## 2024-10-10 RX ADMIN — AMIODARONE HYDROCHLORIDE 150 MG: 50 INJECTION, SOLUTION INTRAVENOUS at 20:28

## 2024-10-10 ASSESSMENT — PAIN DESCRIPTION - FREQUENCY: FREQUENCY: CONTINUOUS

## 2024-10-10 ASSESSMENT — ENCOUNTER SYMPTOMS
SHORTNESS OF BREATH: 0
NAUSEA: 0
ABDOMINAL PAIN: 0
BACK PAIN: 0
COUGH: 0
VOMITING: 0
CHEST TIGHTNESS: 0

## 2024-10-10 ASSESSMENT — PAIN DESCRIPTION - ORIENTATION: ORIENTATION: MID

## 2024-10-10 ASSESSMENT — PAIN DESCRIPTION - PAIN TYPE: TYPE: ACUTE PAIN

## 2024-10-10 ASSESSMENT — PAIN DESCRIPTION - LOCATION
LOCATION: CHEST
LOCATION: CHEST

## 2024-10-10 ASSESSMENT — PAIN DESCRIPTION - DESCRIPTORS: DESCRIPTORS: BURNING

## 2024-10-10 ASSESSMENT — PAIN SCALES - GENERAL
PAINLEVEL_OUTOF10: 9
PAINLEVEL_OUTOF10: 9
PAINLEVEL_OUTOF10: 7

## 2024-10-10 ASSESSMENT — PAIN - FUNCTIONAL ASSESSMENT: PAIN_FUNCTIONAL_ASSESSMENT: 0-10

## 2024-10-10 NOTE — ED PROVIDER NOTES
500 MG TABLET    Take 1 tablet every 12 hours by oral route.    MIDODRINE (PROAMATINE) 5 MG TABLET    Take 1 tablet by mouth 3 times daily (with meals)    MINERAL OIL-HYDROPHILIC PETROLATUM (AQUAPHOR) OINTMENT    Apply topically as needed for Dry Skin Apply topically as needed to left lower extremity    MULTIPLE VITAMINS-MINERALS (THERAPEUTIC MULTIVITAMIN-MINERALS) TABLET    Take 1 tablet by mouth in the morning and at bedtime    NALOXONE 4 MG/0.1ML LIQD NASAL SPRAY    1 spray by Nasal route as needed for Opioid Reversal    NITROGLYCERIN (NITROSTAT) 0.4 MG SL TABLET    PLACE 1 TABLET UNDER TONGUE EVERY 5 MINS, UP TO 3 DOSES AS NEEDED FOR CHEST PAIN    PANTOPRAZOLE (PROTONIX) 40 MG TABLET    Take 1 tablet by mouth daily    SPIRONOLACTONE (ALDACTONE) 25 MG TABLET    Take 1 tablet by mouth daily    SUCRALFATE (CARAFATE) 1 GM TABLET    Take 1 tablet by mouth 4 times daily    TORSEMIDE (DEMADEX) 20 MG TABLET    Take 1 tablet by mouth daily    UMECLIDINIUM-VILANTEROL (ANORO ELLIPTA) 62.5-25 MCG/ACT INHALER    Inhale 1 puff into the lungs daily       ALLERGIES  Azithromycin, Ketorolac, Statins, and Coffea arabica    FAMILYHISTORY  History reviewed. No pertinent family history.    SOCIAL HISTORY  Social History     Tobacco Use    Smoking status: Every Day     Current packs/day: 1.00     Types: Cigarettes    Smokeless tobacco: Never   Vaping Use    Vaping status: Never Used   Substance Use Topics    Alcohol use: Never    Drug use: Not Currently     Types: Amphetamines (Speed)       SCREENINGS    [place] Coma Scale  Eye Opening: Spontaneous  Best Verbal Response: Oriented  Best Motor Response: Obeys commands  [place] Coma Scale Score: 15       [facility] Assessment  BP: (!) 124/94  Pulse: 84             REVIEW OF SYSTEMS:    Review of Systems   Constitutional:  Negative for activity change, appetite change and fever.   HENT:  Negative for congestion and postnasal drip.    Respiratory:  Negative for cough, chest tightness and  hemoglobin reassuring.  No leukocytosis.     Chest x-ray without any acute process.     10:49 PM EDT  Patient continues to tolerate amiodarone drip.  Is been here for 4 hours.  Only that one sustained episode.  Is sleeping and resting comfortably right now.    Awaiting to hear back from cardiology and from hospitalist to [place].  Patient has been admitted multiple times for CHF with AICD, and other cardiac pathology at [facility].  I do believe it is appropriate for him to be transferred for higher level of care, including EP, especially on an amnio drip.    11:00 PM EDT  As this patient requires further evaluation and management as above, this patient will be admitted to the hospital for subsequent care. I spoke with Dr. López who accepts patient for admission. They are available to place admission orders.      Ultimately did discuss with cardiology as well, midlevel provider Nain, and he agrees with plan.  No other intervention or recommendations for now.    1:14 AM EDT  EMS here to pick patient up.  He will be answered in stable condition, although on Amio drip, after 6 hours and 30 minutes and this is the emergency department.  Required another round of fentanyl for his chest pain, although he does have a history of opiate dependence.  Remained stable on the monitor.          CONSULTS:   Hospitalist for admission.   Consult to cardiology       Is this patient to be included in the SEP-1 Core Measure due to severe sepsis or septic shock?   No     Exclusion criteria - the patient is NOT to be included for SEP-1 Core Measure due to:  Infection is not suspected      CLINICAL IMPRESSION:     ICD-10-CM    1. Ventricular tachyarrhythmia (HCC)  I47.20       2. Hyponatremia  E87.1       3. Chest pain, unspecified type  R07.9       4. Dental infection  K04.7             DISPOSITION:  I discussed the results, including any incidental findings, with patient and through shared decision making;   Disposition

## 2024-10-11 ENCOUNTER — HOSPITAL ENCOUNTER (INPATIENT)
Age: 54
LOS: 3 days | Discharge: HOME | DRG: 309 | End: 2024-10-14
Attending: INTERNAL MEDICINE | Admitting: INTERNAL MEDICINE
Payer: MEDICARE

## 2024-10-11 VITALS
HEART RATE: 82 BPM | WEIGHT: 150 LBS | BODY MASS INDEX: 24.96 KG/M2 | TEMPERATURE: 98.1 F | DIASTOLIC BLOOD PRESSURE: 92 MMHG | RESPIRATION RATE: 17 BRPM | OXYGEN SATURATION: 99 % | SYSTOLIC BLOOD PRESSURE: 126 MMHG

## 2024-10-11 DIAGNOSIS — K08.89 PAIN, DENTAL: ICD-10-CM

## 2024-10-11 DIAGNOSIS — I25.5 ISCHEMIC CARDIOMYOPATHY: Primary | ICD-10-CM

## 2024-10-11 LAB
EKG ATRIAL RATE: 93 BPM
EKG ATRIAL RATE: 99 BPM
EKG DIAGNOSIS: NORMAL
EKG DIAGNOSIS: NORMAL
EKG P AXIS: 56 DEGREES
EKG P-R INTERVAL: 192 MS
EKG P-R INTERVAL: 204 MS
EKG Q-T INTERVAL: 406 MS
EKG Q-T INTERVAL: 408 MS
EKG QRS DURATION: 144 MS
EKG QRS DURATION: 152 MS
EKG QTC CALCULATION (BAZETT): 504 MS
EKG QTC CALCULATION (BAZETT): 523 MS
EKG R AXIS: -76 DEGREES
EKG R AXIS: 266 DEGREES
EKG T AXIS: 109 DEGREES
EKG T AXIS: 78 DEGREES
EKG VENTRICULAR RATE: 93 BPM
EKG VENTRICULAR RATE: 99 BPM
GLUCOSE BLD-MCNC: 280 MG/DL (ref 70–99)
GLUCOSE BLD-MCNC: 326 MG/DL (ref 70–99)
PERFORMED ON: ABNORMAL
PERFORMED ON: ABNORMAL

## 2024-10-11 PROCEDURE — 99223 1ST HOSP IP/OBS HIGH 75: CPT | Performed by: INTERNAL MEDICINE

## 2024-10-11 PROCEDURE — 2700000000 HC OXYGEN THERAPY PER DAY

## 2024-10-11 PROCEDURE — 96375 TX/PRO/DX INJ NEW DRUG ADDON: CPT

## 2024-10-11 PROCEDURE — 6370000000 HC RX 637 (ALT 250 FOR IP): Performed by: EMERGENCY MEDICINE

## 2024-10-11 PROCEDURE — 93010 ELECTROCARDIOGRAM REPORT: CPT | Performed by: INTERNAL MEDICINE

## 2024-10-11 PROCEDURE — 2580000003 HC RX 258: Performed by: INTERNAL MEDICINE

## 2024-10-11 PROCEDURE — 6360000002 HC RX W HCPCS: Performed by: EMERGENCY MEDICINE

## 2024-10-11 PROCEDURE — 2060000000 HC ICU INTERMEDIATE R&B

## 2024-10-11 PROCEDURE — 96366 THER/PROPH/DIAG IV INF ADDON: CPT

## 2024-10-11 PROCEDURE — 6370000000 HC RX 637 (ALT 250 FOR IP): Performed by: INTERNAL MEDICINE

## 2024-10-11 PROCEDURE — 6360000002 HC RX W HCPCS: Performed by: INTERNAL MEDICINE

## 2024-10-11 RX ORDER — CLOPIDOGREL BISULFATE 75 MG/1
75 TABLET ORAL DAILY
Status: DISCONTINUED | OUTPATIENT
Start: 2024-10-11 | End: 2024-10-14 | Stop reason: HOSPADM

## 2024-10-11 RX ORDER — SUCRALFATE 1 G/1
1 TABLET ORAL 4 TIMES DAILY
Status: DISCONTINUED | OUTPATIENT
Start: 2024-10-11 | End: 2024-10-14 | Stop reason: HOSPADM

## 2024-10-11 RX ORDER — ACETAMINOPHEN 650 MG/1
650 SUPPOSITORY RECTAL EVERY 6 HOURS PRN
Status: DISCONTINUED | OUTPATIENT
Start: 2024-10-11 | End: 2024-10-14 | Stop reason: HOSPADM

## 2024-10-11 RX ORDER — M-VIT,TX,IRON,MINS/CALC/FOLIC 27MG-0.4MG
1 TABLET ORAL 2 TIMES DAILY
Status: DISCONTINUED | OUTPATIENT
Start: 2024-10-11 | End: 2024-10-14 | Stop reason: HOSPADM

## 2024-10-11 RX ORDER — SODIUM CHLORIDE 9 MG/ML
INJECTION, SOLUTION INTRAVENOUS PRN
Status: DISCONTINUED | OUTPATIENT
Start: 2024-10-11 | End: 2024-10-14 | Stop reason: HOSPADM

## 2024-10-11 RX ORDER — POTASSIUM CHLORIDE 7.45 MG/ML
10 INJECTION INTRAVENOUS PRN
Status: DISCONTINUED | OUTPATIENT
Start: 2024-10-11 | End: 2024-10-14 | Stop reason: HOSPADM

## 2024-10-11 RX ORDER — ACETAMINOPHEN 325 MG/1
650 TABLET ORAL EVERY 6 HOURS PRN
Status: DISCONTINUED | OUTPATIENT
Start: 2024-10-11 | End: 2024-10-14 | Stop reason: HOSPADM

## 2024-10-11 RX ORDER — PANTOPRAZOLE SODIUM 40 MG/1
40 TABLET, DELAYED RELEASE ORAL DAILY
Status: DISCONTINUED | OUTPATIENT
Start: 2024-10-11 | End: 2024-10-14 | Stop reason: HOSPADM

## 2024-10-11 RX ORDER — FERROUS SULFATE 325(65) MG
325 TABLET ORAL 2 TIMES DAILY WITH MEALS
Status: DISCONTINUED | OUTPATIENT
Start: 2024-10-11 | End: 2024-10-14 | Stop reason: HOSPADM

## 2024-10-11 RX ORDER — LACTOBACILLUS RHAMNOSUS GG 10B CELL
1 CAPSULE ORAL 3 TIMES DAILY
Status: DISCONTINUED | OUTPATIENT
Start: 2024-10-11 | End: 2024-10-14 | Stop reason: HOSPADM

## 2024-10-11 RX ORDER — AMIODARONE HYDROCHLORIDE 200 MG/1
400 TABLET ORAL 2 TIMES DAILY
Status: DISCONTINUED | OUTPATIENT
Start: 2024-10-11 | End: 2024-10-14 | Stop reason: HOSPADM

## 2024-10-11 RX ORDER — ATORVASTATIN CALCIUM 40 MG/1
40 TABLET, FILM COATED ORAL NIGHTLY
Status: DISCONTINUED | OUTPATIENT
Start: 2024-10-11 | End: 2024-10-14 | Stop reason: HOSPADM

## 2024-10-11 RX ORDER — FENTANYL CITRATE 50 UG/ML
50 INJECTION, SOLUTION INTRAMUSCULAR; INTRAVENOUS
Status: DISCONTINUED | OUTPATIENT
Start: 2024-10-11 | End: 2024-10-11 | Stop reason: HOSPADM

## 2024-10-11 RX ORDER — POTASSIUM CHLORIDE 1500 MG/1
40 TABLET, EXTENDED RELEASE ORAL PRN
Status: DISCONTINUED | OUTPATIENT
Start: 2024-10-11 | End: 2024-10-14 | Stop reason: HOSPADM

## 2024-10-11 RX ORDER — DICYCLOMINE HYDROCHLORIDE 10 MG/1
10 CAPSULE ORAL EVERY 4 HOURS PRN
Status: DISCONTINUED | OUTPATIENT
Start: 2024-10-11 | End: 2024-10-14 | Stop reason: HOSPADM

## 2024-10-11 RX ORDER — METHOCARBAMOL 500 MG/1
500 TABLET, FILM COATED ORAL EVERY 12 HOURS PRN
Status: DISCONTINUED | OUTPATIENT
Start: 2024-10-11 | End: 2024-10-14 | Stop reason: HOSPADM

## 2024-10-11 RX ORDER — PROCHLORPERAZINE EDISYLATE 5 MG/ML
10 INJECTION INTRAMUSCULAR; INTRAVENOUS EVERY 6 HOURS PRN
Status: DISCONTINUED | OUTPATIENT
Start: 2024-10-11 | End: 2024-10-14 | Stop reason: HOSPADM

## 2024-10-11 RX ORDER — MAGNESIUM SULFATE IN WATER 40 MG/ML
2000 INJECTION, SOLUTION INTRAVENOUS PRN
Status: DISCONTINUED | OUTPATIENT
Start: 2024-10-11 | End: 2024-10-14 | Stop reason: HOSPADM

## 2024-10-11 RX ORDER — INSULIN GLARGINE 100 [IU]/ML
10 INJECTION, SOLUTION SUBCUTANEOUS NIGHTLY
Status: DISCONTINUED | OUTPATIENT
Start: 2024-10-11 | End: 2024-10-12

## 2024-10-11 RX ORDER — SODIUM CHLORIDE 0.9 % (FLUSH) 0.9 %
5-40 SYRINGE (ML) INJECTION PRN
Status: DISCONTINUED | OUTPATIENT
Start: 2024-10-11 | End: 2024-10-14 | Stop reason: HOSPADM

## 2024-10-11 RX ORDER — SODIUM CHLORIDE 0.9 % (FLUSH) 0.9 %
5-40 SYRINGE (ML) INJECTION EVERY 12 HOURS SCHEDULED
Status: DISCONTINUED | OUTPATIENT
Start: 2024-10-11 | End: 2024-10-14 | Stop reason: HOSPADM

## 2024-10-11 RX ORDER — SPIRONOLACTONE 25 MG/1
25 TABLET ORAL DAILY
Status: DISCONTINUED | OUTPATIENT
Start: 2024-10-11 | End: 2024-10-14 | Stop reason: HOSPADM

## 2024-10-11 RX ORDER — MORPHINE SULFATE 2 MG/ML
2 INJECTION, SOLUTION INTRAMUSCULAR; INTRAVENOUS EVERY 4 HOURS PRN
Status: DISCONTINUED | OUTPATIENT
Start: 2024-10-11 | End: 2024-10-14 | Stop reason: HOSPADM

## 2024-10-11 RX ORDER — REGADENOSON 0.08 MG/ML
0.4 INJECTION, SOLUTION INTRAVENOUS
Status: COMPLETED | OUTPATIENT
Start: 2024-10-11 | End: 2024-10-12

## 2024-10-11 RX ORDER — POLYETHYLENE GLYCOL 3350 17 G/17G
17 POWDER, FOR SOLUTION ORAL DAILY PRN
Status: DISCONTINUED | OUTPATIENT
Start: 2024-10-11 | End: 2024-10-14 | Stop reason: HOSPADM

## 2024-10-11 RX ORDER — BUSPIRONE HYDROCHLORIDE 5 MG/1
5 TABLET ORAL 2 TIMES DAILY
Status: DISCONTINUED | OUTPATIENT
Start: 2024-10-11 | End: 2024-10-14 | Stop reason: HOSPADM

## 2024-10-11 RX ORDER — GABAPENTIN 300 MG/1
300 CAPSULE ORAL 3 TIMES DAILY
Status: DISCONTINUED | OUTPATIENT
Start: 2024-10-11 | End: 2024-10-14 | Stop reason: HOSPADM

## 2024-10-11 RX ORDER — TORSEMIDE 20 MG/1
20 TABLET ORAL DAILY
Status: DISCONTINUED | OUTPATIENT
Start: 2024-10-11 | End: 2024-10-14 | Stop reason: HOSPADM

## 2024-10-11 RX ADMIN — AMIODARONE HYDROCHLORIDE 400 MG: 200 TABLET ORAL at 20:43

## 2024-10-11 RX ADMIN — Medication 1 TABLET: at 20:43

## 2024-10-11 RX ADMIN — FENTANYL CITRATE 50 MCG: 50 INJECTION, SOLUTION INTRAMUSCULAR; INTRAVENOUS at 00:17

## 2024-10-11 RX ADMIN — ATORVASTATIN CALCIUM 40 MG: 40 TABLET, FILM COATED ORAL at 20:43

## 2024-10-11 RX ADMIN — Medication 10 ML: at 20:34

## 2024-10-11 RX ADMIN — MORPHINE SULFATE 2 MG: 2 INJECTION, SOLUTION INTRAMUSCULAR; INTRAVENOUS at 02:19

## 2024-10-11 RX ADMIN — MORPHINE SULFATE 2 MG: 2 INJECTION, SOLUTION INTRAMUSCULAR; INTRAVENOUS at 06:39

## 2024-10-11 RX ADMIN — MORPHINE SULFATE 2 MG: 2 INJECTION, SOLUTION INTRAMUSCULAR; INTRAVENOUS at 10:17

## 2024-10-11 RX ADMIN — CLOPIDOGREL BISULFATE 75 MG: 75 TABLET ORAL at 08:48

## 2024-10-11 RX ADMIN — MORPHINE SULFATE 2 MG: 2 INJECTION, SOLUTION INTRAMUSCULAR; INTRAVENOUS at 15:04

## 2024-10-11 RX ADMIN — APIXABAN 5 MG: 5 TABLET, FILM COATED ORAL at 20:43

## 2024-10-11 RX ADMIN — LIDOCAINE HYDROCHLORIDE 15 ML: 20 SOLUTION ORAL at 00:08

## 2024-10-11 RX ADMIN — GABAPENTIN 300 MG: 300 CAPSULE ORAL at 13:19

## 2024-10-11 RX ADMIN — GABAPENTIN 300 MG: 300 CAPSULE ORAL at 08:48

## 2024-10-11 RX ADMIN — AMIODARONE HYDROCHLORIDE 1 MG/MIN: 50 INJECTION, SOLUTION INTRAVENOUS at 02:48

## 2024-10-11 RX ADMIN — GABAPENTIN 300 MG: 300 CAPSULE ORAL at 20:43

## 2024-10-11 RX ADMIN — BUSPIRONE HYDROCHLORIDE 5 MG: 5 TABLET ORAL at 08:48

## 2024-10-11 RX ADMIN — Medication 1 CAPSULE: at 20:43

## 2024-10-11 RX ADMIN — MORPHINE SULFATE 2 MG: 2 INJECTION, SOLUTION INTRAMUSCULAR; INTRAVENOUS at 20:33

## 2024-10-11 RX ADMIN — APIXABAN 5 MG: 5 TABLET, FILM COATED ORAL at 08:48

## 2024-10-11 RX ADMIN — INSULIN GLARGINE 10 UNITS: 100 INJECTION, SOLUTION SUBCUTANEOUS at 20:46

## 2024-10-11 RX ADMIN — BUSPIRONE HYDROCHLORIDE 5 MG: 5 TABLET ORAL at 20:43

## 2024-10-11 RX ADMIN — SUCRALFATE 1 G: 1 TABLET ORAL at 20:43

## 2024-10-11 ASSESSMENT — PAIN DESCRIPTION - DESCRIPTORS
DESCRIPTORS: ACHING;DISCOMFORT
DESCRIPTORS: ACHING;DISCOMFORT

## 2024-10-11 ASSESSMENT — PAIN DESCRIPTION - LOCATION
LOCATION: TEETH
LOCATION: MOUTH
LOCATION: MOUTH
LOCATION: TEETH
LOCATION: MOUTH
LOCATION: TEETH;CHEST

## 2024-10-11 ASSESSMENT — PAIN SCALES - GENERAL
PAINLEVEL_OUTOF10: 8
PAINLEVEL_OUTOF10: 10
PAINLEVEL_OUTOF10: 8
PAINLEVEL_OUTOF10: 7
PAINLEVEL_OUTOF10: 10
PAINLEVEL_OUTOF10: 8
PAINLEVEL_OUTOF10: 6
PAINLEVEL_OUTOF10: 8
PAINLEVEL_OUTOF10: 8

## 2024-10-11 ASSESSMENT — PAIN SCALES - WONG BAKER
WONGBAKER_NUMERICALRESPONSE: NO HURT

## 2024-10-11 NOTE — CARE COORDINATION
Case Management Assessment  Initial Evaluation    Date/Time of Evaluation: 10/11/2024 3:27 PM  Assessment Completed by: Lisa Arias RN    If patient is discharged prior to next notation, then this note serves as note for discharge by case management.    Patient Name: Renato Nagy                   YOB: 1970  Diagnosis: Ventricular tachycardia (HCC) [I47.20]                   Date / Time: 10/11/2024  1:35 AM    Patient Admission Status: Inpatient   Readmission Risk (Low < 19, Mod (19-27), High > 27): Readmission Risk Score: 19.3    Current PCP: Carrie Mead APRN - NP  PCP verified by CM? Yes    Chart Reviewed: Yes      History Provided by: Patient  Patient Orientation: Alert and Oriented    Patient Cognition: Alert    Hospitalization in the last 30 days (Readmission):  No    If yes, Readmission Assessment in CM Navigator will be completed.    Advance Directives:      Code Status: Full Code   Patient's Primary Decision Maker is: Legal Next of Kin      Discharge Planning:    Patient lives with: Spouse/Significant Other Type of Home: House  Primary Care Giver: Self  Patient Support Systems include: Spouse/Significant Other   Current Financial resources: Medicaid, Medicare  Current community resources: None  Current services prior to admission: Durable Medical Equipment            Current DME: Cane, Wheelchair, Other (Comment) (bilat prosthetic)            Type of Home Care services:  None    ADLS  Prior functional level: Independent in ADLs/IADLs  Current functional level: Independent in ADLs/IADLs    PT AM-PAC:   /24  OT AM-PAC:   /24    Family can provide assistance at DC: Yes  Would you like Case Management to discuss the discharge plan with any other family members/significant others, and if so, who? Yes (SO)  Plans to Return to Present Housing: Yes  Potential Assistance needed at discharge: Other (Comment) (following for needs)            Potential DME:    Patient expects to discharge

## 2024-10-11 NOTE — H&P
V2.0  History and Physical      Name:  Renato Nagy /Age/Sex: 1970  (54 y.o. male)   MRN & CSN:  [id number] & [id number] Encounter Date/Time: 10/11/2024 6:59 AM EDT   Location:   PCP: Carrie Mead APRN - NP       Hospital Day: 1    Assessment and Plan:   Renato Nagy is a 54 y.o. male with a pmh of CHF who presents with Ventricular tachycardia (HCC)    1.Ventricular tachycardia  -Ventricular tachycardia noted while in the emergency room at [place]  -Patient received amiodarone bolus, subsequently converted to normal sinus rhythm  -Troponin negative x2  -BP currently stable.  -Admit to telemetry floor.  -Started on amiodarone drip which we will continue  -Cardiology consult  -Continue with other supportive management    2.Chest pain  -Patient reported ongoing substernal chest pain  -Troponin negative x 2  -Currently chest pain-free  -Ventricular tachycardia noted in the emergency room  -Cardiology consulted  Continue to monitor.-    3.Chronic HFrEF  -Recent echo showed ejection fraction of 25 to 30%, grade 2 diastolic dysfunction  -Clinically, not in exacerbation.  -Resume guideline directed medical therapy    4.Insulin-dependent diabetes mellitus with hyperglycemia  -Resume home insulin regimen.  Sliding scale insulin with NovoLog.  -Continue to monitor blood sugar.    5.Hyponatremia  -Sodium level-130  -Follow-up with BMP.    6.Hx of hepatitis C  -Uncertain if treated  -Outpatient follow-up    7.Hx of Liver cirrhosis  -Probably due to hepatitis C.  Not in decompensated liver failure.  -Outpatient follow-up with hepatologist    8.Hx of COPD, not in exacerbation.  -Bronchodilators as needed    9.Tooth pain/dental caries  -Patient reported ongoing tooth pain.  He has dental caries.  Suspect dental infection  -Pain management with pain medication.  -Outpatient follow-up with dentist.    10.Transaminitis  -Chronic  -Follow-up with LFTs    11.Thrombocytopenia  -Platelet

## 2024-10-11 NOTE — ED NOTES
Pt having sustained vtach at this time. Pt grabbing chest, but alert. Pt had run of vtach for approx 2 minutes, provider at [facility], amiodarone drip ordered. PT converted on his own.

## 2024-10-11 NOTE — ED NOTES
Access center called with bed assignment.     N2N: [phone]  Room: [facility] [place]  Transport ETA: CMT 0145

## 2024-10-12 ENCOUNTER — APPOINTMENT (OUTPATIENT)
Age: 54
DRG: 309 | End: 2024-10-12
Attending: INTERNAL MEDICINE
Payer: MEDICARE

## 2024-10-12 ENCOUNTER — APPOINTMENT (OUTPATIENT)
Dept: NUCLEAR MEDICINE | Age: 54
DRG: 309 | End: 2024-10-12
Attending: INTERNAL MEDICINE
Payer: MEDICARE

## 2024-10-12 ENCOUNTER — CLINICAL DOCUMENTATION (OUTPATIENT)
Age: 54
End: 2024-10-12

## 2024-10-12 LAB
ANION GAP SERPL CALCULATED.3IONS-SCNC: 8 MMOL/L (ref 3–16)
APTT BLD: 40.8 SEC (ref 22.1–36.4)
APTT BLD: 52.5 SEC (ref 22.1–36.4)
BASOPHILS # BLD: 0.1 K/UL (ref 0–0.2)
BASOPHILS NFR BLD: 1.1 %
BUN SERPL-MCNC: 16 MG/DL (ref 7–20)
CALCIUM SERPL-MCNC: 8.5 MG/DL (ref 8.3–10.6)
CHLORIDE SERPL-SCNC: 95 MMOL/L (ref 99–110)
CO2 SERPL-SCNC: 24 MMOL/L (ref 21–32)
CREAT SERPL-MCNC: 0.6 MG/DL (ref 0.9–1.3)
DEPRECATED RDW RBC AUTO: 15.3 % (ref 12.4–15.4)
EOSINOPHIL # BLD: 0.1 K/UL (ref 0–0.6)
EOSINOPHIL NFR BLD: 1.9 %
GFR SERPLBLD CREATININE-BSD FMLA CKD-EPI: >90 ML/MIN/{1.73_M2}
GLUCOSE BLD-MCNC: 239 MG/DL (ref 70–99)
GLUCOSE BLD-MCNC: 257 MG/DL (ref 70–99)
GLUCOSE BLD-MCNC: 347 MG/DL (ref 70–99)
GLUCOSE SERPL-MCNC: 295 MG/DL (ref 70–99)
HCT VFR BLD AUTO: 46.5 % (ref 40.5–52.5)
HGB BLD-MCNC: 15.3 G/DL (ref 13.5–17.5)
INR PPP: 1.52 (ref 0.85–1.15)
LYMPHOCYTES # BLD: 2.5 K/UL (ref 1–5.1)
LYMPHOCYTES NFR BLD: 34.4 %
MCH RBC QN AUTO: 28.7 PG (ref 26–34)
MCHC RBC AUTO-ENTMCNC: 33 G/DL (ref 31–36)
MCV RBC AUTO: 87.2 FL (ref 80–100)
MONOCYTES # BLD: 0.9 K/UL (ref 0–1.3)
MONOCYTES NFR BLD: 12.7 %
NEUTROPHILS # BLD: 3.7 K/UL (ref 1.7–7.7)
NEUTROPHILS NFR BLD: 49.9 %
NUC STRESS EJECTION FRACTION: 28 %
NUC STRESS LV EDV: 227 ML (ref 67–155)
NUC STRESS LV ESV: 164 ML (ref 22–58)
NUC STRESS LV MASS: 201 G
PERFORMED ON: ABNORMAL
PHOSPHATE SERPL-MCNC: 3.3 MG/DL (ref 2.5–4.9)
PLATELET # BLD AUTO: 111 K/UL (ref 135–450)
PMV BLD AUTO: 8.8 FL (ref 5–10.5)
POTASSIUM SERPL-SCNC: 4.4 MMOL/L (ref 3.5–5.1)
PROTHROMBIN TIME: 18.4 SEC (ref 11.9–14.9)
RBC # BLD AUTO: 5.33 M/UL (ref 4.2–5.9)
SODIUM SERPL-SCNC: 127 MMOL/L (ref 136–145)
STRESS BASELINE DIAS BP: 75 MMHG
STRESS BASELINE HR: 83 BPM
STRESS BASELINE SYS BP: 113 MMHG
STRESS ESTIMATED WORKLOAD: 1 METS
STRESS PEAK DIAS BP: 65 MMHG
STRESS PEAK SYS BP: 121 MMHG
STRESS PERCENT HR ACHIEVED: 60 %
STRESS POST PEAK HR: 99 BPM
STRESS RATE PRESSURE PRODUCT: ABNORMAL BPM*MMHG
STRESS TARGET HR: 164 BPM
WBC # BLD AUTO: 7.3 K/UL (ref 4–11)

## 2024-10-12 PROCEDURE — 85025 COMPLETE CBC W/AUTO DIFF WBC: CPT

## 2024-10-12 PROCEDURE — 93016 CV STRESS TEST SUPVJ ONLY: CPT | Performed by: INTERNAL MEDICINE

## 2024-10-12 PROCEDURE — 85610 PROTHROMBIN TIME: CPT

## 2024-10-12 PROCEDURE — 78452 HT MUSCLE IMAGE SPECT MULT: CPT

## 2024-10-12 PROCEDURE — 84100 ASSAY OF PHOSPHORUS: CPT

## 2024-10-12 PROCEDURE — 6370000000 HC RX 637 (ALT 250 FOR IP): Performed by: STUDENT IN AN ORGANIZED HEALTH CARE EDUCATION/TRAINING PROGRAM

## 2024-10-12 PROCEDURE — 6360000002 HC RX W HCPCS: Performed by: INTERNAL MEDICINE

## 2024-10-12 PROCEDURE — 3430000000 HC RX DIAGNOSTIC RADIOPHARMACEUTICAL: Performed by: INTERNAL MEDICINE

## 2024-10-12 PROCEDURE — 6360000002 HC RX W HCPCS: Performed by: STUDENT IN AN ORGANIZED HEALTH CARE EDUCATION/TRAINING PROGRAM

## 2024-10-12 PROCEDURE — 36415 COLL VENOUS BLD VENIPUNCTURE: CPT

## 2024-10-12 PROCEDURE — 85730 THROMBOPLASTIN TIME PARTIAL: CPT

## 2024-10-12 PROCEDURE — 93017 CV STRESS TEST TRACING ONLY: CPT

## 2024-10-12 PROCEDURE — 2580000003 HC RX 258: Performed by: INTERNAL MEDICINE

## 2024-10-12 PROCEDURE — A9502 TC99M TETROFOSMIN: HCPCS | Performed by: INTERNAL MEDICINE

## 2024-10-12 PROCEDURE — 78452 HT MUSCLE IMAGE SPECT MULT: CPT | Performed by: INTERNAL MEDICINE

## 2024-10-12 PROCEDURE — 99233 SBSQ HOSP IP/OBS HIGH 50: CPT | Performed by: INTERNAL MEDICINE

## 2024-10-12 PROCEDURE — 93018 CV STRESS TEST I&R ONLY: CPT | Performed by: INTERNAL MEDICINE

## 2024-10-12 PROCEDURE — 80048 BASIC METABOLIC PNL TOTAL CA: CPT

## 2024-10-12 PROCEDURE — 2060000000 HC ICU INTERMEDIATE R&B

## 2024-10-12 PROCEDURE — 3430000000 HC RX DIAGNOSTIC RADIOPHARMACEUTICAL: Performed by: EMERGENCY MEDICINE

## 2024-10-12 PROCEDURE — A9502 TC99M TETROFOSMIN: HCPCS | Performed by: EMERGENCY MEDICINE

## 2024-10-12 PROCEDURE — 6370000000 HC RX 637 (ALT 250 FOR IP): Performed by: INTERNAL MEDICINE

## 2024-10-12 RX ORDER — HEPARIN SODIUM 1000 [USP'U]/ML
4000 INJECTION, SOLUTION INTRAVENOUS; SUBCUTANEOUS PRN
Status: DISCONTINUED | OUTPATIENT
Start: 2024-10-12 | End: 2024-10-13

## 2024-10-12 RX ORDER — METOPROLOL SUCCINATE 25 MG/1
12.5 TABLET, EXTENDED RELEASE ORAL DAILY
Status: DISCONTINUED | OUTPATIENT
Start: 2024-10-12 | End: 2024-10-14 | Stop reason: HOSPADM

## 2024-10-12 RX ORDER — GLUCAGON 1 MG/ML
1 KIT INJECTION PRN
Status: DISCONTINUED | OUTPATIENT
Start: 2024-10-12 | End: 2024-10-14 | Stop reason: HOSPADM

## 2024-10-12 RX ORDER — INSULIN LISPRO 100 [IU]/ML
4 INJECTION, SOLUTION INTRAVENOUS; SUBCUTANEOUS
Status: DISCONTINUED | OUTPATIENT
Start: 2024-10-12 | End: 2024-10-13

## 2024-10-12 RX ORDER — INSULIN LISPRO 100 [IU]/ML
0-8 INJECTION, SOLUTION INTRAVENOUS; SUBCUTANEOUS
Status: DISCONTINUED | OUTPATIENT
Start: 2024-10-12 | End: 2024-10-14 | Stop reason: HOSPADM

## 2024-10-12 RX ORDER — LISINOPRIL 5 MG/1
2.5 TABLET ORAL NIGHTLY
Status: DISCONTINUED | OUTPATIENT
Start: 2024-10-12 | End: 2024-10-14 | Stop reason: HOSPADM

## 2024-10-12 RX ORDER — AMINOPHYLLINE 25 MG/ML
25 INJECTION, SOLUTION INTRAVENOUS ONCE
Status: COMPLETED | OUTPATIENT
Start: 2024-10-12 | End: 2024-10-12

## 2024-10-12 RX ORDER — HEPARIN SODIUM 1000 [USP'U]/ML
4000 INJECTION, SOLUTION INTRAVENOUS; SUBCUTANEOUS ONCE
Status: COMPLETED | OUTPATIENT
Start: 2024-10-12 | End: 2024-10-12

## 2024-10-12 RX ORDER — DEXTROSE MONOHYDRATE 100 MG/ML
INJECTION, SOLUTION INTRAVENOUS CONTINUOUS PRN
Status: DISCONTINUED | OUTPATIENT
Start: 2024-10-12 | End: 2024-10-14 | Stop reason: HOSPADM

## 2024-10-12 RX ORDER — HEPARIN SODIUM 1000 [USP'U]/ML
2000 INJECTION, SOLUTION INTRAVENOUS; SUBCUTANEOUS PRN
Status: DISCONTINUED | OUTPATIENT
Start: 2024-10-12 | End: 2024-10-13

## 2024-10-12 RX ORDER — HEPARIN SODIUM 10000 [USP'U]/100ML
5-30 INJECTION, SOLUTION INTRAVENOUS CONTINUOUS
Status: DISCONTINUED | OUTPATIENT
Start: 2024-10-12 | End: 2024-10-13

## 2024-10-12 RX ORDER — INSULIN GLARGINE 100 [IU]/ML
15 INJECTION, SOLUTION SUBCUTANEOUS NIGHTLY
Status: DISCONTINUED | OUTPATIENT
Start: 2024-10-12 | End: 2024-10-13

## 2024-10-12 RX ADMIN — ACETAMINOPHEN 650 MG: 325 TABLET ORAL at 17:42

## 2024-10-12 RX ADMIN — CLOPIDOGREL BISULFATE 75 MG: 75 TABLET ORAL at 11:39

## 2024-10-12 RX ADMIN — TETROFOSMIN 31.1 MILLICURIE: 1.38 INJECTION, POWDER, LYOPHILIZED, FOR SOLUTION INTRAVENOUS at 09:09

## 2024-10-12 RX ADMIN — TORSEMIDE 20 MG: 20 TABLET ORAL at 11:38

## 2024-10-12 RX ADMIN — HEPARIN SODIUM 12 UNITS/KG/HR: 10000 INJECTION, SOLUTION INTRAVENOUS at 11:51

## 2024-10-12 RX ADMIN — MORPHINE SULFATE 2 MG: 2 INJECTION, SOLUTION INTRAMUSCULAR; INTRAVENOUS at 06:24

## 2024-10-12 RX ADMIN — FERROUS SULFATE TAB 325 MG (65 MG ELEMENTAL FE) 325 MG: 325 (65 FE) TAB at 17:35

## 2024-10-12 RX ADMIN — Medication 1 CAPSULE: at 13:47

## 2024-10-12 RX ADMIN — Medication 1 TABLET: at 21:17

## 2024-10-12 RX ADMIN — INSULIN LISPRO 2 UNITS: 100 INJECTION, SOLUTION INTRAVENOUS; SUBCUTANEOUS at 13:47

## 2024-10-12 RX ADMIN — HEPARIN SODIUM 4000 UNITS: 1000 INJECTION INTRAVENOUS; SUBCUTANEOUS at 11:53

## 2024-10-12 RX ADMIN — AMIODARONE HYDROCHLORIDE 400 MG: 200 TABLET ORAL at 21:18

## 2024-10-12 RX ADMIN — MORPHINE SULFATE 2 MG: 2 INJECTION, SOLUTION INTRAMUSCULAR; INTRAVENOUS at 20:04

## 2024-10-12 RX ADMIN — AMINOPHYLLINE 25 MG: 25 INJECTION, SOLUTION INTRAVENOUS at 09:28

## 2024-10-12 RX ADMIN — BUSPIRONE HYDROCHLORIDE 5 MG: 5 TABLET ORAL at 21:18

## 2024-10-12 RX ADMIN — BUSPIRONE HYDROCHLORIDE 5 MG: 5 TABLET ORAL at 11:39

## 2024-10-12 RX ADMIN — INSULIN GLARGINE 15 UNITS: 100 INJECTION, SOLUTION SUBCUTANEOUS at 21:19

## 2024-10-12 RX ADMIN — MORPHINE SULFATE 2 MG: 2 INJECTION, SOLUTION INTRAMUSCULAR; INTRAVENOUS at 11:48

## 2024-10-12 RX ADMIN — INSULIN LISPRO 6 UNITS: 100 INJECTION, SOLUTION INTRAVENOUS; SUBCUTANEOUS at 17:35

## 2024-10-12 RX ADMIN — INSULIN LISPRO 4 UNITS: 100 INJECTION, SOLUTION INTRAVENOUS; SUBCUTANEOUS at 21:18

## 2024-10-12 RX ADMIN — INSULIN LISPRO 4 UNITS: 100 INJECTION, SOLUTION INTRAVENOUS; SUBCUTANEOUS at 17:36

## 2024-10-12 RX ADMIN — LISINOPRIL 2.5 MG: 5 TABLET ORAL at 21:18

## 2024-10-12 RX ADMIN — GABAPENTIN 300 MG: 300 CAPSULE ORAL at 11:38

## 2024-10-12 RX ADMIN — SUCRALFATE 1 G: 1 TABLET ORAL at 17:35

## 2024-10-12 RX ADMIN — EMPAGLIFLOZIN 10 MG: 10 TABLET, FILM COATED ORAL at 11:39

## 2024-10-12 RX ADMIN — GABAPENTIN 300 MG: 300 CAPSULE ORAL at 21:17

## 2024-10-12 RX ADMIN — TETROFOSMIN 11.5 MILLICURIE: 1.38 INJECTION, POWDER, LYOPHILIZED, FOR SOLUTION INTRAVENOUS at 07:45

## 2024-10-12 RX ADMIN — SUCRALFATE 1 G: 1 TABLET ORAL at 13:47

## 2024-10-12 RX ADMIN — SUCRALFATE 1 G: 1 TABLET ORAL at 21:17

## 2024-10-12 RX ADMIN — ATORVASTATIN CALCIUM 40 MG: 40 TABLET, FILM COATED ORAL at 21:18

## 2024-10-12 RX ADMIN — GABAPENTIN 300 MG: 300 CAPSULE ORAL at 13:47

## 2024-10-12 RX ADMIN — Medication 10 ML: at 20:06

## 2024-10-12 RX ADMIN — HEPARIN SODIUM 4000 UNITS: 1000 INJECTION INTRAVENOUS; SUBCUTANEOUS at 19:16

## 2024-10-12 RX ADMIN — Medication 1 CAPSULE: at 21:17

## 2024-10-12 RX ADMIN — Medication 1 TABLET: at 11:38

## 2024-10-12 RX ADMIN — MORPHINE SULFATE 2 MG: 2 INJECTION, SOLUTION INTRAMUSCULAR; INTRAVENOUS at 00:40

## 2024-10-12 RX ADMIN — AMIODARONE HYDROCHLORIDE 400 MG: 200 TABLET ORAL at 11:38

## 2024-10-12 RX ADMIN — REGADENOSON 0.4 MG: 0.08 INJECTION, SOLUTION INTRAVENOUS at 09:10

## 2024-10-12 RX ADMIN — MORPHINE SULFATE 2 MG: 2 INJECTION, SOLUTION INTRAMUSCULAR; INTRAVENOUS at 15:50

## 2024-10-12 RX ADMIN — METOPROLOL SUCCINATE 12.5 MG: 25 TABLET, EXTENDED RELEASE ORAL at 11:54

## 2024-10-12 ASSESSMENT — PAIN DESCRIPTION - LOCATION
LOCATION: MOUTH;TEETH
LOCATION: MOUTH;TEETH
LOCATION: JAW
LOCATION: JAW;CHEST

## 2024-10-12 ASSESSMENT — PAIN SCALES - GENERAL
PAINLEVEL_OUTOF10: 9
PAINLEVEL_OUTOF10: 9
PAINLEVEL_OUTOF10: 6
PAINLEVEL_OUTOF10: 4
PAINLEVEL_OUTOF10: 6
PAINLEVEL_OUTOF10: 8
PAINLEVEL_OUTOF10: 3
PAINLEVEL_OUTOF10: 8
PAINLEVEL_OUTOF10: 8

## 2024-10-12 ASSESSMENT — PAIN SCALES - WONG BAKER
WONGBAKER_NUMERICALRESPONSE: NO HURT

## 2024-10-12 ASSESSMENT — PAIN DESCRIPTION - DESCRIPTORS
DESCRIPTORS: ACHING
DESCRIPTORS: ACHING;DISCOMFORT
DESCRIPTORS: ACHING;DISCOMFORT
DESCRIPTORS: ACHING

## 2024-10-12 ASSESSMENT — PAIN DESCRIPTION - ORIENTATION
ORIENTATION: RIGHT
ORIENTATION: RIGHT;MID

## 2024-10-12 NOTE — PLAN OF CARE
Problem: Chronic Conditions and Co-morbidities  Goal: Patient's chronic conditions and co-morbidity symptoms are monitored and maintained or improved  Outcome: Progressing     Problem: ABCDS Injury Assessment  Goal: Absence of physical injury  Outcome: Progressing     Problem: Safety - Adult  Goal: Free from fall injury  Outcome: Progressing  Note: Pt will remain free from falls throughout hospital stay. Fall precautions in place, bed alarm on, bed in lowest position with wheels locked and side rails 2/4 up.  Will continue to monitor throughout shift.      Problem: Pain  Goal: Verbalizes/displays adequate comfort level or baseline comfort level  Outcome: Progressing  Note: Pt will be satisfied with pain control. Pt uses numeric pain rating scale with reassessments after pain med administration. Will continue to monitor progression throughout shift.

## 2024-10-12 NOTE — PLAN OF CARE
Problem: Chronic Conditions and Co-morbidities  Goal: Patient's chronic conditions and co-morbidity symptoms are monitored and maintained or improved  10/12/2024 1223 by Sary West RN  Outcome: Progressing     Problem: Discharge Planning  Goal: Discharge to home or other facility with appropriate resources  Outcome: Progressing     Problem: ABCDS Injury Assessment  Goal: Absence of physical injury  10/12/2024 1223 by Sary West RN  Outcome: Progressing     Problem: Skin/Tissue Integrity  Goal: Absence of new skin breakdown  Description: 1.  Monitor for areas of redness and/or skin breakdown  2.  Assess vascular access sites hourly  3.  Every 4-6 hours minimum:  Change oxygen saturation probe site  4.  Every 4-6 hours:  If on nasal continuous positive airway pressure, respiratory therapy assess nares and determine need for appliance change or resting period.  Outcome: Progressing     Problem: Safety - Adult  Goal: Free from fall injury  10/12/2024 1223 by Sary West RN  Outcome: Progressing     Problem: Pain  Goal: Verbalizes/displays adequate comfort level or baseline comfort level  10/12/2024 1223 by Sary West RN  Outcome: Progressing

## 2024-10-13 LAB
ANION GAP SERPL CALCULATED.3IONS-SCNC: 12 MMOL/L (ref 3–16)
APTT BLD: 81.9 SEC (ref 22.1–36.4)
APTT BLD: >180 SEC (ref 22.1–36.4)
BUN SERPL-MCNC: 25 MG/DL (ref 7–20)
CALCIUM SERPL-MCNC: 9.4 MG/DL (ref 8.3–10.6)
CHLORIDE SERPL-SCNC: 92 MMOL/L (ref 99–110)
CO2 SERPL-SCNC: 25 MMOL/L (ref 21–32)
CREAT SERPL-MCNC: 0.9 MG/DL (ref 0.9–1.3)
GFR SERPLBLD CREATININE-BSD FMLA CKD-EPI: >90 ML/MIN/{1.73_M2}
GLUCOSE BLD-MCNC: 137 MG/DL (ref 70–99)
GLUCOSE BLD-MCNC: 169 MG/DL (ref 70–99)
GLUCOSE BLD-MCNC: 282 MG/DL (ref 70–99)
GLUCOSE BLD-MCNC: 386 MG/DL (ref 70–99)
GLUCOSE SERPL-MCNC: 252 MG/DL (ref 70–99)
PERFORMED ON: ABNORMAL
POTASSIUM SERPL-SCNC: 4.3 MMOL/L (ref 3.5–5.1)
SODIUM SERPL-SCNC: 129 MMOL/L (ref 136–145)

## 2024-10-13 PROCEDURE — 94640 AIRWAY INHALATION TREATMENT: CPT

## 2024-10-13 PROCEDURE — 36415 COLL VENOUS BLD VENIPUNCTURE: CPT

## 2024-10-13 PROCEDURE — 2580000003 HC RX 258: Performed by: INTERNAL MEDICINE

## 2024-10-13 PROCEDURE — 6360000002 HC RX W HCPCS: Performed by: INTERNAL MEDICINE

## 2024-10-13 PROCEDURE — 6370000000 HC RX 637 (ALT 250 FOR IP): Performed by: INTERNAL MEDICINE

## 2024-10-13 PROCEDURE — 6370000000 HC RX 637 (ALT 250 FOR IP): Performed by: STUDENT IN AN ORGANIZED HEALTH CARE EDUCATION/TRAINING PROGRAM

## 2024-10-13 PROCEDURE — 99233 SBSQ HOSP IP/OBS HIGH 50: CPT | Performed by: INTERNAL MEDICINE

## 2024-10-13 PROCEDURE — 85730 THROMBOPLASTIN TIME PARTIAL: CPT

## 2024-10-13 PROCEDURE — 80048 BASIC METABOLIC PNL TOTAL CA: CPT

## 2024-10-13 PROCEDURE — 2060000000 HC ICU INTERMEDIATE R&B

## 2024-10-13 PROCEDURE — 83036 HEMOGLOBIN GLYCOSYLATED A1C: CPT

## 2024-10-13 RX ORDER — INSULIN GLARGINE 100 [IU]/ML
20 INJECTION, SOLUTION SUBCUTANEOUS NIGHTLY
Status: DISCONTINUED | OUTPATIENT
Start: 2024-10-13 | End: 2024-10-14 | Stop reason: HOSPADM

## 2024-10-13 RX ORDER — INSULIN LISPRO 100 [IU]/ML
8 INJECTION, SOLUTION INTRAVENOUS; SUBCUTANEOUS
Status: DISCONTINUED | OUTPATIENT
Start: 2024-10-13 | End: 2024-10-14

## 2024-10-13 RX ADMIN — Medication 1 TABLET: at 08:50

## 2024-10-13 RX ADMIN — ACETAMINOPHEN 650 MG: 325 TABLET ORAL at 02:43

## 2024-10-13 RX ADMIN — CLOPIDOGREL BISULFATE 75 MG: 75 TABLET ORAL at 08:50

## 2024-10-13 RX ADMIN — Medication 10 ML: at 21:08

## 2024-10-13 RX ADMIN — INSULIN LISPRO 8 UNITS: 100 INJECTION, SOLUTION INTRAVENOUS; SUBCUTANEOUS at 12:50

## 2024-10-13 RX ADMIN — Medication 1 CAPSULE: at 14:24

## 2024-10-13 RX ADMIN — Medication 1 CAPSULE: at 08:55

## 2024-10-13 RX ADMIN — MORPHINE SULFATE 2 MG: 2 INJECTION, SOLUTION INTRAMUSCULAR; INTRAVENOUS at 04:15

## 2024-10-13 RX ADMIN — INSULIN LISPRO 8 UNITS: 100 INJECTION, SOLUTION INTRAVENOUS; SUBCUTANEOUS at 12:49

## 2024-10-13 RX ADMIN — INSULIN LISPRO 8 UNITS: 100 INJECTION, SOLUTION INTRAVENOUS; SUBCUTANEOUS at 16:59

## 2024-10-13 RX ADMIN — INSULIN GLARGINE 20 UNITS: 100 INJECTION, SOLUTION SUBCUTANEOUS at 21:12

## 2024-10-13 RX ADMIN — Medication 1 TABLET: at 21:11

## 2024-10-13 RX ADMIN — INSULIN LISPRO 8 UNITS: 100 INJECTION, SOLUTION INTRAVENOUS; SUBCUTANEOUS at 17:00

## 2024-10-13 RX ADMIN — APIXABAN 5 MG: 5 TABLET, FILM COATED ORAL at 21:11

## 2024-10-13 RX ADMIN — SUCRALFATE 1 G: 1 TABLET ORAL at 12:46

## 2024-10-13 RX ADMIN — LISINOPRIL 2.5 MG: 5 TABLET ORAL at 21:11

## 2024-10-13 RX ADMIN — SUCRALFATE 1 G: 1 TABLET ORAL at 16:59

## 2024-10-13 RX ADMIN — BUSPIRONE HYDROCHLORIDE 5 MG: 5 TABLET ORAL at 21:12

## 2024-10-13 RX ADMIN — PANTOPRAZOLE SODIUM 40 MG: 40 TABLET, DELAYED RELEASE ORAL at 08:55

## 2024-10-13 RX ADMIN — SUCRALFATE 1 G: 1 TABLET ORAL at 21:12

## 2024-10-13 RX ADMIN — EMPAGLIFLOZIN 10 MG: 10 TABLET, FILM COATED ORAL at 08:50

## 2024-10-13 RX ADMIN — AMIODARONE HYDROCHLORIDE 400 MG: 200 TABLET ORAL at 20:17

## 2024-10-13 RX ADMIN — ACETAMINOPHEN 650 MG: 325 TABLET ORAL at 10:28

## 2024-10-13 RX ADMIN — Medication 1 CAPSULE: at 21:12

## 2024-10-13 RX ADMIN — GABAPENTIN 300 MG: 300 CAPSULE ORAL at 21:11

## 2024-10-13 RX ADMIN — SPIRONOLACTONE 25 MG: 25 TABLET, FILM COATED ORAL at 08:55

## 2024-10-13 RX ADMIN — METOPROLOL SUCCINATE 12.5 MG: 25 TABLET, EXTENDED RELEASE ORAL at 08:55

## 2024-10-13 RX ADMIN — AMIODARONE HYDROCHLORIDE 400 MG: 200 TABLET ORAL at 08:50

## 2024-10-13 RX ADMIN — ATORVASTATIN CALCIUM 40 MG: 40 TABLET, FILM COATED ORAL at 21:11

## 2024-10-13 RX ADMIN — GABAPENTIN 300 MG: 300 CAPSULE ORAL at 08:50

## 2024-10-13 RX ADMIN — MORPHINE SULFATE 2 MG: 2 INJECTION, SOLUTION INTRAMUSCULAR; INTRAVENOUS at 17:01

## 2024-10-13 RX ADMIN — MORPHINE SULFATE 2 MG: 2 INJECTION, SOLUTION INTRAMUSCULAR; INTRAVENOUS at 08:39

## 2024-10-13 RX ADMIN — TIOTROPIUM BROMIDE AND OLODATEROL 2 PUFF: 3.124; 2.736 SPRAY, METERED RESPIRATORY (INHALATION) at 08:23

## 2024-10-13 RX ADMIN — INSULIN LISPRO 4 UNITS: 100 INJECTION, SOLUTION INTRAVENOUS; SUBCUTANEOUS at 17:00

## 2024-10-13 RX ADMIN — BUSPIRONE HYDROCHLORIDE 5 MG: 5 TABLET ORAL at 08:50

## 2024-10-13 RX ADMIN — MORPHINE SULFATE 2 MG: 2 INJECTION, SOLUTION INTRAMUSCULAR; INTRAVENOUS at 12:47

## 2024-10-13 RX ADMIN — MORPHINE SULFATE 2 MG: 2 INJECTION, SOLUTION INTRAMUSCULAR; INTRAVENOUS at 00:03

## 2024-10-13 RX ADMIN — MORPHINE SULFATE 2 MG: 2 INJECTION, SOLUTION INTRAMUSCULAR; INTRAVENOUS at 21:07

## 2024-10-13 RX ADMIN — SUCRALFATE 1 G: 1 TABLET ORAL at 08:55

## 2024-10-13 RX ADMIN — GABAPENTIN 300 MG: 300 CAPSULE ORAL at 14:24

## 2024-10-13 RX ADMIN — ACETAMINOPHEN 650 MG: 325 TABLET ORAL at 21:56

## 2024-10-13 RX ADMIN — TORSEMIDE 20 MG: 20 TABLET ORAL at 08:50

## 2024-10-13 ASSESSMENT — PAIN SCALES - GENERAL
PAINLEVEL_OUTOF10: 9
PAINLEVEL_OUTOF10: 7
PAINLEVEL_OUTOF10: 8
PAINLEVEL_OUTOF10: 10
PAINLEVEL_OUTOF10: 8
PAINLEVEL_OUTOF10: 7
PAINLEVEL_OUTOF10: 9
PAINLEVEL_OUTOF10: 7
PAINLEVEL_OUTOF10: 5
PAINLEVEL_OUTOF10: 10
PAINLEVEL_OUTOF10: 7
PAINLEVEL_OUTOF10: 10
PAINLEVEL_OUTOF10: 4
PAINLEVEL_OUTOF10: 7
PAINLEVEL_OUTOF10: 5

## 2024-10-13 ASSESSMENT — PAIN DESCRIPTION - LOCATION
LOCATION: JAW
LOCATION: JAW
LOCATION: TEETH
LOCATION: TEETH
LOCATION: JAW
LOCATION: JAW;TEETH
LOCATION: TEETH

## 2024-10-13 ASSESSMENT — PAIN DESCRIPTION - DESCRIPTORS
DESCRIPTORS: ACHING
DESCRIPTORS: ACHING;STABBING
DESCRIPTORS: ACHING

## 2024-10-13 ASSESSMENT — PAIN DESCRIPTION - ORIENTATION
ORIENTATION: RIGHT

## 2024-10-13 NOTE — PLAN OF CARE

## 2024-10-13 NOTE — CONSULTS
Pharmacy to Manage Heparin Infusion per Hospital Nomogram      Pt wt = 74.9 kg  Baseline aPTT = 40.8 sec    Oral factor Xa-inhibitors may alter and elevate anti-Xa levels used for unfractionated heparin monitoring. As a result, anti-Xa monitoring is not accurate while Xa-inhibitor activity is detectable. Utilize aPTT monitoring when patient received an oral factor Xa-inhibitor (apixaban, betrixaban, edoxaban or rivaroxaban) within 72 hours prior to admission (please document last administration time). The goal is to allow a washout of oral factor Xa-inhibitors by using aPTT for 72 hours, then change to ant-Xa levels for UFH.     Epixaban last dose 10/11 @2043    Heparin (weight-based) Infusion: CAD/STEMI/NSTEMI/UA/AFib)   Heparin 60 units/kg IVP bolus (max 4,000 units) followed by Heparin infusion at 12 units/kg/hr (recommended max initial rate: 1000 units/hr).  Recheck aPTT in 6 hours.  Goal aPTT =  seconds.    Sary Yang RPH, R.Ph. 10/12/2024 9:05 AM    ------------------------------------  10/12 1900  Low-Dose Heparin Drip  Current Rate: 12 units/kg/hr  10/12 @ 1836 aPTT Level= 52.5  Plan: Per pharmacy dosing, we will give a bolus dose of 60 units/kg and increase heparin drip rate to 16 units/kg/hr    Next aPTT Level: 10/13 @ 0100  Mega WinnD 10/12/2024 6:55 PM    10/13 0119  aptt = >180 sec  Hold x 1 hr; Rate = 13 units/kg/hr.  Next aptt 0900  Jerod Gardner Pharm D.10/13/2024 2:39 AM    10/13 @1020  aptt = 81.9 sec  Rate = 13 units/kg/hr.  Next aptt 1500  Sary Yang RPH, R.Ph. 10/13/2024 11:18 AM  
Session ID: [id number]  Language: ASL   ID: #[id number]   Name: Re
Session ID: [id number]  Language: ASL   ID: #[id number]   Name: Jerri
[facility]

## 2024-10-14 ENCOUNTER — APPOINTMENT (OUTPATIENT)
Dept: CT IMAGING | Age: 54
DRG: 309 | End: 2024-10-14
Attending: INTERNAL MEDICINE
Payer: MEDICARE

## 2024-10-14 VITALS
RESPIRATION RATE: 16 BRPM | SYSTOLIC BLOOD PRESSURE: 113 MMHG | HEART RATE: 80 BPM | OXYGEN SATURATION: 98 % | DIASTOLIC BLOOD PRESSURE: 81 MMHG | WEIGHT: 165.12 LBS | BODY MASS INDEX: 23.12 KG/M2 | TEMPERATURE: 97.7 F | HEIGHT: 71 IN

## 2024-10-14 LAB
EST. AVERAGE GLUCOSE BLD GHB EST-MCNC: 257.5 MG/DL
GLUCOSE BLD-MCNC: 168 MG/DL (ref 70–99)
GLUCOSE BLD-MCNC: 201 MG/DL (ref 70–99)
GLUCOSE BLD-MCNC: 254 MG/DL (ref 70–99)
GLUCOSE BLD-MCNC: 267 MG/DL (ref 70–99)
HBA1C MFR BLD: 10.6 %
PERFORMED ON: ABNORMAL

## 2024-10-14 PROCEDURE — 6370000000 HC RX 637 (ALT 250 FOR IP): Performed by: STUDENT IN AN ORGANIZED HEALTH CARE EDUCATION/TRAINING PROGRAM

## 2024-10-14 PROCEDURE — 6360000004 HC RX CONTRAST MEDICATION: Performed by: STUDENT IN AN ORGANIZED HEALTH CARE EDUCATION/TRAINING PROGRAM

## 2024-10-14 PROCEDURE — 97535 SELF CARE MNGMENT TRAINING: CPT

## 2024-10-14 PROCEDURE — 97166 OT EVAL MOD COMPLEX 45 MIN: CPT

## 2024-10-14 PROCEDURE — 94640 AIRWAY INHALATION TREATMENT: CPT

## 2024-10-14 PROCEDURE — 6370000000 HC RX 637 (ALT 250 FOR IP): Performed by: INTERNAL MEDICINE

## 2024-10-14 PROCEDURE — 70488 CT MAXILLOFACIAL W/O & W/DYE: CPT

## 2024-10-14 PROCEDURE — 6360000002 HC RX W HCPCS: Performed by: INTERNAL MEDICINE

## 2024-10-14 PROCEDURE — 2580000003 HC RX 258: Performed by: INTERNAL MEDICINE

## 2024-10-14 PROCEDURE — 97530 THERAPEUTIC ACTIVITIES: CPT

## 2024-10-14 RX ORDER — METOPROLOL SUCCINATE 25 MG/1
12.5 TABLET, EXTENDED RELEASE ORAL DAILY
Qty: 30 TABLET | Refills: 3 | Status: SHIPPED | OUTPATIENT
Start: 2024-10-15

## 2024-10-14 RX ORDER — OXYCODONE HYDROCHLORIDE 5 MG/1
5 TABLET ORAL EVERY 6 HOURS PRN
Qty: 12 TABLET | Refills: 0 | Status: SHIPPED | OUTPATIENT
Start: 2024-10-14 | End: 2024-10-17

## 2024-10-14 RX ORDER — AMIODARONE HYDROCHLORIDE 400 MG/1
400 TABLET ORAL DAILY
Qty: 90 TABLET | Refills: 1 | Status: SHIPPED | OUTPATIENT
Start: 2024-10-14

## 2024-10-14 RX ORDER — INSULIN LISPRO 100 [IU]/ML
12 INJECTION, SOLUTION INTRAVENOUS; SUBCUTANEOUS
Qty: 10 ML | Refills: 1 | Status: SHIPPED | OUTPATIENT
Start: 2024-10-14

## 2024-10-14 RX ORDER — INSULIN GLARGINE 100 [IU]/ML
20 INJECTION, SOLUTION SUBCUTANEOUS NIGHTLY
Qty: 10 ML | Refills: 3 | Status: SHIPPED | OUTPATIENT
Start: 2024-10-14

## 2024-10-14 RX ORDER — IOPAMIDOL 755 MG/ML
75 INJECTION, SOLUTION INTRAVASCULAR
Status: COMPLETED | OUTPATIENT
Start: 2024-10-14 | End: 2024-10-14

## 2024-10-14 RX ORDER — INSULIN LISPRO 100 [IU]/ML
12 INJECTION, SOLUTION INTRAVENOUS; SUBCUTANEOUS
Status: DISCONTINUED | OUTPATIENT
Start: 2024-10-14 | End: 2024-10-14 | Stop reason: HOSPADM

## 2024-10-14 RX ORDER — LISINOPRIL 2.5 MG/1
2.5 TABLET ORAL NIGHTLY
Qty: 30 TABLET | Refills: 3 | Status: SHIPPED | OUTPATIENT
Start: 2024-10-14

## 2024-10-14 RX ADMIN — PANTOPRAZOLE SODIUM 40 MG: 40 TABLET, DELAYED RELEASE ORAL at 08:53

## 2024-10-14 RX ADMIN — Medication 10 ML: at 09:00

## 2024-10-14 RX ADMIN — BUSPIRONE HYDROCHLORIDE 5 MG: 5 TABLET ORAL at 08:49

## 2024-10-14 RX ADMIN — APIXABAN 5 MG: 5 TABLET, FILM COATED ORAL at 08:51

## 2024-10-14 RX ADMIN — MORPHINE SULFATE 2 MG: 2 INJECTION, SOLUTION INTRAMUSCULAR; INTRAVENOUS at 12:13

## 2024-10-14 RX ADMIN — INSULIN LISPRO 12 UNITS: 100 INJECTION, SOLUTION INTRAVENOUS; SUBCUTANEOUS at 08:59

## 2024-10-14 RX ADMIN — INSULIN LISPRO 4 UNITS: 100 INJECTION, SOLUTION INTRAVENOUS; SUBCUTANEOUS at 12:29

## 2024-10-14 RX ADMIN — INSULIN LISPRO 12 UNITS: 100 INJECTION, SOLUTION INTRAVENOUS; SUBCUTANEOUS at 12:29

## 2024-10-14 RX ADMIN — MORPHINE SULFATE 2 MG: 2 INJECTION, SOLUTION INTRAMUSCULAR; INTRAVENOUS at 01:07

## 2024-10-14 RX ADMIN — SUCRALFATE 1 G: 1 TABLET ORAL at 17:43

## 2024-10-14 RX ADMIN — SPIRONOLACTONE 25 MG: 25 TABLET, FILM COATED ORAL at 08:52

## 2024-10-14 RX ADMIN — SUCRALFATE 1 G: 1 TABLET ORAL at 08:53

## 2024-10-14 RX ADMIN — CLOPIDOGREL BISULFATE 75 MG: 75 TABLET ORAL at 08:52

## 2024-10-14 RX ADMIN — ACETAMINOPHEN 650 MG: 325 TABLET ORAL at 17:43

## 2024-10-14 RX ADMIN — Medication 1 CAPSULE: at 12:22

## 2024-10-14 RX ADMIN — MORPHINE SULFATE 2 MG: 2 INJECTION, SOLUTION INTRAMUSCULAR; INTRAVENOUS at 06:56

## 2024-10-14 RX ADMIN — Medication 1 TABLET: at 08:49

## 2024-10-14 RX ADMIN — METOPROLOL SUCCINATE 12.5 MG: 25 TABLET, EXTENDED RELEASE ORAL at 08:52

## 2024-10-14 RX ADMIN — EMPAGLIFLOZIN 10 MG: 10 TABLET, FILM COATED ORAL at 08:49

## 2024-10-14 RX ADMIN — GABAPENTIN 300 MG: 300 CAPSULE ORAL at 08:51

## 2024-10-14 RX ADMIN — TORSEMIDE 20 MG: 20 TABLET ORAL at 08:49

## 2024-10-14 RX ADMIN — TIOTROPIUM BROMIDE AND OLODATEROL 2 PUFF: 3.124; 2.736 SPRAY, METERED RESPIRATORY (INHALATION) at 07:30

## 2024-10-14 RX ADMIN — GABAPENTIN 300 MG: 300 CAPSULE ORAL at 12:22

## 2024-10-14 RX ADMIN — IOPAMIDOL 75 ML: 755 INJECTION, SOLUTION INTRAVENOUS at 15:53

## 2024-10-14 RX ADMIN — SUCRALFATE 1 G: 1 TABLET ORAL at 12:22

## 2024-10-14 RX ADMIN — Medication 1 CAPSULE: at 08:50

## 2024-10-14 RX ADMIN — AMIODARONE HYDROCHLORIDE 400 MG: 200 TABLET ORAL at 08:50

## 2024-10-14 RX ADMIN — INSULIN LISPRO 2 UNITS: 100 INJECTION, SOLUTION INTRAVENOUS; SUBCUTANEOUS at 17:43

## 2024-10-14 RX ADMIN — INSULIN LISPRO 12 UNITS: 100 INJECTION, SOLUTION INTRAVENOUS; SUBCUTANEOUS at 17:43

## 2024-10-14 ASSESSMENT — PAIN DESCRIPTION - ORIENTATION
ORIENTATION: RIGHT
ORIENTATION: RIGHT;LOWER
ORIENTATION: RIGHT

## 2024-10-14 ASSESSMENT — PAIN SCALES - GENERAL
PAINLEVEL_OUTOF10: 9
PAINLEVEL_OUTOF10: 10
PAINLEVEL_OUTOF10: 9
PAINLEVEL_OUTOF10: 10
PAINLEVEL_OUTOF10: 8
PAINLEVEL_OUTOF10: 5

## 2024-10-14 ASSESSMENT — PAIN DESCRIPTION - LOCATION
LOCATION: TEETH;JAW
LOCATION: TEETH
LOCATION: TEETH;JAW

## 2024-10-14 ASSESSMENT — PAIN DESCRIPTION - DESCRIPTORS
DESCRIPTORS: ACHING
DESCRIPTORS: ACHING;THROBBING

## 2024-10-14 NOTE — CARE COORDINATION
Discharge order noted.   Therapy rec's of SNF noted.   Spoke with patient at bedside. Patient and CM aware that we are waiting on a CT scan to assess for an abscess or any fractures.  Discussed therapy rec's of SNF but he is not agreeable with this plan and writes that he just wants to go home.    Discussed home care but he is unsure at this time.   Patient will need a ride home.

## 2024-10-14 NOTE — PLAN OF CARE
Problem: Chronic Conditions and Co-morbidities  Goal: Patient's chronic conditions and co-morbidity symptoms are monitored and maintained or improved  Outcome: Progressing     Problem: Discharge Planning  Goal: Discharge to home or other facility with appropriate resources  Outcome: Progressing   Possible Discharge today awaiting CT.   Problem: ABCDS Injury Assessment  Goal: Absence of physical injury  Outcome: Progressing     Problem: Skin/Tissue Integrity  Goal: Absence of new skin breakdown  Description: 1.  Monitor for areas of redness and/or skin breakdown  2.  Assess vascular access sites hourly  3.  Every 4-6 hours minimum:  Change oxygen saturation probe site  4.  Every 4-6 hours:  If on nasal continuous positive airway pressure, respiratory therapy assess nares and determine need for appliance change or resting period.  Outcome: Progressing   Pt is at risk for skin breakdown. Pt will have skin assessments every shift, Q2 turns, heels elevated off of the bed, and friction and shear prevented when possible. Will continue to monitor for signs of skin breakdown and enforce prevention measures.    Problem: Safety - Adult  Goal: Free from fall injury  Outcome: Progressing   Pt high fall risk. Instructed to use call light before getting out of bed. Call light within reach. Bed in low position. Bed alarm on.  Will continue to monitor.     Problem: Pain  Goal: Verbalizes/displays adequate comfort level or baseline comfort level  Outcome: Progressing   Pt will be satisfied with pain control. Pt uses numeric pain rating scale with reassessments after pain med administration. Will continue to monitor progression throughout shift.

## 2024-10-14 NOTE — DISCHARGE SUMMARY
Hospital Medicine Discharge Summary    Patient: Renato Nagy   : 1970     Admit Date: 10/11/2024   Discharge Date:   10/14/2024  Disposition: Pain Home   []HHC  []SNF  []ECF  []Acute Rehab  []LTAC  []Hospice  Code status:  [x]Full  []DNR/CCA  []Limited (DNR/CCA with Do Not Intubate)  []DNRCC  Condition at Discharge: Stable  Primary Care Provider: Carrie Mead APRN - NP    Admitting Provider: Ludwin López MD  Discharge Provider: Rusty Shirley DO     Discharge Diagnoses:      Active Hospital Problems    Diagnosis     Ventricular tachycardia (HCC) [I47.20]        Presenting Admission History:      Renato Nagy is a 54 y.o. male with pmh of coronary artery disease, prior cardiac arrest due to opioid overdose, history of heart failure, status post AICD in place, hepatitis C with cirrhosis, history of deafness who presented to the emergency room [facility] with complaint of chest pain.  Patient reported substernal chest pain which has been ongoing in the last few days, associated with palpitation.  He also reported tooth pain, right lower jaw, radiating to his right ear.  Patient is deaf which makes history limited.  He denies fever, no chills, no nausea, no vomiting, no diaphoresis, no dizziness.  While in the emergency room at [facility], patient was noted to be in ventricular tachycardia.  Patient received amiodarone bolus, subsequently was started on amnio drip.  He has been transferred for further evaluation and management.     Vital signs on presentation to the emergency room includes temperature 98 °F, pulse rate of 90 bpm, respiratory rate of 26, blood pressure 124/90 mmHg.  Patient saturating 98% on room air.  Labs significant for sodium 130, chloride 98, glucose 344, BNP 1516, , AST 73.  Platelet 134.  INR 1.74 patient is being admitted for further evaluation and management     Assessment/Plan:      Patient was admitted for chest pain and ventricular tachycardia.  With

## 2024-10-15 ENCOUNTER — CARE COORDINATION (OUTPATIENT)
Dept: CASE MANAGEMENT | Age: 54
End: 2024-10-15

## 2024-10-15 NOTE — PROGRESS NOTES
Hospital Medicine Progress Note   V10.9     Date of Admission: 10/11/2024  Hospital Day: 2    Chief Admission Complaint: Chest pain     Subjective: Patient had stress test done today and tolerated procedure very well.    Rhythm Strip (Telemetry) independently interpreted by me: Normal sinus rhythm on 10/12      Assessment/Plan:      Current Principal Problem:  Ventricular tachycardia (HCC)    Ventricular tachycardia, nonsustained  Chest pain  Chronic systolic heart failure with reduced EF of 25 to 30% status post ICD  Insulin-dependent type 2 diabetes  History of hepatitis C  History of liver cirrhosis likely due to hepatitis C  History of COPD not in exacerbation  Dental pain  Status post BKA    Plan    Noted stress test was done with cardiology and showed mostly nonreversible ischemic changes with minimal reversible ischemia.  Discussed case with cardiology and reviewed their note on 10/12 and noted her plan for getting EP involved on Monday for potential procedure.  I held Eliquis and started patient on heparin drip this morning in case procedure would be needed.  Will continue to check hemoglobin for any sign of bleeding and APTT for adequate anticoagulation  Continue insulin and monitor blood glucose for evidence of hypoglycemia as a result of insulin toxicity.  Added mealtime insulin.  And increase Lantus.      Physical Exam Performed:      General appearance:  No apparent distress  Respiratory:  Normal respiratory effort.   Cardiovascular:  Regular rate and rhythm.  Abdomen:  Soft, non-tender, non-distended.  Musculoskeletal:  No edema  Neurologic:  Non-focal  Psychiatric:  Alert and oriented    BP (!) 124/92   Pulse 93   Temp 98.1 °F (36.7 °C) (Oral)   Resp 18   Ht 1.803 m (5' 11\")   Wt 74.9 kg (165 lb 2 oz)   SpO2 98%   BMI 23.03 kg/m²     Diet: ADULT DIET; Regular  DVT Prophylaxis: []PPx LMWH  [x] drip heparin  []IPC/SCDs  []Eliquis  []Xarelto  []Coumadin  []Other -      Code status: Full 
  Hospital Medicine Progress Note   V10.9     Date of Admission: 10/11/2024  Hospital Day: 3    Chief Admission Complaint: Chest pain     Subjective: Patient denies having any chest pain this morning.  Does report having tooth aches.    Rhythm Strip (Telemetry) independently interpreted by me: Paced rhythm on 10/13      Assessment/Plan:      Current Principal Problem:  Ventricular tachycardia (HCC)    Ventricular tachycardia, nonsustained  Chest pain  Chronic systolic heart failure with reduced EF of 25 to 30% status post ICD  Insulin-dependent type 2 diabetes  History of hepatitis C  History of liver cirrhosis likely due to hepatitis C  History of COPD not in exacerbation  Dental pain  Status post BKA    Plan    Noted stress test was done with cardiology and showed mostly nonreversible ischemic changes with minimal reversible ischemia.  Discussed case with cardiology and reviewed their note on 10/13 and noted no plans for further ischemic evaluation.  Stopped heparin drip and transition back to Eliquis.  Continue insulin and monitor blood glucose for evidence of hypoglycemia as a result of insulin toxicity.  Increased mealtime insulin.  And increase Lantus.  Unfortunately we do not have inpatient dentist will have patient see them as outpatient.  Will likely be able to discharge tomorrow if without any chest pain.      Physical Exam Performed:      General appearance:  No apparent distress  Respiratory:  Normal respiratory effort.   Cardiovascular:  Regular rate and rhythm.  Abdomen:  Soft, non-tender, non-distended.  Musculoskeletal:  No edema  Neurologic:  Non-focal  Psychiatric:  Alert and oriented    /87   Pulse 78   Temp 97.5 °F (36.4 °C) (Oral)   Resp 16   Ht 1.803 m (5' 11\")   Wt 74.9 kg (165 lb 2 oz)   SpO2 98%   BMI 23.03 kg/m²     Diet: ADULT DIET; Regular  DVT Prophylaxis: []PPx LMWH  [x] drip heparin  []IPC/SCDs  []Eliquis  []Xarelto  []Coumadin  []Other -      Code status: Full Code  PT/OT 
CT results sent to .  sent script for Antibiotic. Ok for discharge per .   
Occupational Therapy  Facility/Department: [facility] A2 CARD TELEMETRY  Occupational Therapy Initial Assessment    Name: Renato Nagy  : 1970  MRN: [id number]  Date of Service: 10/14/2024    Discharge Recommendations:  Subacute/Skilled Nursing Facility  OT Equipment Recommendations  Equipment Needed: Yes  Mobility Devices: Wheelchair     Therapy discharge recommendations are subject to collaboration from the patient’s interdisciplinary healthcare team, including MD and case management recommendations.    Barriers to Home Discharge:   [] Steps to access home entry or bed/bath:   [x] Unable to transfer, ambulate, or propel wheelchair household distances without assist   [x] Limited available assist at home upon discharge    [] Patient or family requests d/c to post-acute facility    [x] Poor cognition/safety awareness for d/c to home alone    [] Unable to maintain ordered weight bearing status    [] Patient with salient signs of long-standing immobility   [x] Decreased independence with ADLs   [x] Increased risk for falls   [] Other:    If pt is unable to be seen after this session, please let this note serve as discharge summary.  Please see case management note for discharge disposition.  Thank you.    Patient Diagnosis(es): The primary encounter diagnosis was Ischemic cardiomyopathy. A diagnosis of Pain, dental was also pertinent to this visit.  Past Medical History:  has a past medical history of CAP (community acquired pneumonia), CHF (congestive heart failure) (HCC), Cirrhosis (HCC), COPD (chronic obstructive pulmonary disease) (HCC), COVID-19 virus infection, Deaf, Diabetes (HCC), Hepatitis C, Old myocardial infarction, Other acute osteomyelitis, left ankle and foot, Right upper lobe pneumonia, SBP (spontaneous bacterial peritonitis) (HCC), and Severe sepsis (HCC).  Past Surgical History:  has a past surgical history that includes Leg amputation below knee (Right); Pacemaker insertion; other surgical 
Patient admitted to room 208 direct admit from [place].  Patient oriented to room, call light, bed rails, phone, lights and bathroom.  Patient instructed about the schedule of the day including: vital sign frequency, lab draws, possible tests, frequency of MD and staff rounds, including RN/MD rounding together at bedside, daily weights, and I &O's.  Patient instructed about prescribed diet, and television.   Bed alarm in place, patient aware of placement and reason.  Telemetry box in place, patient aware of placement and reason.  Bed locked, in lowest position, side rails up 2/4, call light within reach.  Will continue to monitor.     
Vancomycin-induced nephrotoxicity 11/24/2021    Methamphetamine abuse (HCC) 11/03/2021    Normocytic anemia 11/03/2021    Scrotal edema 11/03/2021    Hyponatremia 11/02/2021    Abdominal ascites 10/29/2021    Chronic combined systolic and diastolic congestive heart failure (HCC) 10/29/2021    Syncope and collapse 07/04/2021    COPD (chronic obstructive pulmonary disease) (HCC) 06/04/2021    Diabetic neuropathy associated with type 2 diabetes mellitus (HCC) 05/18/2021    PAD (peripheral artery disease) (HCC) 04/25/2021    IGNACIA (obstructive sleep apnea) 04/06/2021    Chronic hepatitis C without hepatic coma (HCC) 03/29/2021    Idiopathic progressive neuropathy 03/18/2021    HFrEF (heart failure with reduced ejection fraction) (HCC) 02/14/2021    Medication induced coagulopathy (HCC) 02/14/2021    Liver cirrhosis (HCC) 02/12/2021    Heartburn 01/15/2021    Type 2 diabetes mellitus with hyperglycemia (HCC) 01/08/2021    Pure hypercholesterolemia 08/31/2020    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    Snoring 06/30/2020    Smoking greater than 20 pack years 02/06/2020    Stable angina pectoris (HCC) 02/06/2020    AICD (automatic cardioverter/defibrillator) present 01/21/2020    SOB (shortness of breath) 12/07/2019    Atypical chest pain 12/06/2019    Ischemic cardiomyopathy with implantable cardioverter-defibrillator (ICD) 09/04/2019    Early dry stage nonexudative age-related macular degeneration of both eyes 01/29/2019    Mild nonproliferative diabetic retinopathy of both eyes without macular edema associated with type 2 diabetes mellitus (HCC) 01/29/2019    Hepatitis C virus infection 08/03/2018    Troponin I above reference range 07/31/2018    Erectile dysfunction 07/31/2018    Poor compliance 06/22/2018    Deaf 04/24/2018    Hypertension 07/20/2017    Coronary artery disease involving native heart with unstable angina pectoris (HCC) 05/01/2017    Hx of substance abuse (HCC) 03/16/2017    Adult ADHD 01/18/2010    Obesity 
fibrillation ([id number] score 4).  10/11/2024 - 10/13/2024  Stable and paroxysmal.  - Continue apixaban, ok to hold pending procedures and can transition to heparin.     4. Jaw pain.  - Per primary team.     Thank you for allowing me to participate in the care of this patient. If you have any questions, please do not hesitate to contact me.    Orestes Taylor MD  Cardiac Electrophysiology  [facility]  [phone] [facility]

## 2024-10-15 NOTE — CARE COORDINATION
Care Transitions Note    Initial Call - Call within 2 business days of discharge: Yes    Attempted to reach patient for transitions of care follow up. Unable to reach patient.    Outreach Attempts:   Unable to leave message.   Received message subscriber is no longer in service. No HIPAA form on file.     Patient: Renato Nagy    Patient : 1970   MRN: [id number]    Reason for Admission: ventricular tachycardia   Discharge Date: 10/14/24  RURS: Readmission Risk Score: 17.7    Last Discharge Facility       Date Complaint Diagnosis Description Type Department Provider    10/11/24  Ischemic cardiomyopathy ... Admission (Discharged) Rusty Thomas, DO            Was this an external facility discharge? No    Follow Up Appointment:   Patient does not have a follow up appointment scheduled at time of call.  [facility] PCP       No further follow-up call indicated     Kianna Torres, MSN, RN, [facility]  Care Transition Nurse  [phone] mobile

## 2024-10-15 NOTE — PLAN OF CARE
Problem: Chronic Conditions and Co-morbidities  Goal: Patient's chronic conditions and co-morbidity symptoms are monitored and maintained or improved  10/14/2024 2034 by Shani Cervantes RN  Outcome: Adequate for Discharge     Problem: Discharge Planning  Goal: Discharge to home or other facility with appropriate resources  10/14/2024 2034 by Shani Cervantes RN  Outcome: Adequate for Discharge     Problem: ABCDS Injury Assessment  Goal: Absence of physical injury  10/14/2024 2034 by Shani Cervantes RN  Outcome: Adequate for Discharge     Problem: Skin/Tissue Integrity  Goal: Absence of new skin breakdown  Description: 1.  Monitor for areas of redness and/or skin breakdown  2.  Assess vascular access sites hourly  3.  Every 4-6 hours minimum:  Change oxygen saturation probe site  4.  Every 4-6 hours:  If on nasal continuous positive airway pressure, respiratory therapy assess nares and determine need for appliance change or resting period.  10/14/2024 2034 by Shani Cervantes RN  Outcome: Adequate for Discharge     Problem: Safety - Adult  Goal: Free from fall injury  10/14/2024 2034 by Shani Cervantes RN  Outcome: Adequate for Discharge     Problem: Pain  Goal: Verbalizes/displays adequate comfort level or baseline comfort level  10/14/2024 2034 by Shani Cervantes RN  Outcome: Adequate for Discharge

## 2024-10-21 ENCOUNTER — APPOINTMENT (OUTPATIENT)
Dept: GENERAL RADIOLOGY | Age: 54
End: 2024-10-21
Payer: MEDICARE

## 2024-10-21 ENCOUNTER — HOSPITAL ENCOUNTER (EMERGENCY)
Age: 54
Discharge: ANOTHER HOSPITAL | End: 2024-10-21
Attending: EMERGENCY MEDICINE
Payer: MEDICARE

## 2024-10-21 ENCOUNTER — HOSPITAL ENCOUNTER (INPATIENT)
Age: 54
LOS: 1 days | Discharge: HOME | DRG: 286 | End: 2024-10-25
Attending: INTERNAL MEDICINE | Admitting: INTERNAL MEDICINE
Payer: MEDICARE

## 2024-10-21 ENCOUNTER — APPOINTMENT (OUTPATIENT)
Age: 54
DRG: 286 | End: 2024-10-21
Attending: INTERNAL MEDICINE
Payer: MEDICARE

## 2024-10-21 VITALS
SYSTOLIC BLOOD PRESSURE: 112 MMHG | OXYGEN SATURATION: 96 % | DIASTOLIC BLOOD PRESSURE: 83 MMHG | BODY MASS INDEX: 23.1 KG/M2 | HEART RATE: 70 BPM | TEMPERATURE: 97.7 F | WEIGHT: 165 LBS | RESPIRATION RATE: 16 BRPM | HEIGHT: 71 IN

## 2024-10-21 DIAGNOSIS — R06.02 SHORTNESS OF BREATH: Primary | ICD-10-CM

## 2024-10-21 DIAGNOSIS — R07.9 RECURRENT CHEST PAIN: ICD-10-CM

## 2024-10-21 DIAGNOSIS — I25.5 ISCHEMIC CARDIOMYOPATHY: ICD-10-CM

## 2024-10-21 DIAGNOSIS — R07.9 CHEST PAIN: ICD-10-CM

## 2024-10-21 DIAGNOSIS — R94.31 PROLONGED Q-T INTERVAL ON ECG: ICD-10-CM

## 2024-10-21 DIAGNOSIS — I50.23 ACUTE ON CHRONIC HFREF (HEART FAILURE WITH REDUCED EJECTION FRACTION) (HCC): ICD-10-CM

## 2024-10-21 DIAGNOSIS — R07.2 PRECORDIAL PAIN: Primary | ICD-10-CM

## 2024-10-21 LAB
ALBUMIN SERPL-MCNC: 3.7 G/DL (ref 3.4–5)
ALBUMIN/GLOB SERPL: 0.9 {RATIO} (ref 1.1–2.2)
ALP SERPL-CCNC: 142 U/L (ref 40–129)
ALT SERPL-CCNC: 88 U/L (ref 10–40)
ANION GAP SERPL CALCULATED.3IONS-SCNC: 11 MMOL/L (ref 3–16)
ANION GAP SERPL CALCULATED.3IONS-SCNC: 8 MMOL/L (ref 3–16)
APTT BLD: 131.8 SEC (ref 22.1–36.4)
APTT BLD: 36.9 SEC (ref 22.1–36.4)
APTT BLD: 39.7 SEC (ref 22.1–36.4)
AST SERPL-CCNC: 75 U/L (ref 15–37)
BASOPHILS # BLD: 0.1 K/UL (ref 0–0.2)
BASOPHILS NFR BLD: 1.2 %
BILIRUB SERPL-MCNC: 0.4 MG/DL (ref 0–1)
BUN SERPL-MCNC: 18 MG/DL (ref 7–20)
BUN SERPL-MCNC: 19 MG/DL (ref 7–20)
CALCIUM SERPL-MCNC: 9.2 MG/DL (ref 8.3–10.6)
CALCIUM SERPL-MCNC: 9.5 MG/DL (ref 8.3–10.6)
CHLORIDE SERPL-SCNC: 102 MMOL/L (ref 99–110)
CHLORIDE SERPL-SCNC: 96 MMOL/L (ref 99–110)
CO2 SERPL-SCNC: 22 MMOL/L (ref 21–32)
CO2 SERPL-SCNC: 22 MMOL/L (ref 21–32)
CREAT SERPL-MCNC: 0.7 MG/DL (ref 0.9–1.3)
CREAT SERPL-MCNC: 0.8 MG/DL (ref 0.9–1.3)
DEPRECATED RDW RBC AUTO: 15.6 % (ref 12.4–15.4)
DEPRECATED RDW RBC AUTO: 16.1 % (ref 12.4–15.4)
EKG ATRIAL RATE: 72 BPM
EKG ATRIAL RATE: 82 BPM
EKG DIAGNOSIS: NORMAL
EKG DIAGNOSIS: NORMAL
EKG P AXIS: 46 DEGREES
EKG P AXIS: 46 DEGREES
EKG P-R INTERVAL: 228 MS
EKG P-R INTERVAL: 232 MS
EKG Q-T INTERVAL: 482 MS
EKG Q-T INTERVAL: 522 MS
EKG QRS DURATION: 138 MS
EKG QRS DURATION: 144 MS
EKG QTC CALCULATION (BAZETT): 563 MS
EKG QTC CALCULATION (BAZETT): 571 MS
EKG R AXIS: 252 DEGREES
EKG R AXIS: 257 DEGREES
EKG T AXIS: 49 DEGREES
EKG T AXIS: 58 DEGREES
EKG VENTRICULAR RATE: 72 BPM
EKG VENTRICULAR RATE: 82 BPM
EOSINOPHIL # BLD: 0.1 K/UL (ref 0–0.6)
EOSINOPHIL NFR BLD: 1.6 %
GFR SERPLBLD CREATININE-BSD FMLA CKD-EPI: >90 ML/MIN/{1.73_M2}
GFR SERPLBLD CREATININE-BSD FMLA CKD-EPI: >90 ML/MIN/{1.73_M2}
GLUCOSE BLD-MCNC: 216 MG/DL (ref 70–99)
GLUCOSE BLD-MCNC: 231 MG/DL (ref 70–99)
GLUCOSE BLD-MCNC: 374 MG/DL (ref 70–99)
GLUCOSE SERPL-MCNC: 238 MG/DL (ref 70–99)
GLUCOSE SERPL-MCNC: 257 MG/DL (ref 70–99)
HCT VFR BLD AUTO: 46.2 % (ref 40.5–52.5)
HCT VFR BLD AUTO: 47.6 % (ref 40.5–52.5)
HGB BLD-MCNC: 15.1 G/DL (ref 13.5–17.5)
HGB BLD-MCNC: 15.7 G/DL (ref 13.5–17.5)
INR PPP: 2.08 (ref 0.85–1.15)
INR PPP: 2.25 (ref 0.85–1.15)
LYMPHOCYTES # BLD: 1.7 K/UL (ref 1–5.1)
LYMPHOCYTES NFR BLD: 29.6 %
MCH RBC QN AUTO: 28.9 PG (ref 26–34)
MCH RBC QN AUTO: 29.2 PG (ref 26–34)
MCHC RBC AUTO-ENTMCNC: 32.7 G/DL (ref 31–36)
MCHC RBC AUTO-ENTMCNC: 32.9 G/DL (ref 31–36)
MCV RBC AUTO: 88.4 FL (ref 80–100)
MCV RBC AUTO: 88.8 FL (ref 80–100)
MONOCYTES # BLD: 0.6 K/UL (ref 0–1.3)
MONOCYTES NFR BLD: 9.5 %
NEUTROPHILS # BLD: 3.4 K/UL (ref 1.7–7.7)
NEUTROPHILS NFR BLD: 58.1 %
NT-PROBNP SERPL-MCNC: 4640 PG/ML (ref 0–124)
PERFORMED ON: ABNORMAL
PLATELET # BLD AUTO: 131 K/UL (ref 135–450)
PLATELET # BLD AUTO: 176 K/UL (ref 135–450)
PMV BLD AUTO: 8.4 FL (ref 5–10.5)
PMV BLD AUTO: 8.4 FL (ref 5–10.5)
POTASSIUM SERPL-SCNC: 4.7 MMOL/L (ref 3.5–5.1)
POTASSIUM SERPL-SCNC: 4.9 MMOL/L (ref 3.5–5.1)
POTASSIUM SERPL-SCNC: 5.4 MMOL/L (ref 3.5–5.1)
PROT SERPL-MCNC: 7.9 G/DL (ref 6.4–8.2)
PROTHROMBIN TIME: 23.4 SEC (ref 11.9–14.9)
PROTHROMBIN TIME: 24.9 SEC (ref 11.9–14.9)
RBC # BLD AUTO: 5.23 M/UL (ref 4.2–5.9)
RBC # BLD AUTO: 5.36 M/UL (ref 4.2–5.9)
SODIUM SERPL-SCNC: 129 MMOL/L (ref 136–145)
SODIUM SERPL-SCNC: 132 MMOL/L (ref 136–145)
TROPONIN, HIGH SENSITIVITY: 17 NG/L (ref 0–22)
TROPONIN, HIGH SENSITIVITY: 17 NG/L (ref 0–22)
WBC # BLD AUTO: 5.9 K/UL (ref 4–11)
WBC # BLD AUTO: 8.2 K/UL (ref 4–11)

## 2024-10-21 PROCEDURE — 85730 THROMBOPLASTIN TIME PARTIAL: CPT

## 2024-10-21 PROCEDURE — 6360000002 HC RX W HCPCS: Performed by: STUDENT IN AN ORGANIZED HEALTH CARE EDUCATION/TRAINING PROGRAM

## 2024-10-21 PROCEDURE — 93005 ELECTROCARDIOGRAM TRACING: CPT | Performed by: INTERNAL MEDICINE

## 2024-10-21 PROCEDURE — 84484 ASSAY OF TROPONIN QUANT: CPT

## 2024-10-21 PROCEDURE — 36415 COLL VENOUS BLD VENIPUNCTURE: CPT

## 2024-10-21 PROCEDURE — 6360000002 HC RX W HCPCS: Performed by: INTERNAL MEDICINE

## 2024-10-21 PROCEDURE — 96375 TX/PRO/DX INJ NEW DRUG ADDON: CPT

## 2024-10-21 PROCEDURE — 85027 COMPLETE CBC AUTOMATED: CPT

## 2024-10-21 PROCEDURE — 96366 THER/PROPH/DIAG IV INF ADDON: CPT

## 2024-10-21 PROCEDURE — 99222 1ST HOSP IP/OBS MODERATE 55: CPT | Performed by: INTERNAL MEDICINE

## 2024-10-21 PROCEDURE — 84132 ASSAY OF SERUM POTASSIUM: CPT

## 2024-10-21 PROCEDURE — 6370000000 HC RX 637 (ALT 250 FOR IP)

## 2024-10-21 PROCEDURE — 99285 EMERGENCY DEPT VISIT HI MDM: CPT

## 2024-10-21 PROCEDURE — 85025 COMPLETE CBC W/AUTO DIFF WBC: CPT

## 2024-10-21 PROCEDURE — 85610 PROTHROMBIN TIME: CPT

## 2024-10-21 PROCEDURE — 96367 TX/PROPH/DG ADDL SEQ IV INF: CPT

## 2024-10-21 PROCEDURE — 80053 COMPREHEN METABOLIC PANEL: CPT

## 2024-10-21 PROCEDURE — 93010 ELECTROCARDIOGRAM REPORT: CPT | Performed by: INTERNAL MEDICINE

## 2024-10-21 PROCEDURE — 71045 X-RAY EXAM CHEST 1 VIEW: CPT

## 2024-10-21 PROCEDURE — 93005 ELECTROCARDIOGRAM TRACING: CPT | Performed by: EMERGENCY MEDICINE

## 2024-10-21 PROCEDURE — 6360000002 HC RX W HCPCS: Performed by: EMERGENCY MEDICINE

## 2024-10-21 PROCEDURE — 83880 ASSAY OF NATRIURETIC PEPTIDE: CPT

## 2024-10-21 PROCEDURE — G0378 HOSPITAL OBSERVATION PER HR: HCPCS

## 2024-10-21 PROCEDURE — 93010 ELECTROCARDIOGRAM REPORT: CPT | Performed by: STUDENT IN AN ORGANIZED HEALTH CARE EDUCATION/TRAINING PROGRAM

## 2024-10-21 PROCEDURE — 80048 BASIC METABOLIC PNL TOTAL CA: CPT

## 2024-10-21 PROCEDURE — G0379 DIRECT REFER HOSPITAL OBSERV: HCPCS

## 2024-10-21 PROCEDURE — 6370000000 HC RX 637 (ALT 250 FOR IP): Performed by: EMERGENCY MEDICINE

## 2024-10-21 PROCEDURE — 96365 THER/PROPH/DIAG IV INF INIT: CPT

## 2024-10-21 PROCEDURE — 6370000000 HC RX 637 (ALT 250 FOR IP): Performed by: INTERNAL MEDICINE

## 2024-10-21 RX ORDER — NITROGLYCERIN 0.4 MG/1
0.4 TABLET SUBLINGUAL ONCE
Status: COMPLETED | OUTPATIENT
Start: 2024-10-21 | End: 2024-10-21

## 2024-10-21 RX ORDER — SODIUM CHLORIDE 0.9 % (FLUSH) 0.9 %
5-40 SYRINGE (ML) INJECTION PRN
Status: DISCONTINUED | OUTPATIENT
Start: 2024-10-21 | End: 2024-10-25 | Stop reason: HOSPADM

## 2024-10-21 RX ORDER — MAGNESIUM SULFATE IN WATER 40 MG/ML
2000 INJECTION, SOLUTION INTRAVENOUS PRN
Status: DISCONTINUED | OUTPATIENT
Start: 2024-10-21 | End: 2024-10-25 | Stop reason: HOSPADM

## 2024-10-21 RX ORDER — HEPARIN SODIUM 1000 [USP'U]/ML
2000 INJECTION, SOLUTION INTRAVENOUS; SUBCUTANEOUS PRN
Status: DISCONTINUED | OUTPATIENT
Start: 2024-10-21 | End: 2024-10-21

## 2024-10-21 RX ORDER — HEPARIN SODIUM 1000 [USP'U]/ML
4000 INJECTION, SOLUTION INTRAVENOUS; SUBCUTANEOUS ONCE
Status: DISCONTINUED | OUTPATIENT
Start: 2024-10-21 | End: 2024-10-21

## 2024-10-21 RX ORDER — RANOLAZINE 500 MG/1
500 TABLET, EXTENDED RELEASE ORAL 2 TIMES DAILY
Status: DISCONTINUED | OUTPATIENT
Start: 2024-10-21 | End: 2024-10-25 | Stop reason: HOSPADM

## 2024-10-21 RX ORDER — TRAMADOL HYDROCHLORIDE 50 MG/1
50 TABLET ORAL EVERY 6 HOURS PRN
Status: DISCONTINUED | OUTPATIENT
Start: 2024-10-21 | End: 2024-10-22

## 2024-10-21 RX ORDER — HEPARIN SODIUM 10000 [USP'U]/100ML
5-30 INJECTION, SOLUTION INTRAVENOUS CONTINUOUS
Status: DISCONTINUED | OUTPATIENT
Start: 2024-10-21 | End: 2024-10-23

## 2024-10-21 RX ORDER — INSULIN GLARGINE 100 [IU]/ML
20 INJECTION, SOLUTION SUBCUTANEOUS NIGHTLY
Status: DISCONTINUED | OUTPATIENT
Start: 2024-10-21 | End: 2024-10-22

## 2024-10-21 RX ORDER — ASPIRIN 325 MG
325 TABLET ORAL ONCE
Status: COMPLETED | OUTPATIENT
Start: 2024-10-21 | End: 2024-10-21

## 2024-10-21 RX ORDER — PROCHLORPERAZINE EDISYLATE 5 MG/ML
10 INJECTION INTRAMUSCULAR; INTRAVENOUS EVERY 6 HOURS PRN
Status: DISCONTINUED | OUTPATIENT
Start: 2024-10-21 | End: 2024-10-25 | Stop reason: HOSPADM

## 2024-10-21 RX ORDER — SODIUM CHLORIDE 0.9 % (FLUSH) 0.9 %
5-40 SYRINGE (ML) INJECTION EVERY 12 HOURS SCHEDULED
Status: DISCONTINUED | OUTPATIENT
Start: 2024-10-21 | End: 2024-10-25 | Stop reason: HOSPADM

## 2024-10-21 RX ORDER — HEPARIN SODIUM 1000 [USP'U]/ML
4000 INJECTION, SOLUTION INTRAVENOUS; SUBCUTANEOUS ONCE
Status: COMPLETED | OUTPATIENT
Start: 2024-10-21 | End: 2024-10-21

## 2024-10-21 RX ORDER — HEPARIN SODIUM 1000 [USP'U]/ML
4000 INJECTION, SOLUTION INTRAVENOUS; SUBCUTANEOUS PRN
Status: DISCONTINUED | OUTPATIENT
Start: 2024-10-21 | End: 2024-10-21 | Stop reason: HOSPADM

## 2024-10-21 RX ORDER — BUSPIRONE HYDROCHLORIDE 5 MG/1
5 TABLET ORAL 2 TIMES DAILY
Status: DISCONTINUED | OUTPATIENT
Start: 2024-10-21 | End: 2024-10-25 | Stop reason: HOSPADM

## 2024-10-21 RX ORDER — HEPARIN SODIUM 1000 [USP'U]/ML
2000 INJECTION, SOLUTION INTRAVENOUS; SUBCUTANEOUS PRN
Status: DISCONTINUED | OUTPATIENT
Start: 2024-10-21 | End: 2024-10-23

## 2024-10-21 RX ORDER — INSULIN LISPRO 100 [IU]/ML
12 INJECTION, SOLUTION INTRAVENOUS; SUBCUTANEOUS
Status: DISCONTINUED | OUTPATIENT
Start: 2024-10-21 | End: 2024-10-25 | Stop reason: HOSPADM

## 2024-10-21 RX ORDER — ALBUTEROL SULFATE 90 UG/1
2 INHALANT RESPIRATORY (INHALATION) 4 TIMES DAILY PRN
Status: DISCONTINUED | OUTPATIENT
Start: 2024-10-21 | End: 2024-10-25 | Stop reason: HOSPADM

## 2024-10-21 RX ORDER — AMIODARONE HYDROCHLORIDE 200 MG/1
400 TABLET ORAL DAILY
Status: DISCONTINUED | OUTPATIENT
Start: 2024-10-21 | End: 2024-10-25 | Stop reason: HOSPADM

## 2024-10-21 RX ORDER — LISINOPRIL 2.5 MG/1
2.5 TABLET ORAL NIGHTLY
Status: DISCONTINUED | OUTPATIENT
Start: 2024-10-21 | End: 2024-10-25 | Stop reason: HOSPADM

## 2024-10-21 RX ORDER — INSULIN LISPRO 100 [IU]/ML
0-4 INJECTION, SOLUTION INTRAVENOUS; SUBCUTANEOUS
Status: DISCONTINUED | OUTPATIENT
Start: 2024-10-21 | End: 2024-10-21

## 2024-10-21 RX ORDER — HEPARIN SODIUM 1000 [USP'U]/ML
4000 INJECTION, SOLUTION INTRAVENOUS; SUBCUTANEOUS PRN
Status: DISCONTINUED | OUTPATIENT
Start: 2024-10-21 | End: 2024-10-23

## 2024-10-21 RX ORDER — HEPARIN SODIUM 1000 [USP'U]/ML
2000 INJECTION, SOLUTION INTRAVENOUS; SUBCUTANEOUS PRN
Status: DISCONTINUED | OUTPATIENT
Start: 2024-10-21 | End: 2024-10-21 | Stop reason: HOSPADM

## 2024-10-21 RX ORDER — DEXTROSE MONOHYDRATE 100 MG/ML
INJECTION, SOLUTION INTRAVENOUS CONTINUOUS PRN
Status: DISCONTINUED | OUTPATIENT
Start: 2024-10-21 | End: 2024-10-25 | Stop reason: HOSPADM

## 2024-10-21 RX ORDER — INSULIN LISPRO 100 [IU]/ML
5 INJECTION, SOLUTION INTRAVENOUS; SUBCUTANEOUS ONCE
Status: COMPLETED | OUTPATIENT
Start: 2024-10-21 | End: 2024-10-21

## 2024-10-21 RX ORDER — SPIRONOLACTONE 25 MG/1
25 TABLET ORAL DAILY
Status: DISCONTINUED | OUTPATIENT
Start: 2024-10-21 | End: 2024-10-25 | Stop reason: HOSPADM

## 2024-10-21 RX ORDER — FUROSEMIDE 10 MG/ML
40 INJECTION INTRAMUSCULAR; INTRAVENOUS ONCE
Status: COMPLETED | OUTPATIENT
Start: 2024-10-21 | End: 2024-10-21

## 2024-10-21 RX ORDER — ACETAMINOPHEN 325 MG/1
650 TABLET ORAL EVERY 6 HOURS PRN
Status: DISCONTINUED | OUTPATIENT
Start: 2024-10-21 | End: 2024-10-21

## 2024-10-21 RX ORDER — NITROGLYCERIN 0.4 MG/1
0.4 TABLET SUBLINGUAL EVERY 5 MIN PRN
Status: DISCONTINUED | OUTPATIENT
Start: 2024-10-21 | End: 2024-10-25 | Stop reason: HOSPADM

## 2024-10-21 RX ORDER — HEPARIN SODIUM 1000 [USP'U]/ML
4000 INJECTION, SOLUTION INTRAVENOUS; SUBCUTANEOUS PRN
Status: DISCONTINUED | OUTPATIENT
Start: 2024-10-21 | End: 2024-10-21

## 2024-10-21 RX ORDER — NALOXONE HYDROCHLORIDE 0.4 MG/ML
0.4 INJECTION, SOLUTION INTRAMUSCULAR; INTRAVENOUS; SUBCUTANEOUS PRN
Status: DISCONTINUED | OUTPATIENT
Start: 2024-10-21 | End: 2024-10-25 | Stop reason: HOSPADM

## 2024-10-21 RX ORDER — FUROSEMIDE 10 MG/ML
40 INJECTION INTRAMUSCULAR; INTRAVENOUS DAILY
Status: DISPENSED | OUTPATIENT
Start: 2024-10-21 | End: 2024-10-24

## 2024-10-21 RX ORDER — HEPARIN SODIUM 10000 [USP'U]/100ML
5-30 INJECTION, SOLUTION INTRAVENOUS CONTINUOUS
Status: DISCONTINUED | OUTPATIENT
Start: 2024-10-21 | End: 2024-10-21

## 2024-10-21 RX ORDER — POTASSIUM CHLORIDE 7.45 MG/ML
10 INJECTION INTRAVENOUS PRN
Status: DISCONTINUED | OUTPATIENT
Start: 2024-10-21 | End: 2024-10-25 | Stop reason: HOSPADM

## 2024-10-21 RX ORDER — SODIUM CHLORIDE 9 MG/ML
INJECTION, SOLUTION INTRAVENOUS PRN
Status: DISCONTINUED | OUTPATIENT
Start: 2024-10-21 | End: 2024-10-25 | Stop reason: HOSPADM

## 2024-10-21 RX ORDER — MAGNESIUM SULFATE IN WATER 40 MG/ML
2000 INJECTION, SOLUTION INTRAVENOUS ONCE
Status: COMPLETED | OUTPATIENT
Start: 2024-10-21 | End: 2024-10-21

## 2024-10-21 RX ORDER — ONDANSETRON 2 MG/ML
4 INJECTION INTRAMUSCULAR; INTRAVENOUS EVERY 6 HOURS PRN
Status: DISCONTINUED | OUTPATIENT
Start: 2024-10-21 | End: 2024-10-21

## 2024-10-21 RX ORDER — POLYETHYLENE GLYCOL 3350 17 G/17G
17 POWDER, FOR SOLUTION ORAL DAILY PRN
Status: DISCONTINUED | OUTPATIENT
Start: 2024-10-21 | End: 2024-10-25 | Stop reason: HOSPADM

## 2024-10-21 RX ORDER — GLUCAGON 1 MG/ML
1 KIT INJECTION PRN
Status: DISCONTINUED | OUTPATIENT
Start: 2024-10-21 | End: 2024-10-25 | Stop reason: HOSPADM

## 2024-10-21 RX ORDER — LACTULOSE 10 G/15ML
20 SOLUTION ORAL EVERY 8 HOURS PRN
Status: DISCONTINUED | OUTPATIENT
Start: 2024-10-21 | End: 2024-10-25 | Stop reason: HOSPADM

## 2024-10-21 RX ORDER — POTASSIUM CHLORIDE 1500 MG/1
40 TABLET, EXTENDED RELEASE ORAL PRN
Status: DISCONTINUED | OUTPATIENT
Start: 2024-10-21 | End: 2024-10-25 | Stop reason: HOSPADM

## 2024-10-21 RX ORDER — ONDANSETRON 4 MG/1
4 TABLET, ORALLY DISINTEGRATING ORAL EVERY 8 HOURS PRN
Status: DISCONTINUED | OUTPATIENT
Start: 2024-10-21 | End: 2024-10-21

## 2024-10-21 RX ORDER — PANTOPRAZOLE SODIUM 40 MG/1
40 TABLET, DELAYED RELEASE ORAL DAILY
Status: DISCONTINUED | OUTPATIENT
Start: 2024-10-21 | End: 2024-10-25 | Stop reason: HOSPADM

## 2024-10-21 RX ORDER — INSULIN LISPRO 100 [IU]/ML
0-8 INJECTION, SOLUTION INTRAVENOUS; SUBCUTANEOUS
Status: DISCONTINUED | OUTPATIENT
Start: 2024-10-21 | End: 2024-10-25 | Stop reason: HOSPADM

## 2024-10-21 RX ORDER — HEPARIN SODIUM 10000 [USP'U]/100ML
12 INJECTION, SOLUTION INTRAVENOUS CONTINUOUS
Status: DISCONTINUED | OUTPATIENT
Start: 2024-10-21 | End: 2024-10-21 | Stop reason: HOSPADM

## 2024-10-21 RX ORDER — ACETAMINOPHEN 500 MG
750 TABLET ORAL EVERY 8 HOURS PRN
Status: DISCONTINUED | OUTPATIENT
Start: 2024-10-21 | End: 2024-10-25 | Stop reason: HOSPADM

## 2024-10-21 RX ORDER — METOPROLOL SUCCINATE 25 MG/1
12.5 TABLET, EXTENDED RELEASE ORAL DAILY
Status: DISCONTINUED | OUTPATIENT
Start: 2024-10-21 | End: 2024-10-25 | Stop reason: HOSPADM

## 2024-10-21 RX ORDER — ACETAMINOPHEN 650 MG/1
650 SUPPOSITORY RECTAL EVERY 6 HOURS PRN
Status: DISCONTINUED | OUTPATIENT
Start: 2024-10-21 | End: 2024-10-21

## 2024-10-21 RX ADMIN — ACETAMINOPHEN 750 MG: 500 TABLET ORAL at 22:50

## 2024-10-21 RX ADMIN — APIXABAN 5 MG: 5 TABLET, FILM COATED ORAL at 15:07

## 2024-10-21 RX ADMIN — INSULIN LISPRO 12 UNITS: 100 INJECTION, SOLUTION INTRAVENOUS; SUBCUTANEOUS at 15:56

## 2024-10-21 RX ADMIN — SPIRONOLACTONE 25 MG: 25 TABLET, FILM COATED ORAL at 15:07

## 2024-10-21 RX ADMIN — FUROSEMIDE 40 MG: 10 INJECTION, SOLUTION INTRAMUSCULAR; INTRAVENOUS at 15:54

## 2024-10-21 RX ADMIN — LISINOPRIL 2.5 MG: 2.5 TABLET ORAL at 20:51

## 2024-10-21 RX ADMIN — MAGNESIUM SULFATE HEPTAHYDRATE 2000 MG: 40 INJECTION, SOLUTION INTRAVENOUS at 05:52

## 2024-10-21 RX ADMIN — INSULIN LISPRO 5 UNITS: 100 INJECTION, SOLUTION INTRAVENOUS; SUBCUTANEOUS at 17:55

## 2024-10-21 RX ADMIN — HEPARIN SODIUM 12 UNITS/KG/HR: 10000 INJECTION, SOLUTION INTRAVENOUS at 09:58

## 2024-10-21 RX ADMIN — ASPIRIN 325 MG: 325 TABLET ORAL at 05:52

## 2024-10-21 RX ADMIN — EMPAGLIFLOZIN 10 MG: 10 TABLET, FILM COATED ORAL at 15:54

## 2024-10-21 RX ADMIN — PANTOPRAZOLE SODIUM 40 MG: 40 TABLET, DELAYED RELEASE ORAL at 15:06

## 2024-10-21 RX ADMIN — HEPARIN SODIUM 12 UNITS/KG/HR: 10000 INJECTION, SOLUTION INTRAVENOUS at 21:00

## 2024-10-21 RX ADMIN — NITROGLYCERIN 0.4 MG: 0.4 TABLET SUBLINGUAL at 07:20

## 2024-10-21 RX ADMIN — BENZOCAINE: 0.1 GEL TOPICAL at 17:52

## 2024-10-21 RX ADMIN — FUROSEMIDE 40 MG: 10 INJECTION, SOLUTION INTRAMUSCULAR; INTRAVENOUS at 06:21

## 2024-10-21 RX ADMIN — INSULIN GLARGINE 20 UNITS: 100 INJECTION, SOLUTION SUBCUTANEOUS at 20:50

## 2024-10-21 RX ADMIN — NITROGLYCERIN 0.5 INCH: 20 OINTMENT TOPICAL at 07:40

## 2024-10-21 RX ADMIN — ACETAMINOPHEN 750 MG: 500 TABLET ORAL at 15:06

## 2024-10-21 RX ADMIN — HEPARIN SODIUM 4000 UNITS: 1000 INJECTION INTRAVENOUS; SUBCUTANEOUS at 09:55

## 2024-10-21 RX ADMIN — BUSPIRONE HYDROCHLORIDE 5 MG: 5 TABLET ORAL at 20:50

## 2024-10-21 RX ADMIN — AMIODARONE HYDROCHLORIDE 400 MG: 200 TABLET ORAL at 15:06

## 2024-10-21 RX ADMIN — RANOLAZINE 500 MG: 500 TABLET, FILM COATED, EXTENDED RELEASE ORAL at 20:51

## 2024-10-21 RX ADMIN — HEPARIN SODIUM 4000 UNITS: 1000 INJECTION INTRAVENOUS; SUBCUTANEOUS at 21:34

## 2024-10-21 RX ADMIN — METOPROLOL SUCCINATE 12.5 MG: 25 TABLET, EXTENDED RELEASE ORAL at 15:07

## 2024-10-21 RX ADMIN — BUSPIRONE HYDROCHLORIDE 5 MG: 5 TABLET ORAL at 15:06

## 2024-10-21 RX ADMIN — INSULIN LISPRO 1 UNITS: 100 INJECTION, SOLUTION INTRAVENOUS; SUBCUTANEOUS at 15:54

## 2024-10-21 RX ADMIN — INSULIN LISPRO 2 UNITS: 100 INJECTION, SOLUTION INTRAVENOUS; SUBCUTANEOUS at 21:04

## 2024-10-21 ASSESSMENT — PAIN DESCRIPTION - LOCATION
LOCATION: CHEST
LOCATION: CHEST
LOCATION: TEETH;CHEST
LOCATION: CHEST
LOCATION: TEETH;CHEST

## 2024-10-21 ASSESSMENT — PAIN - FUNCTIONAL ASSESSMENT
PAIN_FUNCTIONAL_ASSESSMENT: 0-10
PAIN_FUNCTIONAL_ASSESSMENT: PREVENTS OR INTERFERES SOME ACTIVE ACTIVITIES AND ADLS
PAIN_FUNCTIONAL_ASSESSMENT: PREVENTS OR INTERFERES SOME ACTIVE ACTIVITIES AND ADLS

## 2024-10-21 ASSESSMENT — PAIN SCALES - GENERAL
PAINLEVEL_OUTOF10: 6
PAINLEVEL_OUTOF10: 6
PAINLEVEL_OUTOF10: 8
PAINLEVEL_OUTOF10: 10
PAINLEVEL_OUTOF10: 9
PAINLEVEL_OUTOF10: 10
PAINLEVEL_OUTOF10: 9
PAINLEVEL_OUTOF10: 9
PAINLEVEL_OUTOF10: 8

## 2024-10-21 ASSESSMENT — PAIN DESCRIPTION - DESCRIPTORS
DESCRIPTORS: PRESSURE
DESCRIPTORS: PATIENT UNABLE TO DESCRIBE
DESCRIPTORS: ACHING

## 2024-10-21 ASSESSMENT — PAIN DESCRIPTION - ONSET
ONSET: ON-GOING

## 2024-10-21 ASSESSMENT — PAIN DESCRIPTION - FREQUENCY
FREQUENCY: CONTINUOUS

## 2024-10-21 ASSESSMENT — PAIN DESCRIPTION - PAIN TYPE
TYPE: ACUTE PAIN

## 2024-10-21 ASSESSMENT — PAIN SCALES - WONG BAKER: WONGBAKER_NUMERICALRESPONSE: NO HURT

## 2024-10-21 ASSESSMENT — PAIN DESCRIPTION - ORIENTATION: ORIENTATION: RIGHT

## 2024-10-21 NOTE — H&P
09/10/2023 05:07 AM     Hemoglobin A1C:   Lab Results   Component Value Date/Time    LABA1C 10.6 10/13/2024 01:18 AM     TSH: No results found for: \"TSH\"  Troponin: No results found for: \"TROPONINT\"  Lactic Acid: No results for input(s): \"LACTA\" in the last 72 hours.  BNP:   Recent Labs     10/21/24  0525   PROBNP 4,640*     UA:  Lab Results   Component Value Date/Time    NITRU Negative 04/08/2024 12:48 AM    COLORU Yellow 04/08/2024 12:48 AM    PHUR 6.0 04/08/2024 12:48 AM    PHUR 6.0 04/08/2024 12:48 AM    WBCUA 0-2 08/16/2023 09:17 PM    RBCUA 0-2 08/16/2023 09:17 PM    BACTERIA Rare 08/16/2023 09:17 PM    CLARITYU Clear 04/08/2024 12:48 AM    LEUKOCYTESUR Negative 04/08/2024 12:48 AM    UROBILINOGEN 0.2 04/08/2024 12:48 AM    BILIRUBINUR Negative 04/08/2024 12:48 AM    BLOODU Negative 04/08/2024 12:48 AM    GLUCOSEU >=1000 04/08/2024 12:48 AM    KETUA Negative 04/08/2024 12:48 AM    AMORPHOUS Rare 08/16/2023 09:17 PM     Blood Cultures:   Lab Results   Component Value Date/Time    BC No Growth after 4 days of incubation. 02/17/2024 09:41 AM     Lab Results   Component Value Date/Time    BLOODCULT2 No Growth after 4 days of incubation. 02/17/2024 10:18 AM     Imaging/Diagnostics Last 24 Hours   XR CHEST PORTABLE    Result Date: 10/21/2024  EXAMINATION: ONE XRAY VIEW OF THE CHEST 10/21/2024 5:31 am COMPARISON: 10/10/2024 HISTORY: ORDERING SYSTEM PROVIDED HISTORY: CP TECHNOLOGIST PROVIDED HISTORY: Reason for exam:->CP Reason for Exam: Chest Pain FINDINGS: Implanted cardiac device is identified.  Cardiac and mediastinal silhouettes appear within normal limits for size.  Pulmonary vascularity is normal.  No consolidation or effusion.  Degenerative change in the shoulders bilaterally. Aortic atherosclerosis.     Clear chest without acute cardiopulmonary process.     CT MAXILLOFACIAL W WO CONTRAST    Result Date: 10/14/2024  EXAMINATION: CT OF THE FACE WITH AND WITHOUT CONTRAST 10/14/2024 TECHNIQUE: CT of the face was

## 2024-10-21 NOTE — ED PROVIDER NOTES
complexes are now PresentQT has lengthened           Procedures  Procedures      Patient seen and evaluated.  Relevant records reviewed, specifically management plan, discharge summary dated October 14, 2024 and multiple cardiology notes.  Patient has extensive medical history received stress test 2 weeks ago which showed global hypokinesia and reduced EF 25 to 30%, largely irreversible ischemia.  Cardiology did not recommend any further intervention.  Amiodarone doses were adjusted.  - Patient is 54 y.o. male presented for acute chest pain in setting or chronic conditions extensive medical history, reportedly running out of sublingual nitroglycerin.  - Exam showed chronically ill-appearing but in no apparent distress.  His vitals are overall reassuring.  He has no evidence of right heart failure on exam.  - Patient was placed on telemetry during his/her ED stay and no malignant dysrhythmia observed.  - Diagnostic studies reviewed.  EKG shows similar sinus rhythm right bundle branch block pattern nonspecific ST changes and prolonged QT above 550.  He was given magnesium 2 g for this reason.  - Lab:  BNP now significantly elevated above 4000 compared to 10 days ago.  Chest x-ray does not show any edema.  Serial troponins negative.  Patient was given a dose of Lasix here sublingual nitroglycerin and aspirin.  Transition to Nitropatch and still endorses improved but still present precordial chest pain.  -While this patient was recently admitted for intermittent chest pain and concern for ventricular arrhythmia, he is not having ongoing chest pain concerning for unstable angina which appears somewhat different and he does have extensive medical history.  May benefit from transfer to [facility] cardiology consult.      - Patient was given    ED Medication Orders (From admission, onward)      Start Ordered     Status Ordering Provider    10/21/24 0800 10/21/24 0735  nitroGLYCERIN (NITROSTAT) SL tablet 0.4 mg  ONCE

## 2024-10-21 NOTE — ACP (ADVANCE CARE PLANNING)
Advance Care Planning     General Advance Care Planning (ACP) Conversation    Date of Conversation: 10/21/2024  Conducted with: Patient with Decision Making Capacity  Other persons present: None    Healthcare Decision Maker: No healthcare decision makers have been documented.     Today we documented Decision Maker(s) consistent with Legal Next of Kin hierarchy.  Content/Action Overview:  Has NO ACP documents-Information provided          Length of Voluntary ACP Conversation in minutes:  <16 minutes (Non-Billable)    [facility] Correa

## 2024-10-21 NOTE — RT PROTOCOL NOTE
RT Inhaler-Nebulizer Bronchodilator Protocol Note    There is a bronchodilator order in the chart from a provider indicating to follow the RT Bronchodilator Protocol and there is an “Initiate RT Inhaler-Nebulizer Bronchodilator Protocol” order as well (see protocol at bottom of note).    CXR Findings:  XR CHEST PORTABLE    Result Date: 10/21/2024  Clear chest without acute cardiopulmonary process.       The findings from the last RT Protocol Assessment were as follows:   History Pulmonary Disease: Chronic pulmonary disease  Respiratory Pattern: Regular pattern and RR 12-20 bpm  Breath Sounds: Clear breath sounds  Cough:    Indication for Bronchodilator Therapy: None  Bronchodilator Assessment Score:      Aerosolized bronchodilator medication orders have been revised according to the RT Inhaler-Nebulizer Bronchodilator Protocol below.    Respiratory Therapist to perform RT Therapy Protocol Assessment initially then follow the protocol.  Repeat RT Therapy Protocol Assessment PRN for score 0-3 or on second treatment, BID, and PRN for scores above 3.    No Indications - adjust the frequency to every 6 hours PRN wheezing or bronchospasm, if no treatments needed after 48 hours then discontinue using Per Protocol order mode.     If indication present, adjust the RT bronchodilator orders based on the Bronchodilator Assessment Score as indicated below.  Use Inhaler orders unless patient has one or more of the following: on home nebulizer, not able to hold breath for 10 seconds, is not alert and oriented, cannot activate and use MDI correctly, or respiratory rate 25 breaths per minute or more, then use the equivalent nebulizer order(s) with same Frequency and PRN reasons based on the score.  If a patient is on this medication at home then do not decrease Frequency below that used at home.    0-3 - enter or revise RT bronchodilator order(s) to equivalent RT Bronchodilator order with Frequency of every 4 hours PRN for wheezing

## 2024-10-21 NOTE — ED NOTES
ED SBAR report provider to AVINASH Aparicio. Patient to be transported to Room 437-1 via stretcher by  [facility] .Patient transported with bedside cardiac monitor and with IV medications infusing. IV site clean, dry, and intact. MEWS score and pain assessed as 6/10  and documented. Updated patient on plan of care.

## 2024-10-21 NOTE — CARE COORDINATION
Case Management Assessment  Initial Evaluation    Date/Time of Evaluation: 10/21/2024 4:17 PM  Assessment Completed by: [facility] Correa    If patient is discharged prior to next notation, then this note serves as note for discharge by case management.    Patient Name: Renato Nagy                   YOB: 1970  Diagnosis: Chest pain [R07.9]                   Date / Time: 10/21/2024 11:10 AM    Patient Admission Status: Observation   Readmission Risk (Low < 19, Mod (19-27), High > 27): Readmission Risk Score: 26    Current PCP: Carrie Mead APRN - NP  PCP verified by CM? Yes    Chart Reviewed: Yes      History Provided by: Patient  Patient Orientation: Alert and Oriented    Patient Cognition: Alert    Hospitalization in the last 30 days (Readmission):  Yes    If yes, Readmission Assessment in  Navigator will be completed.    Advance Directives:      Code Status: Full Code   Patient's Primary Decision Maker is: Legal Next of Kin      Discharge Planning:    Patient lives with: (P) Alone Type of Home: (P) House  Primary Care Giver: Self  Patient Support Systems include: Spouse/Significant Other   Current Financial resources: None  Current community resources: ECF/Home Care  Current services prior to admission: (P) Durable Medical Equipment            Current DME: (P) Walker            Type of Home Care services:  (P) None    ADLS  Prior functional level: Assistance with the following:, Cooking, Housework, Shopping  Current functional level: Assistance with the following:, Cooking, Housework, Shopping    PT AM-PAC:   /24  OT AM-PAC:   /24    Family can provide assistance at DC: Yes  Would you like Case Management to discuss the discharge plan with any other family members/significant others, and if so, who? No  Plans to Return to Present Housing: Yes  Other Identified Issues/Barriers to RETURNING to current housing: none noted  Potential Assistance needed at discharge: (P) Home Care

## 2024-10-21 NOTE — PROGRESS NOTES
Pharmacy to Manage Heparin Infusion per Hospital Nomogram    Low dose weight based heparin ordered by Dr Kay at [facility] ED.  Pt wt = 74.8 kg (actual weight). Will use wt = 74.8 kg  Baseline aPTT = 39.7 sec at 0935.    Oral factor Xa-inhibitors may alter and elevate anti-Xa levels used for unfractionated heparin monitoring. As a result, anti-Xa monitoring is not accurate while Xa-inhibitor activity is detectable. Utilize aPTT monitoring when patient received an oral factor Xa-inhibitor (apixaban, betrixaban, edoxaban or rivaroxaban) within 72 hours prior to admission (please document last administration time). The goal is to allow a washout of oral factor Xa-inhibitors by using aPTT for 72 hours, then change to ant-Xa levels for UFH.     Heparin (weight-based) Infusion: CAD/STEMI/NSTEMI/UA/AFib)   Heparin 60 units/kg IVP bolus (max 4,000 units) followed by Heparin infusion at 12 units/kg/hr (recommended max initial rate: 1000 units/hr).  Recheck anti-Xa (unless aPTT being used) in 6 hours. (Will use aPTT for now, pt on Eliquis)  Goal anti-Xa 0.3-0.7 IU/mL  Goal aPTT =  seconds.

## 2024-10-22 PROBLEM — I50.23 ACUTE ON CHRONIC HFREF (HEART FAILURE WITH REDUCED EJECTION FRACTION) (HCC): Status: ACTIVE | Noted: 2024-10-22

## 2024-10-22 PROBLEM — I25.10 CAD IN NATIVE ARTERY: Status: ACTIVE | Noted: 2024-10-22

## 2024-10-22 PROBLEM — R06.02 SHORTNESS OF BREATH: Status: ACTIVE | Noted: 2024-10-22

## 2024-10-22 PROBLEM — R07.9 RECURRENT CHEST PAIN: Status: ACTIVE | Noted: 2024-10-22

## 2024-10-22 LAB
ALBUMIN SERPL-MCNC: 3.5 G/DL (ref 3.4–5)
ALBUMIN/GLOB SERPL: 0.9 {RATIO} (ref 1.1–2.2)
ALP SERPL-CCNC: 112 U/L (ref 40–129)
ALT SERPL-CCNC: 99 U/L (ref 10–40)
ANION GAP SERPL CALCULATED.3IONS-SCNC: 13 MMOL/L (ref 3–16)
APTT BLD: 130 SEC (ref 22.1–36.4)
APTT BLD: 149.2 SEC (ref 22.1–36.4)
APTT BLD: 69.5 SEC (ref 22.1–36.4)
APTT BLD: 77.3 SEC (ref 22.1–36.4)
AST SERPL-CCNC: 79 U/L (ref 15–37)
BASOPHILS # BLD: 0.1 K/UL (ref 0–0.2)
BASOPHILS # BLD: 0.1 K/UL (ref 0–0.2)
BASOPHILS NFR BLD: 0.8 %
BASOPHILS NFR BLD: 1.1 %
BILIRUB SERPL-MCNC: 0.4 MG/DL (ref 0–1)
BUN SERPL-MCNC: 22 MG/DL (ref 7–20)
CALCIUM SERPL-MCNC: 9.1 MG/DL (ref 8.3–10.6)
CHLORIDE SERPL-SCNC: 97 MMOL/L (ref 99–110)
CO2 SERPL-SCNC: 22 MMOL/L (ref 21–32)
CREAT SERPL-MCNC: 1.1 MG/DL (ref 0.9–1.3)
DEPRECATED RDW RBC AUTO: 15.5 % (ref 12.4–15.4)
DEPRECATED RDW RBC AUTO: 15.7 % (ref 12.4–15.4)
EOSINOPHIL # BLD: 0.1 K/UL (ref 0–0.6)
EOSINOPHIL # BLD: 0.1 K/UL (ref 0–0.6)
EOSINOPHIL NFR BLD: 1.5 %
EOSINOPHIL NFR BLD: 1.6 %
GFR SERPLBLD CREATININE-BSD FMLA CKD-EPI: 79 ML/MIN/{1.73_M2}
GLUCOSE BLD-MCNC: 181 MG/DL (ref 70–99)
GLUCOSE BLD-MCNC: 187 MG/DL (ref 70–99)
GLUCOSE BLD-MCNC: 233 MG/DL (ref 70–99)
GLUCOSE BLD-MCNC: 99 MG/DL (ref 70–99)
GLUCOSE SERPL-MCNC: 286 MG/DL (ref 70–99)
HCT VFR BLD AUTO: 44.2 % (ref 40.5–52.5)
HCT VFR BLD AUTO: 45.8 % (ref 40.5–52.5)
HGB BLD-MCNC: 14.8 G/DL (ref 13.5–17.5)
HGB BLD-MCNC: 15 G/DL (ref 13.5–17.5)
LYMPHOCYTES # BLD: 2.4 K/UL (ref 1–5.1)
LYMPHOCYTES # BLD: 2.5 K/UL (ref 1–5.1)
LYMPHOCYTES NFR BLD: 34.6 %
LYMPHOCYTES NFR BLD: 36.3 %
MCH RBC QN AUTO: 28.8 PG (ref 26–34)
MCH RBC QN AUTO: 29.1 PG (ref 26–34)
MCHC RBC AUTO-ENTMCNC: 32.7 G/DL (ref 31–36)
MCHC RBC AUTO-ENTMCNC: 33.4 G/DL (ref 31–36)
MCV RBC AUTO: 87.2 FL (ref 80–100)
MCV RBC AUTO: 88.1 FL (ref 80–100)
MONOCYTES # BLD: 0.7 K/UL (ref 0–1.3)
MONOCYTES # BLD: 0.7 K/UL (ref 0–1.3)
MONOCYTES NFR BLD: 10 %
MONOCYTES NFR BLD: 9.3 %
NEUTROPHILS # BLD: 3.4 K/UL (ref 1.7–7.7)
NEUTROPHILS # BLD: 3.9 K/UL (ref 1.7–7.7)
NEUTROPHILS NFR BLD: 51.3 %
NEUTROPHILS NFR BLD: 53.5 %
PERFORMED ON: ABNORMAL
PERFORMED ON: NORMAL
PLATELET # BLD AUTO: 148 K/UL (ref 135–450)
PLATELET # BLD AUTO: 157 K/UL (ref 135–450)
PMV BLD AUTO: 8.7 FL (ref 5–10.5)
PMV BLD AUTO: 8.7 FL (ref 5–10.5)
POTASSIUM SERPL-SCNC: 4.4 MMOL/L (ref 3.5–5.1)
PROT SERPL-MCNC: 7.2 G/DL (ref 6.4–8.2)
RBC # BLD AUTO: 5.07 M/UL (ref 4.2–5.9)
RBC # BLD AUTO: 5.2 M/UL (ref 4.2–5.9)
SODIUM SERPL-SCNC: 132 MMOL/L (ref 136–145)
WBC # BLD AUTO: 6.7 K/UL (ref 4–11)
WBC # BLD AUTO: 7.3 K/UL (ref 4–11)

## 2024-10-22 PROCEDURE — 6370000000 HC RX 637 (ALT 250 FOR IP)

## 2024-10-22 PROCEDURE — 85025 COMPLETE CBC W/AUTO DIFF WBC: CPT

## 2024-10-22 PROCEDURE — G0378 HOSPITAL OBSERVATION PER HR: HCPCS

## 2024-10-22 PROCEDURE — 6370000000 HC RX 637 (ALT 250 FOR IP): Performed by: NURSE PRACTITIONER

## 2024-10-22 PROCEDURE — 6370000000 HC RX 637 (ALT 250 FOR IP): Performed by: INTERNAL MEDICINE

## 2024-10-22 PROCEDURE — 99232 SBSQ HOSP IP/OBS MODERATE 35: CPT | Performed by: NURSE PRACTITIONER

## 2024-10-22 PROCEDURE — 85730 THROMBOPLASTIN TIME PARTIAL: CPT

## 2024-10-22 PROCEDURE — 36415 COLL VENOUS BLD VENIPUNCTURE: CPT

## 2024-10-22 PROCEDURE — 80053 COMPREHEN METABOLIC PANEL: CPT

## 2024-10-22 PROCEDURE — 94640 AIRWAY INHALATION TREATMENT: CPT

## 2024-10-22 PROCEDURE — 6360000002 HC RX W HCPCS: Performed by: INTERNAL MEDICINE

## 2024-10-22 RX ORDER — INSULIN GLARGINE 100 [IU]/ML
24 INJECTION, SOLUTION SUBCUTANEOUS NIGHTLY
Status: DISCONTINUED | OUTPATIENT
Start: 2024-10-22 | End: 2024-10-25 | Stop reason: HOSPADM

## 2024-10-22 RX ADMIN — INSULIN GLARGINE 24 UNITS: 100 INJECTION, SOLUTION SUBCUTANEOUS at 19:41

## 2024-10-22 RX ADMIN — SPIRONOLACTONE 25 MG: 25 TABLET, FILM COATED ORAL at 08:47

## 2024-10-22 RX ADMIN — PANTOPRAZOLE SODIUM 40 MG: 40 TABLET, DELAYED RELEASE ORAL at 08:48

## 2024-10-22 RX ADMIN — HEPARIN SODIUM 12 UNITS/KG/HR: 10000 INJECTION, SOLUTION INTRAVENOUS at 17:53

## 2024-10-22 RX ADMIN — BUSPIRONE HYDROCHLORIDE 5 MG: 5 TABLET ORAL at 19:41

## 2024-10-22 RX ADMIN — BENZOCAINE: 0.1 GEL TOPICAL at 08:54

## 2024-10-22 RX ADMIN — BUSPIRONE HYDROCHLORIDE 5 MG: 5 TABLET ORAL at 08:47

## 2024-10-22 RX ADMIN — AMIODARONE HYDROCHLORIDE 400 MG: 200 TABLET ORAL at 08:47

## 2024-10-22 RX ADMIN — EMPAGLIFLOZIN 10 MG: 10 TABLET, FILM COATED ORAL at 08:48

## 2024-10-22 RX ADMIN — RANOLAZINE 500 MG: 500 TABLET, FILM COATED, EXTENDED RELEASE ORAL at 19:41

## 2024-10-22 RX ADMIN — TRAMADOL HYDROCHLORIDE 50 MG: 50 TABLET ORAL at 06:43

## 2024-10-22 RX ADMIN — ACETAMINOPHEN 750 MG: 500 TABLET ORAL at 08:48

## 2024-10-22 RX ADMIN — TIOTROPIUM BROMIDE AND OLODATEROL 2 PUFF: 3.124; 2.736 SPRAY, METERED RESPIRATORY (INHALATION) at 11:42

## 2024-10-22 RX ADMIN — METOPROLOL SUCCINATE 12.5 MG: 25 TABLET, EXTENDED RELEASE ORAL at 08:48

## 2024-10-22 RX ADMIN — INSULIN LISPRO 2 UNITS: 100 INJECTION, SOLUTION INTRAVENOUS; SUBCUTANEOUS at 08:53

## 2024-10-22 RX ADMIN — HEPARIN SODIUM 15 UNITS/KG/HR: 10000 INJECTION, SOLUTION INTRAVENOUS at 10:32

## 2024-10-22 RX ADMIN — INSULIN LISPRO 2 UNITS: 100 INJECTION, SOLUTION INTRAVENOUS; SUBCUTANEOUS at 21:15

## 2024-10-22 RX ADMIN — INSULIN LISPRO 12 UNITS: 100 INJECTION, SOLUTION INTRAVENOUS; SUBCUTANEOUS at 12:47

## 2024-10-22 RX ADMIN — ACETAMINOPHEN 750 MG: 500 TABLET ORAL at 17:49

## 2024-10-22 RX ADMIN — LISINOPRIL 2.5 MG: 2.5 TABLET ORAL at 19:41

## 2024-10-22 RX ADMIN — HEPARIN SODIUM 2000 UNITS: 1000 INJECTION INTRAVENOUS; SUBCUTANEOUS at 10:33

## 2024-10-22 RX ADMIN — INSULIN LISPRO 12 UNITS: 100 INJECTION, SOLUTION INTRAVENOUS; SUBCUTANEOUS at 08:53

## 2024-10-22 RX ADMIN — RANOLAZINE 500 MG: 500 TABLET, FILM COATED, EXTENDED RELEASE ORAL at 08:48

## 2024-10-22 RX ADMIN — BENZOCAINE: 0.1 GEL TOPICAL at 17:49

## 2024-10-22 RX ADMIN — TRAMADOL HYDROCHLORIDE 50 MG: 50 TABLET ORAL at 00:17

## 2024-10-22 ASSESSMENT — PAIN SCALES - GENERAL
PAINLEVEL_OUTOF10: 9
PAINLEVEL_OUTOF10: 8
PAINLEVEL_OUTOF10: 10
PAINLEVEL_OUTOF10: 9
PAINLEVEL_OUTOF10: 10
PAINLEVEL_OUTOF10: 8
PAINLEVEL_OUTOF10: 10

## 2024-10-22 ASSESSMENT — PAIN DESCRIPTION - ORIENTATION
ORIENTATION: RIGHT
ORIENTATION: MID;RIGHT

## 2024-10-22 ASSESSMENT — PAIN DESCRIPTION - LOCATION
LOCATION: TEETH;CHEST
LOCATION: TEETH
LOCATION: TEETH;CHEST

## 2024-10-22 ASSESSMENT — ENCOUNTER SYMPTOMS
ABDOMINAL PAIN: 1
SHORTNESS OF BREATH: 0

## 2024-10-22 ASSESSMENT — PAIN - FUNCTIONAL ASSESSMENT: PAIN_FUNCTIONAL_ASSESSMENT: ACTIVITIES ARE NOT PREVENTED

## 2024-10-22 ASSESSMENT — PAIN DESCRIPTION - DESCRIPTORS: DESCRIPTORS: ACHING

## 2024-10-22 NOTE — CONSULTS
Pharmacy to Manage Heparin Infusion per Hospital Nomogram    Dx: CAD  Pt wt = 78.9 kg  Baseline aPTT = pending    Oral factor Xa-inhibitors may alter and elevate anti-Xa levels used for unfractionated heparin monitoring. As a result, anti-Xa monitoring is not accurate while Xa-inhibitor activity is detectable. Utilize aPTT monitoring when patient received an oral factor Xa-inhibitor (apixaban, betrixaban, edoxaban or rivaroxaban) within 72 hours prior to admission (please document last administration time). The goal is to allow a washout of oral factor Xa-inhibitors by using aPTT for 72 hours, then change to ant-Xa levels for UFH.     Aptt until 10/24 @ 1300    Heparin (weight-based) Infusion: CAD/STEMI/NSTEMI/UA/AFib)   Heparin 60 units/kg IVP bolus (max 4,000 units) followed by Heparin infusion at 12 units/kg/hr (recommended max initial rate: 1000 units/hr).  Recheck anti-Xa (unless aPTT being used) in 6 hours.  Goal anti-Xa 0.3-0.7 IU/mL  Goal aPTT =  seconds.      aPTT < 59                   Heparin 60 units/kg bolus (max 4,000 units)  Increase infusion by 4 units/kg/hr  aPTT 59 - 72.9            Heparin 30 units/kg bolus (max 2,000 units)  Increase infusion by 2 units/kg/hr  aPTT 73 - 102             No bolus                                No change   aPTT 102.1 - 109        No bolus                                Decrease infusion by 1 units/kg/hr  aPTT 109.1 - 122.9     No bolus                                Decrease infusion by 2 units/kg/hr  aPTT 123 or greater    Hold heparin for 1 hour                                Decrease infusion by 3 units/kg/hr    When aPTT  is within target range for two consecutive times, check aPTT once daily with morning labs.     Benedict Gonzalez, PharmD 10/21/2024 1:58 PM    10/22 at 0253  aPTT: 149  Plan: Hold heparin x1 hour  Lower infusion rate to 13 units/kg/hr  Recheck aPTT at 1000  Seven Grewal PharmD 10/22/2024  3:40 AM    10/22 at 0943  aPTT: 69.5  Plan: give 30 unit/kg 
MCG/ACT inhaler Inhale 2 puffs into the lungs 4 times daily as needed for Wheezing 8/22/23  Yes Mack Sanchez MD   torsemide (DEMADEX) 20 MG tablet Take 1 tablet by mouth daily 8/3/23  Yes Homa Manuel PA-C   spironolactone (ALDACTONE) 25 MG tablet Take 1 tablet by mouth daily 8/4/23  Yes Homa Manuel PA-C   busPIRone (BUSPAR) 5 MG tablet Take 1 tablet by mouth 2 times daily 10/22/22  Yes Kalpesh Hutton MD   insulin lispro (HUMALOG,ADMELOG) 100 UNIT/ML SOLN injection vial Inject 12 Units into the skin 3 times daily (with meals) 10/14/24   Rusty Shirley DO   insulin glargine (LANTUS) 100 UNIT/ML injection vial Inject 20 Units into the skin at bedtime 10/14/24   Rusty Shirley DO   ENULOSE 10 GM/15ML SOLN solution  3/9/24   Kalpesh Hutton MD   nitroGLYCERIN (NITROSTAT) 0.4 MG SL tablet PLACE 1 TABLET UNDER TONGUE EVERY 5 MINS, UP TO 3 DOSES AS NEEDED FOR CHEST PAIN 10/12/23   Kalpesh Hutton MD   umeclidinium-vilanterol (ANORO ELLIPTA) 62.5-25 MCG/ACT inhaler Inhale 1 puff into the lungs daily 5/30/23   Kalpesh Hutton MD   naloxone 4 MG/0.1ML LIQD nasal spray 1 spray by Nasal route as needed for Opioid Reversal 2/22/24   Homa Manuel PA-C   lactobacillus (CULTURELLE) capsule Take 1 capsule by mouth 3 times daily 2/22/24   Homa Manuel PA-C   Multiple Vitamins-Minerals (THERAPEUTIC MULTIVITAMIN-MINERALS) tablet Take 1 tablet by mouth in the morning and at bedtime    Kalpesh Hutton MD   empagliflozin (JARDIANCE) 10 MG tablet Take 1 tablet by mouth daily 1/18/24   Manpreet Raymond MD   clopidogrel (PLAVIX) 75 MG tablet Take 1 tablet by mouth daily 1/18/24   Manpreet Raymond MD   mineral oil-hydrophilic petrolatum (AQUAPHOR) ointment Apply topically as needed for Dry Skin Apply topically as needed to left lower extremity    Anni MD Kalpesh   lactulose (CHRONULAC) 10 GM/15ML solution Take 30 mLs by mouth every 8 hours as needed (maintain 2-3 BM per day) 
LV function is   severely impaired EF of 20%     Assessment and plan   1.  Severe coronary disease surgical consult   2.  Apical clot.  Start heparin       Additional studies:     Impression and Plan:      Chest pain  Abnormal EKG  Severe CAD, h/o inferior STEMI s/p STACY to RCA (2014)  Acute on chronic HFrEF  Ischemic CMP  H/o Apical thrombus  AFIB  S/P ICD (medtronic)  Cirrhosis  Hearing impaired/deaf    --Obtain limited echo with contrast to evaluate possible apical thrombus  --Hold Eliquis and begin heparin drip in anticipation of LHC tentatively Wed     --Begin Ranexa 500mg bid  --Continue amiodarone and metoprolol for rate control  --Continue GDMT for HF as tolerated  --Begin diuresis with IV lasix 40mg daily and continue current dosing of Aldactone  --Replete electrolytes as needed and defer management of hyponatremia to primary service  --Discussed at length the need for smoking cessation    We will follow.    Thank you for allowing us to participate in the care of Renato Nagy. Please call me with any questions [phone].    Orlando Chase MD [facility]  Cardiovascular Disease  [facility]  [phone] [place] Office  [phone] [place] Office  10/21/2024 2:46 PM

## 2024-10-22 NOTE — CARE COORDINATION
Writer received call from pt's Ramona Eisenberg (), she said pt was eligible for home delivered meal and a visiting nurse at discharge.  Plan for [facility] tomorrow morning.  CM will continue to follow pt's progress and coordinate discharge arrangements if arise.  LIZBETH Barros-AVINASH

## 2024-10-23 ENCOUNTER — ANESTHESIA (OUTPATIENT)
Dept: CARDIAC CATH/INVASIVE PROCEDURES | Age: 54
DRG: 286 | End: 2024-10-23
Payer: MEDICARE

## 2024-10-23 ENCOUNTER — APPOINTMENT (OUTPATIENT)
Age: 54
DRG: 286 | End: 2024-10-23
Attending: INTERNAL MEDICINE
Payer: MEDICARE

## 2024-10-23 ENCOUNTER — ANESTHESIA EVENT (OUTPATIENT)
Dept: CARDIAC CATH/INVASIVE PROCEDURES | Age: 54
DRG: 286 | End: 2024-10-23
Payer: MEDICARE

## 2024-10-23 LAB
ALBUMIN SERPL-MCNC: 3.6 G/DL (ref 3.4–5)
ALBUMIN/GLOB SERPL: 1 {RATIO} (ref 1.1–2.2)
ALP SERPL-CCNC: 88 U/L (ref 40–129)
ALT SERPL-CCNC: 118 U/L (ref 10–40)
ANION GAP SERPL CALCULATED.3IONS-SCNC: 14 MMOL/L (ref 3–16)
APTT BLD: 67.4 SEC (ref 22.1–36.4)
AST SERPL-CCNC: 108 U/L (ref 15–37)
BASOPHILS # BLD: 0.1 K/UL (ref 0–0.2)
BASOPHILS NFR BLD: 0.9 %
BILIRUB SERPL-MCNC: 0.5 MG/DL (ref 0–1)
BUN SERPL-MCNC: 22 MG/DL (ref 7–20)
CALCIUM SERPL-MCNC: 8.9 MG/DL (ref 8.3–10.6)
CHLORIDE SERPL-SCNC: 99 MMOL/L (ref 99–110)
CO2 SERPL-SCNC: 19 MMOL/L (ref 21–32)
CREAT SERPL-MCNC: 0.9 MG/DL (ref 0.9–1.3)
DEPRECATED RDW RBC AUTO: 16 % (ref 12.4–15.4)
ECHO BSA: 1.99 M2
ECHO BSA: 1.99 M2
ECHO LV EDV A2C: 207 ML
ECHO LV EDV A4C: 225 ML
ECHO LV EDV INDEX A4C: 113 ML/M2
ECHO LV EDV NDEX A2C: 104 ML/M2
ECHO LV EJECTION FRACTION A2C: 4 %
ECHO LV EJECTION FRACTION A4C: 20 %
ECHO LV EJECTION FRACTION BIPLANE: 12 % (ref 55–100)
ECHO LV ESV A2C: 198 ML
ECHO LV ESV A4C: 181 ML
ECHO LV ESV INDEX A2C: 99 ML/M2
ECHO LV ESV INDEX A4C: 91 ML/M2
ECHO LV FRACTIONAL SHORTENING: 7 % (ref 28–44)
ECHO LV INTERNAL DIMENSION DIASTOLE INDEX: 2.76 CM/M2
ECHO LV INTERNAL DIMENSION DIASTOLIC: 5.5 CM (ref 4.2–5.9)
ECHO LV INTERNAL DIMENSION SYSTOLIC INDEX: 2.56 CM/M2
ECHO LV INTERNAL DIMENSION SYSTOLIC: 5.1 CM
ECHO LV IVSD: 0.8 CM (ref 0.6–1)
ECHO LV MASS 2D: 147.6 G (ref 88–224)
ECHO LV MASS INDEX 2D: 74.1 G/M2 (ref 49–115)
ECHO LV POSTERIOR WALL DIASTOLIC: 0.7 CM (ref 0.6–1)
ECHO LV RELATIVE WALL THICKNESS RATIO: 0.25
ECHO RV INTERNAL DIMENSION: 3.6 CM
EOSINOPHIL # BLD: 0.1 K/UL (ref 0–0.6)
EOSINOPHIL NFR BLD: 1.8 %
GFR SERPLBLD CREATININE-BSD FMLA CKD-EPI: >90 ML/MIN/{1.73_M2}
GLUCOSE BLD-MCNC: 144 MG/DL (ref 70–99)
GLUCOSE BLD-MCNC: 148 MG/DL (ref 70–99)
GLUCOSE BLD-MCNC: 309 MG/DL (ref 70–99)
GLUCOSE SERPL-MCNC: 116 MG/DL (ref 70–99)
HCT VFR BLD AUTO: 46 % (ref 40.5–52.5)
HGB BLD-MCNC: 15.4 G/DL (ref 13.5–17.5)
LYMPHOCYTES # BLD: 2 K/UL (ref 1–5.1)
LYMPHOCYTES NFR BLD: 32.8 %
MCH RBC QN AUTO: 29.6 PG (ref 26–34)
MCHC RBC AUTO-ENTMCNC: 33.5 G/DL (ref 31–36)
MCV RBC AUTO: 88.2 FL (ref 80–100)
MONOCYTES # BLD: 0.5 K/UL (ref 0–1.3)
MONOCYTES NFR BLD: 8.9 %
NEUTROPHILS # BLD: 3.4 K/UL (ref 1.7–7.7)
NEUTROPHILS NFR BLD: 55.6 %
PERFORMED ON: ABNORMAL
PLATELET # BLD AUTO: 159 K/UL (ref 135–450)
PMV BLD AUTO: 9.1 FL (ref 5–10.5)
POTASSIUM SERPL-SCNC: 4.5 MMOL/L (ref 3.5–5.1)
PROT SERPL-MCNC: 7.3 G/DL (ref 6.4–8.2)
RBC # BLD AUTO: 5.21 M/UL (ref 4.2–5.9)
SODIUM SERPL-SCNC: 132 MMOL/L (ref 136–145)
WBC # BLD AUTO: 6.1 K/UL (ref 4–11)

## 2024-10-23 PROCEDURE — C1769 GUIDE WIRE: HCPCS | Performed by: INTERNAL MEDICINE

## 2024-10-23 PROCEDURE — 6360000004 HC RX CONTRAST MEDICATION: Performed by: INTERNAL MEDICINE

## 2024-10-23 PROCEDURE — 6360000002 HC RX W HCPCS: Performed by: INTERNAL MEDICINE

## 2024-10-23 PROCEDURE — C1894 INTRO/SHEATH, NON-LASER: HCPCS | Performed by: INTERNAL MEDICINE

## 2024-10-23 PROCEDURE — G0378 HOSPITAL OBSERVATION PER HR: HCPCS

## 2024-10-23 PROCEDURE — 4A023N7 MEASUREMENT OF CARDIAC SAMPLING AND PRESSURE, LEFT HEART, PERCUTANEOUS APPROACH: ICD-10-PCS | Performed by: INTERNAL MEDICINE

## 2024-10-23 PROCEDURE — 7100000000 HC PACU RECOVERY - FIRST 15 MIN: Performed by: INTERNAL MEDICINE

## 2024-10-23 PROCEDURE — 99152 MOD SED SAME PHYS/QHP 5/>YRS: CPT | Performed by: INTERNAL MEDICINE

## 2024-10-23 PROCEDURE — 2580000003 HC RX 258: Performed by: INTERNAL MEDICINE

## 2024-10-23 PROCEDURE — 6360000002 HC RX W HCPCS: Performed by: NURSE ANESTHETIST, CERTIFIED REGISTERED

## 2024-10-23 PROCEDURE — 6360000002 HC RX W HCPCS: Performed by: ANESTHESIOLOGY

## 2024-10-23 PROCEDURE — 93458 L HRT ARTERY/VENTRICLE ANGIO: CPT | Performed by: INTERNAL MEDICINE

## 2024-10-23 PROCEDURE — B2151ZZ FLUOROSCOPY OF LEFT HEART USING LOW OSMOLAR CONTRAST: ICD-10-PCS | Performed by: INTERNAL MEDICINE

## 2024-10-23 PROCEDURE — B2111ZZ FLUOROSCOPY OF MULTIPLE CORONARY ARTERIES USING LOW OSMOLAR CONTRAST: ICD-10-PCS | Performed by: INTERNAL MEDICINE

## 2024-10-23 PROCEDURE — 6370000000 HC RX 637 (ALT 250 FOR IP): Performed by: INTERNAL MEDICINE

## 2024-10-23 PROCEDURE — 3700000000 HC ANESTHESIA ATTENDED CARE: Performed by: INTERNAL MEDICINE

## 2024-10-23 PROCEDURE — 80053 COMPREHEN METABOLIC PANEL: CPT

## 2024-10-23 PROCEDURE — C8924 2D TTE W OR W/O FOL W/CON,FU: HCPCS

## 2024-10-23 PROCEDURE — 6370000000 HC RX 637 (ALT 250 FOR IP)

## 2024-10-23 PROCEDURE — 99153 MOD SED SAME PHYS/QHP EA: CPT | Performed by: INTERNAL MEDICINE

## 2024-10-23 PROCEDURE — 2500000003 HC RX 250 WO HCPCS: Performed by: NURSE ANESTHETIST, CERTIFIED REGISTERED

## 2024-10-23 PROCEDURE — 3700000001 HC ADD 15 MINUTES (ANESTHESIA): Performed by: INTERNAL MEDICINE

## 2024-10-23 PROCEDURE — 2709999900 HC NON-CHARGEABLE SUPPLY: Performed by: INTERNAL MEDICINE

## 2024-10-23 PROCEDURE — 85025 COMPLETE CBC W/AUTO DIFF WBC: CPT

## 2024-10-23 PROCEDURE — 36415 COLL VENOUS BLD VENIPUNCTURE: CPT

## 2024-10-23 PROCEDURE — 6370000000 HC RX 637 (ALT 250 FOR IP): Performed by: STUDENT IN AN ORGANIZED HEALTH CARE EDUCATION/TRAINING PROGRAM

## 2024-10-23 PROCEDURE — 85730 THROMBOPLASTIN TIME PARTIAL: CPT

## 2024-10-23 PROCEDURE — 7100000001 HC PACU RECOVERY - ADDTL 15 MIN: Performed by: INTERNAL MEDICINE

## 2024-10-23 PROCEDURE — 2500000003 HC RX 250 WO HCPCS: Performed by: INTERNAL MEDICINE

## 2024-10-23 RX ORDER — OXYCODONE HYDROCHLORIDE 5 MG/1
10 TABLET ORAL PRN
Status: DISCONTINUED | OUTPATIENT
Start: 2024-10-23 | End: 2024-10-23 | Stop reason: HOSPADM

## 2024-10-23 RX ORDER — SODIUM CHLORIDE 0.9 % (FLUSH) 0.9 %
5-40 SYRINGE (ML) INJECTION PRN
Status: DISCONTINUED | OUTPATIENT
Start: 2024-10-23 | End: 2024-10-23 | Stop reason: HOSPADM

## 2024-10-23 RX ORDER — ONDANSETRON 2 MG/ML
4 INJECTION INTRAMUSCULAR; INTRAVENOUS EVERY 30 MIN PRN
Status: DISCONTINUED | OUTPATIENT
Start: 2024-10-23 | End: 2024-10-23 | Stop reason: HOSPADM

## 2024-10-23 RX ORDER — SODIUM CHLORIDE 9 MG/ML
INJECTION, SOLUTION INTRAVENOUS PRN
Status: DISCONTINUED | OUTPATIENT
Start: 2024-10-23 | End: 2024-10-23 | Stop reason: HOSPADM

## 2024-10-23 RX ORDER — IOPAMIDOL 755 MG/ML
INJECTION, SOLUTION INTRAVASCULAR PRN
Status: DISCONTINUED | OUTPATIENT
Start: 2024-10-23 | End: 2024-10-23 | Stop reason: HOSPADM

## 2024-10-23 RX ORDER — SODIUM CHLORIDE 0.9 % (FLUSH) 0.9 %
5-40 SYRINGE (ML) INJECTION EVERY 12 HOURS SCHEDULED
Status: DISCONTINUED | OUTPATIENT
Start: 2024-10-23 | End: 2024-10-25 | Stop reason: HOSPADM

## 2024-10-23 RX ORDER — SODIUM CHLORIDE 0.9 % (FLUSH) 0.9 %
5-40 SYRINGE (ML) INJECTION EVERY 12 HOURS SCHEDULED
Status: DISCONTINUED | OUTPATIENT
Start: 2024-10-23 | End: 2024-10-23 | Stop reason: HOSPADM

## 2024-10-23 RX ORDER — OXYCODONE HYDROCHLORIDE 5 MG/1
5 TABLET ORAL EVERY 4 HOURS PRN
Status: DISCONTINUED | OUTPATIENT
Start: 2024-10-23 | End: 2024-10-25 | Stop reason: HOSPADM

## 2024-10-23 RX ORDER — LIDOCAINE HYDROCHLORIDE 20 MG/ML
INJECTION, SOLUTION INFILTRATION; PERINEURAL
Status: DISCONTINUED | OUTPATIENT
Start: 2024-10-23 | End: 2024-10-23 | Stop reason: SDUPTHER

## 2024-10-23 RX ORDER — SODIUM CHLORIDE 9 MG/ML
INJECTION, SOLUTION INTRAVENOUS PRN
Status: DISCONTINUED | OUTPATIENT
Start: 2024-10-23 | End: 2024-10-25 | Stop reason: HOSPADM

## 2024-10-23 RX ORDER — ONDANSETRON 2 MG/ML
4 INJECTION INTRAMUSCULAR; INTRAVENOUS EVERY 6 HOURS PRN
Status: DISCONTINUED | OUTPATIENT
Start: 2024-10-23 | End: 2024-10-25 | Stop reason: HOSPADM

## 2024-10-23 RX ORDER — LORAZEPAM 0.5 MG/1
0.5 TABLET ORAL
Status: ACTIVE | OUTPATIENT
Start: 2024-10-23 | End: 2024-10-24

## 2024-10-23 RX ORDER — SODIUM CHLORIDE 0.9 % (FLUSH) 0.9 %
5-40 SYRINGE (ML) INJECTION PRN
Status: DISCONTINUED | OUTPATIENT
Start: 2024-10-23 | End: 2024-10-25 | Stop reason: HOSPADM

## 2024-10-23 RX ORDER — HYDRALAZINE HYDROCHLORIDE 20 MG/ML
10 INJECTION INTRAMUSCULAR; INTRAVENOUS EVERY 10 MIN PRN
Status: DISCONTINUED | OUTPATIENT
Start: 2024-10-23 | End: 2024-10-25 | Stop reason: HOSPADM

## 2024-10-23 RX ORDER — PROPOFOL 10 MG/ML
INJECTION, EMULSION INTRAVENOUS
Status: DISCONTINUED | OUTPATIENT
Start: 2024-10-23 | End: 2024-10-23 | Stop reason: SDUPTHER

## 2024-10-23 RX ORDER — ALBUTEROL SULFATE 0.83 MG/ML
2.5 SOLUTION RESPIRATORY (INHALATION) ONCE
Status: DISCONTINUED | OUTPATIENT
Start: 2024-10-23 | End: 2024-10-23 | Stop reason: HOSPADM

## 2024-10-23 RX ORDER — OXYCODONE HYDROCHLORIDE 5 MG/1
5 TABLET ORAL PRN
Status: DISCONTINUED | OUTPATIENT
Start: 2024-10-23 | End: 2024-10-23 | Stop reason: HOSPADM

## 2024-10-23 RX ORDER — SODIUM CHLORIDE 9 MG/ML
INJECTION, SOLUTION INTRAVENOUS CONTINUOUS
Status: ACTIVE | OUTPATIENT
Start: 2024-10-23 | End: 2024-10-23

## 2024-10-23 RX ORDER — NALOXONE HYDROCHLORIDE 0.4 MG/ML
INJECTION, SOLUTION INTRAMUSCULAR; INTRAVENOUS; SUBCUTANEOUS PRN
Status: DISCONTINUED | OUTPATIENT
Start: 2024-10-23 | End: 2024-10-23 | Stop reason: HOSPADM

## 2024-10-23 RX ORDER — ROCURONIUM BROMIDE 10 MG/ML
INJECTION, SOLUTION INTRAVENOUS
Status: DISCONTINUED | OUTPATIENT
Start: 2024-10-23 | End: 2024-10-23 | Stop reason: SDUPTHER

## 2024-10-23 RX ORDER — ASPIRIN 325 MG
325 TABLET ORAL ONCE
Status: COMPLETED | OUTPATIENT
Start: 2024-10-23 | End: 2024-10-23

## 2024-10-23 RX ADMIN — RANOLAZINE 500 MG: 500 TABLET, FILM COATED, EXTENDED RELEASE ORAL at 13:07

## 2024-10-23 RX ADMIN — INSULIN LISPRO 12 UNITS: 100 INJECTION, SOLUTION INTRAVENOUS; SUBCUTANEOUS at 13:19

## 2024-10-23 RX ADMIN — ACETAMINOPHEN 750 MG: 500 TABLET ORAL at 02:03

## 2024-10-23 RX ADMIN — PHENYLEPHRINE HYDROCHLORIDE 100 MCG: 10 INJECTION INTRAVENOUS at 10:22

## 2024-10-23 RX ADMIN — OXYCODONE 5 MG: 5 TABLET ORAL at 18:27

## 2024-10-23 RX ADMIN — INSULIN LISPRO 6 UNITS: 100 INJECTION, SOLUTION INTRAVENOUS; SUBCUTANEOUS at 16:35

## 2024-10-23 RX ADMIN — METOPROLOL SUCCINATE 12.5 MG: 25 TABLET, EXTENDED RELEASE ORAL at 13:18

## 2024-10-23 RX ADMIN — ROCURONIUM BROMIDE 50 MG: 50 INJECTION, SOLUTION INTRAVENOUS at 10:22

## 2024-10-23 RX ADMIN — LISINOPRIL 2.5 MG: 2.5 TABLET ORAL at 20:24

## 2024-10-23 RX ADMIN — PANTOPRAZOLE SODIUM 40 MG: 40 TABLET, DELAYED RELEASE ORAL at 13:06

## 2024-10-23 RX ADMIN — ACETAMINOPHEN 750 MG: 500 TABLET ORAL at 20:28

## 2024-10-23 RX ADMIN — HEPARIN SODIUM 2000 UNITS: 1000 INJECTION INTRAVENOUS; SUBCUTANEOUS at 06:51

## 2024-10-23 RX ADMIN — Medication 10 ML: at 23:41

## 2024-10-23 RX ADMIN — BUSPIRONE HYDROCHLORIDE 5 MG: 5 TABLET ORAL at 13:07

## 2024-10-23 RX ADMIN — LIDOCAINE HYDROCHLORIDE 50 MG: 20 INJECTION, SOLUTION INFILTRATION; PERINEURAL at 10:22

## 2024-10-23 RX ADMIN — BUSPIRONE HYDROCHLORIDE 5 MG: 5 TABLET ORAL at 20:24

## 2024-10-23 RX ADMIN — SODIUM CHLORIDE: 9 INJECTION, SOLUTION INTRAVENOUS at 10:11

## 2024-10-23 RX ADMIN — Medication 10 ML: at 13:07

## 2024-10-23 RX ADMIN — AMIODARONE HYDROCHLORIDE 400 MG: 200 TABLET ORAL at 13:06

## 2024-10-23 RX ADMIN — INSULIN GLARGINE 24 UNITS: 100 INJECTION, SOLUTION SUBCUTANEOUS at 20:24

## 2024-10-23 RX ADMIN — ASPIRIN 325 MG: 325 TABLET ORAL at 06:51

## 2024-10-23 RX ADMIN — EMPAGLIFLOZIN 10 MG: 10 TABLET, FILM COATED ORAL at 13:19

## 2024-10-23 RX ADMIN — Medication 10 ML: at 13:20

## 2024-10-23 RX ADMIN — SPIRONOLACTONE 25 MG: 25 TABLET, FILM COATED ORAL at 13:07

## 2024-10-23 RX ADMIN — INSULIN LISPRO 12 UNITS: 100 INJECTION, SOLUTION INTRAVENOUS; SUBCUTANEOUS at 16:34

## 2024-10-23 RX ADMIN — PHENYLEPHRINE HYDROCHLORIDE 100 MCG: 10 INJECTION INTRAVENOUS at 10:24

## 2024-10-23 RX ADMIN — PERFLUTREN 1.5 ML: 6.52 INJECTION, SUSPENSION INTRAVENOUS at 08:06

## 2024-10-23 RX ADMIN — FUROSEMIDE 40 MG: 10 INJECTION, SOLUTION INTRAMUSCULAR; INTRAVENOUS at 13:06

## 2024-10-23 RX ADMIN — OXYCODONE 5 MG: 5 TABLET ORAL at 14:17

## 2024-10-23 RX ADMIN — BENZOCAINE: 0.1 GEL TOPICAL at 20:28

## 2024-10-23 RX ADMIN — SODIUM CHLORIDE: 9 INJECTION, SOLUTION INTRAVENOUS at 13:25

## 2024-10-23 RX ADMIN — PROPOFOL 150 MG: 10 INJECTION, EMULSION INTRAVENOUS at 10:22

## 2024-10-23 RX ADMIN — RANOLAZINE 500 MG: 500 TABLET, FILM COATED, EXTENDED RELEASE ORAL at 20:24

## 2024-10-23 RX ADMIN — HYDROMORPHONE HYDROCHLORIDE 0.5 MG: 1 INJECTION, SOLUTION INTRAMUSCULAR; INTRAVENOUS; SUBCUTANEOUS at 11:19

## 2024-10-23 RX ADMIN — OXYCODONE 5 MG: 5 TABLET ORAL at 23:39

## 2024-10-23 ASSESSMENT — PAIN DESCRIPTION - ORIENTATION
ORIENTATION: RIGHT
ORIENTATION: RIGHT

## 2024-10-23 ASSESSMENT — PAIN SCALES - GENERAL
PAINLEVEL_OUTOF10: 7
PAINLEVEL_OUTOF10: 10
PAINLEVEL_OUTOF10: 10
PAINLEVEL_OUTOF10: 0
PAINLEVEL_OUTOF10: 8
PAINLEVEL_OUTOF10: 10
PAINLEVEL_OUTOF10: 8
PAINLEVEL_OUTOF10: 10

## 2024-10-23 ASSESSMENT — PAIN DESCRIPTION - DESCRIPTORS: DESCRIPTORS: ACHING

## 2024-10-23 ASSESSMENT — PAIN DESCRIPTION - FREQUENCY: FREQUENCY: CONTINUOUS

## 2024-10-23 ASSESSMENT — PAIN DESCRIPTION - LOCATION
LOCATION: WRIST
LOCATION: CHEST;TEETH
LOCATION: MOUTH
LOCATION: TEETH
LOCATION: JAW

## 2024-10-23 ASSESSMENT — ENCOUNTER SYMPTOMS
SHORTNESS OF BREATH: 0
ABDOMINAL PAIN: 1

## 2024-10-23 ASSESSMENT — PAIN DESCRIPTION - PAIN TYPE: TYPE: ACUTE PAIN

## 2024-10-23 ASSESSMENT — PAIN DESCRIPTION - ONSET: ONSET: ON-GOING

## 2024-10-23 ASSESSMENT — PAIN - FUNCTIONAL ASSESSMENT: PAIN_FUNCTIONAL_ASSESSMENT: ACTIVITIES ARE NOT PREVENTED

## 2024-10-23 NOTE — CARE COORDINATION
Cardiac Cath today.No interventions per Cardiology:   Percutaneous options at this point are not feasible.  treatment of the LAD could be considered at an advanced center with mechanical support.   Lives at home with SO.Patient is deaf.  Received message from Ramona SPRING ([facility] [phone])- eligible for  meals and RN visits at [place].    Following.    Unique Fragoso RN

## 2024-10-23 NOTE — PROCEDURES
Post Cardiac Catheterization Note.  Full details available in procedure log    DATE: 10/23/2024  PATIENT: Renato Nagy  MRN: [id number]    Procedure Performed:  Left heart catheterization  Coronary angiogram  Left ventriculogram    Procedure Findings:  Severe single-vessel coronary artery disease involving 100% stenosis of the mid left main anterior descending artery.  Artery is a chronic total occlusion and fills via right to left collaterals.  Moderate diffuse disease of the circumflex distribution.  Moderate diffuse in-stent restenosis and disease of the right coronary artery  Severe left ventricular dysfunction with EF 15% and evidence for inferobasal aneurysm.    Conclusion/Recommendations:  Medical management of the patient's CAD  ~Percutaneous options at this point are not feasible.   treatment of the LAD could be considered at an advanced center with mechanical support.   Referral to advanced heart failure team.    Meri Garsia  Interventional Cardiology  [facility]  Office [phone]

## 2024-10-23 NOTE — PRE SEDATION
Sedation Plan  ASA: class 3 - patient with severe systemic disease     Mallampati class: II - soft palate, uvula, fauces visible.    Sedation plan: deep/analgesia    Risks, benefits, and alternatives discussed with patient.    Plan discussed with CRNA.     Immediate reassessment prior to sedation:  Patient's status reviewed and vital signs assessed; acceptable to perform procedure and proceed to administer sedation as planned.

## 2024-10-23 NOTE — ANESTHESIA PRE PROCEDURE
Department of Anesthesiology  Preprocedure Note       Name:  Renato Nagy   Age:  54 y.o.  :  1970                                          MRN:  [id number]         Date:  10/23/2024      Surgeon: Surgeon(s):  Meri Garsia MD    Procedure: Procedure(s):  Left heart cath / coronary angiography    Medications prior to admission:   Prior to Admission medications    Medication Sig Start Date End Date Taking? Authorizing Provider   Insulin Degludec 200 UNIT/ML SOPN Inject 24 Units into the skin daily   Yes Kalpesh Hutton MD   amiodarone (PACERONE) 400 MG tablet Take 1 tablet by mouth daily 10/14/24  Yes Rusty Shirley DO   lisinopril (PRINIVIL;ZESTRIL) 2.5 MG tablet Take 1 tablet by mouth nightly 10/14/24  Yes Rusty Shirley DO   metoprolol succinate (TOPROL XL) 25 MG extended release tablet Take 0.5 tablets by mouth daily 10/15/24  Yes Rusty Shirley DO   gabapentin (NEURONTIN) 300 MG capsule Take 1 capsule by mouth 3 times daily.   Yes Kalpesh Hutton MD   albuterol sulfate HFA (VENTOLIN HFA) 108 (90 Base) MCG/ACT inhaler Inhale 2 puffs into the lungs 4 times daily as needed for Wheezing 23  Yes Mack aSnchez MD   torsemide (DEMADEX) 20 MG tablet Take 1 tablet by mouth daily 8/3/23  Yes Homa Manuel PA-C   spironolactone (ALDACTONE) 25 MG tablet Take 1 tablet by mouth daily 23  Yes Homa Manuel PA-C   busPIRone (BUSPAR) 5 MG tablet Take 1 tablet by mouth 2 times daily 10/22/22  Yes Kalpesh Hutton MD   insulin lispro (HUMALOG,ADMELOG) 100 UNIT/ML SOLN injection vial Inject 12 Units into the skin 3 times daily (with meals) 10/14/24   Rusty Shirley DO   insulin glargine (LANTUS) 100 UNIT/ML injection vial Inject 20 Units into the skin at bedtime 10/14/24   Rusty Shirley DO   ENULOSE 10 GM/15ML SOLN solution  3/9/24   Kalpesh Hutton MD   nitroGLYCERIN (NITROSTAT) 0.4 MG SL tablet PLACE 1 TABLET UNDER TONGUE EVERY 5 MINS, UP TO 3 DOSES AS NEEDED

## 2024-10-23 NOTE — ANESTHESIA POSTPROCEDURE EVALUATION
Department of Anesthesiology  Postprocedure Note    Patient: Renato Nagy  MRN: [id number]  YOB: 1970  Date of evaluation: 10/23/2024    Procedure Summary       Date: 10/23/24 Room / Location: [facility] CATH LAB 1 / [facility] CARDIAC CATH LAB    Anesthesia Start: 1011 Anesthesia Stop: 1105    Procedure: Left heart cath / coronary angiography Diagnosis:       Precordial pain      (Chest pain [R07.9])    Providers: Meri Garsia MD Responsible Provider: Balaji Nichole MD    Anesthesia Type: general ASA Status: 4            Anesthesia Type: No value filed.    Radha Phase I: Radha Score: 10    Radha Phase II:      Anesthesia Post Evaluation    Patient location during evaluation: PACU  Level of consciousness: awake  Airway patency: patent  Nausea & Vomiting: no vomiting  Cardiovascular status: blood pressure returned to baseline  Respiratory status: acceptable  Hydration status: stable  Pain management: adequate    There were no known notable events for this encounter.

## 2024-10-23 NOTE — POST SEDATION
Patient:  Renato Nagy   :   1970    A pre-sedation re-evaluation was performed immediately at the end of the procedure.  Procedure:  Cardiac cath  Medications: Procedural sedation with minimal conscious sedation  Complications: None  Estimated Blood Loss: none  Specimens: Were not obtained        Lester Medication and Procedural Reconciliation:  I agree that the documented medications and procedures performed are true.  The medications were given under my order.  The procedures were performed under my direct supervision.     An immediate re-assessment was completed prior to sedation, and it is determined to be safe to proceed.

## 2024-10-24 LAB
ALBUMIN SERPL-MCNC: 3.4 G/DL (ref 3.4–5)
ALBUMIN/GLOB SERPL: 1 {RATIO} (ref 1.1–2.2)
ALP SERPL-CCNC: 82 U/L (ref 40–129)
ALT SERPL-CCNC: 127 U/L (ref 10–40)
ANION GAP SERPL CALCULATED.3IONS-SCNC: 10 MMOL/L (ref 3–16)
AST SERPL-CCNC: 129 U/L (ref 15–37)
BASOPHILS # BLD: 0.1 K/UL (ref 0–0.2)
BASOPHILS NFR BLD: 0.8 %
BILIRUB SERPL-MCNC: 0.5 MG/DL (ref 0–1)
BUN SERPL-MCNC: 26 MG/DL (ref 7–20)
CALCIUM SERPL-MCNC: 8.3 MG/DL (ref 8.3–10.6)
CHLORIDE SERPL-SCNC: 101 MMOL/L (ref 99–110)
CHOLEST SERPL-MCNC: 122 MG/DL (ref 0–199)
CO2 SERPL-SCNC: 21 MMOL/L (ref 21–32)
CREAT SERPL-MCNC: 0.8 MG/DL (ref 0.9–1.3)
DEPRECATED RDW RBC AUTO: 15.6 % (ref 12.4–15.4)
EOSINOPHIL # BLD: 0.1 K/UL (ref 0–0.6)
EOSINOPHIL NFR BLD: 0.9 %
GFR SERPLBLD CREATININE-BSD FMLA CKD-EPI: >90 ML/MIN/{1.73_M2}
GLUCOSE BLD-MCNC: 100 MG/DL (ref 70–99)
GLUCOSE BLD-MCNC: 160 MG/DL (ref 70–99)
GLUCOSE BLD-MCNC: 217 MG/DL (ref 70–99)
GLUCOSE BLD-MCNC: 97 MG/DL (ref 70–99)
GLUCOSE SERPL-MCNC: 99 MG/DL (ref 70–99)
HCT VFR BLD AUTO: 43.7 % (ref 40.5–52.5)
HDLC SERPL-MCNC: 33 MG/DL (ref 40–60)
HGB BLD-MCNC: 14.4 G/DL (ref 13.5–17.5)
LDLC SERPL CALC-MCNC: 72 MG/DL
LYMPHOCYTES # BLD: 1.7 K/UL (ref 1–5.1)
LYMPHOCYTES NFR BLD: 24.1 %
MCH RBC QN AUTO: 28.8 PG (ref 26–34)
MCHC RBC AUTO-ENTMCNC: 33 G/DL (ref 31–36)
MCV RBC AUTO: 87.2 FL (ref 80–100)
MONOCYTES # BLD: 0.8 K/UL (ref 0–1.3)
MONOCYTES NFR BLD: 11.2 %
NEUTROPHILS # BLD: 4.5 K/UL (ref 1.7–7.7)
NEUTROPHILS NFR BLD: 63 %
PERFORMED ON: ABNORMAL
PERFORMED ON: NORMAL
PLATELET # BLD AUTO: 139 K/UL (ref 135–450)
PMV BLD AUTO: 8.6 FL (ref 5–10.5)
POTASSIUM SERPL-SCNC: 4.3 MMOL/L (ref 3.5–5.1)
PROT SERPL-MCNC: 6.7 G/DL (ref 6.4–8.2)
RBC # BLD AUTO: 5.02 M/UL (ref 4.2–5.9)
SODIUM SERPL-SCNC: 132 MMOL/L (ref 136–145)
TRIGL SERPL-MCNC: 84 MG/DL (ref 0–150)
VLDLC SERPL CALC-MCNC: 17 MG/DL
WBC # BLD AUTO: 7.2 K/UL (ref 4–11)

## 2024-10-24 PROCEDURE — 2580000003 HC RX 258: Performed by: INTERNAL MEDICINE

## 2024-10-24 PROCEDURE — G0378 HOSPITAL OBSERVATION PER HR: HCPCS

## 2024-10-24 PROCEDURE — 6370000000 HC RX 637 (ALT 250 FOR IP): Performed by: INTERNAL MEDICINE

## 2024-10-24 PROCEDURE — 6370000000 HC RX 637 (ALT 250 FOR IP): Performed by: NURSE PRACTITIONER

## 2024-10-24 PROCEDURE — 94640 AIRWAY INHALATION TREATMENT: CPT

## 2024-10-24 PROCEDURE — 6360000002 HC RX W HCPCS: Performed by: INTERNAL MEDICINE

## 2024-10-24 PROCEDURE — 80061 LIPID PANEL: CPT

## 2024-10-24 PROCEDURE — 6370000000 HC RX 637 (ALT 250 FOR IP)

## 2024-10-24 PROCEDURE — 85025 COMPLETE CBC W/AUTO DIFF WBC: CPT

## 2024-10-24 PROCEDURE — 6370000000 HC RX 637 (ALT 250 FOR IP): Performed by: STUDENT IN AN ORGANIZED HEALTH CARE EDUCATION/TRAINING PROGRAM

## 2024-10-24 PROCEDURE — 99232 SBSQ HOSP IP/OBS MODERATE 35: CPT | Performed by: NURSE PRACTITIONER

## 2024-10-24 PROCEDURE — 80053 COMPREHEN METABOLIC PANEL: CPT

## 2024-10-24 RX ORDER — TORSEMIDE 20 MG/1
40 TABLET ORAL DAILY
Status: DISCONTINUED | OUTPATIENT
Start: 2024-10-25 | End: 2024-10-25 | Stop reason: HOSPADM

## 2024-10-24 RX ORDER — NICOTINE 21 MG/24HR
1 PATCH, TRANSDERMAL 24 HOURS TRANSDERMAL DAILY
Status: DISCONTINUED | OUTPATIENT
Start: 2024-10-24 | End: 2024-10-25 | Stop reason: HOSPADM

## 2024-10-24 RX ORDER — IBUPROFEN 400 MG/1
800 TABLET, FILM COATED ORAL EVERY 8 HOURS PRN
Status: DISCONTINUED | OUTPATIENT
Start: 2024-10-24 | End: 2024-10-25 | Stop reason: HOSPADM

## 2024-10-24 RX ADMIN — OXYCODONE 5 MG: 5 TABLET ORAL at 04:06

## 2024-10-24 RX ADMIN — RANOLAZINE 500 MG: 500 TABLET, FILM COATED, EXTENDED RELEASE ORAL at 20:47

## 2024-10-24 RX ADMIN — INSULIN LISPRO 12 UNITS: 100 INJECTION, SOLUTION INTRAVENOUS; SUBCUTANEOUS at 17:24

## 2024-10-24 RX ADMIN — INSULIN LISPRO 2 UNITS: 100 INJECTION, SOLUTION INTRAVENOUS; SUBCUTANEOUS at 12:23

## 2024-10-24 RX ADMIN — Medication 10 ML: at 09:48

## 2024-10-24 RX ADMIN — PANTOPRAZOLE SODIUM 40 MG: 40 TABLET, DELAYED RELEASE ORAL at 09:47

## 2024-10-24 RX ADMIN — METOPROLOL SUCCINATE 12.5 MG: 25 TABLET, EXTENDED RELEASE ORAL at 09:48

## 2024-10-24 RX ADMIN — RANOLAZINE 500 MG: 500 TABLET, FILM COATED, EXTENDED RELEASE ORAL at 09:48

## 2024-10-24 RX ADMIN — INSULIN LISPRO 12 UNITS: 100 INJECTION, SOLUTION INTRAVENOUS; SUBCUTANEOUS at 12:23

## 2024-10-24 RX ADMIN — SPIRONOLACTONE 25 MG: 25 TABLET, FILM COATED ORAL at 09:48

## 2024-10-24 RX ADMIN — LISINOPRIL 2.5 MG: 2.5 TABLET ORAL at 20:47

## 2024-10-24 RX ADMIN — APIXABAN 5 MG: 5 TABLET, FILM COATED ORAL at 20:47

## 2024-10-24 RX ADMIN — INSULIN GLARGINE 24 UNITS: 100 INJECTION, SOLUTION SUBCUTANEOUS at 20:48

## 2024-10-24 RX ADMIN — BUSPIRONE HYDROCHLORIDE 5 MG: 5 TABLET ORAL at 20:47

## 2024-10-24 RX ADMIN — BENZOCAINE: 0.1 GEL TOPICAL at 04:06

## 2024-10-24 RX ADMIN — Medication 10 ML: at 20:49

## 2024-10-24 RX ADMIN — OXYCODONE 5 MG: 5 TABLET ORAL at 14:45

## 2024-10-24 RX ADMIN — ACETAMINOPHEN 750 MG: 500 TABLET ORAL at 14:44

## 2024-10-24 RX ADMIN — PROCHLORPERAZINE EDISYLATE 10 MG: 5 INJECTION INTRAMUSCULAR; INTRAVENOUS at 11:21

## 2024-10-24 RX ADMIN — AMIODARONE HYDROCHLORIDE 400 MG: 200 TABLET ORAL at 09:47

## 2024-10-24 RX ADMIN — ACETAMINOPHEN 750 MG: 500 TABLET ORAL at 04:05

## 2024-10-24 RX ADMIN — TIOTROPIUM BROMIDE AND OLODATEROL 2 PUFF: 3.124; 2.736 SPRAY, METERED RESPIRATORY (INHALATION) at 08:20

## 2024-10-24 RX ADMIN — INSULIN LISPRO 12 UNITS: 100 INJECTION, SOLUTION INTRAVENOUS; SUBCUTANEOUS at 09:52

## 2024-10-24 RX ADMIN — BUSPIRONE HYDROCHLORIDE 5 MG: 5 TABLET ORAL at 09:48

## 2024-10-24 RX ADMIN — EMPAGLIFLOZIN 10 MG: 10 TABLET, FILM COATED ORAL at 09:58

## 2024-10-24 RX ADMIN — FUROSEMIDE 40 MG: 10 INJECTION, SOLUTION INTRAMUSCULAR; INTRAVENOUS at 09:48

## 2024-10-24 ASSESSMENT — PAIN DESCRIPTION - LOCATION
LOCATION: TEETH

## 2024-10-24 ASSESSMENT — PAIN SCALES - GENERAL
PAINLEVEL_OUTOF10: 3
PAINLEVEL_OUTOF10: 0
PAINLEVEL_OUTOF10: 10
PAINLEVEL_OUTOF10: 0
PAINLEVEL_OUTOF10: 4
PAINLEVEL_OUTOF10: 8
PAINLEVEL_OUTOF10: 10
PAINLEVEL_OUTOF10: 0

## 2024-10-24 ASSESSMENT — PAIN DESCRIPTION - ONSET: ONSET: AWAKENED FROM SLEEP

## 2024-10-24 ASSESSMENT — ENCOUNTER SYMPTOMS
ABDOMINAL PAIN: 1
SHORTNESS OF BREATH: 0

## 2024-10-24 ASSESSMENT — PAIN DESCRIPTION - FREQUENCY: FREQUENCY: CONTINUOUS

## 2024-10-24 ASSESSMENT — PAIN DESCRIPTION - ORIENTATION: ORIENTATION: RIGHT

## 2024-10-24 ASSESSMENT — PAIN - FUNCTIONAL ASSESSMENT: PAIN_FUNCTIONAL_ASSESSMENT: PREVENTS OR INTERFERES SOME ACTIVE ACTIVITIES AND ADLS

## 2024-10-24 ASSESSMENT — PAIN DESCRIPTION - PAIN TYPE: TYPE: ACUTE PAIN

## 2024-10-24 ASSESSMENT — PAIN DESCRIPTION - DESCRIPTORS: DESCRIPTORS: ACHING

## 2024-10-24 NOTE — CARE COORDINATION
CM update LOS 1 inpt- Pt had heart cath yesterday with multiple area of CAD- 100% LAD. EF 15% Referred to adv heart failure team. At discharge pt can call [facility] [phone]  for meals and RN visits.

## 2024-10-25 VITALS
DIASTOLIC BLOOD PRESSURE: 65 MMHG | SYSTOLIC BLOOD PRESSURE: 100 MMHG | HEIGHT: 71 IN | RESPIRATION RATE: 18 BRPM | HEART RATE: 68 BPM | OXYGEN SATURATION: 99 % | TEMPERATURE: 97.8 F | BODY MASS INDEX: 24.14 KG/M2 | WEIGHT: 172.4 LBS

## 2024-10-25 LAB
ALBUMIN SERPL-MCNC: 3.4 G/DL (ref 3.4–5)
ALBUMIN/GLOB SERPL: 0.9 {RATIO} (ref 1.1–2.2)
ALP SERPL-CCNC: 82 U/L (ref 40–129)
ALT SERPL-CCNC: 136 U/L (ref 10–40)
ANION GAP SERPL CALCULATED.3IONS-SCNC: 15 MMOL/L (ref 3–16)
AST SERPL-CCNC: 144 U/L (ref 15–37)
BASOPHILS # BLD: 0.1 K/UL (ref 0–0.2)
BASOPHILS NFR BLD: 1.2 %
BILIRUB SERPL-MCNC: 0.6 MG/DL (ref 0–1)
BUN SERPL-MCNC: 27 MG/DL (ref 7–20)
CALCIUM SERPL-MCNC: 8.6 MG/DL (ref 8.3–10.6)
CHLORIDE SERPL-SCNC: 99 MMOL/L (ref 99–110)
CO2 SERPL-SCNC: 14 MMOL/L (ref 21–32)
CREAT SERPL-MCNC: 0.8 MG/DL (ref 0.9–1.3)
DEPRECATED RDW RBC AUTO: 15.5 % (ref 12.4–15.4)
EOSINOPHIL # BLD: 0.1 K/UL (ref 0–0.6)
EOSINOPHIL NFR BLD: 1.3 %
GFR SERPLBLD CREATININE-BSD FMLA CKD-EPI: >90 ML/MIN/{1.73_M2}
GLUCOSE BLD-MCNC: 324 MG/DL (ref 70–99)
GLUCOSE BLD-MCNC: 90 MG/DL (ref 70–99)
GLUCOSE SERPL-MCNC: 80 MG/DL (ref 70–99)
HCT VFR BLD AUTO: 46.4 % (ref 40.5–52.5)
HGB BLD-MCNC: 15.6 G/DL (ref 13.5–17.5)
LYMPHOCYTES # BLD: 2.5 K/UL (ref 1–5.1)
LYMPHOCYTES NFR BLD: 32.8 %
MCH RBC QN AUTO: 29.6 PG (ref 26–34)
MCHC RBC AUTO-ENTMCNC: 33.6 G/DL (ref 31–36)
MCV RBC AUTO: 88.3 FL (ref 80–100)
MONOCYTES # BLD: 0.8 K/UL (ref 0–1.3)
MONOCYTES NFR BLD: 11 %
NEUTROPHILS # BLD: 4.1 K/UL (ref 1.7–7.7)
NEUTROPHILS NFR BLD: 53.7 %
PERFORMED ON: ABNORMAL
PERFORMED ON: NORMAL
PLATELET # BLD AUTO: 138 K/UL (ref 135–450)
PMV BLD AUTO: 8.4 FL (ref 5–10.5)
POTASSIUM SERPL-SCNC: 4.2 MMOL/L (ref 3.5–5.1)
PROT SERPL-MCNC: 7.3 G/DL (ref 6.4–8.2)
RBC # BLD AUTO: 5.26 M/UL (ref 4.2–5.9)
SODIUM SERPL-SCNC: 128 MMOL/L (ref 136–145)
WBC # BLD AUTO: 7.6 K/UL (ref 4–11)

## 2024-10-25 PROCEDURE — 80053 COMPREHEN METABOLIC PANEL: CPT

## 2024-10-25 PROCEDURE — 6370000000 HC RX 637 (ALT 250 FOR IP): Performed by: INTERNAL MEDICINE

## 2024-10-25 PROCEDURE — 6370000000 HC RX 637 (ALT 250 FOR IP)

## 2024-10-25 PROCEDURE — 85025 COMPLETE CBC W/AUTO DIFF WBC: CPT

## 2024-10-25 PROCEDURE — 99232 SBSQ HOSP IP/OBS MODERATE 35: CPT | Performed by: NURSE PRACTITIONER

## 2024-10-25 PROCEDURE — 94640 AIRWAY INHALATION TREATMENT: CPT

## 2024-10-25 PROCEDURE — 6370000000 HC RX 637 (ALT 250 FOR IP): Performed by: NURSE PRACTITIONER

## 2024-10-25 PROCEDURE — 6370000000 HC RX 637 (ALT 250 FOR IP): Performed by: STUDENT IN AN ORGANIZED HEALTH CARE EDUCATION/TRAINING PROGRAM

## 2024-10-25 PROCEDURE — 2580000003 HC RX 258: Performed by: INTERNAL MEDICINE

## 2024-10-25 PROCEDURE — 36415 COLL VENOUS BLD VENIPUNCTURE: CPT

## 2024-10-25 RX ORDER — RANOLAZINE 500 MG/1
500 TABLET, EXTENDED RELEASE ORAL 2 TIMES DAILY
Qty: 60 TABLET | Refills: 3 | Status: SHIPPED | OUTPATIENT
Start: 2024-10-25

## 2024-10-25 RX ORDER — OXYCODONE HYDROCHLORIDE 5 MG/1
5 TABLET ORAL EVERY 6 HOURS PRN
Qty: 12 TABLET | Refills: 0 | Status: SHIPPED | OUTPATIENT
Start: 2024-10-25 | End: 2024-10-28

## 2024-10-25 RX ORDER — NITROGLYCERIN 0.4 MG/1
0.4 TABLET SUBLINGUAL EVERY 5 MIN PRN
Qty: 25 TABLET | Refills: 3 | Status: SHIPPED | OUTPATIENT
Start: 2024-10-25

## 2024-10-25 RX ADMIN — TORSEMIDE 40 MG: 20 TABLET ORAL at 08:59

## 2024-10-25 RX ADMIN — INSULIN LISPRO 12 UNITS: 100 INJECTION, SOLUTION INTRAVENOUS; SUBCUTANEOUS at 09:00

## 2024-10-25 RX ADMIN — AMIODARONE HYDROCHLORIDE 400 MG: 200 TABLET ORAL at 08:59

## 2024-10-25 RX ADMIN — TIOTROPIUM BROMIDE AND OLODATEROL 2 PUFF: 3.124; 2.736 SPRAY, METERED RESPIRATORY (INHALATION) at 08:06

## 2024-10-25 RX ADMIN — SPIRONOLACTONE 25 MG: 25 TABLET, FILM COATED ORAL at 08:59

## 2024-10-25 RX ADMIN — METOPROLOL SUCCINATE 12.5 MG: 25 TABLET, EXTENDED RELEASE ORAL at 08:59

## 2024-10-25 RX ADMIN — PANTOPRAZOLE SODIUM 40 MG: 40 TABLET, DELAYED RELEASE ORAL at 08:59

## 2024-10-25 RX ADMIN — ACETAMINOPHEN 750 MG: 500 TABLET ORAL at 03:52

## 2024-10-25 RX ADMIN — INSULIN LISPRO 6 UNITS: 100 INJECTION, SOLUTION INTRAVENOUS; SUBCUTANEOUS at 13:06

## 2024-10-25 RX ADMIN — Medication 10 ML: at 09:01

## 2024-10-25 RX ADMIN — EMPAGLIFLOZIN 10 MG: 10 TABLET, FILM COATED ORAL at 09:09

## 2024-10-25 RX ADMIN — BUSPIRONE HYDROCHLORIDE 5 MG: 5 TABLET ORAL at 08:59

## 2024-10-25 RX ADMIN — INSULIN LISPRO 12 UNITS: 100 INJECTION, SOLUTION INTRAVENOUS; SUBCUTANEOUS at 13:05

## 2024-10-25 RX ADMIN — APIXABAN 5 MG: 5 TABLET, FILM COATED ORAL at 09:00

## 2024-10-25 RX ADMIN — RANOLAZINE 500 MG: 500 TABLET, FILM COATED, EXTENDED RELEASE ORAL at 08:59

## 2024-10-25 RX ADMIN — OXYCODONE 5 MG: 5 TABLET ORAL at 10:51

## 2024-10-25 ASSESSMENT — PAIN DESCRIPTION - LOCATION
LOCATION: JAW;TEETH
LOCATION: TEETH

## 2024-10-25 ASSESSMENT — PAIN SCALES - GENERAL
PAINLEVEL_OUTOF10: 0
PAINLEVEL_OUTOF10: 7
PAINLEVEL_OUTOF10: 0
PAINLEVEL_OUTOF10: 5

## 2024-10-25 ASSESSMENT — PAIN DESCRIPTION - ORIENTATION
ORIENTATION: RIGHT
ORIENTATION: RIGHT

## 2024-10-25 ASSESSMENT — PAIN DESCRIPTION - DESCRIPTORS
DESCRIPTORS: ACHING
DESCRIPTORS: ACHING

## 2024-10-25 ASSESSMENT — PAIN DESCRIPTION - PAIN TYPE: TYPE: ACUTE PAIN

## 2024-10-25 ASSESSMENT — PAIN DESCRIPTION - ONSET: ONSET: AWAKENED FROM SLEEP

## 2024-10-25 NOTE — DISCHARGE INSTR - DIET

## 2024-10-25 NOTE — PLAN OF CARE
Problem: Chronic Conditions and Co-morbidities  Goal: Patient's chronic conditions and co-morbidity symptoms are monitored and maintained or improved  10/22/2024 0140 by Yosvany Cobb RN  Outcome: Progressing  10/21/2024 1829 by Brian Rehman RN  Outcome: Progressing     Problem: Discharge Planning  Goal: Discharge to home or other facility with appropriate resources  10/22/2024 0140 by Yosvany Cobb RN  Outcome: Progressing  10/21/2024 1829 by Brian Rehman RN  Outcome: Progressing     Problem: Pain  Goal: Verbalizes/displays adequate comfort level or baseline comfort level  Outcome: Progressing     Problem: Skin/Tissue Integrity  Goal: Absence of new skin breakdown  Description: 1.  Monitor for areas of redness and/or skin breakdown  2.  Assess vascular access sites hourly  3.  Every 4-6 hours minimum:  Change oxygen saturation probe site  4.  Every 4-6 hours:  If on nasal continuous positive airway pressure, respiratory therapy assess nares and determine need for appliance change or resting period.  Outcome: Progressing     Problem: Safety - Adult  Goal: Free from fall injury  10/22/2024 0140 by Yosvany Cobb RN  Outcome: Progressing  10/21/2024 1829 by Brian Rehman RN  Outcome: Progressing     
  Problem: Chronic Conditions and Co-morbidities  Goal: Patient's chronic conditions and co-morbidity symptoms are monitored and maintained or improved  10/22/2024 2223 by Yosvany Cobb RN  Outcome: Progressing  10/22/2024 1451 by Daya Noel RN  Outcome: Progressing     Problem: Discharge Planning  Goal: Discharge to home or other facility with appropriate resources  Outcome: Progressing     Problem: Pain  Goal: Verbalizes/displays adequate comfort level or baseline comfort level  10/22/2024 2223 by Yosvany Cobb RN  Outcome: Progressing  10/22/2024 1451 by Daya Noel RN  Outcome: Not Progressing     Problem: Skin/Tissue Integrity  Goal: Absence of new skin breakdown  Description: 1.  Monitor for areas of redness and/or skin breakdown  2.  Assess vascular access sites hourly  3.  Every 4-6 hours minimum:  Change oxygen saturation probe site  4.  Every 4-6 hours:  If on nasal continuous positive airway pressure, respiratory therapy assess nares and determine need for appliance change or resting period.  Outcome: Progressing     Problem: Pain  Goal: Verbalizes/displays adequate comfort level or baseline comfort level  10/22/2024 2223 by Yosvany Cobb RN  Outcome: Progressing  10/22/2024 1451 by Daya Noel RN  Outcome: Not Progressing     
  Problem: Chronic Conditions and Co-morbidities  Goal: Patient's chronic conditions and co-morbidity symptoms are monitored and maintained or improved  10/24/2024 0245 by Angela Cee, RN  Outcome: Progressing  10/24/2024 0028 by Angela Cee RN  Outcome: Progressing  Flowsheets (Taken 10/23/2024 1430 by Bianca Linares RN)  Care Plan - Patient's Chronic Conditions and Co-Morbidity Symptoms are Monitored and Maintained or Improved: Monitor and assess patient's chronic conditions and comorbid symptoms for stability, deterioration, or improvement     
  Problem: Chronic Conditions and Co-morbidities  Goal: Patient's chronic conditions and co-morbidity symptoms are monitored and maintained or improved  Outcome: Progressing     Problem: Discharge Planning  Goal: Discharge to home or other facility with appropriate resources  Outcome: Progressing     Problem: Safety - Adult  Goal: Free from fall injury  Outcome: Progressing     
  Problem: Chronic Conditions and Co-morbidities  Goal: Patient's chronic conditions and co-morbidity symptoms are monitored and maintained or improved  Outcome: Progressing  Flowsheets (Taken 10/23/2024 1430 by Bianca Linares RN)  Care Plan - Patient's Chronic Conditions and Co-Morbidity Symptoms are Monitored and Maintained or Improved: Monitor and assess patient's chronic conditions and comorbid symptoms for stability, deterioration, or improvement     
  Problem: Chronic Conditions and Co-morbidities  Goal: Patient's chronic conditions and co-morbidity symptoms are monitored and maintained or improved  Outcome: Progressing  Flowsheets (Taken 10/24/2024 1047 by Bianca Linares RN)  Care Plan - Patient's Chronic Conditions and Co-Morbidity Symptoms are Monitored and Maintained or Improved: Monitor and assess patient's chronic conditions and comorbid symptoms for stability, deterioration, or improvement     
  Problem: Pain  Goal: Verbalizes/displays adequate comfort level or baseline comfort level  10/22/2024 1451 by Daya Noel RN  Outcome: Not Progressing  10/22/2024 0140 by Yosvany Cobb RN  Outcome: Progressing     Problem: Chronic Conditions and Co-morbidities  Goal: Patient's chronic conditions and co-morbidity symptoms are monitored and maintained or improved  10/22/2024 1451 by Daya Noel RN  Outcome: Progressing  10/22/2024 0140 by Yosvany Cobb RN  Outcome: Progressing     Problem: Discharge Planning  Goal: Discharge to home or other facility with appropriate resources  10/22/2024 0140 by Yosvany Cobb RN  Outcome: Progressing     Problem: Skin/Tissue Integrity  Goal: Absence of new skin breakdown  Description: 1.  Monitor for areas of redness and/or skin breakdown  2.  Assess vascular access sites hourly  3.  Every 4-6 hours minimum:  Change oxygen saturation probe site  4.  Every 4-6 hours:  If on nasal continuous positive airway pressure, respiratory therapy assess nares and determine need for appliance change or resting period.  10/22/2024 0140 by Yosvany Cobb RN  Outcome: Progressing     Problem: Safety - Adult  Goal: Free from fall injury  10/22/2024 0140 by Yosvany Cobb RN  Outcome: Progressing     Problem: Pain  Goal: Verbalizes/displays adequate comfort level or baseline comfort level  10/22/2024 1451 by Daya Noel RN  Outcome: Not Progressing  10/22/2024 0140 by Yosvany Cobb RN  Outcome: Progressing     
CHF Care Plan      Patient's EF (Ejection Fraction) is less than 40%    Heart Failure Medications:  Diuretics:: Furosemide and Spironolactone    (One of the following REQUIRED for EF </= 40%/SYSTOLIC FAILURE but MAY be used in EF% >40%/DIASTOLIC FAILURE)        ACE:: Lisinopril        ARB:: None         ARNI:: None    (Beta Blockers)  NON- Evidenced Based Beta Blocker (for EF% >40%/DIASTOLIC FAILURE): None    Evidenced Based Beta Blocker::(REQUIRED for EF% <40%/SYSTOLIC FAILURE) Metoprolol SUCCinate- Toprol XL  ...................................................................................................................................................    Failed to redirect to the Timeline version of the [employer] SmartLink.      Patient's weights and intake/output reviewed    Daily Weight log at bedside, patient/family participation in use of log: \"yes    Patient's current weight today shows a difference of 0 lbs more than last documented weight.      Intake/Output Summary (Last 24 hours) at 10/23/2024 0962  Last data filed at 10/23/2024 0523  Gross per 24 hour   Intake --   Output 500 ml   Net -500 ml       Education Booklet Provided: yes    Comorbidities Reviewed Yes    Patient has a past medical history of CAP (community acquired pneumonia), CHF (congestive heart failure) (HCC), Cirrhosis (HCC), COPD (chronic obstructive pulmonary disease) (HCC), COVID-19 virus infection, Deaf, Diabetes (HCC), Hepatitis C, Old myocardial infarction, Other acute osteomyelitis, left ankle and foot, Right upper lobe pneumonia, SBP (spontaneous bacterial peritonitis) (HCC), and Severe sepsis (HCC).     >>For CHF and Comorbidity documentation on Education Time and Topics, please see Education Tab      Pt resting in bed at this time on room air. Pt denies shortness of breath. Pt  BKA    Patient and/or Family's stated Goal of Care this Admission: increase activity tolerance, better understand heart failure and disease management, and 
Received page from pt's nurse about arrival/direct admit. Forwarded to Dr. Golden, admitting provider.  
failure and disease management, and be more comfortable prior to discharge        :   
Completed  10/25/2024 1308 by Heidi Lorenzo, RN  Outcome: Progressing  Goal: Absence of cardiac dysrhythmias or at baseline  10/25/2024 1309 by Heidi Lorenzo, RN  Outcome: Completed  10/25/2024 1308 by Heidi Lorenzo, RN  Outcome: Progressing

## 2024-10-25 NOTE — DISCHARGE INSTRUCTIONS
are having trouble quitting, your doctor or nurse can help.  ?Follow a healthy diet - Eat lots of fruits and vegetables, and limit foods with saturated fat (such as meat and fried foods). Limit processed foods.  ?Move your body - Try to walk or do some form of physical activity on most days of the week. Even gentle exercise is good for your health.  ?Lose weight, if you have excess body weight - Your doctor or nurse can help you do this in a healthy way.    What should I do if I run into problems at home:  When should I call an ambulance for chest pain?  Call for an ambulance (in the [place] and [place], call 9-1-1) if:  ?Your chest pain is new or severe, or keeps getting worse.  ?You have chest pain along with shortness of breath.  ?Your chest pain does not get better with rest.  ?Your chest pain does not get better after you take your angina medicine.  ?You feel lightheaded or dizzy.  ?Your symptoms scare or worry you.  If you are having a heart attack, it's very important to get treatment as soon as possible.

## 2024-10-25 NOTE — PROGRESS NOTES
V2.0  [facility] Critical Care Progress Note      Name:  Renato Nagy /Age/Sex: 1970  (54 y.o. male)   MRN & CSN:  [id number] & [id number] Encounter Date/Time: 10/24/2024 9:39 AM EDT    Location:  [facility]7/0437-01 PCP: Carrie Mead APRN - NP       Hospital Day: 4    Assessment and Plan:   Renato Nagy is a 54 y.o. male with pmh of MI, CHF, CAD, A-fib, HTN, HLP, cirrhosis, hearing impairment, and substance abuse  who presents with Chest pain      Interval History: Pt reports that he is doing well today with his chest pain having improved a bit. The pt is still having complaints of dental pain. Pt denies any SOB, headache, n/v.        Plan:  Chest pain  - Atypical  chest pain based on H&P, labs and imaging   - Received nitroglycerin, aspirin, furosemide, heparin in ED/EMS  - Troponin 17 > 17    - Takes metoprolol succinate, lisinopril, nitroglycerin, amiodarone, clopidogrel, apixaban, empagliflozin at home.   - Will continue with amiodarone, apixaban, empagliflozin, furosemide for now.   - Stress Test done 10/11/24. Results showed - Highly abnormal pharmacologic myocardial perfusion study suggestive of severe ischemic cardiomyopathy with multivessel territory infarction. No significant inducible ischemia noted. Findings suggest a high risk of future cardiac events.   - Cardiology consulted. Left Heart Cath scheduled for 10/23/24       A-fib               -  EKG on arrival showing sinus rhythm of 72 BPM, 1st degree AV block  - Last Echo from 24 showing EF 25-30%  - Takes metoprolol succinate, apixaban and clopidogrel at home   - Will continue with metoprolol, clopidogrel for now.   - Cardiology consulted. Their recommendations appreciated.    - Apixaban currently held. Plan to resume    3.   Congestive Heart Failure w/ reduced EF   - No s/s of acute exacerbation at this time.   - Last Echo from 24 with EF of 25-30%  - Currently on empagliflozin at home   - Will continue on empagliflozin this 
    V2.0  [facility] Critical Care Progress Note      Name:  Renato Nagy /Age/Sex: 1970  (54 y.o. male)   MRN & CSN:  [id number] & [id number] Encounter Date/Time: 10/22/2024 9:39 AM EDT    Location:  0437/0437-01 PCP: Carrie Mead APRN - NP       Hospital Day: 2    Assessment and Plan:   Renato Nagy is a 54 y.o. male with pmh of MI, CHF, CAD, A-fib, HTN, HLP, cirrhosis, hearing impairment, and substance abuse  who presents with Chest pain      Interval History: Pt reports that he is still having chest and jaw pain today, but it is down to an 8/10 from 10/10 yesterday. Pt reports that he slept well, and denies any headache, palpitations or shortness of breath.     Plan:  Chest pain  - Atypical  chest pain based on H&P, labs and imaging   - Received nitroglycerin, aspirin, furosemide, heparin in ED/EMS  - Troponin 17 > 17    - Takes metoprolol succinate, lisinopril, nitroglycerin, amiodarone, clopidogrel, apixaban, empagliflozin at home.   - Will continue with amiodarone, apixaban, empagliflozin, furosemide for now.   - Stress Test done 10/11/24. Results showed - Highly abnormal pharmacologic myocardial perfusion study suggestive of severe ischemic cardiomyopathy with multivessel territory infarction. No significant inducible ischemia noted. Findings suggest a high risk of future cardiac events.   - Cardiology consulted. Their recommendations appreciated.      A-fib               -  EKG on arrival showing sinus rhythm of 72 BPM, 1st degree AV block  - Last Echo from 24 showing EF 25-30%  - Takes metoprolol succinate, apixaban and clopidogrel at home   - Will continue with metoprolol, clopidogrel, apixaban for now.   - Cardiology consulted. Their recommendations appreciated.     3.   Heart failure   - No s/s of acute exacerbation at this time.   - Last Echo from 24 with EF of 25-30%  - Currently on empagliflozin at home   - Will continue on empagliflozin this Inpatient stay   - Strict I&O's 
   10/21/24 1416   RT Protocol   History Pulmonary Disease 2   Respiratory pattern 0   Breath sounds 0   Indications for Bronchodilator Therapy None       
  Hospital Medicine Progress Note      Date of Admission: 10/21/2024  Hospital Day: 4    Chief Admission Complaint:  chest pain     Subjective: Nurse  sign language.  Patient is in hospital bed awake and alert.  No new issues or complaints today.  Patient continues to have chest pain which is a 8-9.5/10 on pain scale.  Denies shortness of breath, nausea, and vomiting.    Presenting Admission History:       Renato Nagy is a 54 y.o. male with pmh of MI, CHF, CAD, A-fib, HTN, HLP, cirrhosis, hearing impairment, and substance abuse who presents with chest and jaw pain that has been present for about 1 week. A  service was used for portion of history-gathering (Emilie #[id number]) as the pt has a hearing impairment but is able to use ASL. Pt states that he went to see his primary care doctor over a week ago for jaw and chest pain that he was experiencing, but reports that he wasn't helped there and went to the ED instead on 10/10. The pt had a complete cardiac workup at that time that was negative for MI.     Since then, the pt reports that his pain hasn't resolved and went to [facility] ED before being transferred here. Pt denies symptoms of SOB, n/v, headache, or fever. Pt does report jaw pain, chest pain, and some mild abdominal pain.    Assessment/Plan:      Current Principal Problem:  Chest pain    Angina  -Etiology unclear at this time, cannot rule out cardiac etiology  -Troponin elevated but stable  -Stress test performed on 10/11/2024 showing abnormal perfusion  -Cardiology consulted  -Cath performed 10/23/2024  -Echo pending    Atrial Fibrillation - chronic paroxysmal/persistent/permanent, of unspecified and clinically unable to determine etiology.  Normally rate controlled on metoprolol - continued.  Anticoagulated at baseline on home eliquis - continued.      CHF - chronic combined systolic failure w/ reduced EF 25-30% by Echo dated 4/11/24.  Likely due to hypertensive and/or ischemic 
Bed alarm off. The patient is at baseline functional status and able to use his prosthesis and ambulate to the bathroom.  
Bedside report given to Bianca DA SILVA   
CHF Care Plan      Patient's EF (Ejection Fraction) is less than 40%    Heart Failure Medications:  Diuretics:: Furosemide and Spironolactone    (One of the following REQUIRED for EF </= 40%/SYSTOLIC FAILURE but MAY be used in EF% >40%/DIASTOLIC FAILURE)        ACE:: Lisinopril        ARB:: None         ARNI:: None    (Beta Blockers)  NON- Evidenced Based Beta Blocker (for EF% >40%/DIASTOLIC FAILURE): None    Evidenced Based Beta Blocker::(REQUIRED for EF% <40%/SYSTOLIC FAILURE) Metoprolol SUCCinate- Toprol XL  ...................................................................................................................................................    Failed to redirect to the Timeline version of the [employer] SmartLink.      Patient's weights and intake/output reviewed    Daily Weight log at bedside, patient/family participation in use of log: no    Patient's current weight today shows a difference of 8 lbs more than last documented weight.      Intake/Output Summary (Last 24 hours) at 10/21/2024 1835  Last data filed at 10/21/2024 1600  Gross per 24 hour   Intake 240 ml   Output 400 ml   Net -160 ml       Education Booklet Provided: no    Comorbidities Reviewed Yes    Patient has a past medical history of CAP (community acquired pneumonia), CHF (congestive heart failure) (HCC), Cirrhosis (HCC), COPD (chronic obstructive pulmonary disease) (HCC), COVID-19 virus infection, Deaf, Diabetes (HCC), Hepatitis C, Old myocardial infarction, Other acute osteomyelitis, left ankle and foot, Right upper lobe pneumonia, SBP (spontaneous bacterial peritonitis) (HCC), and Severe sepsis (HCC).     >>For CHF and Comorbidity documentation on Education Time and Topics, please see Education Tab      Pt resting in bed at this time on room air. Pt denies shortness of breath. Pt with pitting lower extremity edema.     Patient and/or Family's stated Goal of Care this Admission: reduce shortness of breath, increase activity tolerance, 
CMU called RN for tachycardia on telemetry (120's-140's). When RN entered the room pt was using sign language with family on video call. Tachycardia resolved and pt is within a normal rate of 74 bpm. Resident physician aware and CMU will call if pt's heart rate goes above 120 again.   
[facility]   Daily Progress Note      Admit Date:  10/21/2024    Reason for follow up visit: Chest pain    History obtained via  Danelle MOSQUERA: \"I'm still having chest pressure and right side chest pain off and on.\"    53 y/o male with PMH notable for HFrEF, ischemic cardiomyopathy, severe CAD/S/P inferior STEMI (STACY to RCA 2014), ICD, Atrial fib, PVD, S/P bilateral AKA, HTN, HLP, cirrhosis, Hep C and hearing impaired/deafness admitted with chest pain. Patient with multiple chest pain admissions and was recently discharged with similar complaints last week after undergoing cardiac testing.  Patient was also seen by EP service last admission to evaluate possible episodes of VT.  Medical management with amiodarone and metoprolol was recommended.  Cardiac testing was also completed and showed significant infarct and minimal ischemia so patient was discharged and told to follow-up for further evaluation.  Review of prior records in other hospital systems reveal that patient was referred to hospice/palliative care in 6/2023 due to poor prognosis and then did not continue to follow with their office.     He presented to [facility] with c/o chest discomfort and placed on heparin and transferred to [facility] for higher level of care. . His troponins were unremarkable. He continued to have chest/jaw pain and still smokes few cigs daily.       Interval History:  Pt. seen and examined; records reviewed  BP stable. Remains on room air  Continues with intermittent chest pain ranging from \"4-10.\"  Reports as right side pain into his chest and jaw (similar to prior cardiac pain)  He remains on heparin  Denies SOB, cough, palpitations or dizziness  Bilateral lower extremity prosthesis  He was pain free until the last few weeks and now increasing frequency of episodes    Subjective:  Pt with no acute overnight cardiac events.    Review of Systems:   Constitutional: no unanticipated weight loss. There's been no change in 
[facility]   Daily Progress Note      Admit Date:  10/21/2024    Reason for follow up visit: Chest and jaw pain    CC: \"I'm not having any more chest pain.\"    History obtained via  Thu    55 y/o male with PMH notable for HFrEF, ischemic cardiomyopathy, severe CAD/S/P inferior STEMI (STACY to RCA 2014), ICD, Atrial fib, PVD, S/P bilateral AKA, HTN, HLP, cirrhosis, Hep C and hearing impaired/deafness admitted with chest pain. Patient with multiple chest pain admissions and was recently discharged with similar complaints last week after undergoing cardiac testing.  Patient was also seen by EP service last admission to evaluate possible episodes of VT.  Medical management with amiodarone and metoprolol was recommended.  Cardiac testing was also completed and showed significant infarct and minimal ischemia so patient was discharged and told to follow-up for further evaluation.  Review of prior records in other hospital systems reveal that patient was referred to hospice/palliative care in 6/2023 due to poor prognosis and then did not continue to follow with their office.      He presented to [facility] with c/o chest discomfort and placed on heparin and transferred to [facility] for higher level of care. . His troponins were unremarkable. He continued to have chest/jaw pain and still smokes few cigs daily. His echo demonstrated a decreased LVEF (~ 15-20%) and cardiac cath on 10/23/24 demonstrated stable disease. Plan is for continued medical management.    Interval History:  Pt. seen and examined; records reviewed  BP stable. Remains on room air  Remains on IV diuretic  Net diuresis -3.2 L since admit; -< 1L last 24 hours  Denies chest pain; + persistent mouth/jaw pain  + occasional nausea  Denies SOB, cough, palpitations, dizziness   Lengthy discussion with Mr. Nagy regarding his cardiac disease and long term prognosis. Wants to follow with us until he moves to [place] (unsure of 
Pt arrived via [facility] without any reported chest pain during transport and with active tooth pain (10/10). Once vitals were taken and pt was settled to the room pt reported 10/10 chest pain and 10/10 tooth pain. No SOB, VSS, and pt is relaxed in bed at this time. Stat EKG ordered. Admitting provider aware. No new orders at this time.       Patient admitted to room 437 from [facility] ED.  Patient oriented to room, call light, bed rails, phone, lights and bathroom.  Patient instructed about the schedule of the day including: vital sign frequency, lab draws, possible tests, frequency of MD and staff rounds, including RN/MD rounding together at bedside, daily weights, and I &O's , bed alarm in place, patient aware of placement and reason.  Telemetry box  in place, patient aware of placement and reason.  Bed locked, in lowest position, side rails up 2/4, call light within reach.  Will continue to monitor.     
Pt brought to PACU. Report obtained from OR RN and anesthesia. Pt placed on monitor and RA   
Pt is dressed and packed and ready for discharge. Ride/family member here to take him home. Reviewed discharge AVS with him. All questions answered.   
Reviewed AVS with pt and pts caregiver at bedside. Both communicated understanding. Pt has new meds and meds brought from home in our pharmacies, pt assisted out via wheel chair to both pharmacies and then to his ride home.   
[facility]   Daily Progress Note      Admit Date:  10/21/2024    Reason for follow up visit: Chest and jaw pain    CC: \"I feel good.\"    Information obtained via , sophia Kowalski.    55 y/o male with PMH notable for HFrEF, ischemic cardiomyopathy, severe CAD/S/P inferior STEMI (STACY to RCA 2014), ICD, Atrial fib, PVD, S/P bilateral AKA, HTN, HLP, cirrhosis, Hep C and hearing impaired/deafness admitted with chest pain. Patient with multiple chest pain admissions and was recently discharged with similar complaints last week after undergoing cardiac testing.  Patient was also seen by EP service last admission to evaluate possible episodes of VT.  Medical management with amiodarone and metoprolol was recommended.  Cardiac testing was also completed and showed significant infarct and minimal ischemia so patient was discharged and told to follow-up for further evaluation.  Review of prior records in other hospital systems reveal that patient was referred to hospice/palliative care in 6/2023 due to poor prognosis and then did not continue to follow with their office.      He presented to [facility] with c/o chest discomfort and placed on heparin and transferred to [facility] for higher level of care. . His troponins were unremarkable. He continued to have chest/jaw pain and still smokes few cigs daily. His echo demonstrated a decreased LVEF (~ 15-20%) and cardiac cath on 10/23/24 demonstrated stable disease. Plan is for continued medical management.    Interval History:  Pt. seen and examined; records reviewed  BP stable. Remains on room air  Now on po diuretic  Net diuresis-4.9L since admit/-1.6 L last 24 hours  Denies nausea, chest pain, SOB, cough, palpitations, dizziness  Anxious to go home.    Subjective:  Pt with no acute overnight cardiac events.     Review of Systems:   Constitutional: no unanticipated weight loss. There's been no change in energy level, sleep pattern, or activity level.   No fevers, 
The patient has  been complaining of tooth pain 10/10. This RN explained the pain medication regimen as prescribed by the physician and as reiterated by the NP thru secure chat and the significance of adhering to the prescribed regimen. Tylenol 750 every 8 hrs and oxycodone 5 mg every 4 hrs and oragel prn. However, he states that the current pain medication regimen is not working and that he needs something stronger because the tooth needs to be pulled. Notified NP and received orders for Ibuprofen every 8 hrs.   
The patient is complaining of toothache even after a dose of oxy 5 mg at around 6 pm. Deferred to Np for pain control. This RN was advised to stick to pain medication regimen.   
The patient is in no acute distress on rounds. Denies needs at this time. [address], but the patient is able to demonstrate ambulating with prosthesis and walker independently. Bed alarm off. Call light within reach.   
Cardiac and mediastinal silhouettes appear within normal limits for size.  Pulmonary vascularity is normal.  No consolidation or effusion.  Degenerative change in the shoulders bilaterally. Aortic atherosclerosis.     Clear chest without acute cardiopulmonary process.       Electronically signed by Eris Hay DO on 10/23/2024 at 7:54 AM     Eris Hay DO, PGY-1  [facility]

## 2024-10-25 NOTE — DISCHARGE INSTR - COC
Continuity of Care Form    Patient Name: Renato Nagy   :  1970  MRN:  [id number]    Admit date:  10/21/2024  Discharge date:  ***    Code Status Order: Full Code   Advance Directives:   Advance Care Flowsheet Documentation             Admitting Physician:  Marcelo Golden MD  PCP: Carrie Mead APRN - NP    Discharging Nurse: ***  Discharging Hospital Unit/Room#: 0437/0437-01  Discharging Unit Phone Number: ***    Emergency Contact:   Extended Emergency Contact Information  Primary Emergency Contact: Shobha Arayana  Address: [address] [place] [address]  Home Phone: [phone]  Mobile Phone: [phone]  Relation: Other    Past Surgical History:  Past Surgical History:   Procedure Laterality Date    FOOT SURGERY Left 2024    PARTIAL LEFT FOOT AMPUTATION performed by Juan M Quarles DPM at [facility]    LEG AMPUTATION BELOW KNEE Right     LEG AMPUTATION BELOW KNEE Left 2024    LEG AMPUTATION BELOW KNEE performed by Juan M Monteiro MD at [facility]    OTHER SURGICAL HISTORY  2024    PARTIAL LEFT FOOT AMPUTATION - Left    PACEMAKER INSERTION         Immunization History:   Immunization History   Administered Date(s) Administered    Hep A, HAVRIX, VAQTA, (age 19y+), IM, 1mL 2019    Influenza Virus Vaccine 1999, 10/21/2020    Influenza, FLUBLOK, (age 18 y+), Quadv PF, 0.5mL 2019    Pneumococcal, PPSV23, PNEUMOVAX 23, (age 2y+), SC/IM, 0.5mL 2007, 2014    TDaP, ADACEL (age 10y-64y), BOOSTRIX (age 10y+), IM, 0.5mL 10/21/2020    Td vaccine (adult) 2007, 2007    Zoster Recombinant (Shingrix) 10/21/2020       Active Problems:  Patient Active Problem List   Diagnosis Code    Acute on chronic congestive heart failure, unspecified heart failure type ([facility]) I50.9    Acute pulmonary edema J81.0    Below knee amputation ([facility]) S88.119A    Cellulitis of right lower extremity L03.115    QT prolongation R94.31    SOB

## 2024-10-25 NOTE — DISCHARGE SUMMARY
V2.0  Discharge Summary    Name:  Renato Nagy /Age/Sex: 1970 (54 y.o. male)   Admit Date: 10/21/2024  Discharge Date: 10/25/24    MRN & CSN:  [id number] & [id number] Encounter Date and Time 10/25/24 7:09 AM EDT    Attending:  Americo Jack DO Discharging Provider: Eris Hay DO       Hospital Course:     Brief HPI: Renato Nagy is a 54 y.o. male with pmh of MI, CHF, CAD, A-fib, HTN, HLP, cirrhosis, hearing impairment, and substance abuse  who presents with Chest pain.    Brief Problem Based Course:     Chest pain  - Atypical  chest pain based on H&P, labs and imaging   - Takes metoprolol succinate, lisinopril, nitroglycerin, amiodarone, clopidogrel, apixaban, empagliflozin at home. These can all be continued on discharge  - Stress Test done 10/11/24.   - Cardiology consulted. Left Heart Cath done 10/23/24  - Per cardiology, pt can be medically managed for now. Pt is not a feasible candidate for PCI at this time.  treatment of LAD could be considered at an advanced center with mechanical support  - Pain improved w/ administration of oxycodone  - Pt's pain management plan was reviewed during admission   - Pt with intractable pain with non-opioid medications  - Discussed the risks of opioid therapy given the pt's PmHX of substance use disorders and previous overdose  - Affirmed the need for the pt to follow up w/ his cardiologist as scheduled and to schedule himself with a pain management program for better pain control  - Pt has been seen and evaluated for chest pain   - Pt was given 3 day supply of oxycodone 5 mg (12 tablets)        A-fib   - Last Echo from 24 showing EF 25-30%  - Metoprolol succinate, apixaban and clopidogrel continued during admission, can continue at home       3.   Congestive Heart Failure w/ reduced EF   - No s/s of acute exacerbation at this time.   - Last Echo from 24 with EF of 25-30%  - Empagliflozin continued during admission, can continue at home

## 2024-10-25 NOTE — CARE COORDINATION
CASE MANAGEMENT DISCHARGE SUMMARY      Discharge to: Home w/ SO    CM called with an update to [facility] planner  IMM given: 10/25/24     New Durable Medical Equipment ordered/agency: no    Transportation:    Family/car: yes   Confirmed discharge plan with:     Patient: yes/     Family:  yes/Anastasiya    Name: Contact number:[phone]        RN, name: Heidi    Note: Discharging nurse to complete KATIA, reconcile AVS, and place final copy with patient's discharge packet. RN to ensure that written prescriptions for  Level II medications are sent with patient to the facility as per protocol.    Risa Paez RN

## (undated) DEVICE — BANDAGE,GAUZE,BULKEE II,4.5"X4.1YD,STRL: Brand: MEDLINE

## (undated) DEVICE — GOWN SIRUS NONREIN XL W/TWL: Brand: MEDLINE INDUSTRIES, INC.

## (undated) DEVICE — X-RAY CASSETTE COVER: Brand: CONVERTORS

## (undated) DEVICE — SOLUTION IRRIG 3000ML 0.9% SOD CHL USP UROMATIC PLAS CONT

## (undated) DEVICE — DUAL CUT SAGITTAL BLADE

## (undated) DEVICE — GLOVE ORANGE PI 7 1/2   MSG9075

## (undated) DEVICE — SYRINGE MED 10ML LUERLOCK TIP W/O SFTY DISP

## (undated) DEVICE — DRESSING,GAUZE,XEROFORM,CURAD,1"X8",ST: Brand: CURAD

## (undated) DEVICE — 3M™ COBAN™ NL STERILE NON-LATEX SELF-ADHERENT WRAP, 2084S, 4 IN X 5 YD (10 CM X 4,5 M), 18 ROLLS/CASE: Brand: 3M™ COBAN™

## (undated) DEVICE — PAD,ABDOMINAL,8"X10",ST,LF: Brand: MEDLINE

## (undated) DEVICE — BANDAGE,GAUZE,4.5"X4.1YD,STERILE,LF: Brand: MEDLINE

## (undated) DEVICE — SUTURE PERMAHAND SZ 2-0 L30IN NONABSORBABLE BLK SILK W/O A305H

## (undated) DEVICE — SOLUTION IV IRRIG 500ML 0.9% SODIUM CHL 2F7123

## (undated) DEVICE — SUTURE PERMA-HAND SZ 2-0 L30IN NONABSORBABLE BLK L26MM SH K833H

## (undated) DEVICE — SUTURE PERMAHAND SZ 2-0 L18IN NONABSORBABLE BLK L26MM SH C012D

## (undated) DEVICE — MHCZ EXTREMITY: Brand: MEDLINE INDUSTRIES, INC.

## (undated) DEVICE — IMMOBILIZER KNEE L20IN AD 1 SZ FIT MOST UNIV WRP ARND OPN

## (undated) DEVICE — OPTIFOAM GENTLE,NON-BORDERED,4X4: Brand: MEDLINE

## (undated) DEVICE — SEALER ENDOSCP NANO COAT OPN DIV CRV L JAW LIGASURE IMPACT

## (undated) DEVICE — HANDPIECE SET WITH HIGH FLOW TIP AND SUCTION TUBE: Brand: INTERPULSE

## (undated) DEVICE — SHOE, POST-OPERATIVE: Brand: DEROYAL

## (undated) DEVICE — COTTON UNDERCAST PADDING,CRIMPED FINISH: Brand: WEBRIL

## (undated) DEVICE — TIP SUCT DIA12FR W STYL CTRL VENT DISPOSABLE FRAZ

## (undated) DEVICE — SPLINT KNEE L20IN FOR 32IN THGH UNIV FOAM 3 PC DSGN TRIMMED

## (undated) DEVICE — SUTURE VCRL + SZ 0 L27IN ABSRB UD L36MM CT-1 1/2 CIR VCPP41D

## (undated) DEVICE — COVER,MAYO STAND,STERILE: Brand: MEDLINE

## (undated) DEVICE — WAX SURG 2.5GM HEMSTAT BNE BEESWAX PARAFFIN ISO PALMITATE

## (undated) DEVICE — SPONGE LAP W18XL18IN WHT COT 4 PLY FLD STRUNG RADPQ DISP ST 2 PER PACK